# Patient Record
Sex: FEMALE | Race: WHITE | Employment: OTHER | URBAN - METROPOLITAN AREA
[De-identification: names, ages, dates, MRNs, and addresses within clinical notes are randomized per-mention and may not be internally consistent; named-entity substitution may affect disease eponyms.]

---

## 2017-07-17 PROBLEM — M16.11 PRIMARY OSTEOARTHRITIS OF RIGHT HIP: Status: ACTIVE | Noted: 2017-07-17

## 2018-04-12 ENCOUNTER — HOSPITAL ENCOUNTER (OUTPATIENT)
Age: 73
Discharge: HOME | End: 2018-04-14

## 2018-04-12 PROCEDURE — 88360 TUMOR IMMUNOHISTOCHEM/MANUAL: CPT

## 2018-04-12 PROCEDURE — 88342 IMHCHEM/IMCYTCHM 1ST ANTB: CPT

## 2018-04-12 PROCEDURE — 88305 TISSUE EXAM BY PATHOLOGIST: CPT

## 2018-04-12 PROCEDURE — 88341 IMHCHEM/IMCYTCHM EA ADD ANTB: CPT

## 2021-04-14 ENCOUNTER — HOSPITAL ENCOUNTER (EMERGENCY)
Age: 76
Discharge: HOME | End: 2021-04-14
Payer: MEDICARE

## 2021-04-14 VITALS
SYSTOLIC BLOOD PRESSURE: 179 MMHG | HEART RATE: 80 BPM | RESPIRATION RATE: 18 BRPM | BODY MASS INDEX: 30.86 KG/M2 | TEMPERATURE: 98 F | HEIGHT: 58 IN | OXYGEN SATURATION: 98 % | DIASTOLIC BLOOD PRESSURE: 71 MMHG | WEIGHT: 147 LBS

## 2021-04-14 DIAGNOSIS — H65.02 ACUTE SEROUS OTITIS MEDIA OF LEFT EAR, RECURRENCE NOT SPECIFIED: ICD-10-CM

## 2021-04-14 DIAGNOSIS — R42 LIGHTHEADEDNESS: Primary | ICD-10-CM

## 2021-04-14 PROCEDURE — 99211 OFF/OP EST MAY X REQ PHY/QHP: CPT

## 2021-04-14 PROCEDURE — 6370000000 HC RX 637 (ALT 250 FOR IP): Performed by: PHYSICIAN ASSISTANT

## 2021-04-14 RX ORDER — MECLIZINE HCL 12.5 MG/1
25 TABLET ORAL ONCE
Status: COMPLETED | OUTPATIENT
Start: 2021-04-14 | End: 2021-04-14

## 2021-04-14 RX ORDER — ASCORBIC ACID 500 MG
500 TABLET ORAL DAILY
COMMUNITY

## 2021-04-14 RX ORDER — ALPRAZOLAM 0.5 MG/1
0.5 TABLET ORAL NIGHTLY PRN
COMMUNITY
End: 2022-08-15 | Stop reason: ALTCHOICE

## 2021-04-14 RX ORDER — OLMESARTAN MEDOXOMIL 40 MG/1
40 TABLET ORAL DAILY
COMMUNITY

## 2021-04-14 RX ORDER — MECLIZINE HYDROCHLORIDE 25 MG/1
25 TABLET ORAL 3 TIMES DAILY PRN
Qty: 21 TABLET | Refills: 0 | Status: SHIPPED | OUTPATIENT
Start: 2021-04-14 | End: 2021-04-21

## 2021-04-14 RX ORDER — ANASTROZOLE 1 MG/1
1 TABLET ORAL DAILY
COMMUNITY

## 2021-04-14 RX ORDER — LORATADINE 10 MG/1
10 TABLET ORAL DAILY
Qty: 7 TABLET | Refills: 0 | Status: SHIPPED | OUTPATIENT
Start: 2021-04-14 | End: 2021-04-21

## 2021-04-14 RX ADMIN — MECLIZINE 25 MG: 12.5 TABLET ORAL at 19:29

## 2021-04-14 NOTE — ED PROVIDER NOTES
[address] [facility]  Department of Emergency Medicine   ED  Encounter Note  Admit Date/RoomTime: 2021  7:02 PM  ED Room:     NAME: Crystal Evans  : 1945  MRN: [id number]     Chief Complaint:  Dizziness (Pt c/o dizziness with nausea for 3 days, also c/o L ear feeling clogged off and on for 3 days) and Nausea    History of Present Illness        Crystal Evans is a 67 y.o. old female who presents to the emergency department with a complaint of dizziness. Patient states for 3 days now she has had some sinus congestion. Facial pressure. She feels pain and clogging to her left ear. In addition she has been light headed. She just feels a little off balance and lightheaded. The room is not spinning. She thought maybe it was a wax buildup in her ear so she has been using over-the-counter Debrox to her left ear. Does get slightly nauseated when she gets the dizzy feeling. Patient denies any recurrent headaches. Denies any visual changes or blurred vision. Denies sore throat. Denies cough or chest congestion. Denies any vomiting or diarrhea. Patient is not a diabetic. She is not on any blood thinners. Vertebrobasilar CVA Symptoms:    Vertigo: no (0)   Diplopia: no (0)   Decreased Vision: no (0)   Oscillopsia: no (0)   Ataxia: yes (1)   Precipitated by moving one upper extremity with  Decreased BP (subclavian steal syndrome):       no (0)   Total:    1     N/A  ROS   Pertinent positives and negatives are stated within HPI, all other systems reviewed and are negative. Past Medical History:  has a past medical history of Cancer ([address] Hyperlipidemia, Hypertension, and Pulmonary embolism ([address] Surgical History:  has a past surgical history that includes joint replacement; Foot surgery; Cholecystectomy;  section; Carpal tunnel release; Mastectomy (Right, 2018); and Mastectomy (Right, 2018). Social History:  reports that she has never smoked.  She has never used smokeless tobacco. She reports current alcohol use. She reports that she does not use drugs. Family History: family history is not on file. Allergies: Medrol [methylprednisolone], Penicillins, Biaxin [clarithromycin], Cortisone, Prednisone, Tetracyclines & related, and Procardia [nifedipine]    Physical Exam   Oxygen Saturation Interpretation: Normal.        ED Triage Vitals [04/14/21 1904]   BP Temp Temp Source Pulse Resp SpO2 Height Weight   (!) 179/71 98 °F (36.7 °C) Infrared 80 18 98 % 4' 10\" (1.473 m) 147 lb (66.7 kg)       General:  NAD. Alert and Oriented. Well-appearing. Skin:  Warm, dry. No rashes. Head:  Normocephalic. Atraumatic. Eyes:  EOMI. Conjunctiva normal.  Pupils equal round and reactive to light. ENT:  Oral mucosa moist.  Airway patent. Posterior pharynx slightly erythematous with some minimal vesicle formation to the posterior pharynx wall. Left TM is bulging with air bubbles behind the TM. Left ear canal itself has absolutely no wax, no swelling, no edema. Right TM also has a slight bulge, but no air-fluid levels. Neck:  Supple. Normal ROM. Respiratory:  No respiratory distress. No labored breathing. Lungs clear without rales, rhonchi or wheezing. Cardiovascular:  Regular rate. No peripheral edema. Extremities warm and good color. Extremities:  Normal ROM. Nontender to palpation. Atraumatic. Back:  Normal ROM. Nontender to palpation. Neuro:  Alert and Oriented to person, place, time and situation. Normal LOC. Moves all extremities. Speech fluent. Alert and Oriented to person, place, time, and situation. Normal LOC. Normal speech. No visual deficit. Negative facial droop. Tongue midline. Negative arm drift bilateral.  Equal  strength. Negative leg drift bilateral. Heel to shin normal bilateral.  Normal sensation to bilateral face, both arms and both legs. When I had patient stand she did sway for a second and then got her balance and was okay.   Psych: Calm and Cooperative. Normal thought process. Normal judgement. Lab / Imaging Results   (All laboratory and radiology results have been personally reviewed by myself)  Labs:  No results found for this visit on 04/14/21. Imaging: All Radiology results interpreted by Radiologist unless otherwise noted. No orders to display         ED Course / Medical Decision Making     Medications   meclizine (ANTIVERT) tablet 25 mg (25 mg Oral Given 4/14/21 1929)        Re-Evaluations:  4/14/21      Time: 1955    Patients condition remains unchanged. Consultations:             None    Procedures:   none    MDM: Patient medicated 1 time here with Antivert. Prescription of Antivert and Claritin called in. I did explain to her that if she does not get any relief of this dizziness by tonight that she is to go to the emergency room for further evaluation. Patient understands. Plan of Care/Counseling:  I reviewed today's visit with the patient in addition to providing specific details for the plan of care and counseling regarding the diagnosis and prognosis. Questions are answered at this time and are agreeable with the plan. Assessment      1. Lightheadedness New Problem   2. Acute serous otitis media of left ear, recurrence not specified New Problem     This patient's ED course included: a personal history and physicial examination and re-evaluation prior to disposition  This patient has remained hemodynamically stable and improved during their ED course. Plan   Discharge home. Patient condition is good. New Medications     New Prescriptions    LORATADINE (CLARITIN) 10 MG TABLET    Take 1 tablet by mouth daily for 7 days    MECLIZINE (ANTIVERT) 25 MG TABLET    Take 1 tablet by mouth 3 times daily as needed for Dizziness     Electronically signed by JELENA Brady   DD: 4/14/21  **This report was transcribed using voice recognition software.  Every effort was made to ensure accuracy; however, inadvertent

## 2022-08-15 ENCOUNTER — APPOINTMENT (OUTPATIENT)
Dept: GENERAL RADIOLOGY | Age: 77
End: 2022-08-15
Payer: MEDICARE

## 2022-08-15 ENCOUNTER — HOSPITAL ENCOUNTER (EMERGENCY)
Age: 77
Discharge: HOME | End: 2022-08-15
Attending: EMERGENCY MEDICINE
Payer: MEDICARE

## 2022-08-15 VITALS
TEMPERATURE: 97.8 F | RESPIRATION RATE: 16 BRPM | OXYGEN SATURATION: 97 % | DIASTOLIC BLOOD PRESSURE: 78 MMHG | HEIGHT: 58 IN | SYSTOLIC BLOOD PRESSURE: 167 MMHG | HEART RATE: 82 BPM | WEIGHT: 138 LBS | BODY MASS INDEX: 28.97 KG/M2

## 2022-08-15 DIAGNOSIS — M23.91 INTERNAL DERANGEMENT OF RIGHT KNEE: Primary | ICD-10-CM

## 2022-08-15 PROCEDURE — 99283 EMERGENCY DEPT VISIT LOW MDM: CPT

## 2022-08-15 PROCEDURE — 6370000000 HC RX 637 (ALT 250 FOR IP): Performed by: EMERGENCY MEDICINE

## 2022-08-15 PROCEDURE — 73564 X-RAY EXAM KNEE 4 OR MORE: CPT

## 2022-08-15 RX ORDER — PANTOPRAZOLE SODIUM 40 MG/1
40 GRANULE, DELAYED RELEASE ORAL
COMMUNITY

## 2022-08-15 RX ORDER — ROSUVASTATIN CALCIUM 5 MG/1
5 TABLET, COATED ORAL DAILY
COMMUNITY

## 2022-08-15 RX ORDER — ACETAMINOPHEN 500 MG
1000 TABLET ORAL ONCE
Status: COMPLETED | OUTPATIENT
Start: 2022-08-15 | End: 2022-08-15

## 2022-08-15 RX ORDER — TRAMADOL HYDROCHLORIDE 50 MG/1
50 TABLET ORAL EVERY 8 HOURS PRN
Qty: 9 TABLET | Refills: 0 | Status: SHIPPED | OUTPATIENT
Start: 2022-08-15 | End: 2022-08-18

## 2022-08-15 RX ADMIN — ACETAMINOPHEN 1000 MG: 500 TABLET ORAL at 12:33

## 2022-08-15 ASSESSMENT — ENCOUNTER SYMPTOMS
BACK PAIN: 0
PHOTOPHOBIA: 0
CHEST TIGHTNESS: 0
COLOR CHANGE: 0
SINUS PAIN: 0
SHORTNESS OF BREATH: 0

## 2022-08-15 ASSESSMENT — PAIN - FUNCTIONAL ASSESSMENT: PAIN_FUNCTIONAL_ASSESSMENT: 0-10

## 2022-08-15 ASSESSMENT — PAIN DESCRIPTION - LOCATION
LOCATION: KNEE
LOCATION: KNEE

## 2022-08-15 ASSESSMENT — PAIN SCALES - GENERAL
PAINLEVEL_OUTOF10: 4
PAINLEVEL_OUTOF10: 2

## 2022-08-15 ASSESSMENT — PAIN DESCRIPTION - PAIN TYPE: TYPE: ACUTE PAIN

## 2022-08-15 ASSESSMENT — PAIN DESCRIPTION - DESCRIPTORS
DESCRIPTORS: SHARP;TIGHTNESS;PRESSURE
DESCRIPTORS: SQUEEZING;STABBING;SHOOTING

## 2022-08-15 ASSESSMENT — PAIN DESCRIPTION - ORIENTATION
ORIENTATION: RIGHT
ORIENTATION: RIGHT

## 2022-08-15 NOTE — Clinical Note
Shannon Ariza was seen and treated in our emergency department on 8/15/2022. She may return to work on 08/17/2022. If you have any questions or concerns, please don't hesitate to call.       Ronnie Berrios, DO

## 2022-08-15 NOTE — ED PROVIDER NOTES
HPI:  8/15/22, Time: 10:45 AM EDT         Misa Bales is a 67 y.o. female presenting to the ED for sharp right knee pain wile moving a lawn chair, beginning yesterday ago. The complaint has been intermittent, moderate in severity, and worsened by nothing. 68 yo f who ntoes she takes aspirin was bending over movin ga lawn chair yesterday and felt a sharp pop in her right knee. She ntoes it happened twice has been having pain since. She notes she couldn't bend it yesterday now. Pt follows w dr Shira cross in [place]. Pain is now 2/10 was 10/10 better w rest worse with movement. Review of Systems   Constitutional:  Negative for chills and fever. HENT:  Negative for congestion and sinus pain. Eyes:  Negative for photophobia and visual disturbance. Respiratory:  Negative for chest tightness and shortness of breath. Cardiovascular:  Negative for chest pain and palpitations. Endocrine: Negative for polyphagia and polyuria. Genitourinary:  Negative for difficulty urinating and dysuria. Musculoskeletal:  Negative for arthralgias and back pain. Skin:  Negative for color change and rash. Allergic/Immunologic: Negative for immunocompromised state. Neurological:  Negative for dizziness and light-headedness. Hematological:  Negative for adenopathy. Does not bruise/bleed easily. Psychiatric/Behavioral:  Negative for agitation.              --------------------------------------------- PAST HISTORY ---------------------------------------------  Past Medical History:  has a past medical history of Cancer [address] [address] Hyperlipidemia, Hypertension, and Pulmonary embolism ([facility] Past Surgical History:  has a past surgical history that includes joint replacement; Foot surgery; Cholecystectomy;  section; Carpal tunnel release; Mastectomy (Right, 2018); and Mastectomy (Right, 2018). Social History:  reports that she has never smoked.  She has never used smokeless tobacco. She reports current alcohol use. She reports that she does not use drugs. Family History: family history is not on file. The patients home medications have been reviewed. Allergies: Medrol [methylprednisolone], Penicillins, Biaxin [clarithromycin], Cortisone, Prednisone, Tetracyclines & related, and Procardia [nifedipine]    -------------------------------------------------- RESULTS -------------------------------------------------  All laboratory and radiology results have been personally reviewed by myself   LABS:  No results found for this visit on 08/15/22. RADIOLOGY:  Interpreted by Radiologist.  XR KNEE RIGHT (MIN 4 VIEWS)   Final Result   Mild degenerative change about the right knee without acute bony abnormality.             ------------------------- NURSING NOTES AND VITALS REVIEWED ---------------------------   The nursing notes within the ED encounter and vital signs as below have been reviewed. BP (!) 167/78   Pulse 82   Temp 97.8 °F (36.6 °C) (Temporal)   Resp 16   Ht 4' 10\" (1.473 m)   Wt 138 lb (62.6 kg)   SpO2 97%   BMI 28.84 kg/m²   Oxygen Saturation Interpretation: Normal      ---------------------------------------------------PHYSICAL EXAM--------------------------------------      Physical Exam  Vitals reviewed. Constitutional:       General: She is not in acute distress. Appearance: Normal appearance. She is not toxic-appearing. HENT:      Head: Normocephalic and atraumatic. Right Ear: External ear normal.      Left Ear: External ear normal.      Nose: Nose normal. No congestion. Mouth/Throat:      Mouth: Mucous membranes are moist.      Pharynx: Oropharynx is clear. No posterior oropharyngeal erythema. Eyes:      Extraocular Movements: Extraocular movements intact. Pupils: Pupils are equal, round, and reactive to light. Cardiovascular:      Rate and Rhythm: Normal rate and regular rhythm. Pulses: Normal pulses. Heart sounds: No murmur heard.   Pulmonary: Effort: Pulmonary effort is normal.      Breath sounds: No wheezing or rhonchi. Chest:      Chest wall: No tenderness. Abdominal:      General: Bowel sounds are normal.      Tenderness: There is no abdominal tenderness. There is no right CVA tenderness, left CVA tenderness or guarding. Musculoskeletal:         General: No swelling or deformity. Cervical back: Normal range of motion and neck supple. No muscular tenderness. Skin:     General: Skin is warm and dry. Capillary Refill: Capillary refill takes less than 2 seconds. Findings: No bruising or erythema. Neurological:      General: No focal deficit present. Mental Status: She is alert and oriented to person, place, and time. Motor: No weakness. Coordination: Coordination normal.   Psychiatric:         Mood and Affect: Mood normal.           ------------------------------ ED COURSE/MEDICAL DECISION MAKING----------------------  Medications   acetaminophen (TYLENOL) tablet 1,000 mg (1,000 mg Oral Given 8/15/22 1233)         ED COURSE:       Medical Decision Making:    The patient with right knee pain. She likely may have a meniscus tear. Recommend rest ice elevation patient was placed in knee immobilizer was given crutches. He is to call orthopedics and schedule appointment. Recommend pain control. We discussed warning signs and precautions while taking pain medicine at home. She is also to follow-up with her primary care physician. Patient verbalized understanding of all instructions she will have her doctor follow-up all test results    Counseling: The emergency provider has spoken with the patient and discussed todays results, in addition to providing specific details for the plan of care and counseling regarding the diagnosis and prognosis.   Questions are answered at this time and they are agreeable with the plan.      --------------------------------- IMPRESSION AND DISPOSITION ---------------------------------    IMPRESSION  1. Internal derangement of right knee        DISPOSITION  Disposition: Discharge to  home  Patient condition is good      NOTE: This report was transcribed using voice recognition software.  Every effort was made to ensure accuracy; however, inadvertent computerized transcription errors may be present       Pio Neal DO  08/15/22 [id number]

## 2022-12-07 ENCOUNTER — HOSPITAL ENCOUNTER (EMERGENCY)
Age: 77
Discharge: HOME | End: 2022-12-07
Payer: MEDICARE

## 2022-12-07 VITALS
TEMPERATURE: 98.6 F | OXYGEN SATURATION: 100 % | HEART RATE: 73 BPM | DIASTOLIC BLOOD PRESSURE: 75 MMHG | SYSTOLIC BLOOD PRESSURE: 183 MMHG | WEIGHT: 145 LBS | RESPIRATION RATE: 16 BRPM | BODY MASS INDEX: 30.31 KG/M2

## 2022-12-07 DIAGNOSIS — S20.362A TICK BITE OF LEFT FRONT WALL OF THORAX, INITIAL ENCOUNTER: Primary | ICD-10-CM
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
## 2022-12-07 PROCEDURE — 99211 OFF/OP EST MAY X REQ PHY/QHP: CPT

## 2022-12-07 RX ORDER — CEFUROXIME AXETIL 500 MG/1
500 TABLET ORAL 2 TIMES DAILY
Qty: 28 TABLET | Refills: 0 | Status: SHIPPED | OUTPATIENT
Start: 2022-12-07 | End: 2022-12-21

## 2022-12-07 RX ORDER — LEVOTHYROXINE SODIUM 0.03 MG/1
25 TABLET ORAL DAILY
COMMUNITY

## 2022-12-07 NOTE — ED PROVIDER NOTES
HPI:  22, Time: 10:54 AM ISH Woodruff is a 67 y.o. female presenting to the ED for tick bite, beginning 2 weeks ago. The complaint has been persistent, mild in severity, and worsened by nothing. Patient initially reporting that she thought it was a blood blister. It had not went away in the last 2 weeks therefore prompting evaluation today. Reports it is not painful. Initially was bleeding a little bit however that has since resolved. No chest pain or shortness of breath. Does live with a [place]ed area behind her house. No rashes, headache, dizziness, neck pain. Afebrile without recent travel or sick contacts. Patient denies all other symptoms at this time. Review of Systems:   A complete review of systems was performed and pertinent positives and negatives are stated within HPI, all other systems reviewed and are negative.          --------------------------------------------- PAST HISTORY ---------------------------------------------  Past Medical History:  has a past medical history of Cancer ([facility] [address] Hyperlipidemia, Hypertension, and Pulmonary embolism ([facility] Past Surgical History:  has a past surgical history that includes joint replacement; Foot surgery; Cholecystectomy;  section; Carpal tunnel release; Mastectomy (Right, 2018); and Mastectomy (Right, 2018). Social History:  reports that she has never smoked. She has never used smokeless tobacco. She reports current alcohol use. She reports that she does not use drugs. Family History: family history is not on file. The patients home medications have been reviewed.     Allergies: Medrol [methylprednisolone], Penicillins, Biaxin [clarithromycin], Cortisone, Prednisone, Tetracyclines & related, and Procardia [nifedipine]    -------------------------------------------------- RESULTS -------------------------------------------------  All laboratory and radiology results have been personally reviewed by myself LABS:  No results found for this visit on 12/07/22. RADIOLOGY:  Interpreted by Radiologist.  No orders to display       ------------------------- NURSING NOTES AND VITALS REVIEWED ---------------------------   The nursing notes within the ED encounter and vital signs as below have been reviewed. BP (!) 183/75   Pulse 73   Temp 98.6 °F (37 °C)   Resp 16   Wt 145 lb (65.8 kg)   SpO2 100%   BMI 30.31 kg/m²   Oxygen Saturation Interpretation: Normal      ---------------------------------------------------PHYSICAL EXAM--------------------------------------      Constitutional/General: Alert and oriented x3, well appearing, non toxic in NAD  Head: Normocephalic and atraumatic  Eyes: PERRL, EOMI  Mouth: Oropharynx clear, handling secretions, no trismus  Neck: Supple, full ROM,   Pulmonary: Lungs clear to auscultation bilaterally, no wheezes, rales, or rhonchi. Not in respiratory distress  Cardiovascular:  Regular rate and rhythm, no murmurs, gallops, or rubs. 2+ distal pulses  Extremities: Moves all extremities x 4. Warm and well perfused  Skin: warm and dry without rash. Area of concern to left upper chest is actually a tick and not a blood blister as patient initially thought. Tick still appears to be alive and moving and embedded. No surrounding rash noted. No tenderness to palpation of this area. Neurologic: GCS 15,  Psych: Normal Affect      ------------------------------ ED COURSE/MEDICAL DECISION MAKING----------------------  Medications - No data to display      ED COURSE:       Medical Decision Making:    Patient is a 78-year-old female presenting to urgent care today with what she thought was a blood blister to the left upper chest however found to be a tick on physical exam.  Tick was easily removed in its entirety with forceps. Patient tolerated procedure well. Minimal bleeding following. Bacitracin and Band-Aid placed.   At this time discussed Lyme disease prophylaxis as she thinks that this has been attached for 2 weeks. She is allergic to tetracyclines therefore doxycycline nonamenable. We will go ahead and treat with Ceftin for 2 weeks. Recommended very close follow-up with PCP for recheck return emergency department any new or worsening symptoms. Patient voiced understanding and is agreeable to the above treatment plan. Counseling: The emergency provider has spoken with the patient and spouse/SO and discussed todays results, in addition to providing specific details for the plan of care and counseling regarding the diagnosis and prognosis. Questions are answered at this time and they are agreeable with the plan.      --------------------------------- IMPRESSION AND DISPOSITION ---------------------------------    IMPRESSION  1. Tick bite of left front wall of thorax, initial encounter        DISPOSITION  Disposition: Discharge to home  Patient condition is stable      NOTE: This report was transcribed using voice recognition software.  Every effort was made to ensure accuracy; however, inadvertent computerized transcription errors may be present        Jenna Samaniego  12/07/22 1116

## 2023-04-25 ENCOUNTER — APPOINTMENT (OUTPATIENT)
Dept: CT IMAGING | Age: 78
End: 2023-04-25
Payer: MEDICARE

## 2023-04-25 ENCOUNTER — HOSPITAL ENCOUNTER (EMERGENCY)
Age: 78
Discharge: HOME | End: 2023-04-26
Attending: EMERGENCY MEDICINE
Payer: MEDICARE

## 2023-04-25 ENCOUNTER — APPOINTMENT (OUTPATIENT)
Dept: GENERAL RADIOLOGY | Age: 78
End: 2023-04-25
Payer: MEDICARE

## 2023-04-25 VITALS
DIASTOLIC BLOOD PRESSURE: 96 MMHG | OXYGEN SATURATION: 98 % | TEMPERATURE: 98.6 F | HEART RATE: 85 BPM | BODY MASS INDEX: 30.1 KG/M2 | RESPIRATION RATE: 16 BRPM | WEIGHT: 144 LBS | SYSTOLIC BLOOD PRESSURE: 183 MMHG

## 2023-04-25 DIAGNOSIS — B02.9 HERPES ZOSTER WITHOUT COMPLICATION: Primary | ICD-10-CM

## 2023-04-25 DIAGNOSIS — R03.0 ELEVATED BLOOD PRESSURE READING: ICD-10-CM

## 2023-04-25 LAB
LACTATE BLDV-SCNC: 0.8 MMOL/L (ref 0.5–2.2)
REASON FOR REJECTION: NORMAL
REASON FOR REJECTION: NORMAL
REJECTED TEST: NORMAL
REJECTED TEST: NORMAL

## 2023-04-25 PROCEDURE — 93005 ELECTROCARDIOGRAM TRACING: CPT | Performed by: EMERGENCY MEDICINE

## 2023-04-25 PROCEDURE — 6370000000 HC RX 637 (ALT 250 FOR IP): Performed by: EMERGENCY MEDICINE

## 2023-04-25 PROCEDURE — 83605 ASSAY OF LACTIC ACID: CPT

## 2023-04-25 PROCEDURE — 99284 EMERGENCY DEPT VISIT MOD MDM: CPT

## 2023-04-25 RX ORDER — 0.9 % SODIUM CHLORIDE 0.9 %
1000 INTRAVENOUS SOLUTION INTRAVENOUS ONCE
Status: DISCONTINUED | OUTPATIENT
Start: 2023-04-25 | End: 2023-04-26 | Stop reason: HOSPADM

## 2023-04-25 RX ORDER — ONDANSETRON 4 MG/1
8 TABLET, ORALLY DISINTEGRATING ORAL ONCE
Status: COMPLETED | OUTPATIENT
Start: 2023-04-25 | End: 2023-04-25

## 2023-04-25 RX ORDER — HYDROCODONE BITARTRATE AND ACETAMINOPHEN 5; 325 MG/1; MG/1
1 TABLET ORAL ONCE
Status: COMPLETED | OUTPATIENT
Start: 2023-04-25 | End: 2023-04-25

## 2023-04-25 RX ADMIN — ONDANSETRON 8 MG: 4 TABLET, ORALLY DISINTEGRATING ORAL at 23:39

## 2023-04-25 RX ADMIN — HYDROCODONE BITARTRATE AND ACETAMINOPHEN 1 TABLET: 5; 325 TABLET ORAL at 23:38

## 2023-04-25 ASSESSMENT — PAIN - FUNCTIONAL ASSESSMENT: PAIN_FUNCTIONAL_ASSESSMENT: NONE - DENIES PAIN

## 2023-04-25 ASSESSMENT — PAIN DESCRIPTION - LOCATION: LOCATION: RIB CAGE

## 2023-04-25 ASSESSMENT — PAIN DESCRIPTION - DESCRIPTORS: DESCRIPTORS: BURNING

## 2023-04-25 ASSESSMENT — PAIN SCALES - GENERAL: PAINLEVEL_OUTOF10: 3

## 2023-04-26 LAB
EKG ATRIAL RATE: 75 BPM
EKG P AXIS: 23 DEGREES
EKG P-R INTERVAL: 174 MS
EKG Q-T INTERVAL: 386 MS
EKG QRS DURATION: 84 MS
EKG QTC CALCULATION (BAZETT): 431 MS
EKG R AXIS: 17 DEGREES
EKG T AXIS: 14 DEGREES
EKG VENTRICULAR RATE: 75 BPM

## 2023-04-26 PROCEDURE — 93010 ELECTROCARDIOGRAM REPORT: CPT | Performed by: INTERNAL MEDICINE

## 2023-04-26 RX ORDER — HYDROCODONE BITARTRATE AND ACETAMINOPHEN 5; 325 MG/1; MG/1
1 TABLET ORAL EVERY 8 HOURS PRN
Qty: 10 TABLET | Refills: 0 | Status: SHIPPED | OUTPATIENT
Start: 2023-04-26 | End: 2023-04-29

## 2023-04-26 RX ORDER — ACYCLOVIR 800 MG/1
800 TABLET ORAL
Qty: 35 TABLET | Refills: 0 | Status: SHIPPED | OUTPATIENT
Start: 2023-04-26 | End: 2023-05-03

## 2023-04-26 RX ORDER — ONDANSETRON 4 MG/1
8 TABLET, ORALLY DISINTEGRATING ORAL 3 TIMES DAILY PRN
Qty: 14 TABLET | Refills: 0 | Status: SHIPPED | OUTPATIENT
Start: 2023-04-26

## 2023-04-26 NOTE — ED NOTES
EKG completed and pt placed on monitor.  BP rechecked at this time     Giovanni Staton  04/25/23 0891

## 2023-04-26 NOTE — DISCHARGE INSTRUCTIONS
NOTE:  Please see your family doctor in 3 days for reevaluation and also for a blood pressure recheck.

## 2023-04-26 NOTE — ED PROVIDER NOTES
transcribed using voice recognition software.  Every effort was made to ensure accuracy; however, inadvertent computerized transcription errors may be present        Goyo Vásquez MD  04/26/23 0007

## 2023-08-05 ENCOUNTER — HOSPITAL ENCOUNTER (EMERGENCY)
Age: 78
Discharge: HOME | End: 2023-08-05
Payer: MEDICARE

## 2023-08-05 VITALS
HEART RATE: 77 BPM | WEIGHT: 143 LBS | TEMPERATURE: 97.5 F | DIASTOLIC BLOOD PRESSURE: 85 MMHG | BODY MASS INDEX: 29.89 KG/M2 | SYSTOLIC BLOOD PRESSURE: 182 MMHG | OXYGEN SATURATION: 100 % | RESPIRATION RATE: 18 BRPM

## 2023-08-05 DIAGNOSIS — N30.01 ACUTE CYSTITIS WITH HEMATURIA: Primary | ICD-10-CM

## 2023-08-05 LAB
BACTERIA URNS QL MICRO: ABNORMAL
BILIRUB UR QL STRIP: ABNORMAL
CLARITY UR: ABNORMAL
COLOR UR: ABNORMAL
GLUCOSE UR STRIP-MCNC: NEGATIVE MG/DL
HGB UR QL STRIP.AUTO: ABNORMAL
KETONES UR STRIP-MCNC: 15 MG/DL
LEUKOCYTE ESTERASE UR QL STRIP: ABNORMAL
NITRITE UR QL STRIP: NEGATIVE
PH UR STRIP: 5 [PH] (ref 5–9)
PROT UR STRIP-MCNC: >=300 MG/DL
RBC #/AREA URNS HPF: ABNORMAL /HPF
SP GR UR STRIP: 1.01 (ref 1–1.03)
UROBILINOGEN UR STRIP-ACNC: 4 EU/DL (ref 0–1)
WBC #/AREA URNS HPF: ABNORMAL /HPF

## 2023-08-05 PROCEDURE — 99211 OFF/OP EST MAY X REQ PHY/QHP: CPT

## 2023-08-05 PROCEDURE — 81001 URINALYSIS AUTO W/SCOPE: CPT

## 2023-08-05 PROCEDURE — 87086 URINE CULTURE/COLONY COUNT: CPT

## 2023-08-05 PROCEDURE — 87077 CULTURE AEROBIC IDENTIFY: CPT

## 2023-08-05 RX ORDER — CEFUROXIME AXETIL 250 MG/1
250 TABLET ORAL 2 TIMES DAILY
Qty: 14 TABLET | Refills: 0 | Status: SHIPPED | OUTPATIENT
Start: 2023-08-05 | End: 2023-08-12

## 2023-08-05 RX ORDER — GABAPENTIN 100 MG/1
100 CAPSULE ORAL 3 TIMES DAILY
COMMUNITY

## 2023-08-06 LAB
MICROORGANISM SPEC CULT: ABNORMAL
SPECIMEN DESCRIPTION: ABNORMAL

## 2023-08-07 LAB
MICROORGANISM SPEC CULT: ABNORMAL
SPECIMEN DESCRIPTION: ABNORMAL

## 2023-08-07 NOTE — ED NOTES
Reviewed patients after hours culture results. Urine culture growing ESCHERICHIA COLI (S) cefoxitin, patient discharged on cefuroxime. No need for further intervention at this time.     Delon Rousseau, PharmD, BCPS 8/7/2023 12:11 PM   [phone]

## 2023-09-11 ENCOUNTER — HOSPITAL ENCOUNTER (EMERGENCY)
Age: 78
Discharge: HOME | End: 2023-09-11
Payer: MEDICARE

## 2023-09-11 VITALS
HEIGHT: 58 IN | RESPIRATION RATE: 20 BRPM | HEART RATE: 75 BPM | DIASTOLIC BLOOD PRESSURE: 80 MMHG | TEMPERATURE: 98.7 F | WEIGHT: 143 LBS | BODY MASS INDEX: 30.02 KG/M2 | SYSTOLIC BLOOD PRESSURE: 140 MMHG | OXYGEN SATURATION: 100 %

## 2023-09-11 DIAGNOSIS — M43.6 TORTICOLLIS: Primary | ICD-10-CM

## 2023-09-11 PROCEDURE — 6360000002 HC RX W HCPCS: Performed by: NURSE PRACTITIONER

## 2023-09-11 PROCEDURE — 96372 THER/PROPH/DIAG INJ SC/IM: CPT

## 2023-09-11 PROCEDURE — 99211 OFF/OP EST MAY X REQ PHY/QHP: CPT

## 2023-09-11 RX ORDER — TRAMADOL HYDROCHLORIDE 50 MG/1
50 TABLET ORAL EVERY 6 HOURS PRN
Qty: 12 TABLET | Refills: 0 | Status: SHIPPED | OUTPATIENT
Start: 2023-09-11 | End: 2023-09-14

## 2023-09-11 RX ORDER — CYCLOBENZAPRINE HCL 5 MG
5 TABLET ORAL 2 TIMES DAILY PRN
Qty: 14 TABLET | Refills: 0 | Status: SHIPPED | OUTPATIENT
Start: 2023-09-11 | End: 2023-09-18

## 2023-09-11 RX ORDER — KETOROLAC TROMETHAMINE 30 MG/ML
30 INJECTION, SOLUTION INTRAMUSCULAR; INTRAVENOUS ONCE
Status: COMPLETED | OUTPATIENT
Start: 2023-09-11 | End: 2023-09-11

## 2023-09-11 RX ORDER — MENTHOL 40 MG/ML
GEL TOPICAL
Qty: 1 EACH | Refills: 0 | Status: SHIPPED | OUTPATIENT
Start: 2023-09-11

## 2023-09-11 RX ADMIN — KETOROLAC TROMETHAMINE 30 MG: 30 INJECTION, SOLUTION INTRAMUSCULAR; INTRAVENOUS at 09:02

## 2023-09-11 ASSESSMENT — PAIN SCALES - GENERAL: PAINLEVEL_OUTOF10: 10

## 2023-09-11 ASSESSMENT — PAIN DESCRIPTION - DESCRIPTORS: DESCRIPTORS: ACHING;DISCOMFORT

## 2023-09-11 ASSESSMENT — PAIN DESCRIPTION - LOCATION: LOCATION: NECK

## 2023-09-11 ASSESSMENT — PAIN - FUNCTIONAL ASSESSMENT: PAIN_FUNCTIONAL_ASSESSMENT: 0-10

## 2023-09-12 PROBLEM — K21.9 GERD (GASTROESOPHAGEAL REFLUX DISEASE): Status: ACTIVE | Noted: 2023-09-12

## 2023-09-12 PROBLEM — C50.919 INVASIVE CARCINOMA OF BREAST (MULTI): Status: ACTIVE | Noted: 2023-09-12

## 2023-09-12 PROBLEM — R59.0 AXILLARY LYMPHADENOPATHY: Status: ACTIVE | Noted: 2023-09-12

## 2023-09-12 PROBLEM — R51.9 CHRONIC HEADACHES: Status: ACTIVE | Noted: 2023-09-12

## 2023-09-12 PROBLEM — G89.29 CHRONIC HEADACHES: Status: ACTIVE | Noted: 2023-09-12

## 2023-09-12 PROBLEM — K64.9 HEMORRHOID: Status: ACTIVE | Noted: 2023-09-12

## 2023-09-12 PROBLEM — I10 HTN (HYPERTENSION): Status: ACTIVE | Noted: 2023-09-12

## 2023-09-12 PROBLEM — N64.1 FAT NECROSIS OF BREAST: Status: ACTIVE | Noted: 2023-09-12

## 2023-09-12 PROBLEM — I97.2 LYMPHEDEMA SYNDROME, POSTMASTECTOMY: Status: ACTIVE | Noted: 2023-09-12

## 2023-09-12 PROBLEM — M19.90 OSTEOARTHRITIS: Status: ACTIVE | Noted: 2023-09-12

## 2023-09-12 RX ORDER — OLMESARTAN MEDOXOMIL 40 MG/1
1 TABLET ORAL DAILY
COMMUNITY

## 2023-09-12 RX ORDER — METOPROLOL TARTRATE 50 MG/1
50 TABLET ORAL 2 TIMES DAILY
COMMUNITY

## 2023-09-12 RX ORDER — ARTIFICIAL TEARS 1; 2; 3 MG/ML; MG/ML; MG/ML
1 SOLUTION/ DROPS OPHTHALMIC 4 TIMES DAILY
COMMUNITY

## 2023-09-12 RX ORDER — LEVOTHYROXINE SODIUM 25 UG/1
1 TABLET ORAL DAILY
COMMUNITY

## 2023-09-12 RX ORDER — ASPIRIN 81 MG/1
TABLET ORAL
COMMUNITY
Start: 2018-04-30

## 2023-09-12 RX ORDER — ANASTROZOLE 1 MG/1
TABLET ORAL
COMMUNITY
Start: 2023-04-10

## 2023-09-12 RX ORDER — PSEUDOEPHEDRINE HCL 30 MG
TABLET ORAL
COMMUNITY
Start: 2021-09-20

## 2023-09-12 RX ORDER — PANTOPRAZOLE SODIUM 40 MG/1
1 TABLET, DELAYED RELEASE ORAL DAILY
COMMUNITY
End: 2024-03-27

## 2023-09-12 RX ORDER — VITAMIN E 268 MG
CAPSULE ORAL
COMMUNITY
Start: 2021-09-20

## 2023-09-12 RX ORDER — CYANOCOBALAMIN (VITAMIN B-12) 500 MCG
TABLET ORAL
COMMUNITY
Start: 2021-09-20 | End: 2024-03-27 | Stop reason: ALTCHOICE

## 2023-09-12 RX ORDER — ROSUVASTATIN CALCIUM 5 MG/1
1 TABLET, COATED ORAL DAILY
COMMUNITY
Start: 2022-07-12

## 2023-10-13 RX ORDER — GABAPENTIN 100 MG/1
200 CAPSULE ORAL EVERY 8 HOURS
COMMUNITY

## 2023-10-16 ENCOUNTER — OFFICE VISIT (OUTPATIENT)
Dept: HEMATOLOGY/ONCOLOGY | Facility: CLINIC | Age: 78
End: 2023-10-16
Payer: MEDICARE

## 2023-10-16 ENCOUNTER — LAB (OUTPATIENT)
Dept: LAB | Facility: HOSPITAL | Age: 78
End: 2023-10-16
Payer: MEDICARE

## 2023-10-16 VITALS
SYSTOLIC BLOOD PRESSURE: 135 MMHG | DIASTOLIC BLOOD PRESSURE: 78 MMHG | OXYGEN SATURATION: 96 % | TEMPERATURE: 97.2 F | WEIGHT: 135.47 LBS | BODY MASS INDEX: 28.44 KG/M2 | HEART RATE: 56 BPM | HEIGHT: 58 IN | RESPIRATION RATE: 16 BRPM

## 2023-10-16 DIAGNOSIS — C50.919 INVASIVE CARCINOMA OF BREAST (MULTI): ICD-10-CM

## 2023-10-16 LAB
ALBUMIN SERPL BCP-MCNC: 4.1 G/DL (ref 3.4–5)
ALP SERPL-CCNC: 47 U/L (ref 33–136)
ALT SERPL W P-5'-P-CCNC: 14 U/L (ref 7–45)
ANION GAP SERPL CALC-SCNC: 9 MMOL/L (ref 10–20)
AST SERPL W P-5'-P-CCNC: 17 U/L (ref 9–39)
BASOPHILS # BLD AUTO: 0.03 X10*3/UL (ref 0–0.1)
BASOPHILS NFR BLD AUTO: 0.5 %
BILIRUB SERPL-MCNC: 0.4 MG/DL (ref 0–1.2)
BUN SERPL-MCNC: 19 MG/DL (ref 6–23)
CALCIUM SERPL-MCNC: 9.7 MG/DL (ref 8.6–10.3)
CHLORIDE SERPL-SCNC: 107 MMOL/L (ref 98–107)
CO2 SERPL-SCNC: 32 MMOL/L (ref 21–32)
CREAT SERPL-MCNC: 0.99 MG/DL (ref 0.5–1.05)
EOSINOPHIL # BLD AUTO: 0.12 X10*3/UL (ref 0–0.4)
EOSINOPHIL NFR BLD AUTO: 2.2 %
ERYTHROCYTE [DISTWIDTH] IN BLOOD BY AUTOMATED COUNT: 12.8 % (ref 11.5–14.5)
GFR SERPL CREATININE-BSD FRML MDRD: 58 ML/MIN/1.73M*2
GLUCOSE SERPL-MCNC: 93 MG/DL (ref 74–99)
HCT VFR BLD AUTO: 38.1 % (ref 36–46)
HGB BLD-MCNC: 12.5 G/DL (ref 12–16)
IMM GRANULOCYTES # BLD AUTO: 0.02 X10*3/UL (ref 0–0.5)
IMM GRANULOCYTES NFR BLD AUTO: 0.4 % (ref 0–0.9)
LYMPHOCYTES # BLD AUTO: 1.44 X10*3/UL (ref 0.8–3)
LYMPHOCYTES NFR BLD AUTO: 26.3 %
MCH RBC QN AUTO: 32 PG (ref 26–34)
MCHC RBC AUTO-ENTMCNC: 32.8 G/DL (ref 32–36)
MCV RBC AUTO: 97 FL (ref 80–100)
MONOCYTES # BLD AUTO: 0.43 X10*3/UL (ref 0.05–0.8)
MONOCYTES NFR BLD AUTO: 7.9 %
NEUTROPHILS # BLD AUTO: 3.43 X10*3/UL (ref 1.6–5.5)
NEUTROPHILS NFR BLD AUTO: 62.7 %
PLATELET # BLD AUTO: 142 X10*3/UL (ref 150–450)
PMV BLD AUTO: 9 FL (ref 7.5–11.5)
POTASSIUM SERPL-SCNC: 4.4 MMOL/L (ref 3.5–5.3)
PROT SERPL-MCNC: 6.3 G/DL (ref 6.4–8.2)
RBC # BLD AUTO: 3.91 X10*6/UL (ref 4–5.2)
SODIUM SERPL-SCNC: 144 MMOL/L (ref 136–145)
WBC # BLD AUTO: 5.5 X10*3/UL (ref 4.4–11.3)

## 2023-10-16 PROCEDURE — 3078F DIAST BP <80 MM HG: CPT | Performed by: NURSE PRACTITIONER

## 2023-10-16 PROCEDURE — 1159F MED LIST DOCD IN RCRD: CPT | Performed by: NURSE PRACTITIONER

## 2023-10-16 PROCEDURE — 99214 OFFICE O/P EST MOD 30 MIN: CPT | Mod: PO | Performed by: NURSE PRACTITIONER

## 2023-10-16 PROCEDURE — 1036F TOBACCO NON-USER: CPT | Performed by: NURSE PRACTITIONER

## 2023-10-16 PROCEDURE — 80053 COMPREHEN METABOLIC PANEL: CPT | Performed by: NURSE PRACTITIONER

## 2023-10-16 PROCEDURE — 1126F AMNT PAIN NOTED NONE PRSNT: CPT | Performed by: NURSE PRACTITIONER

## 2023-10-16 PROCEDURE — 85025 COMPLETE CBC W/AUTO DIFF WBC: CPT | Performed by: NURSE PRACTITIONER

## 2023-10-16 PROCEDURE — 36415 COLL VENOUS BLD VENIPUNCTURE: CPT

## 2023-10-16 PROCEDURE — 3075F SYST BP GE 130 - 139MM HG: CPT | Performed by: NURSE PRACTITIONER

## 2023-10-16 PROCEDURE — 99214 OFFICE O/P EST MOD 30 MIN: CPT | Performed by: NURSE PRACTITIONER

## 2023-10-16 ASSESSMENT — ENCOUNTER SYMPTOMS: FATIGUE: 1

## 2023-10-16 ASSESSMENT — PAIN SCALES - GENERAL: PAINLEVEL: 0-NO PAIN

## 2023-10-16 ASSESSMENT — COLUMBIA-SUICIDE SEVERITY RATING SCALE - C-SSRS: 1. IN THE PAST MONTH, HAVE YOU WISHED YOU WERE DEAD OR WISHED YOU COULD GO TO SLEEP AND NOT WAKE UP?: NO

## 2023-10-16 ASSESSMENT — PATIENT HEALTH QUESTIONNAIRE - PHQ9
SUM OF ALL RESPONSES TO PHQ9 QUESTIONS 1 AND 2: 0
1. LITTLE INTEREST OR PLEASURE IN DOING THINGS: NOT AT ALL
2. FEELING DOWN, DEPRESSED OR HOPELESS: NOT AT ALL

## 2023-10-16 NOTE — PATIENT INSTRUCTIONS
Office appointment with Kimberly for right breast cancer 2018  Rosemary will continue Anastrozole until March of 2024  Follow up with DR. Stanton in 1year  Labs in 6mons then again before next appointment in 1yr

## 2023-10-16 NOTE — PROGRESS NOTES
Patient ID: Rosemary Motta is a 77 y.o. female.  Referring Physician: No referring provider defined for this encounter.  Primary Care Provider: Mello Urbina MD  Visit Type: Follow Up    Transfer of Care from Sonam Hernandes CNP      [facility]      Office visit for follow-up and treatment  of breast cancer.   Interval History:    Rosemary Motta presents today for interval follow-up and treatment of breast cancer. She overall feels well. She does endorse insomnia with moderate fatigue. Has tried  to take Melatonin in the past, however did wake up groggy in the mornings and did not like that feeling. She does not necessarily want to take anything else as she reports she feels like she takes enough medications and does not want to add anything further.  She is taking Anastrazole 1 mg daily and reports she is tolerating it well. Denies any hot flashes. She has chronic arthralgias, recently completed physical therapy for bilateral knee pain and is completing exercises at home. She denies any chest pain,  shortness of breath, bleeding indications, fevers, chills, night sweats. No neurological symptoms, abdominal pain, other significant symptoms.      Past Medical History:  Right breast cancer: Initially referred by Dr. Kylah Avendaño in consultation for evaluation and treatment of breast cancer.  She was in fairly good health until April 2018 when she felt a mass in her right  breast.  A diagnostic mammogram done on April 12, 2018 showed 5.5 cm mass in the right upper quadrant of right breast. She had ultrasound-guided core biopsy on April 12, 2018 of right breast mass which showed invasive poorly differentiated carcinoma with  associated lymphocytic infiltrate, Amaris score of 3, estrogen receptor positive > 90%, progesterone receptor positive > 90%, HER-2/dima equivocal 2+, fish not amplified negative. On April 30, 2018 she had right axillary lymph node biopsy which showed  benign lymph node with reactive  "changes. 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 She had Goldilocks mastectomy on December 18, 2018 after neoadjuvant chemotherapy with Adriamycin, Cytoxan  and Taxol.  She had residual lobular carcinoma in the breast. 3 sentinel lymph nodes are negative for metastatic disease. She had a lesion on the right chest. Biopsy of the chest wall mass was benign. Started hormonal therapy with Anastrazole 1 mg daily-  Started March 2019.        Review of Systems   Constitutional:  Positive for fatigue.        Objective   BSA: 1.59 meters squared  /78 (BP Location: Left arm, Patient Position: Sitting, BP Cuff Size: Adult)   Pulse 56   Temp 36.2 °C (97.2 °F) (Skin)   Resp 16   Ht 1.473 m (4' 10\")   Wt 61.4 kg (135 lb 7.6 oz)   SpO2 96%   BMI 28.31 kg/m²      has a past medical history of Complication of other artery following a procedure, not elsewhere classified, subsequent encounter, Personal history of other diseases of the circulatory system, Personal history of other diseases of the digestive system, and Personal history of other specified conditions.   has a past surgical history that includes Other surgical history (09/21/2021); Other surgical history (09/29/2021); Other surgical history (12/31/2018); Other surgical history (04/30/2018); Cholecystectomy (04/30/2018); Carpal tunnel release (04/30/2018); and Other surgical history (04/30/2018).  Family History   Problem Relation Name Age of Onset    Breast cancer Mother       Cancer History:          Breast         AJCC Edition: 8th (AJCC), Diagnosis Date: Apr 2018, IIB, cT3 cN0 cM0 G3     Treatment Synopsis:    Mass in the right breast 5.5 cm in the right upper quadrant on mammogram.  Right breast, core biopsy on April 12, 2018: Invasive poorly differentiated carcinoma with associated lymphocyte infiltrate.  Tumor measured 1.5 cm in the limited specimen.  Amaris score of 3, estrogen " receptor positive over 90%, progesterone receptor  positive > 90% HER-2 equivocal 2+  HER-2 by Fish negative not amplified.  Right axillary lymph node, ultrasound-guided core biopsy on April 30, 2018, benign lymph node with reactive changes.  Right breast stereotactic vacuum-assisted core biopsy on April 30, 2018: Ductal carcinoma in situ.  Neoadjuvant chemotherapy with Adriamycin plus Cytoxan ×4 followed by paclitaxel starting in May 2018.  Goldilocks mastectomy on December 18, 2018.  Right sentinel lymph node #1, #2, #3 no malignancy identified.  Right breast, mastectomy, invasive lobular carcinoma > 2 cm and< 5 cm stage IB.  Tumor board recommendation: Hormonal treatment, radiation therapy.  Anastrozole 1 mg daily starting March 2019.   March 2021- Osteopenia on bone density- Recommended starting Prolia every 6 months, however patient refused, taking Calcium and Vitamin D  Mammogram 9/19/22- negative    Rosemary Motta  reports that she has never smoked. She has never used smokeless tobacco.  She  reports no history of alcohol use.  She  reports no history of drug use.    Physical Exam  HENT:      Head: Normocephalic.      Nose: Nose normal.      Mouth/Throat:      Mouth: Mucous membranes are moist.   Eyes:      Pupils: Pupils are equal, round, and reactive to light.   Cardiovascular:      Rate and Rhythm: Normal rate and regular rhythm.      Pulses: Normal pulses.      Heart sounds: Normal heart sounds.   Pulmonary:      Effort: Pulmonary effort is normal.      Breath sounds: Normal breath sounds.   Abdominal:      General: Bowel sounds are normal.      Palpations: Abdomen is soft.   Musculoskeletal:         General: Normal range of motion.   Skin:     General: Skin is warm and dry.   Neurological:      General: No focal deficit present.      Mental Status: She is alert and oriented to person, place, and time.   Psychiatric:         Mood and Affect: Mood normal.         Behavior: Behavior normal.         WBC  "  Date/Time Value Ref Range Status   10/16/2023 09:52 AM 5.5 4.4 - 11.3 x10*3/uL Final   10/17/2022 09:53 AM 6.5 4.4 - 11.3 x10E9/L Final   12/02/2021 01:00 PM 5.6 4.4 - 11.3 x10E9/L Final   10/04/2021 10:39 AM 5.6 4.4 - 11.3 x10E9/L Final     No results found for: \"NRBC\"  RBC   Date Value Ref Range Status   10/16/2023 3.91 (L) 4.00 - 5.20 x10*6/uL Final   10/17/2022 4.14 4.00 - 5.20 x10E12/L Final   12/02/2021 4.28 4.00 - 5.20 x10E12/L Final   10/04/2021 3.99 (L) 4.00 - 5.20 x10E12/L Final     Hemoglobin   Date Value Ref Range Status   10/16/2023 12.5 12.0 - 16.0 g/dL Final   10/17/2022 13.0 12.0 - 16.0 g/dL Final   12/02/2021 13.4 12.0 - 16.0 g/dL Final   10/04/2021 12.7 12.0 - 16.0 g/dL Final     Hematocrit   Date Value Ref Range Status   10/16/2023 38.1 36.0 - 46.0 % Final   10/17/2022 39.6 36.0 - 46.0 % Final   12/02/2021 40.0 36.0 - 46.0 % Final   10/04/2021 38.0 36.0 - 46.0 % Final     MCV   Date/Time Value Ref Range Status   10/16/2023 09:52 AM 97 80 - 100 fL Final   10/17/2022 09:53 AM 96 80 - 100 fL Final   12/02/2021 01:00 PM 93 80 - 100 fL Final   10/04/2021 10:39 AM 95 80 - 100 fL Final     MCH   Date/Time Value Ref Range Status   10/16/2023 09:52 AM 32.0 26.0 - 34.0 pg Final     MCHC   Date/Time Value Ref Range Status   10/16/2023 09:52 AM 32.8 32.0 - 36.0 g/dL Final   10/17/2022 09:53 AM 32.8 32.0 - 36.0 g/dL Final   12/02/2021 01:00 PM 33.5 32.0 - 36.0 g/dL Final   10/04/2021 10:39 AM 33.4 32.0 - 36.0 g/dL Final     RDW   Date/Time Value Ref Range Status   10/16/2023 09:52 AM 12.8 11.5 - 14.5 % Final   10/17/2022 09:53 AM 12.3 11.5 - 14.5 % Final   12/02/2021 01:00 PM 12.3 11.5 - 14.5 % Final   10/04/2021 10:39 AM 12.4 11.5 - 14.5 % Final     Platelets   Date/Time Value Ref Range Status   10/16/2023 09:52  (L) 150 - 450 x10*3/uL Final   10/17/2022 09:53  150 - 450 x10E9/L Final   12/02/2021 01:00  150 - 450 x10E9/L Final   10/04/2021 10:39  150 - 450 x10E9/L Final     MPV "   Date/Time Value Ref Range Status   10/16/2023 09:52 AM 9.0 7.5 - 11.5 fL Final     Neutrophils %   Date/Time Value Ref Range Status   10/16/2023 09:52 AM 62.7 40.0 - 80.0 % Final   10/17/2022 09:53 AM 63.6 40.0 - 80.0 % Final   10/04/2021 10:39 AM 62.5 40.0 - 80.0 % Final   12/03/2018 11:26 AM 62.9 40.0 - 80.0 % Final     Immature Granulocytes %, Automated   Date/Time Value Ref Range Status   10/16/2023 09:52 AM 0.4 0.0 - 0.9 % Final     Comment:     Immature Granulocyte Count (IG) includes promyelocytes, myelocytes and metamyelocytes but does not include bands. Percent differential counts (%) should be interpreted in the context of the absolute cell counts (cells/UL).   10/17/2022 09:53 AM 0.3 0.0 - 0.9 % Final     Comment:      Immature Granulocyte Count (IG) includes promyelocytes,    myelocytes and metamyelocytes but does not include bands.   Percent differential counts (%) should be interpreted in the   context of the absolute cell counts (cells/L).     10/04/2021 10:39 AM 0.4 0.0 - 0.9 % Final     Comment:      Immature Granulocyte Count (IG) includes promyelocytes,    myelocytes and metamyelocytes but does not include bands.   Percent differential counts (%) should be interpreted in the   context of the absolute cell counts (cells/L).     12/03/2018 11:26 AM 0.5 0.0 - 0.9 % Final     Comment:      Percent differential counts (%) should be interpreted in the   context of the absolute cell counts (cells/L).       Lymphocytes %   Date/Time Value Ref Range Status   10/16/2023 09:52 AM 26.3 13.0 - 44.0 % Final   10/17/2022 09:53 AM 25.0 13.0 - 44.0 % Final   10/04/2021 10:39 AM 26.4 13.0 - 44.0 % Final   12/03/2018 11:26 AM 23.1 13.0 - 44.0 % Final     Monocytes %   Date/Time Value Ref Range Status   10/16/2023 09:52 AM 7.9 2.0 - 10.0 % Final   10/17/2022 09:53 AM 8.3 2.0 - 10.0 % Final   10/04/2021 10:39 AM 7.9 2.0 - 10.0 % Final   12/03/2018 11:26 AM 11.3 2.0 - 10.0 % Final     Eosinophils %   Date/Time Value  Ref Range Status   10/16/2023 09:52 AM 2.2 0.0 - 6.0 % Final   10/17/2022 09:53 AM 2.3 0.0 - 6.0 % Final   10/04/2021 10:39 AM 2.1 0.0 - 6.0 % Final   12/03/2018 11:26 AM 1.2 0.0 - 6.0 % Final     Basophils %   Date/Time Value Ref Range Status   10/16/2023 09:52 AM 0.5 0.0 - 2.0 % Final   10/17/2022 09:53 AM 0.5 0.0 - 2.0 % Final   10/04/2021 10:39 AM 0.7 0.0 - 2.0 % Final   12/03/2018 11:26 AM 1.0 0.0 - 2.0 % Final     Neutrophils Absolute   Date/Time Value Ref Range Status   10/16/2023 09:52 AM 3.43 1.60 - 5.50 x10*3/uL Final     Comment:     Percent differential counts (%) should be interpreted in the context of the absolute cell counts (cells/uL).   10/17/2022 09:53 AM 4.14 1.60 - 5.50 x10E9/L Final   10/04/2021 10:39 AM 3.50 1.60 - 5.50 x10E9/L Final   12/03/2018 11:26 AM 2.56 1.60 - 5.50 x10E9/L Final     Immature Granulocytes Absolute, Automated   Date/Time Value Ref Range Status   10/16/2023 09:52 AM 0.02 0.00 - 0.50 x10*3/uL Final     Lymphocytes Absolute   Date/Time Value Ref Range Status   10/16/2023 09:52 AM 1.44 0.80 - 3.00 x10*3/uL Final   10/17/2022 09:53 AM 1.63 0.80 - 3.00 x10E9/L Final   10/04/2021 10:39 AM 1.48 0.80 - 3.00 x10E9/L Final   12/03/2018 11:26 AM 0.94 0.80 - 3.00 x10E9/L Final     Monocytes Absolute   Date/Time Value Ref Range Status   10/16/2023 09:52 AM 0.43 0.05 - 0.80 x10*3/uL Final   10/17/2022 09:53 AM 0.54 0.05 - 0.80 x10E9/L Final   10/04/2021 10:39 AM 0.44 0.05 - 0.80 x10E9/L Final   12/03/2018 11:26 AM 0.46 0.05 - 0.80 x10E9/L Final     Eosinophils Absolute   Date/Time Value Ref Range Status   10/16/2023 09:52 AM 0.12 0.00 - 0.40 x10*3/uL Final   10/17/2022 09:53 AM 0.15 0.00 - 0.40 x10E9/L Final   10/04/2021 10:39 AM 0.12 0.00 - 0.40 x10E9/L Final   12/03/2018 11:26 AM 0.05 0.00 - 0.40 x10E9/L Final     Basophils Absolute   Date/Time Value Ref Range Status   10/16/2023 09:52 AM 0.03 0.00 - 0.10 x10*3/uL Final   10/17/2022 09:53 AM 0.03 0.00 - 0.10 x10E9/L Final   10/04/2021  "10:39 AM 0.04 0.00 - 0.10 x10E9/L Final   12/03/2018 11:26 AM 0.04 0.00 - 0.10 x10E9/L Final       No components found for: \"PT\"  aPTT   Date/Time Value Ref Range Status   12/03/2018 11:26 AM 29 28 - 38 sec Final     Comment:      Note new reference range as of 10/23/2018.    THE APTT IS NO LONGER USED FOR MONITORING     UNFRACTIONATED HEPARIN THERAPY.    FOR MONITORING HEPARIN THERAPY,     USE THE HEPARIN ASSAY.          Assessment and Plan:   Assessment:    1. Carcinoma right breast Diagnosed April 2018. T3, N0, M0 stage IIB, estrogen receptor positive, progesterone  receptor positive and HER-2 negative.  Neoadjuvant chemotherapy with A/C and paclitaxel. Goldilocks mastectomy, T2, N0, M0 stage IB. Started hormonal therapy with Anastrazole 1 mg daily March 2019. Mammogram 9/19/22- benign.      2. Osteopenia Bone density showed osteopenia, Prolia was recommended, however patient refused. Is taking Calcium and Vitamin D supplementation.      3. Multiple medical conditions including HTN, OA, GERD, History of PE, etc.     Plan:  Patient will continue Anastrazole 1 mg daily, plan for at least 5 years (through March 2024). Had another discussion at her last visit regarding Prolia for osteopenia, patient would like to remain on Calcium and Vitamin D supplement. She recently saw her breat surgeon and had a breast exam. She will continue yearly screening mammograms and self-breast exams. Will call office with any changes, concerns or questions. She will continue care with primary care provider, breast surgeon and all other healthcare providers.  She will return for follow-up in 12 months with Dr Stanton. If labs today are stable we will check CBC/diff in 6 months.     I had an extensive discussion with the patient regarding the diagnosis and discussed the plan of therapy, including general considerations regarding side effects and outcomes. Pt understood and gave appropriate teach back about the plan of care. All questions " were answered to the patient's satisfaction. The patient is instructed to contact us at any time if questions or problems arise. Thank you for the opportunity to participate in the care of this very pleasant patient.      Total time =30 minutes. 50% or more of this time was spent in counseling and/or coordination of care including reviewing medical history/radiology/labs, examining patient, formulating outlined plan  with team, and discussing plan with patient/family.  I reviewed patient's chart including but not limited to labs, imaging, surgical/procedure notes, pathology, hospital notes, doctor's notes.     Rosanne M Casal, DNP, APN-BC, AOCNP

## 2024-01-19 ENCOUNTER — OFFICE VISIT (OUTPATIENT)
Dept: PRIMARY CARE CLINIC | Age: 79
End: 2024-01-19
Payer: MEDICARE

## 2024-01-19 VITALS
HEIGHT: 58 IN | DIASTOLIC BLOOD PRESSURE: 80 MMHG | HEART RATE: 73 BPM | SYSTOLIC BLOOD PRESSURE: 155 MMHG | BODY MASS INDEX: 30.02 KG/M2 | WEIGHT: 143 LBS | TEMPERATURE: 97.4 F

## 2024-01-19 DIAGNOSIS — B02.29 POST HERPETIC NEURALGIA: Primary | ICD-10-CM

## 2024-01-19 DIAGNOSIS — I10 PRIMARY HYPERTENSION: ICD-10-CM

## 2024-01-19 DIAGNOSIS — Z76.89 ESTABLISHING CARE WITH NEW DOCTOR, ENCOUNTER FOR: ICD-10-CM

## 2024-01-19 PROCEDURE — G8400 PT W/DXA NO RESULTS DOC: HCPCS | Performed by: FAMILY MEDICINE

## 2024-01-19 PROCEDURE — 99204 OFFICE O/P NEW MOD 45 MIN: CPT | Performed by: FAMILY MEDICINE

## 2024-01-19 PROCEDURE — 1036F TOBACCO NON-USER: CPT | Performed by: FAMILY MEDICINE

## 2024-01-19 PROCEDURE — G8419 CALC BMI OUT NRM PARAM NOF/U: HCPCS | Performed by: FAMILY MEDICINE

## 2024-01-19 PROCEDURE — 3078F DIAST BP <80 MM HG: CPT | Performed by: FAMILY MEDICINE

## 2024-01-19 PROCEDURE — G8484 FLU IMMUNIZE NO ADMIN: HCPCS | Performed by: FAMILY MEDICINE

## 2024-01-19 PROCEDURE — 1123F ACP DISCUSS/DSCN MKR DOCD: CPT | Performed by: FAMILY MEDICINE

## 2024-01-19 PROCEDURE — G8427 DOCREV CUR MEDS BY ELIG CLIN: HCPCS | Performed by: FAMILY MEDICINE

## 2024-01-19 PROCEDURE — 1090F PRES/ABSN URINE INCON ASSESS: CPT | Performed by: FAMILY MEDICINE

## 2024-01-19 PROCEDURE — 3077F SYST BP >= 140 MM HG: CPT | Performed by: FAMILY MEDICINE

## 2024-01-19 RX ORDER — METOPROLOL TARTRATE 100 MG/1
100 TABLET ORAL 2 TIMES DAILY
Qty: 60 TABLET | Refills: 1 | Status: SHIPPED | OUTPATIENT
Start: 2024-01-19

## 2024-01-19 RX ORDER — HYDROXYZINE HYDROCHLORIDE 25 MG/1
25 TABLET, FILM COATED ORAL NIGHTLY
Qty: 30 TABLET | Refills: 2 | Status: SHIPPED | OUTPATIENT
Start: 2024-01-19 | End: 2024-04-18

## 2024-01-19 SDOH — ECONOMIC STABILITY: INCOME INSECURITY: HOW HARD IS IT FOR YOU TO PAY FOR THE VERY BASICS LIKE FOOD, HOUSING, MEDICAL CARE, AND HEATING?: NOT HARD AT ALL

## 2024-01-19 SDOH — ECONOMIC STABILITY: FOOD INSECURITY: WITHIN THE PAST 12 MONTHS, THE FOOD YOU BOUGHT JUST DIDN'T LAST AND YOU DIDN'T HAVE MONEY TO GET MORE.: NEVER TRUE

## 2024-01-19 SDOH — ECONOMIC STABILITY: FOOD INSECURITY: WITHIN THE PAST 12 MONTHS, YOU WORRIED THAT YOUR FOOD WOULD RUN OUT BEFORE YOU GOT MONEY TO BUY MORE.: NEVER TRUE

## 2024-01-19 SDOH — ECONOMIC STABILITY: HOUSING INSECURITY
IN THE LAST 12 MONTHS, WAS THERE A TIME WHEN YOU DID NOT HAVE A STEADY PLACE TO SLEEP OR SLEPT IN A SHELTER (INCLUDING NOW)?: NO

## 2024-01-19 ASSESSMENT — PATIENT HEALTH QUESTIONNAIRE - PHQ9
5. POOR APPETITE OR OVEREATING: 1
10. IF YOU CHECKED OFF ANY PROBLEMS, HOW DIFFICULT HAVE THESE PROBLEMS MADE IT FOR YOU TO DO YOUR WORK, TAKE CARE OF THINGS AT HOME, OR GET ALONG WITH OTHER PEOPLE: 1
SUM OF ALL RESPONSES TO PHQ QUESTIONS 1-9: 17
6. FEELING BAD ABOUT YOURSELF - OR THAT YOU ARE A FAILURE OR HAVE LET YOURSELF OR YOUR FAMILY DOWN: 1
7. TROUBLE CONCENTRATING ON THINGS, SUCH AS READING THE NEWSPAPER OR WATCHING TELEVISION: 3
SUM OF ALL RESPONSES TO PHQ QUESTIONS 1-9: 17
1. LITTLE INTEREST OR PLEASURE IN DOING THINGS: 3
SUM OF ALL RESPONSES TO PHQ QUESTIONS 1-9: 17
SUM OF ALL RESPONSES TO PHQ QUESTIONS 1-9: 17
SUM OF ALL RESPONSES TO PHQ9 QUESTIONS 1 & 2: 6
4. FEELING TIRED OR HAVING LITTLE ENERGY: 3
2. FEELING DOWN, DEPRESSED OR HOPELESS: 3
9. THOUGHTS THAT YOU WOULD BE BETTER OFF DEAD, OR OF HURTING YOURSELF: 0
3. TROUBLE FALLING OR STAYING ASLEEP: 3
8. MOVING OR SPEAKING SO SLOWLY THAT OTHER PEOPLE COULD HAVE NOTICED. OR THE OPPOSITE, BEING SO FIGETY OR RESTLESS THAT YOU HAVE BEEN MOVING AROUND A LOT MORE THAN USUAL: 0

## 2024-01-19 NOTE — PROGRESS NOTES
sounds: Normal breath sounds. No wheezing.   Abdominal:      General: Bowel sounds are normal.      Palpations: Abdomen is soft.   Musculoskeletal:      Cervical back: No rigidity.      Right lower leg: No edema.      Left lower leg: No edema.   Skin:     General: Skin is warm and dry.      Findings: No rash.   Neurological:      Mental Status: She is alert and oriented to person, place, and time. Mental status is at baseline.         Assessment and Plan     1. Post herpetic neuralgia  Uncontrolled  Trial atarax for itch  Trial gabapentin cream for pain  FU with Pain Management  FU 1 month  - Gabapentin 10 % CREA; Apply to affected area twice daily  Dispense: 30 g; Refill: 2  - hydrOXYzine HCl (ATARAX) 25 MG tablet; Take 1 tablet by mouth nightly  Dispense: 30 tablet; Refill: 2    2. Primary hypertension  Uncontrolled  Increase lopressor to 100mg BID  FU 1 month  - metoprolol (LOPRESSOR) 100 MG tablet; Take 1 tablet by mouth 2 times daily  Dispense: 60 tablet; Refill: 1    3. Establishing care with new doctor, encounter for  Updated medical, surgical, family, and social histories and medication list as appropriate.         Counseled regarding above diagnosis, including possible risks and complications, especially if left uncontrolled. Counseled regarding the possible side effects, risks, benefits and alternatives to treatment; patient and/or guardian verbalizes understanding, agrees, feels comfortable with, and wishes to proceed with above treatment plan.    Call or go to ED immediately if symptoms worsen or persist. Advised patient to call with any new medication issues and, as applicable, read all Rx info from pharmacy to assure aware of all possible risks and side effects of medication before taking.    Patient and/or guardian given opportunity to ask questions/raise concerns. The patient verbalized comfort and understanding of instructions.    I encourage further reading and education about your health

## 2024-01-22 ENCOUNTER — TELEPHONE (OUTPATIENT)
Dept: PRIMARY CARE CLINIC | Age: 79
End: 2024-01-22

## 2024-01-22 RX ORDER — CAPSAICIN 0.025 %
CREAM (GRAM) TOPICAL
Qty: 50 G | Refills: 1 | Status: SHIPPED | OUTPATIENT
Start: 2024-01-22 | End: 2024-02-21

## 2024-01-22 NOTE — TELEPHONE ENCOUNTER
Pt states the cream you sent in for the shingles is costing her $400 out of pocket so she can not afford it, is there anything else that can be sent into [facility] [place]

## 2024-01-22 NOTE — TELEPHONE ENCOUNTER
We can try a different type of cream, call capsicin cream, which can help with the pain and should be less expensive.  She should make sure to wash her hands after she applies this, as it contains the same chemical that make peppers hot and spicy.  Sent to pharmacy.

## 2024-01-22 NOTE — TELEPHONE ENCOUNTER
Pt called asking if she should take her metoprolol 100mg and her Olmesartan 40mg or should she only be taking the metoprolol?

## 2024-01-24 ENCOUNTER — OFFICE VISIT (OUTPATIENT)
Dept: PAIN MEDICINE | Facility: HOSPITAL | Age: 79
End: 2024-01-24
Payer: COMMERCIAL

## 2024-01-24 VITALS
BODY MASS INDEX: 28.99 KG/M2 | DIASTOLIC BLOOD PRESSURE: 92 MMHG | HEART RATE: 74 BPM | SYSTOLIC BLOOD PRESSURE: 174 MMHG | WEIGHT: 138.1 LBS | RESPIRATION RATE: 16 BRPM | HEIGHT: 58 IN

## 2024-01-24 DIAGNOSIS — B02.29 POST HERPETIC NEURALGIA: Primary | ICD-10-CM

## 2024-01-24 PROCEDURE — 3080F DIAST BP >= 90 MM HG: CPT | Performed by: PHYSICAL MEDICINE & REHABILITATION

## 2024-01-24 PROCEDURE — 1157F ADVNC CARE PLAN IN RCRD: CPT | Performed by: PHYSICAL MEDICINE & REHABILITATION

## 2024-01-24 PROCEDURE — 1126F AMNT PAIN NOTED NONE PRSNT: CPT | Performed by: PHYSICAL MEDICINE & REHABILITATION

## 2024-01-24 PROCEDURE — 99214 OFFICE O/P EST MOD 30 MIN: CPT | Performed by: PHYSICAL MEDICINE & REHABILITATION

## 2024-01-24 PROCEDURE — 99204 OFFICE O/P NEW MOD 45 MIN: CPT | Performed by: PHYSICAL MEDICINE & REHABILITATION

## 2024-01-24 PROCEDURE — 3077F SYST BP >= 140 MM HG: CPT | Performed by: PHYSICAL MEDICINE & REHABILITATION

## 2024-01-24 PROCEDURE — 1159F MED LIST DOCD IN RCRD: CPT | Performed by: PHYSICAL MEDICINE & REHABILITATION

## 2024-01-24 PROCEDURE — 1036F TOBACCO NON-USER: CPT | Performed by: PHYSICAL MEDICINE & REHABILITATION

## 2024-01-24 RX ORDER — HYDROXYZINE HYDROCHLORIDE 25 MG/1
25 TABLET, FILM COATED ORAL NIGHTLY
COMMUNITY

## 2024-01-24 ASSESSMENT — PATIENT HEALTH QUESTIONNAIRE - PHQ9
SUM OF ALL RESPONSES TO PHQ9 QUESTIONS 1 AND 2: 1
1. LITTLE INTEREST OR PLEASURE IN DOING THINGS: NOT AT ALL
2. FEELING DOWN, DEPRESSED OR HOPELESS: SEVERAL DAYS
10. IF YOU CHECKED OFF ANY PROBLEMS, HOW DIFFICULT HAVE THESE PROBLEMS MADE IT FOR YOU TO DO YOUR WORK, TAKE CARE OF THINGS AT HOME, OR GET ALONG WITH OTHER PEOPLE: NOT DIFFICULT AT ALL

## 2024-01-24 ASSESSMENT — ENCOUNTER SYMPTOMS
OCCASIONAL FEELINGS OF UNSTEADINESS: 0
DEPRESSION: 1
LOSS OF SENSATION IN FEET: 0

## 2024-01-24 ASSESSMENT — COLUMBIA-SUICIDE SEVERITY RATING SCALE - C-SSRS
6. HAVE YOU EVER DONE ANYTHING, STARTED TO DO ANYTHING, OR PREPARED TO DO ANYTHING TO END YOUR LIFE?: NO
1. IN THE PAST MONTH, HAVE YOU WISHED YOU WERE DEAD OR WISHED YOU COULD GO TO SLEEP AND NOT WAKE UP?: NO
2. HAVE YOU ACTUALLY HAD ANY THOUGHTS OF KILLING YOURSELF?: NO

## 2024-01-24 NOTE — PROGRESS NOTES
Subjective   Patient ID: Rosemary Motta is a 78 y.o. female who presents for Abdominal Pain.  HPI    Patient presents to office for initial eval of shingles pain to stomach and back. Patient reports that she has had pain since 25th of April. States that pain occasionally radiates towards neck posteriorly. Reports that she has seen another pain mgmt physician but wants a second opinion from office.    Pain Score: 2/10 but reports pain waxes and wanes with activity    Injections/Procedures: denies    Neuromodulators/Other Medications: gabapentin trial failed due to dizziness/foggy thoughts.    Other: denies    78-year-old female with history of shingles that started with a rash on 25 April 2023 several weeks later started developing intermittent neuropathic itching and pain that would wax and wane and has waxed and waned since then.  Nothing really incites that and nothing has really helped with the pain.  Pain is localized starting in her left side lower back radiating around her stomach to just above her umbilicus.  No other radiation no weakness.  Patient has had multiple medications that the only when she knows that she has had his gabapentin which was attempted to titrate up to 200 mg 3 times a day but she got side effects and it did not help her at 100 mg 3 times daily.  She was offered by another pain physician some type of injection but wants a second opinion for other options.                  Monitoring and compliance:    ORT:    PDUQ:    Office Agreement:      Review of Systems     Current Outpatient Medications   Medication Instructions    anastrozole (Arimidex) 1 mg tablet     artificial tears, dextran-hypomel-glycerin, 0.1-0.3-0.2 % ophthalmic solution 1 drop, ophthalmic (eye), 4 times daily,  1 drop(s) to each affected eye 4 times a day    ascorbic acid, vitamin C, 500 mg ER tablet Vitamin C  MG Oral Tablet Extended Release   Refills: 0        Start : 20-Sep-2021   Active    aspirin 81 mg EC  tablet Aspirin 81 MG Oral Tablet Delayed Release   Refills: 0        Start : 2018   Active    calcium citrate 250 mg calcium tablet Calcium Citrate 1040 MG TABS   Refills: 0        Start : 20-Sep-2021   Active    cholecalciferol (Vitamin D-3) 10 MCG (400 UNIT) tablet Vitamin D3 10 MCG (400 UNIT) Oral Tablet   Refills: 0        Start : 20-Sep-2021   Active    gabapentin (NEURONTIN) 200 mg, oral, Every 8 hours    hydrOXYzine HCL (ATARAX) 25 mg, oral, Nightly    levothyroxine (Synthroid, Levoxyl) 25 mcg tablet 1 tablet, oral, Daily    metoprolol tartrate (LOPRESSOR) 50 mg, oral, 2 times daily    olmesartan (BENIcar) 40 mg tablet 1 tablet, oral, Daily    pantoprazole (ProtoNix) 40 mg EC tablet 1 tablet, oral, Daily    phenyleph-min oil-petrolatum (Preparation H) 0.25-14-74.9 % rectal ointment 1 Application, rectal, 2 times daily PRN    rosuvastatin (Crestor) 5 mg tablet 1 tablet, oral, Daily        Past Medical History:   Diagnosis Date    Complication of other artery following a procedure, not elsewhere classified, subsequent encounter     Postoperative pulmonary embolism, subsequent encounter    Personal history of other diseases of the circulatory system     History of cardiac murmur    Personal history of other diseases of the digestive system     History of gastroesophageal reflux (GERD)    Personal history of other specified conditions     History of epistaxis        Past Surgical History:   Procedure Laterality Date    CARPAL TUNNEL RELEASE  2018    Neuroplasty Decompression Median Nerve At Carpal Tunnel    CHOLECYSTECTOMY  2018    Cholecystectomy Laparoscopic    OTHER SURGICAL HISTORY  2021     section    OTHER SURGICAL HISTORY  2021    Breast biopsy    OTHER SURGICAL HISTORY  2018    Right mastectomy    OTHER SURGICAL HISTORY  2018    Total Hip Replacement Right    OTHER SURGICAL HISTORY  2018    Hip Replacement Left        Family History   Problem  Relation Name Age of Onset    Breast cancer Mother          Allergies   Allergen Reactions    Penicillins Anaphylaxis and Other     Passed out    Clarithromycin Other and Nausea/vomiting     n/v    Tetracycline Other and Nausea/vomiting     n/v    Cortisone Rash, Other and Headache     Facial pain    Methylprednisolone Swelling, Other and Rash     facial pain    Nifedipine Rash, Other and Dizziness     Visual disturbance    Prednisone Rash, Other and Headache     Pain         Objective     Vitals:    01/24/24 1026   BP: (!) 174/92   Pulse: 74   Resp: 16        Physical Exam    GENERAL EXAM  Vital Signs: Vital signs to include heart rate, respiration rate, blood pressure, and temperature were reviewed.  General Appearance:  Awake, alert, healthy appearing, well developed, No acute distress.  Head: Normocephalic without evidence of head injury.  Neck: The appearance of the neck was normal without swelling with a midline trachea.  Eyes: The eyelids and eyebrows exhibited no abnormalities.  Pupils were not pin-point.  Sclera was without icterus.  Lungs: Respiration rhythm and depth was normal.  Respiratory movements were normal without labored breathing.  Cardiovascular: No peripheral edema was present.    Neurological: Patient was oriented to time, place, and person.  Speech was normal.  Balance, gait, and stance were unremarkable.    Psychiatric: Appearance was normal with appropriate dress.  Mood was euthymic and affect was normal.  Skin: Affected regions were without ecchymosis or skin lesions.        Physical exam as above except:  No allodynia no rash or hyperalgesia on her left abdomen.      Assessment/Plan   Problem List Items Addressed This Visit             ICD-10-CM    Post herpetic neuralgia - Primary B02.29     78-year-old female with history of mild kidney dysfunction with history of postherpetic neuralgia and a left  T8 and T9 distribution.  She has failed some medication treatments to include gabapentin  and possibly others that she is unsure.  We discussed multiple treatment options to include interventional treatment, Qutenza treatment or the addition of medications.  After lengthy discussion today we settled that patient would like to trial interventional treatments first.  Our plan for today:  - Will schedule patient for left eighth and ninth intercostal nerve block.  Discussed risk benefits and alternatives and patient would like to proceed.  Patient is on baby aspirin but no other blood thinners, she does not need to hold her baby aspirin for this procedure.  - We could trial Qutenza treatment in the future as well for postherpetic neuralgia  - We could try other medications in the future depending on what she has been on, patient will try to find out those medications.  Medications could include duloxetine, TCA such as amitriptyline or nortriptyline or topiramate or pregabalin.  - She will follow-up with my nurse practitioner few weeks after the injection for reevaluation.  We could repeat the injection in the future as well if it gives her substantial relief         Relevant Orders    Nerve Block            This note was generated with the aid of dictation software, there may be typos despite my attempts at proofreading.

## 2024-01-24 NOTE — ASSESSMENT & PLAN NOTE
78-year-old female with history of mild kidney dysfunction with history of postherpetic neuralgia and a left  T8 and T9 distribution.  She has failed some medication treatments to include gabapentin and possibly others that she is unsure.  We discussed multiple treatment options to include interventional treatment, Qutenza treatment or the addition of medications.  After lengthy discussion today we settled that patient would like to trial interventional treatments first.  Our plan for today:  - Will schedule patient for left eighth and ninth intercostal nerve block.  Discussed risk benefits and alternatives and patient would like to proceed.  Patient is on baby aspirin but no other blood thinners, she does not need to hold her baby aspirin for this procedure.  - We could trial Qutenza treatment in the future as well for postherpetic neuralgia  - We could try other medications in the future depending on what she has been on, patient will try to find out those medications.  Medications could include duloxetine, TCA such as amitriptyline or nortriptyline or topiramate or pregabalin.  - She will follow-up with my nurse practitioner few weeks after the injection for reevaluation.  We could repeat the injection in the future as well if it gives her substantial relief

## 2024-01-29 RX ORDER — OLMESARTAN MEDOXOMIL 40 MG/1
40 TABLET ORAL DAILY
Qty: 30 TABLET | Refills: 1 | Status: SHIPPED
Start: 2024-01-29 | End: 2024-02-01 | Stop reason: SDUPTHER

## 2024-02-01 RX ORDER — OLMESARTAN MEDOXOMIL 40 MG/1
40 TABLET ORAL DAILY
Qty: 90 TABLET | Refills: 1 | Status: SHIPPED | OUTPATIENT
Start: 2024-02-01

## 2024-02-01 RX ORDER — OLMESARTAN MEDOXOMIL 40 MG/1
40 TABLET ORAL DAILY
Qty: 90 TABLET | Refills: 1 | Status: CANCELLED | OUTPATIENT
Start: 2024-02-01

## 2024-02-01 NOTE — TELEPHONE ENCOUNTER
Last Appointment   1/19/2024  Next Appointment  2/16/2024      Pharmacy requesting a 90 day script

## 2024-02-14 ENCOUNTER — HOSPITAL ENCOUNTER (EMERGENCY)
Age: 79
Discharge: HOME | End: 2024-02-14
Payer: COMMERCIAL

## 2024-02-14 ENCOUNTER — APPOINTMENT (OUTPATIENT)
Dept: GENERAL RADIOLOGY | Age: 79
End: 2024-02-14
Payer: COMMERCIAL

## 2024-02-14 VITALS
DIASTOLIC BLOOD PRESSURE: 88 MMHG | RESPIRATION RATE: 18 BRPM | OXYGEN SATURATION: 98 % | TEMPERATURE: 98.4 F | SYSTOLIC BLOOD PRESSURE: 182 MMHG | HEART RATE: 59 BPM

## 2024-02-14 DIAGNOSIS — M79.672 LEFT FOOT PAIN: Primary | ICD-10-CM

## 2024-02-14 PROCEDURE — 73630 X-RAY EXAM OF FOOT: CPT

## 2024-02-14 PROCEDURE — 99211 OFF/OP EST MAY X REQ PHY/QHP: CPT

## 2024-02-14 NOTE — ED PROVIDER NOTES
[facility]  EMERGENCY DEPARTMENT ENCOUNTER        NAME: Brie Harris  :  1945  MRN:  [id number]  Date of evaluation: 2024  Provider: Juan J Gongora PA-C  PCP: Afsaneh Mello DO  Note Started : 5:53 PM EST 24    Chief Complaint: Foot Pain (Left foot hurting no injury noted   started Monday night)      This is a 78-year-old female that presents to urgent care complaining of mid to distal left foot pain for the past several days.  She is not exactly sure how she developed this foot pain but she states that there has been no fall recently.  She denies any injury to the foot in the past.  Patient denies any ankle or other extremity pain.  No numbness or tingling.  On first contact patient she appears to be in no acute distress.        Review of Systems  Pertinent positives and negatives are stated within HPI, all other systems reviewed and are negative.     Allergies: Medrol [methylprednisolone], Penicillins, Biaxin [clarithromycin], Cortisone, Prednisone, Tetracyclines & related, and Procardia [nifedipine]     --------------------------------------------- PAST HISTORY ---------------------------------------------  Past Medical History:  has a past medical history of Cancer (HCC), Hyperlipidemia, Hypertension, and Pulmonary embolism (HCC).    Past Surgical History:  has a past surgical history that includes joint replacement; Foot surgery; Cholecystectomy;  section; Carpal tunnel release; Mastectomy (Right, 2018); and Mastectomy (Right, 2018).    Social History:  reports that she has never smoked. She has never used smokeless tobacco. She reports current alcohol use. She reports that she does not use drugs.    Family History: family history is not on file.     The patient’s home medications have been reviewed.    The nursing notes within the ED encounter have been reviewed.

## 2024-02-16 ENCOUNTER — OFFICE VISIT (OUTPATIENT)
Dept: PRIMARY CARE CLINIC | Age: 79
End: 2024-02-16
Payer: COMMERCIAL

## 2024-02-16 VITALS
BODY MASS INDEX: 29.18 KG/M2 | WEIGHT: 139 LBS | OXYGEN SATURATION: 100 % | DIASTOLIC BLOOD PRESSURE: 79 MMHG | HEIGHT: 58 IN | TEMPERATURE: 97.3 F | HEART RATE: 67 BPM | SYSTOLIC BLOOD PRESSURE: 182 MMHG

## 2024-02-16 DIAGNOSIS — I10 PRIMARY HYPERTENSION: Primary | ICD-10-CM

## 2024-02-16 PROCEDURE — 3077F SYST BP >= 140 MM HG: CPT | Performed by: FAMILY MEDICINE

## 2024-02-16 PROCEDURE — 3078F DIAST BP <80 MM HG: CPT | Performed by: FAMILY MEDICINE

## 2024-02-16 PROCEDURE — 1123F ACP DISCUSS/DSCN MKR DOCD: CPT | Performed by: FAMILY MEDICINE

## 2024-02-16 PROCEDURE — 99213 OFFICE O/P EST LOW 20 MIN: CPT | Performed by: FAMILY MEDICINE

## 2024-02-16 RX ORDER — OLMESARTAN MEDOXOMIL AND HYDROCHLOROTHIAZIDE 40/12.5 40; 12.5 MG/1; MG/1
1 TABLET ORAL DAILY
Qty: 30 TABLET | Refills: 1 | Status: SHIPPED | OUTPATIENT
Start: 2024-02-16

## 2024-02-16 NOTE — PROGRESS NOTES
[facility]  Family Medicine      Patient:  Brie Harris 78 y.o. female  Date of Service: 24      Chief complaint:   Chief Complaint   Patient presents with    Hypertension     1 month f/u          History of Present Illness   Brie Harris is a 78 y.o. female who presents to the clinic with complaints as above.    Hypertension  BP Readings from Last 3 Encounters:   24 (!) 182/79   24 (!) 182/88   24 (!) 155/80   Blood pressures uncontrolled at home  Medications include olmesartan 40mg daily, lopressor 100mg BID, taking as prescribed  Denies symptoms of shakiness, dizziness, lightheadedness, high or low blood pressure  Blood work monitoring not due today    Current Health Maintenance:  Health Maintenance   Topic Date Due    Lipids  Never done    Hepatitis C screen  Never done    Shingles vaccine (1 of 2) Never done    DEXA (modify frequency per FRAX score)  Never done    Annual Wellness Visit (Medicare)  Never done    Depression Monitoring  2025    DTaP/Tdap/Td vaccine (2 - Td or Tdap) 2027    Flu vaccine  Completed    Pneumococcal 65+ years Vaccine  Completed    COVID-19 Vaccine  Completed    Respiratory Syncytial Virus (RSV) Pregnant or age 60 yrs+  Completed    Hepatitis A vaccine  Aged Out    Hepatitis B vaccine  Aged Out    Hib vaccine  Aged Out    Polio vaccine  Aged Out    Meningococcal (ACWY) vaccine  Aged Out    Depression Screen  Discontinued       Past Medical History:      Diagnosis Date    Cancer (HCC) 2018    breast    Hyperlipidemia     Hypertension     Pulmonary embolism (HCC)        Past Surgical History:        Procedure Laterality Date    CARPAL TUNNEL RELEASE      bilateral     SECTION      CHOLECYSTECTOMY      FOOT SURGERY      bunions and corns both feet    JOINT REPLACEMENT      left hip    MASTECTOMY Right 2018    MASTECTOMY Right 2018       Allergies:    Medrol [methylprednisolone], Penicillins, Biaxin [clarithromycin], Cortisone,

## 2024-02-19 ENCOUNTER — TELEPHONE (OUTPATIENT)
Dept: PRIMARY CARE CLINIC | Age: 79
End: 2024-02-19

## 2024-02-19 DIAGNOSIS — B02.29 POST HERPETIC NEURALGIA: ICD-10-CM

## 2024-02-20 RX ORDER — HYDROXYZINE HYDROCHLORIDE 25 MG/1
25 TABLET, FILM COATED ORAL 3 TIMES DAILY PRN
Qty: 90 TABLET | Refills: 2 | Status: SHIPPED | OUTPATIENT
Start: 2024-02-20 | End: 2024-05-20

## 2024-02-27 ENCOUNTER — TELEPHONE (OUTPATIENT)
Dept: PRIMARY CARE CLINIC | Age: 79
End: 2024-02-27

## 2024-02-27 NOTE — TELEPHONE ENCOUNTER
Pt was in stating that she wants to stop the olmesartan-hctz due to her getting bad diarrhea.    Wants to know if there is something else she can be put on.     Pt also wants you to know that Friday at 10:00 she is getting an injection for her current shingles outbreak

## 2024-02-28 RX ORDER — OLMESARTAN MEDOXOMIL 40 MG/1
40 TABLET ORAL DAILY
Qty: 90 TABLET | Refills: 1 | Status: SHIPPED | OUTPATIENT
Start: 2024-02-28

## 2024-02-28 NOTE — TELEPHONE ENCOUNTER
We could try amlodipine; I'll send it in if she would like to try it.  She can stop the combo pill; does she need a refill of her olmesartan tabs?

## 2024-02-28 NOTE — TELEPHONE ENCOUNTER
Pt is ok with just going on the Olmesartan by itself.      Please send in a Script to [facility]     Thank you

## 2024-03-01 ENCOUNTER — HOSPITAL ENCOUNTER (OUTPATIENT)
Dept: GASTROENTEROLOGY | Facility: HOSPITAL | Age: 79
Setting detail: OUTPATIENT SURGERY
Discharge: HOME | End: 2024-03-01
Payer: COMMERCIAL

## 2024-03-01 ENCOUNTER — HOSPITAL ENCOUNTER (OUTPATIENT)
Dept: RADIOLOGY | Facility: HOSPITAL | Age: 79
Setting detail: OUTPATIENT SURGERY
Discharge: HOME | End: 2024-03-01
Payer: COMMERCIAL

## 2024-03-01 VITALS
HEIGHT: 58 IN | BODY MASS INDEX: 28.97 KG/M2 | TEMPERATURE: 98.6 F | SYSTOLIC BLOOD PRESSURE: 150 MMHG | DIASTOLIC BLOOD PRESSURE: 66 MMHG | RESPIRATION RATE: 16 BRPM | HEART RATE: 64 BPM | WEIGHT: 138 LBS | OXYGEN SATURATION: 100 %

## 2024-03-01 DIAGNOSIS — R52 PAIN: ICD-10-CM

## 2024-03-01 DIAGNOSIS — B02.29 POST HERPETIC NEURALGIA: ICD-10-CM

## 2024-03-01 PROCEDURE — 77002 NEEDLE LOCALIZATION BY XRAY: CPT | Performed by: PHYSICAL MEDICINE & REHABILITATION

## 2024-03-01 PROCEDURE — 2500000004 HC RX 250 GENERAL PHARMACY W/ HCPCS (ALT 636 FOR OP/ED): Performed by: PHYSICAL MEDICINE & REHABILITATION

## 2024-03-01 PROCEDURE — 64421 NJX AA&/STRD NTRCOST NRV EA: CPT | Mod: LT | Performed by: PHYSICAL MEDICINE & REHABILITATION

## 2024-03-01 PROCEDURE — 2550000001 HC RX 255 CONTRASTS: Performed by: PHYSICAL MEDICINE & REHABILITATION

## 2024-03-01 PROCEDURE — 2500000005 HC RX 250 GENERAL PHARMACY W/O HCPCS: Performed by: PHYSICAL MEDICINE & REHABILITATION

## 2024-03-01 PROCEDURE — 64421 NJX AA&/STRD NTRCOST NRV EA: CPT | Performed by: PHYSICAL MEDICINE & REHABILITATION

## 2024-03-01 RX ORDER — DEXAMETHASONE SODIUM PHOSPHATE 4 MG/ML
INJECTION, SOLUTION INTRA-ARTICULAR; INTRALESIONAL; INTRAMUSCULAR; INTRAVENOUS; SOFT TISSUE AS NEEDED
Status: COMPLETED | OUTPATIENT
Start: 2024-03-01 | End: 2024-03-01

## 2024-03-01 RX ORDER — LIDOCAINE HYDROCHLORIDE 5 MG/ML
INJECTION, SOLUTION INFILTRATION; INTRAVENOUS AS NEEDED
Status: COMPLETED | OUTPATIENT
Start: 2024-03-01 | End: 2024-03-01

## 2024-03-01 RX ADMIN — LIDOCAINE HYDROCHLORIDE 15 ML: 5 INJECTION, SOLUTION INFILTRATION at 09:54

## 2024-03-01 RX ADMIN — DEXAMETHASONE SODIUM PHOSPHATE 10 MG: 4 INJECTION INTRA-ARTICULAR; INTRALESIONAL; INTRAMUSCULAR; INTRAVENOUS; SOFT TISSUE at 09:57

## 2024-03-01 RX ADMIN — IOHEXOL 5 ML: 350 INJECTION, SOLUTION INTRAVENOUS at 09:57

## 2024-03-01 ASSESSMENT — PAIN SCALES - GENERAL
PAINLEVEL_OUTOF10: 1
PAINLEVEL_OUTOF10: 1

## 2024-03-01 ASSESSMENT — COLUMBIA-SUICIDE SEVERITY RATING SCALE - C-SSRS
2. HAVE YOU ACTUALLY HAD ANY THOUGHTS OF KILLING YOURSELF?: NO
6. HAVE YOU EVER DONE ANYTHING, STARTED TO DO ANYTHING, OR PREPARED TO DO ANYTHING TO END YOUR LIFE?: NO
1. IN THE PAST MONTH, HAVE YOU WISHED YOU WERE DEAD OR WISHED YOU COULD GO TO SLEEP AND NOT WAKE UP?: NO

## 2024-03-01 ASSESSMENT — PAIN - FUNCTIONAL ASSESSMENT
PAIN_FUNCTIONAL_ASSESSMENT: 0-10
PAIN_FUNCTIONAL_ASSESSMENT: 0-10

## 2024-03-01 ASSESSMENT — PAIN DESCRIPTION - DESCRIPTORS: DESCRIPTORS: ACHING;DULL

## 2024-03-01 NOTE — H&P
History Of Present Illness  Rosemary Motta is a 78 y.o. female presents for procedure state below. Endorses no changes in past medical history or medical health since last seen in clinic.     Past Medical History  She has a past medical history of Complication of other artery following a procedure, not elsewhere classified, subsequent encounter, Personal history of other diseases of the circulatory system, Personal history of other diseases of the digestive system, and Personal history of other specified conditions.    Surgical History  She has a past surgical history that includes Other surgical history (09/21/2021); Other surgical history (09/29/2021); Other surgical history (12/31/2018); Other surgical history (04/30/2018); Cholecystectomy (04/30/2018); Carpal tunnel release (04/30/2018); and Other surgical history (04/30/2018).     Social History  She reports that she has never smoked. She has never used smokeless tobacco. She reports that she does not drink alcohol and does not use drugs.    Family History  Family History   Problem Relation Name Age of Onset    Breast cancer Mother          Allergies  Penicillins, Clarithromycin, Tetracycline, Cortisone, Methylprednisolone, Nifedipine, and Prednisone    Review of Symptoms:   Constitutional: Negative for chills, diaphoresis or fever  HENT: Negative for neck swelling  Eyes:.  Negative for eye pain  Respiratory:.  Negative for cough, shortness of breath or wheezing    Cardiovascular:.  Negative for chest pain or palpitations  Gastrointestinal:.  Negative for abdominal pain, nausea and vomiting  Genitourinary:.  Negative for urgency  Musculoskeletal:  Positive for back pain. Positive for joint pain. Denies falls within the past 3 months.  Skin: Negative for wounds or itching   Neurological: Negative for dizziness, seizures, loss of consciousness and weakness  Endo/Heme/Allergies: Does not bruise/bleed easily  Psychiatric/Behavioral: Negative for depression. The  patient does not appear anxious.       PHYSICAL EXAM  Vitals signs reviewed  Constitutional:       General: Not in acute distress     Appearance: Normal appearance. Not ill-appearing.  HENT:     Head: Normocephalic and atraumatic  Eyes:     Conjunctiva/sclera: Conjunctivae normal  Cardiovascular:     Rate and Rhythm: Normal rate and regular rhythm  Pulmonary:     Effort: No respiratory distress  Abdominal:     Palpations: Abdomen is soft  Musculoskeletal: LEAHY  Skin:     General: Skin is warm and dry  Neurological:     General: No focal deficit present  Psychiatric:         Mood and Affect: Mood normal         Behavior: Behavior normal     Last Recorded Vitals  There were no vitals taken for this visit.    Relevant Results  Current Outpatient Medications   Medication Instructions    anastrozole (Arimidex) 1 mg tablet     artificial tears, dextran-hypomel-glycerin, 0.1-0.3-0.2 % ophthalmic solution 1 drop, ophthalmic (eye), 4 times daily,  1 drop(s) to each affected eye 4 times a day    ascorbic acid, vitamin C, 500 mg ER tablet Vitamin C  MG Oral Tablet Extended Release   Refills: 0        Start : 20-Sep-2021   Active    aspirin 81 mg EC tablet Aspirin 81 MG Oral Tablet Delayed Release   Refills: 0        Start : 30-Apr-2018   Active    calcium citrate 250 mg calcium tablet Calcium Citrate 1040 MG TABS   Refills: 0        Start : 20-Sep-2021   Active    cholecalciferol (Vitamin D-3) 10 MCG (400 UNIT) tablet Vitamin D3 10 MCG (400 UNIT) Oral Tablet   Refills: 0        Start : 20-Sep-2021   Active    gabapentin (NEURONTIN) 200 mg, oral, Every 8 hours    hydrOXYzine HCL (ATARAX) 25 mg, oral, Nightly    levothyroxine (Synthroid, Levoxyl) 25 mcg tablet 1 tablet, oral, Daily    metoprolol tartrate (LOPRESSOR) 50 mg, oral, 2 times daily    olmesartan (BENIcar) 40 mg tablet 1 tablet, oral, Daily    pantoprazole (ProtoNix) 40 mg EC tablet 1 tablet, oral, Daily    phenyleph-min oil-petrolatum (Preparation H) 0.25-14-74.9  % rectal ointment 1 Application, rectal, 2 times daily PRN    rosuvastatin (Crestor) 5 mg tablet 1 tablet, oral, Daily       No results found for this or any previous visit from the past 1000 days.     No image results found.       No diagnosis found.     ASSESSMENT/PLAN  Rosemary Motta is a 78 y.o. female presents for left eighth and ninth intercostal nerve block.     Our plan is as follows:  - Follow In pain clinic  - Continue to participate in physical therapy as well as physician directed home exercises  - Continue pain medications as prescribed       Tre Terry MD

## 2024-03-01 NOTE — PROCEDURES
Nerve Block    Date/Time: 3/1/2024 9:58 AM    Performed by: Rei Redd MD  Authorized by: Rei Redd MD    Consent:     Consent obtained:  Written    Consent given by:  Patient    Risks, benefits, and alternatives were discussed: yes      Risks discussed:  Nerve damage, infection, allergic reaction, swelling, bleeding and pain    Alternatives discussed:  No treatment, delayed treatment and alternative treatment  Universal protocol:     Procedure explained and questions answered to patient or proxy's satisfaction: yes      Relevant documents present and verified: yes      Test results available: yes      Imaging studies available: yes      Required blood products, implants, devices, and special equipment available: yes      Site/side marked: yes      Immediately prior to procedure, a time out was called: yes      Patient identity confirmed:  Verbally with patient, hospital-assigned identification number and arm band  Comments:      Intercostal Nerve Block    The patient was brought into the procedure room and positioned into the prone position.  Skin was prepped with a Chloraprep solution, allowed to air dry, and then draped in sterile fashion. The rib(s) of the level below was identified under fluoroscopy.   Lidocaine 1% was used to anesthetize the skin with a 27ga needle.  22-gauge spinal needle was advanced to the rib(s) at the below level, it was then advanced after touching rib(s) into the intercostal space.   After negative needle aspiration, 2 mL of contrast was injected to confirm spread in the intercostal space around the intercostal space. The remaining local/steroid solution below was injected slowly. The needle was withdrawn and a bandaid was applied for a dressing.   The patient tolerated the procedure very well without any complications.     Level(s): 8, 9 intercostal nerve  Laterality:  [Left]  Medication(s): [5 mL 0.5% ropivacaine] 10 mg dexamethasone divided between sites

## 2024-03-02 ENCOUNTER — APPOINTMENT (OUTPATIENT)
Dept: RADIOLOGY | Facility: HOSPITAL | Age: 79
End: 2024-03-02
Payer: COMMERCIAL

## 2024-03-02 ENCOUNTER — HOSPITAL ENCOUNTER (EMERGENCY)
Facility: HOSPITAL | Age: 79
Discharge: HOME | End: 2024-03-02
Attending: STUDENT IN AN ORGANIZED HEALTH CARE EDUCATION/TRAINING PROGRAM
Payer: COMMERCIAL

## 2024-03-02 ENCOUNTER — APPOINTMENT (OUTPATIENT)
Dept: CARDIOLOGY | Facility: HOSPITAL | Age: 79
End: 2024-03-02
Payer: COMMERCIAL

## 2024-03-02 VITALS
WEIGHT: 145 LBS | OXYGEN SATURATION: 100 % | SYSTOLIC BLOOD PRESSURE: 168 MMHG | DIASTOLIC BLOOD PRESSURE: 85 MMHG | RESPIRATION RATE: 20 BRPM | BODY MASS INDEX: 30.44 KG/M2 | TEMPERATURE: 98.1 F | HEIGHT: 58 IN | HEART RATE: 89 BPM

## 2024-03-02 DIAGNOSIS — R42 DIZZINESS: Primary | ICD-10-CM

## 2024-03-02 DIAGNOSIS — T50.905A ADVERSE EFFECT OF DRUG, INITIAL ENCOUNTER: ICD-10-CM

## 2024-03-02 LAB
ALBUMIN SERPL BCP-MCNC: 4.3 G/DL (ref 3.4–5)
ALP SERPL-CCNC: 53 U/L (ref 33–136)
ALT SERPL W P-5'-P-CCNC: 19 U/L (ref 7–45)
ANION GAP SERPL CALC-SCNC: 12 MMOL/L (ref 10–20)
AST SERPL W P-5'-P-CCNC: 18 U/L (ref 9–39)
BASOPHILS # BLD AUTO: 0.02 X10*3/UL (ref 0–0.1)
BASOPHILS NFR BLD AUTO: 0.2 %
BILIRUB SERPL-MCNC: 0.5 MG/DL (ref 0–1.2)
BUN SERPL-MCNC: 30 MG/DL (ref 6–23)
CALCIUM SERPL-MCNC: 10.3 MG/DL (ref 8.6–10.3)
CARDIAC TROPONIN I PNL SERPL HS: 5 NG/L (ref 0–13)
CHLORIDE SERPL-SCNC: 105 MMOL/L (ref 98–107)
CO2 SERPL-SCNC: 27 MMOL/L (ref 21–32)
CREAT SERPL-MCNC: 1.05 MG/DL (ref 0.5–1.05)
CRP SERPL-MCNC: <0.1 MG/DL
EGFRCR SERPLBLD CKD-EPI 2021: 54 ML/MIN/1.73M*2
EOSINOPHIL # BLD AUTO: 0 X10*3/UL (ref 0–0.4)
EOSINOPHIL NFR BLD AUTO: 0 %
ERYTHROCYTE [DISTWIDTH] IN BLOOD BY AUTOMATED COUNT: 12.5 % (ref 11.5–14.5)
ERYTHROCYTE [SEDIMENTATION RATE] IN BLOOD BY WESTERGREN METHOD: 8 MM/H (ref 0–30)
GLUCOSE SERPL-MCNC: 161 MG/DL (ref 74–99)
HCT VFR BLD AUTO: 39.8 % (ref 36–46)
HGB BLD-MCNC: 13.9 G/DL (ref 12–16)
IMM GRANULOCYTES # BLD AUTO: 0.08 X10*3/UL (ref 0–0.5)
IMM GRANULOCYTES NFR BLD AUTO: 0.7 % (ref 0–0.9)
LYMPHOCYTES # BLD AUTO: 1.63 X10*3/UL (ref 0.8–3)
LYMPHOCYTES NFR BLD AUTO: 13.7 %
MCH RBC QN AUTO: 32.4 PG (ref 26–34)
MCHC RBC AUTO-ENTMCNC: 34.9 G/DL (ref 32–36)
MCV RBC AUTO: 93 FL (ref 80–100)
MONOCYTES # BLD AUTO: 0.67 X10*3/UL (ref 0.05–0.8)
MONOCYTES NFR BLD AUTO: 5.6 %
NEUTROPHILS # BLD AUTO: 9.46 X10*3/UL (ref 1.6–5.5)
NEUTROPHILS NFR BLD AUTO: 79.8 %
NRBC BLD-RTO: 0 /100 WBCS (ref 0–0)
PLATELET # BLD AUTO: 190 X10*3/UL (ref 150–450)
POTASSIUM SERPL-SCNC: 3.7 MMOL/L (ref 3.5–5.3)
PROT SERPL-MCNC: 6.8 G/DL (ref 6.4–8.2)
RBC # BLD AUTO: 4.29 X10*6/UL (ref 4–5.2)
SODIUM SERPL-SCNC: 140 MMOL/L (ref 136–145)
WBC # BLD AUTO: 11.9 X10*3/UL (ref 4.4–11.3)

## 2024-03-02 PROCEDURE — 70450 CT HEAD/BRAIN W/O DYE: CPT

## 2024-03-02 PROCEDURE — 96361 HYDRATE IV INFUSION ADD-ON: CPT

## 2024-03-02 PROCEDURE — 96374 THER/PROPH/DIAG INJ IV PUSH: CPT

## 2024-03-02 PROCEDURE — 86140 C-REACTIVE PROTEIN: CPT | Performed by: PHYSICIAN ASSISTANT

## 2024-03-02 PROCEDURE — 80053 COMPREHEN METABOLIC PANEL: CPT | Performed by: PHYSICIAN ASSISTANT

## 2024-03-02 PROCEDURE — 85025 COMPLETE CBC W/AUTO DIFF WBC: CPT | Performed by: PHYSICIAN ASSISTANT

## 2024-03-02 PROCEDURE — 36415 COLL VENOUS BLD VENIPUNCTURE: CPT | Performed by: PHYSICIAN ASSISTANT

## 2024-03-02 PROCEDURE — 84484 ASSAY OF TROPONIN QUANT: CPT | Performed by: PHYSICIAN ASSISTANT

## 2024-03-02 PROCEDURE — 93005 ELECTROCARDIOGRAM TRACING: CPT

## 2024-03-02 PROCEDURE — 99285 EMERGENCY DEPT VISIT HI MDM: CPT | Mod: 25

## 2024-03-02 PROCEDURE — 71046 X-RAY EXAM CHEST 2 VIEWS: CPT

## 2024-03-02 PROCEDURE — 2500000004 HC RX 250 GENERAL PHARMACY W/ HCPCS (ALT 636 FOR OP/ED): Performed by: PHYSICIAN ASSISTANT

## 2024-03-02 PROCEDURE — 85652 RBC SED RATE AUTOMATED: CPT | Performed by: PHYSICIAN ASSISTANT

## 2024-03-02 RX ORDER — DIPHENHYDRAMINE HCL 25 MG
25 CAPSULE ORAL EVERY 6 HOURS PRN
Qty: 20 CAPSULE | Refills: 0 | Status: SHIPPED | OUTPATIENT
Start: 2024-03-02 | End: 2024-03-27

## 2024-03-02 RX ORDER — DIPHENHYDRAMINE HYDROCHLORIDE 50 MG/ML
25 INJECTION INTRAMUSCULAR; INTRAVENOUS ONCE
Status: COMPLETED | OUTPATIENT
Start: 2024-03-02 | End: 2024-03-02

## 2024-03-02 RX ADMIN — DIPHENHYDRAMINE HYDROCHLORIDE 25 MG: 50 INJECTION, SOLUTION INTRAMUSCULAR; INTRAVENOUS at 09:50

## 2024-03-02 RX ADMIN — SODIUM CHLORIDE 1000 ML: 9 INJECTION, SOLUTION INTRAVENOUS at 09:51

## 2024-03-02 ASSESSMENT — LIFESTYLE VARIABLES
HAVE PEOPLE ANNOYED YOU BY CRITICIZING YOUR DRINKING: NO
HAVE YOU EVER FELT YOU SHOULD CUT DOWN ON YOUR DRINKING: NO
EVER HAD A DRINK FIRST THING IN THE MORNING TO STEADY YOUR NERVES TO GET RID OF A HANGOVER: NO
EVER FELT BAD OR GUILTY ABOUT YOUR DRINKING: NO

## 2024-03-02 ASSESSMENT — PAIN DESCRIPTION - LOCATION: LOCATION: FACE

## 2024-03-02 ASSESSMENT — COLUMBIA-SUICIDE SEVERITY RATING SCALE - C-SSRS
2. HAVE YOU ACTUALLY HAD ANY THOUGHTS OF KILLING YOURSELF?: NO
4. HAVE YOU HAD THESE THOUGHTS AND HAD SOME INTENTION OF ACTING ON THEM?: NO
6. HAVE YOU EVER DONE ANYTHING, STARTED TO DO ANYTHING, OR PREPARED TO DO ANYTHING TO END YOUR LIFE?: NO
5. HAVE YOU STARTED TO WORK OUT OR WORKED OUT THE DETAILS OF HOW TO KILL YOURSELF? DO YOU INTEND TO CARRY OUT THIS PLAN?: NO
1. IN THE PAST MONTH, HAVE YOU WISHED YOU WERE DEAD OR WISHED YOU COULD GO TO SLEEP AND NOT WAKE UP?: NO

## 2024-03-02 ASSESSMENT — PAIN - FUNCTIONAL ASSESSMENT: PAIN_FUNCTIONAL_ASSESSMENT: 0-10

## 2024-03-02 ASSESSMENT — PAIN DESCRIPTION - PAIN TYPE: TYPE: ACUTE PAIN

## 2024-03-02 ASSESSMENT — PAIN SCALES - GENERAL
PAINLEVEL_OUTOF10: 3
PAINLEVEL_OUTOF10: 6

## 2024-03-02 ASSESSMENT — PAIN DESCRIPTION - DESCRIPTORS: DESCRIPTORS: ACHING

## 2024-03-02 NOTE — ED PROVIDER NOTES
"HPI   Chief Complaint   Patient presents with    Headache     C/O headache and fever after having nerve block for shingles yesterday.       History of present illness: 78-year-old female has multiple complaints this morning.  Patient notes that she woke up feeling dizzy with mostly lightheadedness sensation that was mild.  Patient describes it as \"woozy.\"  She also notes that she felt that her face was warm and flushed.  She did not have a working thermometer so cannot check her temperature.  She also describes 5 out of 10 headache in her frontal region.  She does not normally have a headache.  Patient has a history of postherpetic neuralgia since April 2023.  She has been seeing pain management in regards to the pain.  Yesterday she had a nerve block at [facility] using a steroid and lidocaine.  She was not able to contact their office as they were closed today.  She was told to go to the emergency department if she developed a fever or other symptoms.  Patient denies any vertigo, nausea, vomiting, diplopia, blurred vision, cough, congestion, abdominal pain, diarrhea, constipation.  Patient states that she has an itchy painful area of her abdominal wall that has been going on for months that is no different than usual.  She does not feel there is any skin changes or swelling otherwise on her trunk.    Review of systems: Constitutional, eye, ENT, cardiovascular, respiratory, gastrointestinal, genitourinary, neurologic, musculoskeletal, dermatologic, hematologic, endocrine systems were evaluated and were negative unless otherwise specified in history of present illness.    Medications: Reviewed and per nursing note.    Family history: Denies relevant medical conditions.    Social history: Denies tobacco, alcohol, drug use.      Physical exam:    Appearance: Well-developed, well-nourished, nontoxic-appearing, alert and oriented x3. Talking in complete sentences.    HEENT: Facial flushing.  Head normocephalic atraumatic, " extraocular movements intact, pupils equal round reactive to light, mucous membranes are moist and pink.  Normal tympanic membranes.    NECK:  Nml Inspection, no meningismus, no thyromegaly, no lymphadenopathy, no JVD, trachea is midline.    Respiratory: Clear to auscultation bilaterally with normal bilateral excursion. No wheezes, rhonchi, crackles.    Cardiovascular: Regular rate and rhythm, no murmurs, rubs or gallops. Pulses 2+ symmetrically in the dorsalis pedis and radial pulses.    Abdomen/GI:  Soft, nontender, nondistended, normal bowel sounds x4. No masses or organomegaly.    :  No CVA tenderness    Neuro:  Oriented x 3, Speech Clear, cranial nerves grossly intact. Normal sensation to light touch in all 4 extremities.    Musculoskeletal: Patient spontaneously moves all 4 extremities.    Skin:  No cyanosis, clubbing, edema, or rashes.  Healing puncture wounds of the T3-T4 spine with no erythema induration or tenderness.                          No data recorded                   Patient History   Past Medical History:   Diagnosis Date    Complication of other artery following a procedure, not elsewhere classified, subsequent encounter     Postoperative pulmonary embolism, subsequent encounter    Personal history of other diseases of the circulatory system     History of cardiac murmur    Personal history of other diseases of the digestive system     History of gastroesophageal reflux (GERD)    Personal history of other specified conditions     History of epistaxis     Past Surgical History:   Procedure Laterality Date    CARPAL TUNNEL RELEASE  2018    Neuroplasty Decompression Median Nerve At Carpal Tunnel    CHOLECYSTECTOMY  2018    Cholecystectomy Laparoscopic    OTHER SURGICAL HISTORY  2021     section    OTHER SURGICAL HISTORY  2021    Breast biopsy    OTHER SURGICAL HISTORY  2018    Right mastectomy    OTHER SURGICAL HISTORY  2018    Total Hip Replacement  Right    OTHER SURGICAL HISTORY  04/30/2018    Hip Replacement Left     Family History   Problem Relation Name Age of Onset    Breast cancer Mother       Social History     Tobacco Use    Smoking status: Never    Smokeless tobacco: Never   Substance Use Topics    Alcohol use: Never    Drug use: Never       Physical Exam   ED Triage Vitals [03/02/24 0912]   Temperature Heart Rate Respirations BP   36.7 °C (98.1 °F) 89 20 168/85      Pulse Ox Temp Source Heart Rate Source Patient Position   100 % Tympanic -- Sitting      BP Location FiO2 (%)     Left arm --       Physical Exam    ED Course & MDM   Diagnoses as of 03/02/24 1213   Dizziness   Adverse effect of drug, initial encounter       Medical Decision Making  EKG: Normal sinus rhythm with a rate of 67 bpm with no ST segment elevation or ectopy.  NY interval 192 with a QTc 417.  No clear acute ischemia.  This is interpreted by myself.    Labs Reviewed  CBC WITH AUTO DIFFERENTIAL - Abnormal     WBC                           11.9 (*)               nRBC                          0.0                    RBC                           4.29                   Hemoglobin                    13.9                   Hematocrit                    39.8                   MCV                           93                     MCH                           32.4                   MCHC                          34.9                   RDW                           12.5                   Platelets                     190                    Neutrophils %                 79.8                   Immature Granulocytes %, Automated   0.7                    Lymphocytes %                 13.7                   Monocytes %                   5.6                    Eosinophils %                 0.0                    Basophils %                   0.2                    Neutrophils Absolute          9.46 (*)               Immature Granulocytes Absolute, Au*   0.08                   Lymphocytes Absolute           1.63                   Monocytes Absolute            0.67                   Eosinophils Absolute          0.00                   Basophils Absolute            0.02                COMPREHENSIVE METABOLIC PANEL - Abnormal     Glucose                       161 (*)                Sodium                        140                    Potassium                     3.7                    Chloride                      105                    Bicarbonate                   27                     Anion Gap                     12                     Urea Nitrogen                 30 (*)                 Creatinine                    1.05                   eGFR                          54 (*)                 Calcium                       10.3                   Albumin                       4.3                    Alkaline Phosphatase          53                     Total Protein                 6.8                    AST                           18                     Bilirubin, Total              0.5                    ALT                           19                  TROPONIN I, HIGH SENSITIVITY - Normal     Troponin I, High Sensitivity   5                          Narrative: Less than 99th percentile of normal range cutoff-                  Female and children under 18 years old <14 ng/L; Male <21 ng/L: Negative                  Repeat testing should be performed if clinically indicated.                                     Female and children under 18 years old 14-50 ng/L; Male 21-50 ng/L:                  Consistent with possible cardiac damage and possible increased clinical                   risk. Serial measurements may help to assess extent of myocardial damage.                                     >50 ng/L: Consistent with cardiac damage, increased clinical risk and                  myocardial infarction. Serial measurements may help assess extent of                   myocardial damage.                                       NOTE: Children less than 1 year old may have higher baseline troponin                   levels and results should be interpreted in conjunction with the overall                   clinical context.                                     NOTE: Troponin I testing is performed using a different                   testing methodology at [facility] than at other                   system hospitals. Direct result comparisons should only                   be made within the same method.  SEDIMENTATION RATE, AUTOMATED - Normal     Sedimentation Rate            8                   C-REACTIVE PROTEIN - Normal    XR chest 2 views   Final Result    1. No acute cardiopulmonary process.          Signed by: Shivani Perez 3/2/2024 10:41 AM    Dictation workstation:   [id number]     CT head wo IV contrast   Final Result    1. No acute intracranial abnormality.                MACRO:    None          Signed by: Shivani Perez 3/2/2024 10:39 AM    Dictation workstation:   [id number]         Patient has complaints of dizziness, facial flushing, headache.  Differential diagnosis of allergic reaction to medications, electrolyte disturbance, coronary syndrome, infection from injection.  Patient reports her symptoms began this morning.  She had thoracic rib injections yesterday.  Examination shows facial flushing.  There is no skin tenderness or induration.  No clear wound infection.  Normal cranial nerve and peripheral nerve exam.  No URI symptoms.    CBC CMP sed rate CRP chest x-ray CT head EKG troponin ordered.  Given normal saline and Benadryl IV.    EKG shows no acute ischemia, chemistries show glucose 161 with BUN 30 with normal creatinine, normal CRP, white blood cell count minimally elevated 11.9 chest x-ray shows no acute findings.    Patient's symptoms almost entirely resolved.  She is feeling much better.  White blood cell count elevation could be stress reaction.  No clear infectious process at this time.   Normal inflammatory markers.  Skin has no clear infectious findings.  BUN elevated indicating some degree of dehydration which could be a cause of her dizziness.  Most likely underlying etiology is medication reaction.  Recommend follow-up with pain management.    Patient will be discharged to home with prescription for Benadryl as needed.  Patient is educated in signs and symptoms of worsening symptoms and reasons to come back to the emergency department.  Will need to follow up with primary care provider.  Patient does not report social determinants of health impacting ability to obtain care that is needed.  Patient agrees with plan.    This is a transcription.  Text was reviewed for errors, but some transcription errors may remain.  Please call for any questions.              Procedure  Procedures     Heriberto Vallecillo PA-C  03/02/24 [id number]

## 2024-03-02 NOTE — ED TRIAGE NOTES
Pt to ER with c/o headache, facial pain, and fever. Pt states she received nerve block yesterday at 10am for shingles by pain management.

## 2024-03-12 LAB
ATRIAL RATE: 67 BPM
P AXIS: 44 DEGREES
PR INTERVAL: 192 MS
Q ONSET: 253 MS
QRS COUNT: 10 BEATS
QRS DURATION: 96 MS
QT INTERVAL: 395 MS
QTC CALCULATION(BAZETT): 417 MS
QTC FREDERICIA: 410 MS
R AXIS: -1 DEGREES
T AXIS: 8 DEGREES
T OFFSET: 451 MS
VENTRICULAR RATE: 67 BPM

## 2024-03-13 ENCOUNTER — TELEPHONE (OUTPATIENT)
Dept: PRIMARY CARE CLINIC | Age: 79
End: 2024-03-13

## 2024-03-13 NOTE — TELEPHONE ENCOUNTER
----- Message from Jennifer King sent at 3/13/2024 10:53 AM EDT -----  Subject: Message to Provider    QUESTIONS  Information for Provider? pt stated that she is having Diarrhea and that   she cant leave the house. it started in the middle of February , last   Friday for 3 days, and then went away, and then on Saturday for 2 and half   days, and it has started back up today   ---------------------------------------------------------------------------  --------------  CALL BACK INFO  [phone]; OK to leave message on voicemail  ---------------------------------------------------------------------------  --------------  SCRIPT ANSWERS  Relationship to Patient? Self

## 2024-03-18 ENCOUNTER — OFFICE VISIT (OUTPATIENT)
Dept: PRIMARY CARE CLINIC | Age: 79
End: 2024-03-18
Payer: COMMERCIAL

## 2024-03-18 VITALS
SYSTOLIC BLOOD PRESSURE: 167 MMHG | BODY MASS INDEX: 29.18 KG/M2 | HEART RATE: 72 BPM | WEIGHT: 139 LBS | DIASTOLIC BLOOD PRESSURE: 75 MMHG | TEMPERATURE: 97.1 F | HEIGHT: 58 IN

## 2024-03-18 DIAGNOSIS — E03.9 ACQUIRED HYPOTHYROIDISM: ICD-10-CM

## 2024-03-18 DIAGNOSIS — I10 PRIMARY HYPERTENSION: Primary | ICD-10-CM

## 2024-03-18 DIAGNOSIS — F32.A ANXIETY AND DEPRESSION: ICD-10-CM

## 2024-03-18 DIAGNOSIS — F41.9 ANXIETY AND DEPRESSION: ICD-10-CM

## 2024-03-18 DIAGNOSIS — E55.9 VITAMIN D DEFICIENCY: ICD-10-CM

## 2024-03-18 PROCEDURE — 99214 OFFICE O/P EST MOD 30 MIN: CPT | Performed by: FAMILY MEDICINE

## 2024-03-18 PROCEDURE — 3077F SYST BP >= 140 MM HG: CPT | Performed by: FAMILY MEDICINE

## 2024-03-18 PROCEDURE — 36415 COLL VENOUS BLD VENIPUNCTURE: CPT | Performed by: FAMILY MEDICINE

## 2024-03-18 PROCEDURE — 1123F ACP DISCUSS/DSCN MKR DOCD: CPT | Performed by: FAMILY MEDICINE

## 2024-03-18 PROCEDURE — 3078F DIAST BP <80 MM HG: CPT | Performed by: FAMILY MEDICINE

## 2024-03-18 RX ORDER — ESCITALOPRAM OXALATE 5 MG/1
5 TABLET ORAL DAILY
Qty: 30 TABLET | Refills: 1 | Status: SHIPPED | OUTPATIENT
Start: 2024-03-18

## 2024-03-18 RX ORDER — METOPROLOL TARTRATE 100 MG/1
100 TABLET ORAL 2 TIMES DAILY
Qty: 60 TABLET | Refills: 3 | Status: CANCELLED | OUTPATIENT
Start: 2024-03-18

## 2024-03-18 NOTE — PROGRESS NOTES
Completed    COVID-19 Vaccine  Completed    Respiratory Syncytial Virus (RSV) Pregnant or age 60 yrs+  Completed    Hepatitis A vaccine  Aged Out    Hepatitis B vaccine  Aged Out    Hib vaccine  Aged Out    Polio vaccine  Aged Out    Meningococcal (ACWY) vaccine  Aged Out    Depression Screen  Discontinued       Past Medical History:      Diagnosis Date    Cancer (HCC) 2018    breast    Hyperlipidemia     Hypertension     Pulmonary embolism (HCC)        Past Surgical History:        Procedure Laterality Date    CARPAL TUNNEL RELEASE      bilateral     SECTION      CHOLECYSTECTOMY      FOOT SURGERY      bunions and corns both feet    JOINT REPLACEMENT      left hip    MASTECTOMY Right 2018    MASTECTOMY Right 2018       Allergies:    Medrol [methylprednisolone], Penicillins, Biaxin [clarithromycin], Cortisone, Hydrochlorothiazide, Prednisone, Tetracyclines & related, and Procardia [nifedipine]    Social History:   Social History     Socioeconomic History    Marital status:      Spouse name: Not on file    Number of children: Not on file    Years of education: Not on file    Highest education level: Not on file   Occupational History    Not on file   Tobacco Use    Smoking status: Never    Smokeless tobacco: Never   Vaping Use    Vaping Use: Never used   Substance and Sexual Activity    Alcohol use: Yes     Comment: rare    Drug use: No    Sexual activity: Not on file   Other Topics Concern    Not on file   Social History Narrative    Not on file     Social Determinants of Health     Financial Resource Strain: Low Risk  (2024)    Overall Financial Resource Strain (CARDIA)     Difficulty of Paying Living Expenses: Not hard at all   Food Insecurity: No Food Insecurity (2024)    Hunger Vital Sign     Worried About Running Out of Food in the Last Year: Never true     Ran Out of Food in the Last Year: Never true   Transportation Needs: Unknown (2024)    PRAPARE - Transportation     Lack of

## 2024-03-19 LAB
T4 FREE: 1.3 NG/DL (ref 0.9–1.7)
TSH SERPL DL<=0.05 MIU/L-ACNC: 1.86 UIU/ML (ref 0.27–4.2)
VITAMIN D 25-HYDROXY: 98.1 NG/ML (ref 30–100)

## 2024-03-20 ENCOUNTER — TELEPHONE (OUTPATIENT)
Dept: PRIMARY CARE CLINIC | Age: 79
End: 2024-03-20

## 2024-03-21 NOTE — TELEPHONE ENCOUNTER
Patient notified and verbalizes understanding. Mailing her resource list  
She can try some therapy/counseling.  We can give her the list of providers in the area.  
Spoke to patient and she is afraid to start taking the lexapro due to all of the side effects.  She really doesn't want to take any more pills.  Wants to know if there is any other way to fix this issue.     Please advise   
no fever and no chills.

## 2024-03-25 ENCOUNTER — APPOINTMENT (OUTPATIENT)
Dept: PAIN MEDICINE | Facility: HOSPITAL | Age: 79
End: 2024-03-25
Payer: COMMERCIAL

## 2024-03-26 DIAGNOSIS — I10 PRIMARY HYPERTENSION: ICD-10-CM

## 2024-03-26 RX ORDER — METOPROLOL TARTRATE 100 MG/1
100 TABLET ORAL 2 TIMES DAILY
Qty: 60 TABLET | Refills: 1 | Status: SHIPPED | OUTPATIENT
Start: 2024-03-26

## 2024-03-27 ENCOUNTER — OFFICE VISIT (OUTPATIENT)
Dept: PAIN MEDICINE | Facility: HOSPITAL | Age: 79
End: 2024-03-27
Payer: COMMERCIAL

## 2024-03-27 VITALS
DIASTOLIC BLOOD PRESSURE: 89 MMHG | HEART RATE: 64 BPM | HEIGHT: 58 IN | RESPIRATION RATE: 18 BRPM | BODY MASS INDEX: 29.58 KG/M2 | WEIGHT: 140.9 LBS | OXYGEN SATURATION: 100 % | SYSTOLIC BLOOD PRESSURE: 165 MMHG

## 2024-03-27 DIAGNOSIS — B02.29 POST HERPETIC NEURALGIA: Primary | ICD-10-CM

## 2024-03-27 PROCEDURE — 1159F MED LIST DOCD IN RCRD: CPT | Performed by: CLINICAL NURSE SPECIALIST

## 2024-03-27 PROCEDURE — 1157F ADVNC CARE PLAN IN RCRD: CPT | Performed by: CLINICAL NURSE SPECIALIST

## 2024-03-27 PROCEDURE — 3079F DIAST BP 80-89 MM HG: CPT | Performed by: CLINICAL NURSE SPECIALIST

## 2024-03-27 PROCEDURE — 1126F AMNT PAIN NOTED NONE PRSNT: CPT | Performed by: CLINICAL NURSE SPECIALIST

## 2024-03-27 PROCEDURE — 1160F RVW MEDS BY RX/DR IN RCRD: CPT | Performed by: CLINICAL NURSE SPECIALIST

## 2024-03-27 PROCEDURE — 3077F SYST BP >= 140 MM HG: CPT | Performed by: CLINICAL NURSE SPECIALIST

## 2024-03-27 PROCEDURE — 99214 OFFICE O/P EST MOD 30 MIN: CPT | Performed by: CLINICAL NURSE SPECIALIST

## 2024-03-27 PROCEDURE — 1036F TOBACCO NON-USER: CPT | Performed by: CLINICAL NURSE SPECIALIST

## 2024-03-27 ASSESSMENT — PATIENT HEALTH QUESTIONNAIRE - PHQ9
2. FEELING DOWN, DEPRESSED OR HOPELESS: NOT AT ALL
1. LITTLE INTEREST OR PLEASURE IN DOING THINGS: NOT AT ALL
SUM OF ALL RESPONSES TO PHQ9 QUESTIONS 1 AND 2: 0

## 2024-03-27 ASSESSMENT — PAIN SCALES - GENERAL: PAINLEVEL: 0-NO PAIN

## 2024-03-27 ASSESSMENT — ENCOUNTER SYMPTOMS
OCCASIONAL FEELINGS OF UNSTEADINESS: 0
DEPRESSION: 0
LOSS OF SENSATION IN FEET: 1

## 2024-03-27 NOTE — PROGRESS NOTES
Follow Up Visit    Location of Pain: Intermittent itching stomach and back-had shingles 4/25/23. States itching is not getting better. States also has neuropathy bilat feet. Hydroxyzine is not helping with itching or sleeping-prescribed by PCP.         Pain Score: 0/10    Other pain medication/neuromodulator: none    Therapeutic Goals:    Therapeutic Assessment:    Injections and/or Procedures: Nerve block 3/1/24-seen in ER after for meds used for procedure    Other: none  OA: 1/24/24    Subjective   Patient ID: Rosemary Motta is a 78 y.o. female who presents for shingles pain    HPI    78-year-old female with history of shingles that started with a rash on 4/25/2023 and several weeks after this developed intermittent neuropathic itching and pain.  Neuropathic pain waxes and wanes.  Most bothersome in the morning.  Pain accompanied by itching/burning.  Pain localized to the left side of her lower back radiating around to her stomach above her umbilicus.  No lesions noted.  Patient has been evaluated by pain management in [place] and has tried multiple oral medications which she states were ineffective.  She does not recall the names of all the medications she has tried.  She knows that she failed gabapentin.  She has also tried multiple patches and creams without relief.  She was scheduled for a T8-9 intercostal nerve block at her last office visit.  Patient presents at today's office visit currently experiencing no pain.  She states that more than pain she has intermittent itching and burning following the dermatome consistent with previous shingles.  When she does have itching/burning it starts in the left side of her abdomen and radiates posteriorly to her mid back.  She does not have any noted lesions.  States that pain continues to wax and wane.  She cannot identify any events or activities that increase her pain.  Continues to experience more pain in the morning.  Unfortunately the nerve block did not  provide any relief.  Patient actually had what sounds like an allergic reaction after her nerve block with facial flushing, headache and was evaluated in the emergency department.  She is not interested in additional medication, ointments or treatments.  She states that if her pain does not worsen she is able to tolerate it at the current level.    OARRS:  Rosemary Caal, APRN-CNP, APRN-CNS on 3/27/2024 11:47 AM  I have personally reviewed the OARRS report for Rosemary Motta. I have considered the risks of abuse, dependence, addiction and diversion    Is the patient prescribed a combination of a benzodiazepine and opioid?  No    Last Urine Drug Screen / ordered today: No  No results found for this or any previous visit (from the past 8760 hour(s)).  N/A    Controlled Substance Agreement:  Date of the Last Agreement:       Monitoring and compliance:    ORT:    PDUQ:    Office Agreement: 1/24/24      Review of Systems    ROS:   General: No fevers, chills, weight loss  Skin: Negative for lesions  Eyes: No acute vision changes  Ears: No vertigo  Nose, mouth, throat: No difficulty swallowing or speaking  Respiratory: No cough, shortness of breath, cyanosis  Cardiovascular: Negative for chest pain syncope or palpitation  Gastrointestinal: No constipation, nausea, vomiting  Neurological: Negative for headache, positive for: Neuralgia, burning, itching  Psychological: Negative for severe or debilitating anxiety, depression. Negative memory loss  Musculoskeletal: Positive for myalgia and pain  Endocrine: Negative for weight gain, appetite changes, excessive sweating  Allergy/immune: Negative    All 13 systems were reviewed and are within normal levels except as noted or in the history of present illness.  Positive or pertinent negative responses are noted or were in the history of present illness. As noted, the patient denies significant or impairing weakness in the bilateral upper and lower extremities, medication induced  constipation, and bowel or bladder incontinence.     Current Outpatient Medications:     anastrozole (Arimidex) 1 mg tablet, , Disp: , Rfl:     ascorbic acid, vitamin C, 500 mg ER tablet, Vitamin C  MG Oral Tablet Extended Release  Refills: 0      Start : 20-Sep-2021  Active, Disp: , Rfl:     aspirin 81 mg EC tablet, Aspirin 81 MG Oral Tablet Delayed Release  Refills: 0      Start : 30-Apr-2018  Active, Disp: , Rfl:     calcium citrate 250 mg calcium tablet, Calcium Citrate 1040 MG TABS  Refills: 0      Start : 20-Sep-2021  Active, Disp: , Rfl:     hydrOXYzine HCL (Atarax) 25 mg tablet, Take 1 tablet (25 mg) by mouth once daily at bedtime., Disp: , Rfl:     levothyroxine (Synthroid, Levoxyl) 25 mcg tablet, Take 1 tablet (25 mcg) by mouth once daily., Disp: , Rfl:     metoprolol tartrate (Lopressor) 50 mg tablet, Take 1 tablet by mouth twice a day., Disp: , Rfl:     olmesartan (BENIcar) 40 mg tablet, Take 1 tablet (40 mg) by mouth once daily., Disp: , Rfl:     rosuvastatin (Crestor) 5 mg tablet, Take 1 tablet (5 mg) by mouth once daily., Disp: , Rfl:     artificial tears, dextran-hypomel-glycerin, 0.1-0.3-0.2 % ophthalmic solution, Administer 1 drop into affected eye(s) 4 times a day.  1 drop(s) to each affected eye 4 times a day, Disp: , Rfl:     gabapentin (Neurontin) 100 mg capsule, Take 2 capsules (200 mg) by mouth every 8 hours., Disp: , Rfl:     phenyleph-min oil-petrolatum (Preparation H) 0.25-14-74.9 % rectal ointment, Insert 1 Application into the rectum 2 times a day as needed., Disp: , Rfl:      Past Medical History:   Diagnosis Date    Complication of other artery following a procedure, not elsewhere classified, subsequent encounter     Postoperative pulmonary embolism, subsequent encounter    Personal history of other diseases of the circulatory system     History of cardiac murmur    Personal history of other diseases of the digestive system     History of gastroesophageal reflux (GERD)     "Personal history of other specified conditions     History of epistaxis        Past Surgical History:   Procedure Laterality Date    CARPAL TUNNEL RELEASE  2018    Neuroplasty Decompression Median Nerve At Carpal Tunnel    CHOLECYSTECTOMY  2018    Cholecystectomy Laparoscopic    OTHER SURGICAL HISTORY  2021     section    OTHER SURGICAL HISTORY  2021    Breast biopsy    OTHER SURGICAL HISTORY  2018    Right mastectomy    OTHER SURGICAL HISTORY  2018    Total Hip Replacement Right    OTHER SURGICAL HISTORY  2018    Hip Replacement Left        Family History   Problem Relation Name Age of Onset    Breast cancer Mother          Allergies   Allergen Reactions    Penicillins Anaphylaxis and Other     Passed out    Clarithromycin Other and Nausea/vomiting     n/v    Tetracycline Other and Nausea/vomiting     n/v    Cortisone Rash, Other and Headache     Facial pain    Methylprednisolone Swelling, Other and Rash     facial pain    Nifedipine Rash, Other and Dizziness     Visual disturbance    Prednisone Rash, Other and Headache     Pain         Objective     Visit Vitals  /89   Pulse 64   Resp 18   Ht 1.473 m (4' 10\")   Wt 63.9 kg (140 lb 14.4 oz)   SpO2 100%   BMI 29.45 kg/m²   Smoking Status Never   BSA 1.62 m²        Physical Exam    PE:  General: Well-developed, well-nourished, no acute distress. The patient demonstrates no pain behavior, symptom magnification or overt drug-seeking behavior.  Eye: Pupils appropriate for room lighting  Neck/thyroid: No obvious goiter or enlargement of neck noted  Respiratory exam: Normal respiratory effort, unlabored respiration. No accessory muscle use noted  Cardiac exam: Bilateral radial pulses intact  Abdominal: Nondistended  Spine, lumbar: The patient is able to rise from a seated to standing position without hesitancy, push off, or delay. Gait is grossly nonantalgic.  No noted lesions on her abdomen or lower back.  No pain " with palpation, no allodynia or hyperalgesia.  Neurologic exam: Muscle strength is antigravity in all 4 extremities.  Psychiatric exam: Judgment and insight normal, affect normal, speech is fluent, affect appropriate, demonstrating no signs of hypersomnolence, sedation, or confusion        Assessment/Plan   Problem List Items Addressed This Visit             ICD-10-CM    Post herpetic neuralgia - Primary B02.29       78-year-old female with history of mild kidney dysfunction with history of postherpetic neuralgia in a left T8- T9 distribution.  She has failed multiple medication treatments including gabapentin and others which she cannot recall.  She is not interested in additional medication at this time.  She also has failed multiple patches and ointments.  She is not interested in trying additional topical treatments.  Discussed Qutenza treatments at her last office visit and she states she is not interested at this time, however, if her symptoms worsen she may reconsider Qutenza treatment.  Unfortunately patient did not receive relief with recent T8-9 intercostal nerve block.  She does not want to repeat the nerve block. Currently she states that her pain is tolerable and that it comes and goes.  She states that if it does not worsen it is something she can live with.  We discussed returning to our office if her symptoms should worsen.    -Patient will return to our office if symptoms of postherpetic neuralgia should worsen.  She currently does not want to add additional medication, creams or patches.  She may consider Qutenza in the future if symptoms increase.

## 2024-03-27 NOTE — ASSESSMENT & PLAN NOTE
78-year-old female with history of mild kidney dysfunction with history of postherpetic neuralgia in a left T8- T9 distribution.  She has failed multiple medication treatments including gabapentin and others which she cannot recall.  She is not interested in additional medication at this time.  She also has failed multiple patches and ointments.  She is not interested in trying additional topical treatments.  Discussed Qutenza treatments at her last office visit and she states she is not interested at this time, however, if her symptoms worsen she may reconsider Qutenza treatment.  Unfortunately patient did not receive relief with recent T8-9 intercostal nerve block.  She does not want to repeat the nerve block. Currently she states that her pain is tolerable and that it comes and goes.  She states that if it does not worsen it is something she can live with.  We discussed returning to our office if her symptoms should worsen.    -Patient will return to our office if symptoms of postherpetic neuralgia should worsen.  She currently does not want to add additional medication, creams or patches.  She may consider Qutenza in the future if symptoms increase.

## 2024-04-02 ENCOUNTER — TELEPHONE (OUTPATIENT)
Dept: PRIMARY CARE CLINIC | Age: 79
End: 2024-04-02

## 2024-04-02 RX ORDER — ANASTROZOLE 1 MG/1
1 TABLET ORAL DAILY
Qty: 90 TABLET | Refills: 1 | Status: SHIPPED | OUTPATIENT
Start: 2024-04-02

## 2024-04-02 NOTE — TELEPHONE ENCOUNTER
Last Appointment   3/18/2024  Next Appointment  4/17/2024    Needs anastrozole 1 mg refilled to [facility]   Also pt stated hydroxyzine is not helping her and she does not want to take it anymore, does she need weaned off of it ?

## 2024-04-02 NOTE — TELEPHONE ENCOUNTER
She should be safe to get the shingles vaccine.  If that doesn't help with her symptoms, we can do a follow up appointment to discuss options.

## 2024-04-06 ENCOUNTER — HOSPITAL ENCOUNTER (EMERGENCY)
Facility: HOSPITAL | Age: 79
Discharge: HOME | End: 2024-04-06
Attending: STUDENT IN AN ORGANIZED HEALTH CARE EDUCATION/TRAINING PROGRAM
Payer: COMMERCIAL

## 2024-04-06 VITALS
DIASTOLIC BLOOD PRESSURE: 78 MMHG | OXYGEN SATURATION: 96 % | SYSTOLIC BLOOD PRESSURE: 155 MMHG | WEIGHT: 140 LBS | BODY MASS INDEX: 29.39 KG/M2 | HEART RATE: 69 BPM | HEIGHT: 58 IN | TEMPERATURE: 97.5 F | RESPIRATION RATE: 18 BRPM

## 2024-04-06 DIAGNOSIS — K56.41 FECAL IMPACTION (MULTI): Primary | ICD-10-CM

## 2024-04-06 PROCEDURE — 99283 EMERGENCY DEPT VISIT LOW MDM: CPT

## 2024-04-06 PROCEDURE — 2500000005 HC RX 250 GENERAL PHARMACY W/O HCPCS

## 2024-04-06 RX ORDER — LIDOCAINE HYDROCHLORIDE 20 MG/ML
JELLY TOPICAL
Status: DISCONTINUED
Start: 2024-04-06 | End: 2024-04-06 | Stop reason: HOSPADM

## 2024-04-06 RX ORDER — AMOXICILLIN 250 MG
1 CAPSULE ORAL DAILY
Qty: 30 TABLET | Refills: 0 | Status: SHIPPED | OUTPATIENT
Start: 2024-04-06 | End: 2024-05-06

## 2024-04-06 RX ORDER — ONDANSETRON 4 MG/1
TABLET, ORALLY DISINTEGRATING ORAL
Status: COMPLETED
Start: 2024-04-06 | End: 2024-04-06

## 2024-04-06 RX ORDER — ONDANSETRON 4 MG/1
4 TABLET, ORALLY DISINTEGRATING ORAL ONCE
Status: COMPLETED | OUTPATIENT
Start: 2024-04-06 | End: 2024-04-06

## 2024-04-06 RX ORDER — LIDOCAINE HYDROCHLORIDE 20 MG/ML
1 JELLY TOPICAL ONCE
Status: DISCONTINUED | OUTPATIENT
Start: 2024-04-06 | End: 2024-04-06 | Stop reason: HOSPADM

## 2024-04-06 RX ADMIN — ONDANSETRON 4 MG: 4 TABLET, ORALLY DISINTEGRATING ORAL at 16:37

## 2024-04-06 ASSESSMENT — LIFESTYLE VARIABLES
HAVE YOU EVER FELT YOU SHOULD CUT DOWN ON YOUR DRINKING: NO
EVER FELT BAD OR GUILTY ABOUT YOUR DRINKING: NO
HAVE PEOPLE ANNOYED YOU BY CRITICIZING YOUR DRINKING: NO
EVER HAD A DRINK FIRST THING IN THE MORNING TO STEADY YOUR NERVES TO GET RID OF A HANGOVER: NO
TOTAL SCORE: 0

## 2024-04-06 ASSESSMENT — PAIN SCALES - GENERAL: PAINLEVEL_OUTOF10: 4

## 2024-04-06 ASSESSMENT — COLUMBIA-SUICIDE SEVERITY RATING SCALE - C-SSRS
2. HAVE YOU ACTUALLY HAD ANY THOUGHTS OF KILLING YOURSELF?: NO
1. IN THE PAST MONTH, HAVE YOU WISHED YOU WERE DEAD OR WISHED YOU COULD GO TO SLEEP AND NOT WAKE UP?: NO
6. HAVE YOU EVER DONE ANYTHING, STARTED TO DO ANYTHING, OR PREPARED TO DO ANYTHING TO END YOUR LIFE?: NO

## 2024-04-06 ASSESSMENT — PAIN - FUNCTIONAL ASSESSMENT: PAIN_FUNCTIONAL_ASSESSMENT: 0-10

## 2024-04-06 ASSESSMENT — PAIN DESCRIPTION - LOCATION: LOCATION: RECTUM

## 2024-04-06 ASSESSMENT — PAIN DESCRIPTION - DESCRIPTORS: DESCRIPTORS: PRESSURE

## 2024-04-06 NOTE — ED PROVIDER NOTES
HPI   Chief Complaint   Patient presents with    Rectal Bleeding     Started 0700 on 4/6/24.     Rectal Pain     Started 0700 on 4/6/24.    Constipation     Started 0700 on 4/6/24.       HPI: The patient is a 78-year-old female, she is presenting to the emergency department today for concern for constipation.  She reports that she has battled with constipation for quite some time.  She says that she is also had hemorrhoids for quite some time as well.  She reports that today though after not having a bowel movement for a few days she tried to use the restroom and felt a lot of pressure in her rectum, she had bleeding from her hemorrhoids which was not uncommon, however she was unable to use the restroom.  She was having increasing rectal pain and so presented to the ER for further evaluation.  She is unable to sit down due to the discomfort in her bottom.  She denies any vomiting, no fevers or chills, no trauma.  She is not on narcotic or antihistamine medications.  Denies any prior abdominal surgeries.      ROS: Complete 12 point review of systems performed, otherwise negative except as noted in the history of present illness    PMH: Reviewed, documented below in note. Pertinents in HPI  PSH: Reviewed and documented below in note. Pertinents in HPI  SH: No tobacco alcohol or illicits   Fam: Reviewed, noncontributory to patients current complaint  MEDS: Reviewed and documented below in note. Pertinents in HPI  ALLERGIES: Reviewed and documented below in note.        History provided by:  Patient                        Poly Coma Scale Score: 15                  Patient History   Past Medical History:   Diagnosis Date    Complication of other artery following a procedure, not elsewhere classified, subsequent encounter     Postoperative pulmonary embolism, subsequent encounter    Personal history of other diseases of the circulatory system     History of cardiac murmur    Personal history of other diseases of  "the digestive system     History of gastroesophageal reflux (GERD)    Personal history of other specified conditions     History of epistaxis     Past Surgical History:   Procedure Laterality Date    CARPAL TUNNEL RELEASE  2018    Neuroplasty Decompression Median Nerve At Carpal Tunnel    CHOLECYSTECTOMY  2018    Cholecystectomy Laparoscopic    OTHER SURGICAL HISTORY  2021     section    OTHER SURGICAL HISTORY  2021    Breast biopsy    OTHER SURGICAL HISTORY  2018    Right mastectomy    OTHER SURGICAL HISTORY  2018    Total Hip Replacement Right    OTHER SURGICAL HISTORY  2018    Hip Replacement Left     Family History   Problem Relation Name Age of Onset    Breast cancer Mother       Social History     Tobacco Use    Smoking status: Never    Smokeless tobacco: Never   Vaping Use    Vaping Use: Never used   Substance Use Topics    Alcohol use: Not Currently    Drug use: Never       Physical Exam   Visit Vitals  BP (!) 198/83 (BP Location: Left arm, Patient Position: Sitting)   Pulse 67   Temp 36.4 °C (97.5 °F) (Temporal)   Resp 16   Ht 1.473 m (4' 10\")   Wt 63.5 kg (140 lb)   SpO2 95%   BMI 29.26 kg/m²   Smoking Status Never   BSA 1.61 m²      Physical Exam  Vitals and nursing note reviewed.   Constitutional:       Appearance: Normal appearance.      Comments: Uncomfortable appearing, pacing the room   HENT:      Head: Normocephalic and atraumatic.      Mouth/Throat:      Mouth: Mucous membranes are moist.   Neck:      Vascular: No carotid bruit.   Cardiovascular:      Rate and Rhythm: Normal rate and regular rhythm.      Pulses: Normal pulses.      Heart sounds: Normal heart sounds.   Pulmonary:      Effort: Pulmonary effort is normal.      Breath sounds: Normal breath sounds.   Abdominal:      General: There is no distension.      Palpations: Abdomen is soft.      Tenderness: There is no abdominal tenderness. There is no guarding or rebound.   Genitourinary:     " Comments: Rectal exam reveals large hemorrhoids, not thrombosed. On internal exam she has a fecal impaction noted  Musculoskeletal:         General: No tenderness, deformity or signs of injury.      Cervical back: Normal range of motion. No rigidity.   Skin:     General: Skin is warm and dry.      Capillary Refill: Capillary refill takes less than 2 seconds.   Neurological:      General: No focal deficit present.      Mental Status: She is alert and oriented to person, place, and time.      Sensory: No sensory deficit.      Motor: No weakness.   Psychiatric:         Mood and Affect: Mood normal.         Behavior: Behavior normal.         No orders to display       Labs Reviewed - No data to display      ED Course & MDM   Diagnoses as of 04/06/24 1831   Fecal impaction (CMS/[facility])           Medical Decision Making  All mentioned lab results, ECGs, and imaging were independently reviewed by myself  - Patient evaluated.  Patient is presenting to the emergency department today for rectal pain and constipation.  On exam she has a fecal impaction.  Uro-Jet was ordered and placed in the patient's rectum and a disimpaction was performed by myself.  Soapsuds enema was then given and the patient had a bowel movement with good success.  During the bowel movement she was feeling little nauseous and so ODT Zofran was given.  On reevaluation she feels much better and is requesting discharge.  I feel this is appropriate.  We will start the patient on Senokot and she was instructed on a high-fiber diet.  All questions were answered.  She was discharged otherwise stable condition.  We will give the patient surgery follow-up for her hemorrhoids as needed and she was instructed on strict return precautions    - Monitored for any changes in stability or symptomatology. Patient remained stable.   - Counseled regarding labs, imaging, diagnosis, and plan. Patient was agreeable. All questions were answered. The patient was receptive and  agreeable to the plan of care.   -The patient was instructed to return to the emergency department if any symptoms recurred, worsened, or if there were any additional concerns.    *Disclaimer: This note was dictated by speech recognition. Minor errors in transcription may be present. Please call with questions.    Jaime Parkinson MD             Your medication list        START taking these medications        Instructions Last Dose Given Next Dose Due   sennosides-docusate sodium 8.6-50 mg tablet  Commonly known as: Annie-Colace      Take 1 tablet by mouth once daily.              ASK your doctor about these medications        Instructions Last Dose Given Next Dose Due   anastrozole 1 mg tablet  Commonly known as: Arimidex           artificial tears (dextran-hypomel-glycerin) 0.1-0.3-0.2 % ophthalmic solution           ascorbic acid (vitamin C) 500 mg ER tablet           aspirin 81 mg EC tablet           calcium citrate 250 mg calcium tablet           gabapentin 100 mg capsule  Commonly known as: Neurontin           hydrOXYzine HCL 25 mg tablet  Commonly known as: Atarax           levothyroxine 25 mcg tablet  Commonly known as: Synthroid, Levoxyl           metoprolol tartrate 50 mg tablet  Commonly known as: Lopressor           olmesartan 40 mg tablet  Commonly known as: BENIcar           phenyleph-min oil-petrolatum 0.25-14-74.9 % rectal ointment  Commonly known as: Preparation H           rosuvastatin 5 mg tablet  Commonly known as: Crestor                     Where to Get Your Medications        These medications were sent to [id number] - [address] [address]      Phone: [phone]   sennosides-docusate sodium 8.6-50 mg tablet         Procedure  Procedures     *This report was transcribed using voice recognition software.  Every effort was made to ensure accuracy; however, inadvertent computerized transcription errors may be  present.*  Matt Parkinson MD  04/06/24         Matt Parkinson MD  04/06/24 4558

## 2024-04-17 ENCOUNTER — OFFICE VISIT (OUTPATIENT)
Dept: PRIMARY CARE CLINIC | Age: 79
End: 2024-04-17
Payer: COMMERCIAL

## 2024-04-17 VITALS
SYSTOLIC BLOOD PRESSURE: 145 MMHG | TEMPERATURE: 97.6 F | HEIGHT: 58 IN | BODY MASS INDEX: 28.84 KG/M2 | OXYGEN SATURATION: 98 % | HEART RATE: 73 BPM | WEIGHT: 137.4 LBS | DIASTOLIC BLOOD PRESSURE: 80 MMHG

## 2024-04-17 DIAGNOSIS — I10 PRIMARY HYPERTENSION: Primary | ICD-10-CM

## 2024-04-17 DIAGNOSIS — Z76.0 MEDICATION REFILL: ICD-10-CM

## 2024-04-17 PROCEDURE — 1123F ACP DISCUSS/DSCN MKR DOCD: CPT | Performed by: FAMILY MEDICINE

## 2024-04-17 PROCEDURE — 3078F DIAST BP <80 MM HG: CPT | Performed by: FAMILY MEDICINE

## 2024-04-17 PROCEDURE — 3077F SYST BP >= 140 MM HG: CPT | Performed by: FAMILY MEDICINE

## 2024-04-17 PROCEDURE — 99213 OFFICE O/P EST LOW 20 MIN: CPT | Performed by: FAMILY MEDICINE

## 2024-04-17 RX ORDER — AMOXICILLIN 250 MG
1 CAPSULE ORAL DAILY
COMMUNITY
End: 2024-04-17 | Stop reason: SDUPTHER

## 2024-04-17 RX ORDER — AMOXICILLIN 250 MG
1 CAPSULE ORAL 2 TIMES DAILY
Qty: 60 TABLET | Refills: 2 | Status: SHIPPED | OUTPATIENT
Start: 2024-04-17

## 2024-04-17 NOTE — PROGRESS NOTES
[facility]  Family Medicine      Patient:  Brie Harris 78 y.o. female  Date of Service: 24      Chief complaint:   Chief Complaint   Patient presents with    Hypertension         History of Present Illness   Brie Harris is a 78 y.o. female who presents to the clinic with complaints as above.    Hypertension  BP Readings from Last 3 Encounters:   24 (!) 145/80   24 (!) 167/75   24 (!) 182/79   Blood pressures controlled at home  Medications include Lopressor 100mg BID, olmesartan 40mg daily, taking as prescribed  Denies symptoms of shakiness, dizziness, lightheadedness, high or low blood pressure  Blood work monitoring not due today    Anxiety  Controlled  Patient states mood is doing much better recently, now that her diarrhea is improved.  She has been connecting more with family and friends.  Current medications include none  Denies SI, HI     Current Health Maintenance:  Health Maintenance   Topic Date Due    Lipids  Never done    Hepatitis C screen  Never done    Shingles vaccine (1 of 2) Never done    DEXA (modify frequency per FRAX score)  Never done    Annual Wellness Visit (Medicare)  Never done    Depression Monitoring  2025    DTaP/Tdap/Td vaccine (2 - Td or Tdap) 2027    Flu vaccine  Completed    Pneumococcal 65+ years Vaccine  Completed    COVID-19 Vaccine  Completed    Respiratory Syncytial Virus (RSV) Pregnant or age 60 yrs+  Completed    Hepatitis A vaccine  Aged Out    Hepatitis B vaccine  Aged Out    Hib vaccine  Aged Out    Polio vaccine  Aged Out    Meningococcal (ACWY) vaccine  Aged Out    Depression Screen  Discontinued       Past Medical History:      Diagnosis Date    Cancer (HCC) 2018    breast    Hyperlipidemia     Hypertension     Pulmonary embolism (HCC)        Past Surgical History:        Procedure Laterality Date    CARPAL TUNNEL RELEASE      bilateral     SECTION      CHOLECYSTECTOMY      FOOT SURGERY      bunions and

## 2024-05-09 ENCOUNTER — TELEPHONE (OUTPATIENT)
Dept: PRIMARY CARE CLINIC | Age: 79
End: 2024-05-09

## 2024-05-09 NOTE — TELEPHONE ENCOUNTER
----- Message from Elham Love sent at 5/9/2024 11:36 AM EDT -----  Subject: Message to Provider    QUESTIONS  Information for Provider? PT called stating she went to ED on 4/6, with   Impacted Intestine. Has appt with specialist 5/10. Please f/u.   ---------------------------------------------------------------------------  --------------  CALL BACK INFO  [phone]; OK to leave message on voicemail  ---------------------------------------------------------------------------  --------------  SCRIPT ANSWERS  Relationship to Patient? Self

## 2024-05-10 ENCOUNTER — TELEPHONE (OUTPATIENT)
Dept: PRIMARY CARE CLINIC | Age: 79
End: 2024-05-10

## 2024-05-10 DIAGNOSIS — Z12.11 COLON CANCER SCREENING: Primary | ICD-10-CM

## 2024-05-10 NOTE — TELEPHONE ENCOUNTER
Patient states her provider who was going to do her colonoscopy is not contracted with her insurance. Patient states she needs a referral for her colonoscopy within [facility].

## 2024-05-10 NOTE — TELEPHONE ENCOUNTER
I can see if we have a general surgeon who can do a colonoscopy within [facility].  We do not have any GI doctors who can do routine colonoscopies within [facility] at this time.  Is this okay with her?

## 2024-05-13 NOTE — TELEPHONE ENCOUNTER
Pt called back insurance will cover [facility], [address] [place] [address], phone number [phone]

## 2024-05-28 DIAGNOSIS — I10 PRIMARY HYPERTENSION: ICD-10-CM

## 2024-05-28 RX ORDER — METOPROLOL TARTRATE 100 MG/1
100 TABLET ORAL 2 TIMES DAILY
Qty: 180 TABLET | Refills: 1 | Status: SHIPPED | OUTPATIENT
Start: 2024-05-28

## 2024-05-28 NOTE — TELEPHONE ENCOUNTER
Last Appointment:  4/17/2024  Future Appointments   Date Time Provider Department Center   10/18/2024 10:00 AM Afsaneh Mello DO [facility]

## 2024-06-12 ENCOUNTER — HOSPITAL ENCOUNTER (EMERGENCY)
Age: 79
Discharge: HOME | End: 2024-06-12
Attending: EMERGENCY MEDICINE
Payer: COMMERCIAL

## 2024-06-12 ENCOUNTER — APPOINTMENT (OUTPATIENT)
Dept: GENERAL RADIOLOGY | Age: 79
End: 2024-06-12
Payer: COMMERCIAL

## 2024-06-12 ENCOUNTER — TELEPHONE (OUTPATIENT)
Dept: PRIMARY CARE CLINIC | Age: 79
End: 2024-06-12

## 2024-06-12 ENCOUNTER — APPOINTMENT (OUTPATIENT)
Dept: CT IMAGING | Age: 79
End: 2024-06-12
Payer: COMMERCIAL

## 2024-06-12 VITALS
SYSTOLIC BLOOD PRESSURE: 203 MMHG | HEART RATE: 64 BPM | TEMPERATURE: 98.1 F | OXYGEN SATURATION: 99 % | WEIGHT: 145 LBS | BODY MASS INDEX: 30.31 KG/M2 | DIASTOLIC BLOOD PRESSURE: 87 MMHG | RESPIRATION RATE: 18 BRPM

## 2024-06-12 DIAGNOSIS — R53.1 GENERALIZED WEAKNESS: Primary | ICD-10-CM
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
## 2024-06-12 LAB
ALBUMIN SERPL-MCNC: 4.6 G/DL (ref 3.5–5.2)
ALP SERPL-CCNC: 87 U/L (ref 35–104)
ALT SERPL-CCNC: 13 U/L (ref 0–32)
ANION GAP SERPL CALCULATED.3IONS-SCNC: 10 MMOL/L (ref 7–16)
AST SERPL-CCNC: 21 U/L (ref 0–31)
BASOPHILS # BLD: 0.04 K/UL (ref 0–0.2)
BASOPHILS NFR BLD: 1 % (ref 0–2)
BILIRUB SERPL-MCNC: 0.5 MG/DL (ref 0–1.2)
BUN SERPL-MCNC: 18 MG/DL (ref 6–23)
CALCIUM SERPL-MCNC: 11.2 MG/DL (ref 8.6–10.2)
CHLORIDE SERPL-SCNC: 106 MMOL/L (ref 98–107)
CO2 SERPL-SCNC: 28 MMOL/L (ref 22–29)
CREAT SERPL-MCNC: 1.1 MG/DL (ref 0.5–1)
EKG ATRIAL RATE: 56 BPM
EKG P AXIS: 41 DEGREES
EKG P-R INTERVAL: 172 MS
EKG Q-T INTERVAL: 402 MS
EKG QRS DURATION: 82 MS
EKG QTC CALCULATION (BAZETT): 387 MS
EKG R AXIS: -2 DEGREES
EKG T AXIS: -3 DEGREES
EKG VENTRICULAR RATE: 56 BPM
EOSINOPHIL # BLD: 0.1 K/UL (ref 0.05–0.5)
EOSINOPHILS RELATIVE PERCENT: 2 % (ref 0–6)
ERYTHROCYTE [DISTWIDTH] IN BLOOD BY AUTOMATED COUNT: 12.1 % (ref 11.5–15)
GFR, ESTIMATED: 54 ML/MIN/1.73M2
GLUCOSE SERPL-MCNC: 99 MG/DL (ref 74–99)
HCT VFR BLD AUTO: 40.8 % (ref 34–48)
HGB BLD-MCNC: 13.9 G/DL (ref 11.5–15.5)
IMM GRANULOCYTES # BLD AUTO: 0.04 K/UL (ref 0–0.58)
IMM GRANULOCYTES NFR BLD: 1 % (ref 0–5)
LYMPHOCYTES NFR BLD: 1.84 K/UL (ref 1.5–4)
LYMPHOCYTES RELATIVE PERCENT: 30 % (ref 20–42)
MCH RBC QN AUTO: 31.9 PG (ref 26–35)
MCHC RBC AUTO-ENTMCNC: 34.1 G/DL (ref 32–34.5)
MCV RBC AUTO: 93.6 FL (ref 80–99.9)
MONOCYTES NFR BLD: 0.51 K/UL (ref 0.1–0.95)
MONOCYTES NFR BLD: 8 % (ref 2–12)
NEUTROPHILS NFR BLD: 59 % (ref 43–80)
NEUTS SEG NFR BLD: 3.67 K/UL (ref 1.8–7.3)
PLATELET # BLD AUTO: 196 K/UL (ref 130–450)
PMV BLD AUTO: 9.9 FL (ref 7–12)
POTASSIUM SERPL-SCNC: 4.2 MMOL/L (ref 3.5–5)
PROT SERPL-MCNC: 7.5 G/DL (ref 6.4–8.3)
RBC # BLD AUTO: 4.36 M/UL (ref 3.5–5.5)
SODIUM SERPL-SCNC: 144 MMOL/L (ref 132–146)
TROPONIN I SERPL HS-MCNC: 10 NG/L (ref 0–9)
TROPONIN I SERPL HS-MCNC: 11 NG/L (ref 0–9)
WBC OTHER # BLD: 6.2 K/UL (ref 4.5–11.5)

## 2024-06-12 PROCEDURE — 71046 X-RAY EXAM CHEST 2 VIEWS: CPT

## 2024-06-12 PROCEDURE — 93010 ELECTROCARDIOGRAM REPORT: CPT | Performed by: INTERNAL MEDICINE

## 2024-06-12 PROCEDURE — 72125 CT NECK SPINE W/O DYE: CPT

## 2024-06-12 PROCEDURE — 71275 CT ANGIOGRAPHY CHEST: CPT

## 2024-06-12 PROCEDURE — 93005 ELECTROCARDIOGRAM TRACING: CPT

## 2024-06-12 PROCEDURE — 99285 EMERGENCY DEPT VISIT HI MDM: CPT

## 2024-06-12 PROCEDURE — 6360000004 HC RX CONTRAST MEDICATION: Performed by: RADIOLOGY

## 2024-06-12 PROCEDURE — 85025 COMPLETE CBC W/AUTO DIFF WBC: CPT

## 2024-06-12 PROCEDURE — 80053 COMPREHEN METABOLIC PANEL: CPT

## 2024-06-12 PROCEDURE — 70450 CT HEAD/BRAIN W/O DYE: CPT

## 2024-06-12 PROCEDURE — 84484 ASSAY OF TROPONIN QUANT: CPT

## 2024-06-12 RX ADMIN — IOPAMIDOL 70 ML: 755 INJECTION, SOLUTION INTRAVENOUS at 15:59

## 2024-06-12 ASSESSMENT — LIFESTYLE VARIABLES: HOW OFTEN DO YOU HAVE A DRINK CONTAINING ALCOHOL: NEVER

## 2024-06-12 NOTE — TELEPHONE ENCOUNTER
Pt called stating she fell over the weekend with no clue as to why and she is all bruised up, said today it almost happened again and she is very dizzy and seems out of it, told pt to go to urgent care or ED for further evaluation due to no openings here in office and no way for us to do imaging pt agreed to this and will go.

## 2024-06-12 NOTE — ED PROVIDER NOTES
I have also reviewed and agree with the past medical, family and social history unless otherwise noted.    I have discussed this patient in detail with the resident, and provided the instruction and education regarding patient here complaining of feeling fatigued and weak and has slumped to the ground a few times or fallen with no actual injuries.  She has not been lightheaded or syncopal or near syncopal.  She is not dizzy.  She denies extremity weakness or numbness or paresthesia or facial droop or speech difficulty.  No chest pain or palpitations or shortness of breath.  No abdominal pain.  She states this been an ongoing chronic issue for her although it has been worse lately and She fell a couple days ago but really did not hurt herself..  My findings/plan: Patient sitting up in a chair resting comfortably no distress.  Arms legs are palpated through their entirety and really show no sign of acute bony or joint injury although she states she hit her elbows and knees she really does not have any tenderness or findings of injury on my exam.  No sign of acute head or face injury and no cervical, thoracic or lumbar spine tenderness.  She has no focal neurologic deficit.  No resting nystagmus.  Heart rate regular, lungs are clear and equal.  Abdomen nontender.       [NC]   1412 EKG sinus bradycardia rate 56 with sinus arrhythmia.  No acute ischemic ST-T wave changes, normal axis, normal conduction otherwise.  Otherwise agree with resident. [NC]   1619 Chloride: 106 [SD]      ED Course User Index  [NC] Shaheed Gallegos, DO  [SD] Cb Lancaster MD       Discussion With Other Professionals:  Consultation with None .      1. Generalized weakness    2. Fall, initial encounter        Patient has been closely monitored with multiple reevaluations and has remained hemodynamically stable. The patient has clinically improved and through shared decision making, is to be discharged to home.  Patient is understanding and

## 2024-06-26 RX ORDER — ROSUVASTATIN CALCIUM 5 MG/1
5 TABLET, COATED ORAL DAILY
Qty: 30 TABLET | Refills: 1 | Status: SHIPPED | OUTPATIENT
Start: 2024-06-26

## 2024-06-26 NOTE — TELEPHONE ENCOUNTER
Last Appointment   4/17/2024  Next Appointment  6/27/2024    Rosuvastatin 5 mg to [facility]

## 2024-06-27 ENCOUNTER — OFFICE VISIT (OUTPATIENT)
Dept: PRIMARY CARE CLINIC | Age: 79
End: 2024-06-27
Payer: COMMERCIAL

## 2024-06-27 VITALS
TEMPERATURE: 97.4 F | OXYGEN SATURATION: 96 % | DIASTOLIC BLOOD PRESSURE: 72 MMHG | HEIGHT: 58 IN | WEIGHT: 137.4 LBS | BODY MASS INDEX: 28.84 KG/M2 | HEART RATE: 71 BPM | SYSTOLIC BLOOD PRESSURE: 139 MMHG

## 2024-06-27 DIAGNOSIS — I83.93 VARICOSE VEINS OF BOTH LOWER EXTREMITIES, UNSPECIFIED WHETHER COMPLICATED: ICD-10-CM

## 2024-06-27 DIAGNOSIS — H61.23 BILATERAL IMPACTED CERUMEN: ICD-10-CM

## 2024-06-27 DIAGNOSIS — M79.602 ARM PAIN, LEFT: ICD-10-CM

## 2024-06-27 DIAGNOSIS — Z09 HOSPITAL DISCHARGE FOLLOW-UP: Primary | ICD-10-CM

## 2024-06-27 PROCEDURE — 1111F DSCHRG MED/CURRENT MED MERGE: CPT

## 2024-06-27 PROCEDURE — 99214 OFFICE O/P EST MOD 30 MIN: CPT

## 2024-06-27 PROCEDURE — 1123F ACP DISCUSS/DSCN MKR DOCD: CPT

## 2024-06-27 PROCEDURE — 3078F DIAST BP <80 MM HG: CPT

## 2024-06-27 PROCEDURE — 3075F SYST BP GE 130 - 139MM HG: CPT

## 2024-06-27 ASSESSMENT — ENCOUNTER SYMPTOMS
NAUSEA: 0
DIARRHEA: 0
ABDOMINAL PAIN: 0
VOMITING: 0
SHORTNESS OF BREATH: 0
WHEEZING: 0
CHEST TIGHTNESS: 0
CONSTIPATION: 0

## 2024-06-27 NOTE — PROGRESS NOTES
in office and patient did not tolerate.  She does have an appointment with ENT in July.  - Ear wax removal    3. Varicose veins of both lower extremities, unspecified whether complicated  Patient wants to see vascular surgery.  - External Referral To Vascular Surgery    4. Arm pain, left  Rule out fracture from fall.  In the meantime Apply a compressive ACE bandage. Rest and elevate the affected painful area.  Apply cold compresses intermittently as needed.  As pain recedes, begin normal activities slowly as tolerated.  Call if symptoms persist.  - XR RADIUS ULNA LEFT (2 VIEWS)      Counseled regarding above diagnosis, including possible risks and complications, especially if left uncontrolled. Counseled regarding the possible side effects, risks, benefits and alternatives to treatment; patient and/or guardian verbalizes understanding, agrees, feels comfortable with, and wishes to proceed with above treatment plan.    Call or go to ED immediately if symptoms worsen or persist. Advised patient to call with any new medication issues and, as applicable, read all Rx info from pharmacy to assure aware of all possible risks and side effects of medication before taking.    Patient and/or guardian given opportunity to ask questions/raise concerns. The patient verbalized comfort and understanding of instructions.    I encourage further reading and education about your health conditions.  Information on many health conditions is provided by the American Academy of Family [employer]icians: https://familydoctor.org/  Please bring any questions to me at your next visit.    Return to Office: No follow-ups on file.    Tanesha Zamora, BISMARK - CNP

## 2024-07-01 ENCOUNTER — APPOINTMENT (OUTPATIENT)
Dept: GENERAL RADIOLOGY | Age: 79
End: 2024-07-01
Payer: COMMERCIAL

## 2024-07-01 ENCOUNTER — HOSPITAL ENCOUNTER (EMERGENCY)
Age: 79
Discharge: HOME | End: 2024-07-01
Payer: COMMERCIAL

## 2024-07-01 VITALS
TEMPERATURE: 97.5 F | BODY MASS INDEX: 28.76 KG/M2 | DIASTOLIC BLOOD PRESSURE: 80 MMHG | WEIGHT: 137 LBS | HEART RATE: 61 BPM | OXYGEN SATURATION: 100 % | SYSTOLIC BLOOD PRESSURE: 193 MMHG | RESPIRATION RATE: 16 BRPM | HEIGHT: 58 IN

## 2024-07-01 DIAGNOSIS — S50.11XA CONTUSION OF RIGHT FOREARM, INITIAL ENCOUNTER: Primary | ICD-10-CM

## 2024-07-01 DIAGNOSIS — M17.11 ARTHRITIS OF RIGHT KNEE: ICD-10-CM

## 2024-07-01 PROCEDURE — 99211 OFF/OP EST MAY X REQ PHY/QHP: CPT

## 2024-07-01 PROCEDURE — 73090 X-RAY EXAM OF FOREARM: CPT

## 2024-07-01 PROCEDURE — 73560 X-RAY EXAM OF KNEE 1 OR 2: CPT

## 2024-07-01 ASSESSMENT — PAIN DESCRIPTION - ORIENTATION: ORIENTATION: RIGHT

## 2024-07-01 ASSESSMENT — PAIN DESCRIPTION - DESCRIPTORS: DESCRIPTORS: ACHING

## 2024-07-01 ASSESSMENT — PAIN SCALES - GENERAL: PAINLEVEL_OUTOF10: 0

## 2024-07-01 ASSESSMENT — PAIN DESCRIPTION - LOCATION: LOCATION: KNEE

## 2024-07-01 ASSESSMENT — PAIN - FUNCTIONAL ASSESSMENT: PAIN_FUNCTIONAL_ASSESSMENT: 0-10

## 2024-07-01 NOTE — DISCHARGE INSTRUCTIONS
XR RADIUS ULNA LEFT (2 VIEWS)   Final Result   No acute osseous abnormality.         XR KNEE RIGHT (1-2 VIEWS)   Final Result   No acute abnormality of the knee.      Mild tricompartmental osteoarthritis.              There are no fractures on your knee or forearm x-rays, you do have severe arthritis of the right knee which is likely causing the pain.  You may apply Voltaren gel 4 times daily as needed for right knee pain.  You may also apply the Voltaren gel to your forearm.  Ace wrap on your forearm while awake, may remove at rest.  If symptoms do not improve please follow-up with orthopedics as listed below.

## 2024-07-01 NOTE — ED PROVIDER NOTES
interpreted by Radiologist unless otherwise noted.  XR RADIUS ULNA LEFT (2 VIEWS)   Final Result   No acute osseous abnormality.         XR KNEE RIGHT (1-2 VIEWS)   Final Result   No acute abnormality of the knee.      Mild tricompartmental osteoarthritis.           ED Course / Medical Decision Making   Medications - No data to display       Consult(s):   None    Procedure(s):  PROCEDURE NOTE  7/1/24       Time: 1115    SPLINT  APPLICATION  Risks, benefits and alternatives (for applicable procedures below) described.   Performed By: BISMARK Carter CNP.    Indication:  strain of left forearm .  Procedure:   An Ace wrap was applied by myself, patient remained neurovascular intact after Ace wrap application.          MDM:   Please refer to HPI as above, on exam patient is overall well-appearing, well-hydrated, there is no tenderness or joint laxity with any provocative movements.,  Patient has no bony tenderness to the knee.  No obvious deformity to the knee.  She does have a small contusion noted to her left lateral forearm, range of motion is intact, cap refill is less than 2 seconds, able to abduct and adduct fingers, able to make the okay sign, able to give the thumbs up, able to supinate and pronate hand.  Plain film x-rays of the right knee and left radius ulna were obtained and independently reviewed by myself, no evidence of an acute fracture or traumatic malalignment.  Patient was placed in Ace wrap by myself, patient remained neurovascular intact after Ace wrap application.  Patient was advised follow-up with Ortho regarding the arthritis, she may benefit from joint injections or other treatment modalities for her arthritis of the knee.  Prescription for Voltaren gel was sent to patient's pharmacy.  Patient was advised of conditions requiring emergent reevaluation in the ED.    Plan of Care/Counseling:  BISMARK Carter CNP reviewed today's visit with the patient in addition to providing

## 2024-08-01 NOTE — TELEPHONE ENCOUNTER
Last Appointment   6/27/2024  Next Appointment  9/26/2024    [facility] stating they don't have this script. Can you please resend

## 2024-08-02 RX ORDER — ROSUVASTATIN CALCIUM 5 MG/1
5 TABLET, COATED ORAL DAILY
Qty: 30 TABLET | Refills: 1 | Status: SHIPPED
Start: 2024-08-02 | End: 2024-08-02 | Stop reason: SDUPTHER

## 2024-08-04 RX ORDER — ROSUVASTATIN CALCIUM 5 MG/1
5 TABLET, COATED ORAL DAILY
Qty: 30 TABLET | Refills: 1 | Status: SHIPPED | OUTPATIENT
Start: 2024-08-04

## 2024-08-23 DIAGNOSIS — I10 PRIMARY HYPERTENSION: ICD-10-CM

## 2024-08-26 DIAGNOSIS — I10 PRIMARY HYPERTENSION: ICD-10-CM

## 2024-08-26 RX ORDER — OLMESARTAN MEDOXOMIL 40 MG/1
40 TABLET ORAL DAILY
Qty: 90 TABLET | Refills: 1 | Status: SHIPPED | OUTPATIENT
Start: 2024-08-26

## 2024-08-26 RX ORDER — OLMESARTAN MEDOXOMIL 40 MG/1
40 TABLET ORAL DAILY
Qty: 90 TABLET | Refills: 1 | Status: SHIPPED
Start: 2024-08-26 | End: 2024-08-26 | Stop reason: SDUPTHER

## 2024-08-26 RX ORDER — ROSUVASTATIN CALCIUM 5 MG/1
5 TABLET, COATED ORAL DAILY
Qty: 90 TABLET | Refills: 0 | Status: SHIPPED | OUTPATIENT
Start: 2024-08-26

## 2024-08-26 RX ORDER — LEVOTHYROXINE SODIUM 25 UG/1
25 TABLET ORAL DAILY
Qty: 90 TABLET | Refills: 0 | Status: SHIPPED | OUTPATIENT
Start: 2024-08-26

## 2024-08-26 RX ORDER — METOPROLOL TARTRATE 100 MG
100 TABLET ORAL 2 TIMES DAILY
Qty: 180 TABLET | Refills: 1 | Status: SHIPPED | OUTPATIENT
Start: 2024-08-26

## 2024-08-26 RX ORDER — METOPROLOL TARTRATE 100 MG
100 TABLET ORAL 2 TIMES DAILY
Qty: 180 TABLET | Refills: 1 | Status: SHIPPED
Start: 2024-08-26 | End: 2024-08-26 | Stop reason: SDUPTHER

## 2024-08-26 RX ORDER — ANASTROZOLE 1 MG/1
1 TABLET ORAL DAILY
Qty: 90 TABLET | Refills: 1 | Status: SHIPPED | OUTPATIENT
Start: 2024-08-26

## 2024-09-23 ENCOUNTER — HOSPITAL ENCOUNTER (OUTPATIENT)
Dept: RADIOLOGY | Facility: HOSPITAL | Age: 79
Discharge: HOME | End: 2024-09-23
Payer: MEDICARE

## 2024-09-23 VITALS — WEIGHT: 137 LBS | BODY MASS INDEX: 28.76 KG/M2 | HEIGHT: 58 IN

## 2024-09-23 DIAGNOSIS — Z12.31 ENCOUNTER FOR SCREENING MAMMOGRAM FOR MALIGNANT NEOPLASM OF BREAST: ICD-10-CM

## 2024-09-23 PROCEDURE — 77067 SCR MAMMO BI INCL CAD: CPT | Mod: 52,LT

## 2024-09-23 PROCEDURE — 77067 SCR MAMMO BI INCL CAD: CPT | Mod: LEFT SIDE | Performed by: RADIOLOGY

## 2024-09-23 PROCEDURE — 77063 BREAST TOMOSYNTHESIS BI: CPT | Mod: LEFT SIDE | Performed by: RADIOLOGY

## 2024-09-26 ENCOUNTER — TELEPHONE (OUTPATIENT)
Dept: PRIMARY CARE CLINIC | Age: 79
End: 2024-09-26

## 2024-09-26 ENCOUNTER — OFFICE VISIT (OUTPATIENT)
Dept: PRIMARY CARE CLINIC | Age: 79
End: 2024-09-26
Payer: MEDICARE

## 2024-09-26 VITALS
DIASTOLIC BLOOD PRESSURE: 83 MMHG | WEIGHT: 137 LBS | OXYGEN SATURATION: 97 % | TEMPERATURE: 97.4 F | BODY MASS INDEX: 28.76 KG/M2 | SYSTOLIC BLOOD PRESSURE: 159 MMHG | HEART RATE: 68 BPM | HEIGHT: 58 IN

## 2024-09-26 DIAGNOSIS — I10 PRIMARY HYPERTENSION: ICD-10-CM

## 2024-09-26 DIAGNOSIS — F41.9 ANXIETY AND DEPRESSION: ICD-10-CM

## 2024-09-26 DIAGNOSIS — F32.A ANXIETY AND DEPRESSION: ICD-10-CM

## 2024-09-26 DIAGNOSIS — F51.01 PRIMARY INSOMNIA: Primary | ICD-10-CM

## 2024-09-26 PROCEDURE — 3077F SYST BP >= 140 MM HG: CPT | Performed by: FAMILY MEDICINE

## 2024-09-26 PROCEDURE — G8400 PT W/DXA NO RESULTS DOC: HCPCS | Performed by: FAMILY MEDICINE

## 2024-09-26 PROCEDURE — 1036F TOBACCO NON-USER: CPT | Performed by: FAMILY MEDICINE

## 2024-09-26 PROCEDURE — 1123F ACP DISCUSS/DSCN MKR DOCD: CPT | Performed by: FAMILY MEDICINE

## 2024-09-26 PROCEDURE — 1090F PRES/ABSN URINE INCON ASSESS: CPT | Performed by: FAMILY MEDICINE

## 2024-09-26 PROCEDURE — 99214 OFFICE O/P EST MOD 30 MIN: CPT | Performed by: FAMILY MEDICINE

## 2024-09-26 PROCEDURE — G8419 CALC BMI OUT NRM PARAM NOF/U: HCPCS | Performed by: FAMILY MEDICINE

## 2024-09-26 PROCEDURE — G8427 DOCREV CUR MEDS BY ELIG CLIN: HCPCS | Performed by: FAMILY MEDICINE

## 2024-09-26 PROCEDURE — 3079F DIAST BP 80-89 MM HG: CPT | Performed by: FAMILY MEDICINE

## 2024-09-26 RX ORDER — DULOXETIN HYDROCHLORIDE 30 MG/1
30 CAPSULE, DELAYED RELEASE ORAL DAILY
Qty: 30 CAPSULE | Refills: 2 | Status: SHIPPED | OUTPATIENT
Start: 2024-09-26

## 2024-09-26 NOTE — TELEPHONE ENCOUNTER
Patient states she verified her medications and does not remember being on Cymbalta in the past and is for sure currently not taking it \"but I will be once I  the new prescription from \". Patient just wanted to make provider aware.     Last Appointment:  9/26/2024  Future Appointments   Date Time Provider Department Center   10/18/2024 10:00 AM [facility]   10/25/2024  9:30 AM Afsaneh Mello, DO [facility] DEP

## 2024-09-27 ENCOUNTER — APPOINTMENT (OUTPATIENT)
Dept: SURGERY | Facility: CLINIC | Age: 79
End: 2024-09-27
Payer: COMMERCIAL

## 2024-09-27 VITALS
WEIGHT: 138.2 LBS | HEIGHT: 58 IN | BODY MASS INDEX: 29.01 KG/M2 | SYSTOLIC BLOOD PRESSURE: 175 MMHG | OXYGEN SATURATION: 97 % | DIASTOLIC BLOOD PRESSURE: 91 MMHG | HEART RATE: 67 BPM

## 2024-09-27 DIAGNOSIS — I97.2 LYMPHEDEMA SYNDROME, POSTMASTECTOMY: ICD-10-CM

## 2024-09-27 DIAGNOSIS — C50.919 INVASIVE CARCINOMA OF BREAST (MULTI): Primary | ICD-10-CM

## 2024-09-27 DIAGNOSIS — N64.1 FAT NECROSIS OF BREAST: ICD-10-CM

## 2024-09-27 DIAGNOSIS — Z12.31 ENCOUNTER FOR SCREENING MAMMOGRAM FOR BREAST CANCER: ICD-10-CM

## 2024-09-27 PROCEDURE — 1036F TOBACCO NON-USER: CPT | Performed by: SURGERY

## 2024-09-27 PROCEDURE — 3080F DIAST BP >= 90 MM HG: CPT | Performed by: SURGERY

## 2024-09-27 PROCEDURE — 1157F ADVNC CARE PLAN IN RCRD: CPT | Performed by: SURGERY

## 2024-09-27 PROCEDURE — 3077F SYST BP >= 140 MM HG: CPT | Performed by: SURGERY

## 2024-09-27 PROCEDURE — 1159F MED LIST DOCD IN RCRD: CPT | Performed by: SURGERY

## 2024-09-27 PROCEDURE — 99213 OFFICE O/P EST LOW 20 MIN: CPT | Performed by: SURGERY

## 2024-09-27 PROCEDURE — 1160F RVW MEDS BY RX/DR IN RCRD: CPT | Performed by: SURGERY

## 2024-09-27 NOTE — PROGRESS NOTES
GENERAL SURGERY OFFICE NOTE    Patient: Rosemary Motta    Age: 78 y.o.   Gender: female    MRN: [id number]    PCP: Mello Urbina MD        SUBJECTIVE     Chief Complaint  Follow-up (Patient is here for her 1 year right breast follow up. Patient states that she has not been having any problems with either breast.)       HPI  Rosemary returns to the office for a 6.5-year follow-up after undergoing a right Goldilocks mastectomy with sentinel lymph node biopsy for a T3N0 poorly differentiated right breast cancer.  She has completed her 5 years of anastrozole (as of March 2024), but continues to take this medication.  She does have some associated tiredness and arthritic pain, but is unsure whether this medication is contributing to these symptoms, or not.  She does have a follow-up appointment with medical oncology (Dr. Stanton) on 10/16/2024.  She noted that recently she has been having panic attacks, and her primary care physician started her on some medication for this.  She has not noticed any new masses, skin changes or nipple discharge of the left breast, nor any other abnormalities of the right chest wall.  She did undergo her screening mammogram of her left breast which showed no concerns for malignancy.    ROS  Review of Systems   Constitutional: no fever, no chills, no recent weight gain and no recent weight loss.   Eyes: no loss of vision, no discharge from the eyes, no itching of the eyes and no eye pain.   ENT: no hearing loss, no neck pain and no hoarseness.   Cardiovascular: no chest pain, no palpitations and no lower extremity edema.   Respiratory: no dyspnea, no dyspnea during exertion and no cough.   Breast: no nipple discharge, no breast mass, no pain in breast, no erythema, no change in breast skin and no axillary adenopathy.   Gastrointestinal: no abdominal pain, no vomiting, no nausea.   Genitourinary: no dysuria and no hematuria.   Musculoskeletal: arthralgias, muscle weakness and joint  stiffness/swelling, but no myalgias.   Integumentary: no rashes and no skin lesions.   Neurological: no headache, no dizziness and no numbness.   Psychiatric: anxiety, but no depression and no emotional problems.   Endocrine: no heat or cold intolerance and no increased thirst.   Hematologic/Lymphatic: no tendency for easy bleeding and no tendency for easy bruising.   All other systems have been reviewed and are negative for complaint.     HISTORY     Past Medical History:   Diagnosis Date    Complication of other artery following a procedure, not elsewhere classified, subsequent encounter     Postoperative pulmonary embolism, subsequent encounter    Personal history of other diseases of the circulatory system     History of cardiac murmur    Personal history of other diseases of the digestive system     History of gastroesophageal reflux (GERD)    Personal history of other specified conditions     History of epistaxis        Past Surgical History:   Procedure Laterality Date    CARPAL TUNNEL RELEASE  2018    Neuroplasty Decompression Median Nerve At Carpal Tunnel    CHOLECYSTECTOMY  2018    Cholecystectomy Laparoscopic    OTHER SURGICAL HISTORY  2021     section    OTHER SURGICAL HISTORY  2021    Breast biopsy    OTHER SURGICAL HISTORY  2018    Right mastectomy    OTHER SURGICAL HISTORY  2018    Total Hip Replacement Right    OTHER SURGICAL HISTORY  2018    Hip Replacement Left        Family History   Problem Relation Name Age of Onset    Breast cancer Mother  65        Allergies   Allergen Reactions    Penicillins Anaphylaxis and Other     Passed out    Clarithromycin Other and Nausea/vomiting     n/v    Tetracycline Other and Nausea/vomiting     n/v    Cortisone Rash, Other and Headache     Facial pain    Methylprednisolone Swelling, Other and Rash     facial pain    Nifedipine Rash, Other and Dizziness     Visual disturbance    Prednisone Rash, Other and Headache  "    Pain         Social History     Tobacco Use   Smoking Status Never   Smokeless Tobacco Never        Social History     Substance and Sexual Activity   Alcohol Use Not Currently        HOME MEDICATIONS  Current Outpatient Medications   Medication Instructions    anastrozole (Arimidex) 1 mg tablet     artificial tears, dextran-hypomel-glycerin, 0.1-0.3-0.2 % ophthalmic solution 1 drop, ophthalmic (eye), 4 times daily,  1 drop(s) to each affected eye 4 times a day    aspirin 81 mg EC tablet Aspirin 81 MG Oral Tablet Delayed Release   Refills: 0        Start : 30-Apr-2018   Active    calcium citrate 250 mg calcium tablet Calcium Citrate 1040 MG TABS   Refills: 0        Start : 20-Sep-2021   Active    levothyroxine (Synthroid, Levoxyl) 25 mcg tablet 1 tablet, oral, Daily    metoprolol tartrate (LOPRESSOR) 50 mg, oral, 2 times daily    olmesartan (BENIcar) 40 mg tablet 1 tablet, oral, Daily    rosuvastatin (Crestor) 5 mg tablet 1 tablet, oral, Daily          OBJECTIVE   Last Recorded Vitals.  Blood pressure (!) 175/91, pulse 67, height 1.473 m (4' 10\"), weight 62.7 kg (138 lb 3.2 oz), SpO2 97%.     PHYSICAL EXAM  Physical Exam   General: Well-developed, well-nourished and in no acute distress.  Head: Normocephalic. Atraumatic.  Neck/thyroid: Neck is supple. Normal thyroid without mass. No jugular venous distention.  Eyes: Pupils equal round and reactive to light. Conjunctiva normal.  ENMT: No masses or deformity of external nose. External ears without masses.  Respiratory/Chest: Normal respiratory effort.  Breast: No abnormalities of the left breast. On the right, status post mastectomy with inverted T shaped scar. At the central portion of the mastectomy wound, j there is a mound of tissue, but the previous \"nodules\" are no longer palpable. No induration. No obvious mass.  Lymphatics: No palpable lymphadenopathy of the cervical, supraclavicular or axillary regions.  Cardiovascular: Regular rate and rhythm.   Abdomen: " Soft, nontender, nondistended. No hernias.   Musculoskeletal: Joints and limbs are grossly normal. Normal gait. Normal range of motion of major joints.  Neuro: Oriented to person, place and time. No obvious neurological deficit. Motor strength grossly normal.  Psych: Normal mood and affect.     RESULTS   Labs  No results found for this or any previous visit (from the past 24 hour(s)).    Radiology Resutls  mammo left screening tomosynthesis  Status: Final result     PACS Images     Show images for BI mammo left screening tomosynthesis  Signed by    Signed Time Phone Pager   Chris Escobar MD 9/23/2024 14:24 [phone]     Exam Information    Status Exam Begun Exam Ended   Final 9/23/2024 09:55 9/23/2024 10:10     Study Result    Narrative & Impression   Interpreted By:  Chris Escobar,   STUDY:  BI MAMMO LEFT SCREENING TOMOSYNTHESIS;  9/23/2024 10:10 am      ACCESSION NUMBER(S):  [id number]      ORDERING CLINICIAN:  AISHA MEADOWS      INDICATION:  Screening.      ,Z12.31 Encounter for screening mammogram for malignant neoplasm of  breast      COMPARISON:  Multiple prior examinations dating back to 06/10/2019      FINDINGS:  2D and tomosynthesis images were reviewed at 1 mm slice thickness.  Left unilateral screening mammogram      Density:  There are scattered areas of fibroglandular density.      There are punctate calcifications left breast, stable. No suspicious  masses or calcifications are identified.      IMPRESSION:  No mammographic evidence of malignancy.      BI-RADS CATEGORY:  BI-RADS Category:  2 Benign.  Recommendation:  Annual Screening.  Recommended Date:  1 Year.         ASSESSMENT / PLAN   Diagnoses and all orders for this visit:  Invasive carcinoma of breast (Multi)  -     Mastectomy bra  Lymphedema syndrome, postmastectomy  Fat necrosis of breast  Encounter for screening mammogram for breast cancer  -     BI mammo left screening tomosynthesis; Future      Plan  2018: RIGHT: T3N0  "(3 neg SLN) poorly differentialted cancer; neoadj chemo; Goldilock mastectomy with SLNB; post op XRT; Anastrazole     1. The \"mound\" of tissue in the central portion of her mastectomy wound is her subcutaneous tissue from the Goldilocks mastectomy. The previous nodules are no longer palpable and previous biopsy was benign. Those nodules most likely were consistent with fatty necrosis. No clinical evidence for recurrent cancer.   2. Recent yearly screening left mammogram was without radiographic evidence of malignancy.  Continue with yearly left screening mammograms. She will follow-up in the office after this mammogram.   3.  She has completed her 5 years of anastrozole as of March 2024.  She will follow-up with medical oncology (Dr. Stanton) on 10/16/2024 to discuss whether, or not, to continue this medication.  She is having some tiredness/exhaustion and some arthritic pain, but it is unclear whether is associated with this medication or not.  She does still take a calcium and vitamin D supplement while on this medication.  4.  Prescription provided for postmastectomy bra and prosthesis.      Cynthia Haywood MD, FACS  [facility] General Surgery  [address] [place] [address];   [address]; [address]  [place] [id number] [phone]  "

## 2024-09-27 NOTE — PATIENT INSTRUCTIONS
1. You will be due for a left breast mammogram in September 2025. Follow-up in Dr. Haywood's office after this mammogram.  2.  Keep your appointment with Dr. Stanton (medical oncology) on 10/16/2024 to discuss whether, or not, you can come off of the anastrozole medication.  3. Continue checking your chest wall on the right and a self breast exam on the left monthly. If you identify any abnormalities, please call Dr. Haywood's office immediately. [phone]. Otherwise she will follow-up with Dr. Haywood in September 2024 after your mammogram.   Adequate: hears normal conversation without difficulty

## 2024-10-02 ENCOUNTER — TELEPHONE (OUTPATIENT)
Dept: PRIMARY CARE CLINIC | Age: 79
End: 2024-10-02

## 2024-10-02 NOTE — TELEPHONE ENCOUNTER
She can stop the Cymbalta.  If her mood is still bothering her, we can always try something like Celexa.

## 2024-10-04 NOTE — TELEPHONE ENCOUNTER
Spoke with pt she stopped the Cymbalta and will call if she wants something else sent to [facility]

## 2024-10-09 RX ORDER — ANASTROZOLE 1 MG/1
1 TABLET ORAL DAILY
Qty: 90 TABLET | Refills: 1 | Status: SHIPPED | OUTPATIENT
Start: 2024-10-09

## 2024-10-14 ENCOUNTER — APPOINTMENT (OUTPATIENT)
Dept: HEMATOLOGY/ONCOLOGY | Facility: CLINIC | Age: 79
End: 2024-10-14
Payer: MEDICARE

## 2024-10-15 DIAGNOSIS — C50.919 INVASIVE CARCINOMA OF BREAST (MULTI): ICD-10-CM

## 2024-10-16 ENCOUNTER — SOCIAL WORK (OUTPATIENT)
Dept: HEMATOLOGY/ONCOLOGY | Facility: CLINIC | Age: 79
End: 2024-10-16

## 2024-10-16 ENCOUNTER — APPOINTMENT (OUTPATIENT)
Dept: HEMATOLOGY/ONCOLOGY | Facility: CLINIC | Age: 79
End: 2024-10-16
Payer: MEDICARE

## 2024-10-16 ENCOUNTER — OFFICE VISIT (OUTPATIENT)
Dept: HEMATOLOGY/ONCOLOGY | Facility: CLINIC | Age: 79
End: 2024-10-16
Payer: MEDICARE

## 2024-10-16 VITALS
WEIGHT: 137.24 LBS | HEART RATE: 75 BPM | OXYGEN SATURATION: 99 % | BODY MASS INDEX: 28.81 KG/M2 | TEMPERATURE: 98.6 F | HEIGHT: 58 IN | DIASTOLIC BLOOD PRESSURE: 77 MMHG | RESPIRATION RATE: 16 BRPM | SYSTOLIC BLOOD PRESSURE: 153 MMHG

## 2024-10-16 DIAGNOSIS — C50.919 INVASIVE CARCINOMA OF BREAST (MULTI): ICD-10-CM

## 2024-10-16 LAB
ALBUMIN SERPL BCP-MCNC: 4.1 G/DL (ref 3.4–5)
ALP SERPL-CCNC: 62 U/L (ref 33–136)
ALT SERPL W P-5'-P-CCNC: 12 U/L (ref 7–45)
ANION GAP SERPL CALC-SCNC: 12 MMOL/L (ref 10–20)
AST SERPL W P-5'-P-CCNC: 17 U/L (ref 9–39)
BASOPHILS # BLD AUTO: 0.03 X10*3/UL (ref 0–0.1)
BASOPHILS NFR BLD AUTO: 0.6 %
BILIRUB SERPL-MCNC: 0.4 MG/DL (ref 0–1.2)
BUN SERPL-MCNC: 20 MG/DL (ref 6–23)
CALCIUM SERPL-MCNC: 9.4 MG/DL (ref 8.6–10.3)
CHLORIDE SERPL-SCNC: 104 MMOL/L (ref 98–107)
CO2 SERPL-SCNC: 29 MMOL/L (ref 21–32)
CREAT SERPL-MCNC: 0.89 MG/DL (ref 0.5–1.05)
EGFRCR SERPLBLD CKD-EPI 2021: 66 ML/MIN/1.73M*2
EOSINOPHIL # BLD AUTO: 0.1 X10*3/UL (ref 0–0.4)
EOSINOPHIL NFR BLD AUTO: 2 %
ERYTHROCYTE [DISTWIDTH] IN BLOOD BY AUTOMATED COUNT: 12.6 % (ref 11.5–14.5)
GLUCOSE SERPL-MCNC: 120 MG/DL (ref 74–99)
HCT VFR BLD AUTO: 38.5 % (ref 36–46)
HGB BLD-MCNC: 12.7 G/DL (ref 12–16)
IMM GRANULOCYTES # BLD AUTO: 0.02 X10*3/UL (ref 0–0.5)
IMM GRANULOCYTES NFR BLD AUTO: 0.4 % (ref 0–0.9)
LYMPHOCYTES # BLD AUTO: 1.4 X10*3/UL (ref 0.8–3)
LYMPHOCYTES NFR BLD AUTO: 28.3 %
MCH RBC QN AUTO: 31.8 PG (ref 26–34)
MCHC RBC AUTO-ENTMCNC: 33 G/DL (ref 32–36)
MCV RBC AUTO: 96 FL (ref 80–100)
MONOCYTES # BLD AUTO: 0.38 X10*3/UL (ref 0.05–0.8)
MONOCYTES NFR BLD AUTO: 7.7 %
NEUTROPHILS # BLD AUTO: 3.02 X10*3/UL (ref 1.6–5.5)
NEUTROPHILS NFR BLD AUTO: 61 %
PLATELET # BLD AUTO: 154 X10*3/UL (ref 150–450)
POTASSIUM SERPL-SCNC: 3.8 MMOL/L (ref 3.5–5.3)
PROT SERPL-MCNC: 6.6 G/DL (ref 6.4–8.2)
RBC # BLD AUTO: 4 X10*6/UL (ref 4–5.2)
SODIUM SERPL-SCNC: 141 MMOL/L (ref 136–145)
WBC # BLD AUTO: 5 X10*3/UL (ref 4.4–11.3)

## 2024-10-16 PROCEDURE — 99214 OFFICE O/P EST MOD 30 MIN: CPT | Performed by: INTERNAL MEDICINE

## 2024-10-16 PROCEDURE — 36415 COLL VENOUS BLD VENIPUNCTURE: CPT | Performed by: INTERNAL MEDICINE

## 2024-10-16 PROCEDURE — 3077F SYST BP >= 140 MM HG: CPT | Performed by: INTERNAL MEDICINE

## 2024-10-16 PROCEDURE — 1157F ADVNC CARE PLAN IN RCRD: CPT | Performed by: INTERNAL MEDICINE

## 2024-10-16 PROCEDURE — 84075 ASSAY ALKALINE PHOSPHATASE: CPT | Performed by: INTERNAL MEDICINE

## 2024-10-16 PROCEDURE — 3078F DIAST BP <80 MM HG: CPT | Performed by: INTERNAL MEDICINE

## 2024-10-16 PROCEDURE — 1159F MED LIST DOCD IN RCRD: CPT | Performed by: INTERNAL MEDICINE

## 2024-10-16 PROCEDURE — 99204 OFFICE O/P NEW MOD 45 MIN: CPT | Performed by: INTERNAL MEDICINE

## 2024-10-16 PROCEDURE — 1126F AMNT PAIN NOTED NONE PRSNT: CPT | Performed by: INTERNAL MEDICINE

## 2024-10-16 PROCEDURE — 1160F RVW MEDS BY RX/DR IN RCRD: CPT | Performed by: INTERNAL MEDICINE

## 2024-10-16 PROCEDURE — 85025 COMPLETE CBC W/AUTO DIFF WBC: CPT | Performed by: INTERNAL MEDICINE

## 2024-10-16 ASSESSMENT — NCCN CANCER DISTRESS MANAGEMENT
NCCN PHYSICAL CONCERNS: 2
NCCN OTHER CONCERNS: ANGER
NCCN EMOTIONAL CONCERNS: 1
NCCN PRACTICAL CONCERNS: 2
NCCN EMOTIONAL CONCERNS: 7
NCCN PHYSICAL CONCERNS: 3
NCCN EMOTIONAL CONCERNS: 2
NCCN PHYSICAL CONCERNS: 6

## 2024-10-16 ASSESSMENT — PAIN SCALES - GENERAL: PAINLEVEL_OUTOF10: 0-NO PAIN

## 2024-10-16 NOTE — PROGRESS NOTES
Patient had her visit with Dr. Stanton today.   (SW) reviewed chart and met with patient today for introduction, to assess needs, and offer support.  Patient was A&Ox3 with appropriate and congruent mood and affect.  Patient did get emotional during SW discussion.  Patient stated that she has not been sleeping very well, feels a little weak, hard to concentrate, feels restless, and cries a lot.  Patient stated that she was embarrassed because she cried in front of the doctor.  Patient stated that she is not sure why she feels this way.  Patient stated that she has discussed this with her PCP but she does not want to be on any medication.  SW reviewed an article on anxiety and depression.  Patient stated that she had a lot of these symptoms.   SW discussed getting set up with a counselor.  Patient thought this was a good idea.  Patient stated that she is  and her  has been through a lot and has had several strokes.  Patient stated that she is very close to her son and daughter but she does not tell them what she is going through.  Patient stated that if counseling does not help she would maybe be willing to get on medication.  SW provided information on [facility] and provided their phone number.  SW encouraged her to reach out to SW if her insurance does not work for their agency.  SW provided business card.  Patient was appreciative.      Chance Almanza MSW, LSW

## 2024-10-16 NOTE — PROGRESS NOTES
Patient ID: Rosemary Motta is a 77 y.o. female.  Referring Physician: No referring provider defined for this encounter.  Primary Care Provider: Mello Urbina MD  Visit Type: Follow Up             Subjective  Office visit for follow-up and treatment  of breast cancer.   Interval History:    10/16/24  Rosemary Motta presents today for interval follow-up and treatment of breast cancer. She overall feels well. She does endorse insomnia with moderate fatigue.  She is not taking any medication for sleep problem.  She is very anxious     She is taking Anastrazole 1 mg daily and reports she is tolerating it well. Denies any hot flashes. She has chronic arthralgias, recently completed physical therapy for bilateral knee pain and is completing exercises at home. She denies any chest pain,  shortness of breath, bleeding indications, fevers, chills, night sweats. No neurological symptoms, abdominal pain, other significant symptoms.      Past Medical History:  Right breast cancer: Initially referred by Dr. Kylah Avendaño in consultation for evaluation and treatment of breast cancer.  She was in fairly good health until April 2018 when she felt a mass in her right  breast.  A diagnostic mammogram done on April 12, 2018 showed 5.5 cm mass in the right upper quadrant of right breast. She had ultrasound-guided core biopsy on April 12, 2018 of right breast mass which showed invasive poorly differentiated carcinoma with  associated lymphocytic infiltrate, [place] score of 3, estrogen receptor positive > 90%, progesterone receptor positive > 90%, HER-2/dima equivocal 2+, fish not amplified negative. On April 30, 2018 she had right axillary lymph node biopsy which showed  benign lymph node with reactive changes. 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 She had Goldilocks mastectomy on December 18, 2018 after neoadjuvant chemotherapy with Adriamycin, Cytoxan  and  "Taxol.  She had residual lobular carcinoma in the breast. 3 sentinel lymph nodes are negative for metastatic disease. She had a lesion on the right chest. Biopsy of the chest wall mass was benign. Started hormonal therapy with Anastrazole 1 mg daily-  Started March 2019.         Review of Systems   Constitutional:  Positive for fatigue.            Objective  BSA: 1.59 meters squared  /78 (BP Location: Left arm, Patient Position: Sitting, BP Cuff Size: Adult)   Pulse 56   Temp 36.2 °C (97.2 °F) (Skin)   Resp 16   Ht 1.473 m (4' 10\")   Wt 61.4 kg (135 lb 7.6 oz)   SpO2 96%   BMI 28.31 kg/m²       has a past medical history of Complication of other artery following a procedure, not elsewhere classified, subsequent encounter, Personal history of other diseases of the circulatory system, Personal history of other diseases of the digestive system, and Personal history of other specified conditions.   has a past surgical history that includes Other surgical history (09/21/2021); Other surgical history (09/29/2021); Other surgical history (12/31/2018); Other surgical history (04/30/2018); Cholecystectomy (04/30/2018); Carpal tunnel release (04/30/2018); and Other surgical history (04/30/2018).  Family History          Family History   Problem Relation Name Age of Onset    Breast cancer Mother             Cancer History:          Breast         AJCC Edition: 8th (AJCC), Diagnosis Date: Apr 2018, IIB, cT3 cN0 cM0 G3     Treatment Synopsis:    Mass in the right breast 5.5 cm in the right upper quadrant on mammogram.  Right breast, core biopsy on April 12, 2018: Invasive poorly differentiated carcinoma with associated lymphocyte infiltrate.  Tumor measured 1.5 cm in the limited specimen.  [place] score of 3, estrogen receptor positive over 90%, progesterone receptor  positive > 90% HER-2 equivocal 2+  HER-2 by Fish negative not amplified.  Right axillary lymph node, ultrasound-guided core biopsy on April 30, " 2018, benign lymph node with reactive changes.  Right breast stereotactic vacuum-assisted core biopsy on April 30, 2018: Ductal carcinoma in situ.  Neoadjuvant chemotherapy with Adriamycin plus Cytoxan ×4 followed by paclitaxel starting in May 2018.  Goldilocks mastectomy on December 18, 2018.  Right sentinel lymph node #1, #2, #3 no malignancy identified.  Right breast, mastectomy, invasive lobular carcinoma > 2 cm and< 5 cm stage IB.  Tumor board recommendation: Hormonal treatment, radiation therapy.  Anastrozole 1 mg daily starting March 2019.   March 2021- Osteopenia on bone density- Recommended starting Prolia every 6 months, however patient refused, taking Calcium and Vitamin D  Mammogram 9/19/22- negative  Mammogram  9/23/24 negative    Rosemary Motta  reports that she has never smoked. She has never used smokeless tobacco.  She  reports no history of alcohol use.  She  reports no history of drug use.     Physical Exam  HENT:      Head: Normocephalic.      Nose: Nose normal.      Mouth/Throat:      Mouth: Mucous membranes are moist.   Eyes:      Pupils: Pupils are equal, round, and reactive to light.   Cardiovascular:      Rate and Rhythm: Normal rate and regular rhythm.      Pulses: Normal pulses.      Heart sounds: Normal heart sounds.   Pulmonary:      Effort: Pulmonary effort is normal.      Breath sounds: Normal breath sounds.   Abdominal:      General: Bowel sounds are normal.      Palpations: Abdomen is soft.   Musculoskeletal:         General: Normal range of motion.   Skin:     General: Skin is warm and dry.   Neurological:      General: No focal deficit present.      Mental Status: She is alert and oriented to person, place, and time.   Psychiatric:      Very anxious   Right breast examination did show surgical scar no mass, left breast examination within normal limit    Lymphatic no peripheral lymphadenopathy    Labs      16/24) 7 mo ago  (3/2/24) 1 yr ago  (10/16/23) 2 yr ago  (10/17/22) 2 yr  ago  (12/2/21) 3 yr ago  (10/4/21) 3 yr ago  (6/22/21)    WBC  4.4 - 11.3 x10*3/uL 5.0 11.9 High  5.5 6.5 R 5.6 R 5.6 R 5.3 R   RBC  4.00 - 5.20 x10*6/uL 4.00 4.29 3.91 Low  4.14 R 4.28 R 3.99 Low  R 4.46 R   Hemoglobin  12.0 - 16.0 g/dL 12.7 13.9 12.5 13.0 13.4 12.7 13.7   Hematocrit  36.0 - 46.0 % 38.5 39.8 38.1 39.6 40.0 38.0 41.9   MCV  80 - 100 fL 96 93 97 96 93 95 94   MCH  26.0 - 34.0 pg 31.8 32.4 32.0       MCHC  32.0 - 36.0 g/dL 33.0 34.9 32.8 32.8 33.5 33.4 32.7   RDW  11.5 - 14.5 % 12.6 12.5 12.8 12.3 12.3 12.4 12.4   Platelets  150 - 450 x10*3/uL 154                 Assessment and Plan:   Assessment:    1. Carcinoma right breast Diagnosed April 2018. T3, N0, M0 stage IIB, estrogen receptor positive, progesterone  receptor positive and HER-2 negative.  Neoadjuvant chemotherapy with A/C and paclitaxel. Goldilocks mastectomy, T2, N0, M0 stage IB. Started hormonal therapy with Anastrazole 1 mg daily March 2019. Mammogram 9/23/24- benign.      2. Osteopenia Bone density showed osteopenia, Prolia was recommended, however patient refused. Is taking Calcium and Vitamin D supplementation.  Calcium level was slightly elevated     3. Multiple medical conditions including HTN, OA, GERD, History of PE, etc.     Plan:  Patient will continue Anastrazole 1 mg daily, plan for at least 7 years (through March 2026).   I will offer further 7-year course of aromatase inhibitor Patient did not respond very well to neoadjuvant chemotherapy.  Had another discussion at her last visit regarding Prolia for osteopenia, patient would like to remain on Calcium and Vitamin D supplement. She recently saw her breat surgeon and had a breast exam. She will continue yearly screening mammograms and self-breast exams.    Follow-up after 1 year.

## 2024-10-20 DIAGNOSIS — F41.9 ANXIETY AND DEPRESSION: ICD-10-CM

## 2024-10-20 DIAGNOSIS — F32.A ANXIETY AND DEPRESSION: ICD-10-CM

## 2024-10-21 RX ORDER — DULOXETIN HYDROCHLORIDE 30 MG/1
CAPSULE, DELAYED RELEASE ORAL DAILY
Qty: 90 CAPSULE | Refills: 1 | OUTPATIENT
Start: 2024-10-21

## 2024-11-20 NOTE — TELEPHONE ENCOUNTER
Name of Medication(s) Requested:  Requested Prescriptions     Pending Prescriptions Disp Refills    rosuvastatin (CRESTOR) 5 MG tablet 90 tablet 1     Sig: Take 1 tablet by mouth daily       Medication is on current medication list Yes    Dosage and directions were verified? Yes    Quantity verified: 90 day supply     Pharmacy Verified?  Yes    Last Appointment:  9/26/2024    Future appts:  No future appointments.     (If no appt send self scheduling link. .REFILLAPPT)  Scheduling request sent?     [] Yes  [x] No    Does patient need updated?  [] Yes  [x] No

## 2024-11-21 RX ORDER — ROSUVASTATIN CALCIUM 5 MG/1
5 TABLET, COATED ORAL DAILY
Qty: 90 TABLET | Refills: 3 | Status: SHIPPED | OUTPATIENT
Start: 2024-11-21

## 2024-11-22 ENCOUNTER — OFFICE VISIT (OUTPATIENT)
Dept: PRIMARY CARE CLINIC | Age: 79
End: 2024-11-22
Payer: MEDICARE

## 2024-11-22 VITALS
HEART RATE: 77 BPM | DIASTOLIC BLOOD PRESSURE: 85 MMHG | SYSTOLIC BLOOD PRESSURE: 159 MMHG | HEIGHT: 58 IN | BODY MASS INDEX: 29.05 KG/M2 | OXYGEN SATURATION: 100 % | WEIGHT: 138.4 LBS | TEMPERATURE: 98.7 F

## 2024-11-22 DIAGNOSIS — N39.42 URINARY INCONTINENCE WITHOUT SENSORY AWARENESS: ICD-10-CM

## 2024-11-22 DIAGNOSIS — R30.0 DYSURIA: ICD-10-CM

## 2024-11-22 DIAGNOSIS — R53.83 OTHER FATIGUE: Primary | ICD-10-CM

## 2024-11-22 DIAGNOSIS — R15.9 FULL INCONTINENCE OF FECES: ICD-10-CM

## 2024-11-22 LAB
BILIRUBIN, POC: NORMAL
BLOOD URINE, POC: NORMAL
CLARITY, POC: NORMAL
COLOR, POC: YELLOW
GLUCOSE URINE, POC: NORMAL MG/DL
KETONES, POC: NORMAL MG/DL
LEUKOCYTE EST, POC: NORMAL
NITRITE, POC: NORMAL
PH, POC: 7
PROTEIN, POC: NORMAL MG/DL
SPECIFIC GRAVITY, POC: 1.02
UROBILINOGEN, POC: 0.2 MG/DL

## 2024-11-22 PROCEDURE — G8427 DOCREV CUR MEDS BY ELIG CLIN: HCPCS | Performed by: FAMILY MEDICINE

## 2024-11-22 PROCEDURE — 1160F RVW MEDS BY RX/DR IN RCRD: CPT | Performed by: FAMILY MEDICINE

## 2024-11-22 PROCEDURE — 1123F ACP DISCUSS/DSCN MKR DOCD: CPT | Performed by: FAMILY MEDICINE

## 2024-11-22 PROCEDURE — 1159F MED LIST DOCD IN RCRD: CPT | Performed by: FAMILY MEDICINE

## 2024-11-22 PROCEDURE — G8484 FLU IMMUNIZE NO ADMIN: HCPCS | Performed by: FAMILY MEDICINE

## 2024-11-22 PROCEDURE — 3077F SYST BP >= 140 MM HG: CPT | Performed by: FAMILY MEDICINE

## 2024-11-22 PROCEDURE — 0509F URINE INCON PLAN DOCD: CPT | Performed by: FAMILY MEDICINE

## 2024-11-22 PROCEDURE — 1090F PRES/ABSN URINE INCON ASSESS: CPT | Performed by: FAMILY MEDICINE

## 2024-11-22 PROCEDURE — G8419 CALC BMI OUT NRM PARAM NOF/U: HCPCS | Performed by: FAMILY MEDICINE

## 2024-11-22 PROCEDURE — G8400 PT W/DXA NO RESULTS DOC: HCPCS | Performed by: FAMILY MEDICINE

## 2024-11-22 PROCEDURE — 99214 OFFICE O/P EST MOD 30 MIN: CPT | Performed by: FAMILY MEDICINE

## 2024-11-22 PROCEDURE — 1036F TOBACCO NON-USER: CPT | Performed by: FAMILY MEDICINE

## 2024-11-22 PROCEDURE — 3079F DIAST BP 80-89 MM HG: CPT | Performed by: FAMILY MEDICINE

## 2024-11-22 PROCEDURE — 81002 URINALYSIS NONAUTO W/O SCOPE: CPT | Performed by: FAMILY MEDICINE

## 2024-11-22 RX ORDER — POLYETHYLENE GLYCOL 3350 17 G/17G
17 POWDER, FOR SOLUTION ORAL DAILY
COMMUNITY

## 2024-11-22 RX ORDER — LEVOTHYROXINE SODIUM 25 UG/1
25 TABLET ORAL DAILY
Qty: 90 TABLET | Refills: 0 | OUTPATIENT
Start: 2024-11-22

## 2024-11-22 NOTE — PROGRESS NOTES
[facility]  Family Medicine      Patient:  Brie Harris 78 y.o. female  Date of Service: 11/22/24      Chief complaint:   Chief Complaint   Patient presents with    Cough    Other     Pt c/o of being extremely cold.    Fatigue         History of Present Illness   Brie Harris is a 78 y.o. female who presents to the clinic with complaints as above.    Fatigue  Cough occasionally for a few weeks now  Fatigued for much longer, feels like she is cold all the time  Feels cold in hands and feet all the time, now with whole body  Never before  Wears a sweatsuit at home even on warm days  Hair falling out, thinning    Stool and Urine Incontinence  Maybe about 6 weeks  April 6th was blockage  She did have a bowel blockage, seen in ED in [place], Dr. Way for colonoscopy  She was unaware of these incidents, now wears a pad  Stool is liquid when she is incontinent  Occasional diarrhea  No saddle anesthesia  Back pain worsening in the past month    Current Health Maintenance:  Health Maintenance   Topic Date Due    Lipids  Never done    Hepatitis C screen  Never done    Shingles vaccine (1 of 2) Never done    DEXA (modify frequency per FRAX score)  Never done    Annual Wellness Visit (Medicare)  Never done    Depression Monitoring  01/19/2025    DTaP/Tdap/Td vaccine (2 - Td or Tdap) 11/24/2027    Flu vaccine  Completed    Pneumococcal 65+ years Vaccine  Completed    COVID-19 Vaccine  Completed    Respiratory Syncytial Virus (RSV) Pregnant or age 60 yrs+  Completed    Hepatitis A vaccine  Aged Out    Hepatitis B vaccine  Aged Out    Hib vaccine  Aged Out    Polio vaccine  Aged Out    Meningococcal (ACWY) vaccine  Aged Out    Depression Screen  Discontinued       Past Medical History:      Diagnosis Date    Cancer (HCC) 2018    breast    Hyperlipidemia     Hypertension     Pulmonary embolism (HCC)        Past Surgical History:        Procedure Laterality Date    CARPAL TUNNEL RELEASE      bilateral

## 2024-11-23 LAB
CULTURE: NORMAL
CULTURE: NORMAL
SPECIMEN DESCRIPTION: NORMAL

## 2024-12-01 PROBLEM — N39.42 URINARY INCONTINENCE WITHOUT SENSORY AWARENESS: Status: ACTIVE | Noted: 2024-12-01

## 2024-12-01 PROBLEM — R53.83 OTHER FATIGUE: Status: ACTIVE | Noted: 2024-12-01

## 2024-12-01 PROBLEM — R15.9 FULL INCONTINENCE OF FECES: Status: ACTIVE | Noted: 2024-12-01

## 2024-12-04 ENCOUNTER — OFFICE VISIT (OUTPATIENT)
Dept: PRIMARY CARE CLINIC | Age: 79
End: 2024-12-04
Payer: MEDICARE

## 2024-12-04 VITALS
HEART RATE: 66 BPM | WEIGHT: 140.2 LBS | OXYGEN SATURATION: 97 % | HEIGHT: 58 IN | BODY MASS INDEX: 29.43 KG/M2 | SYSTOLIC BLOOD PRESSURE: 155 MMHG | TEMPERATURE: 98 F | DIASTOLIC BLOOD PRESSURE: 67 MMHG

## 2024-12-04 DIAGNOSIS — E03.9 ACQUIRED HYPOTHYROIDISM: ICD-10-CM

## 2024-12-04 DIAGNOSIS — N39.42 URINARY INCONTINENCE WITHOUT SENSORY AWARENESS: Primary | ICD-10-CM

## 2024-12-04 PROCEDURE — G8400 PT W/DXA NO RESULTS DOC: HCPCS | Performed by: FAMILY MEDICINE

## 2024-12-04 PROCEDURE — G8419 CALC BMI OUT NRM PARAM NOF/U: HCPCS | Performed by: FAMILY MEDICINE

## 2024-12-04 PROCEDURE — G8484 FLU IMMUNIZE NO ADMIN: HCPCS | Performed by: FAMILY MEDICINE

## 2024-12-04 PROCEDURE — 1036F TOBACCO NON-USER: CPT | Performed by: FAMILY MEDICINE

## 2024-12-04 PROCEDURE — 1090F PRES/ABSN URINE INCON ASSESS: CPT | Performed by: FAMILY MEDICINE

## 2024-12-04 PROCEDURE — 99213 OFFICE O/P EST LOW 20 MIN: CPT | Performed by: FAMILY MEDICINE

## 2024-12-04 PROCEDURE — 0509F URINE INCON PLAN DOCD: CPT | Performed by: FAMILY MEDICINE

## 2024-12-04 PROCEDURE — 1159F MED LIST DOCD IN RCRD: CPT | Performed by: FAMILY MEDICINE

## 2024-12-04 PROCEDURE — 1123F ACP DISCUSS/DSCN MKR DOCD: CPT | Performed by: FAMILY MEDICINE

## 2024-12-04 PROCEDURE — 3078F DIAST BP <80 MM HG: CPT | Performed by: FAMILY MEDICINE

## 2024-12-04 PROCEDURE — 1160F RVW MEDS BY RX/DR IN RCRD: CPT | Performed by: FAMILY MEDICINE

## 2024-12-04 PROCEDURE — G8427 DOCREV CUR MEDS BY ELIG CLIN: HCPCS | Performed by: FAMILY MEDICINE

## 2024-12-04 PROCEDURE — 3077F SYST BP >= 140 MM HG: CPT | Performed by: FAMILY MEDICINE

## 2024-12-04 RX ORDER — LEVOTHYROXINE SODIUM 25 UG/1
25 TABLET ORAL DAILY
Qty: 90 TABLET | Refills: 1 | Status: SHIPPED | OUTPATIENT
Start: 2024-12-04

## 2024-12-04 NOTE — PROGRESS NOTES
19 [clarithromycin], Cortisone, Hydrochlorothiazide, Prednisone, Tetracyclines & related, and Procardia [nifedipine]    Social History:   Social History     Socioeconomic History    Marital status:      Spouse name: Not on file    Number of children: Not on file    Years of education: Not on file    Highest education level: Not on file   Occupational History    Not on file   Tobacco Use    Smoking status: Never    Smokeless tobacco: Never   Vaping Use    Vaping status: Never Used   Substance and Sexual Activity    Alcohol use: Yes     Comment: rare    Drug use: No    Sexual activity: Not on file   Other Topics Concern    Not on file   Social History Narrative    Not on file     Social Determinants of Health     Financial Resource Strain: Low Risk  (1/19/2024)    Overall Financial Resource Strain (CARDIA)     Difficulty of Paying Living Expenses: Not hard at all   Food Insecurity: No Food Insecurity (1/19/2024)    Hunger Vital Sign     Worried About Running Out of Food in the Last Year: Never true     Ran Out of Food in the Last Year: Never true   Transportation Needs: Unknown (1/19/2024)    PRAPARE - Transportation     Lack of Transportation (Medical): Not on file     Lack of Transportation (Non-Medical): No   Physical Activity: Not on file   Stress: Not on file   Social Connections: Not on file   Intimate Partner Violence: Not on file   Housing Stability: Unknown (1/19/2024)    Housing Stability Vital Sign     Unable to Pay for Housing in the Last Year: Not on file     Number of Places Lived in the Last Year: Not on file     Unstable Housing in the Last Year: No        Family History:   History reviewed. No pertinent family history.    Medication List:    Current Outpatient Medications   Medication Sig Dispense Refill    levothyroxine (SYNTHROID) 25 MCG tablet Take 1 tablet by mouth Daily 90 tablet 1    polyethylene glycol (GLYCOLAX) 17 GM/SCOOP powder Take 17 g by mouth daily      rosuvastatin (CRESTOR) 5 MG

## 2024-12-23 RX ORDER — ROSUVASTATIN CALCIUM 5 MG/1
5 TABLET, COATED ORAL DAILY
Qty: 90 TABLET | Refills: 3 | Status: SHIPPED
Start: 2024-12-23 | End: 2025-03-04

## 2024-12-23 RX ORDER — ROSUVASTATIN CALCIUM 5 MG/1
5 TABLET, COATED ORAL DAILY
Qty: 90 TABLET | Refills: 3 | Status: CANCELLED | OUTPATIENT
Start: 2024-12-23

## 2024-12-23 NOTE — TELEPHONE ENCOUNTER
Name of Medication(s) Requested:  Requested Prescriptions     Pending Prescriptions Disp Refills    rosuvastatin (CRESTOR) 5 MG tablet 90 tablet 3     Sig: Take 1 tablet by mouth daily       Medication is on current medication list Yes    Dosage and directions were verified? Yes    Quantity verified: 90 day supply     Pharmacy Verified?  Yes    Last Appointment:  12/4/2024    Future appts:  Future Appointments   Date Time Provider Department Center   3/4/2025 10:30 AM Afsaneh Mello DO [facility] DEP        (If no appt send self scheduling link. .REFILLAPPT)  Scheduling request sent?     [] Yes  [x] No    Does patient need updated?  [] Yes  [x] No

## 2025-01-05 ENCOUNTER — HOSPITAL ENCOUNTER (EMERGENCY)
Age: 80
Discharge: ANOTHER HOSPITAL | DRG: 378 | End: 2025-01-05
Payer: MEDICARE

## 2025-01-05 ENCOUNTER — APPOINTMENT (OUTPATIENT)
Dept: CT IMAGING | Age: 80
DRG: 378 | End: 2025-01-05
Payer: MEDICARE

## 2025-01-05 ENCOUNTER — HOSPITAL ENCOUNTER (INPATIENT)
Age: 80
LOS: 3 days | Discharge: HOME | DRG: 378 | End: 2025-01-08
Attending: STUDENT IN AN ORGANIZED HEALTH CARE EDUCATION/TRAINING PROGRAM | Admitting: INTERNAL MEDICINE
Payer: MEDICARE

## 2025-01-05 VITALS
WEIGHT: 140 LBS | HEART RATE: 78 BPM | RESPIRATION RATE: 18 BRPM | SYSTOLIC BLOOD PRESSURE: 145 MMHG | BODY MASS INDEX: 29.26 KG/M2 | OXYGEN SATURATION: 98 % | DIASTOLIC BLOOD PRESSURE: 75 MMHG | TEMPERATURE: 98.2 F

## 2025-01-05 DIAGNOSIS — R31.9 URINARY TRACT INFECTION WITH HEMATURIA, SITE UNSPECIFIED: ICD-10-CM

## 2025-01-05 DIAGNOSIS — K57.20 DIVERTICULITIS OF LARGE INTESTINE WITH ABSCESS, UNSPECIFIED BLEEDING STATUS: Primary | ICD-10-CM

## 2025-01-05 DIAGNOSIS — R10.32 LEFT LOWER QUADRANT ABDOMINAL PAIN: ICD-10-CM

## 2025-01-05 DIAGNOSIS — R10.32 LEFT LOWER QUADRANT ABDOMINAL PAIN: Primary | ICD-10-CM

## 2025-01-05 DIAGNOSIS — N39.0 URINARY TRACT INFECTION WITH HEMATURIA, SITE UNSPECIFIED: ICD-10-CM

## 2025-01-05 PROBLEM — K57.92 ACUTE DIVERTICULITIS: Status: ACTIVE | Noted: 2025-01-05

## 2025-01-05 PROBLEM — E78.5 HYPERLIPIDEMIA: Status: ACTIVE | Noted: 2025-01-05

## 2025-01-05 PROBLEM — E03.9 HYPOTHYROIDISM: Status: ACTIVE | Noted: 2025-01-05

## 2025-01-05 LAB
ALBUMIN SERPL-MCNC: 4.1 G/DL (ref 3.5–5.2)
ALP SERPL-CCNC: 97 U/L (ref 35–104)
ALT SERPL-CCNC: 13 U/L (ref 0–32)
ANION GAP SERPL CALCULATED.3IONS-SCNC: 14 MMOL/L (ref 7–16)
AST SERPL-CCNC: 24 U/L (ref 0–31)
BACTERIA URNS QL MICRO: ABNORMAL
BASOPHILS # BLD: 0.04 K/UL (ref 0–0.2)
BASOPHILS NFR BLD: 0 % (ref 0–2)
BILIRUB SERPL-MCNC: 1.1 MG/DL (ref 0–1.2)
BILIRUB UR QL STRIP: ABNORMAL
BUN SERPL-MCNC: 16 MG/DL (ref 6–23)
CALCIUM SERPL-MCNC: 9.9 MG/DL (ref 8.6–10.2)
CHLORIDE SERPL-SCNC: 99 MMOL/L (ref 98–107)
CLARITY UR: CLEAR
CO2 SERPL-SCNC: 25 MMOL/L (ref 22–29)
COLOR UR: ABNORMAL
CREAT SERPL-MCNC: 1.1 MG/DL (ref 0.5–1)
EOSINOPHIL # BLD: 0.03 K/UL (ref 0.05–0.5)
EOSINOPHILS RELATIVE PERCENT: 0 % (ref 0–6)
ERYTHROCYTE [DISTWIDTH] IN BLOOD BY AUTOMATED COUNT: 12.5 % (ref 11.5–15)
GFR, ESTIMATED: 53 ML/MIN/1.73M2
GLUCOSE SERPL-MCNC: 102 MG/DL (ref 74–99)
GLUCOSE UR STRIP-MCNC: NEGATIVE MG/DL
HCT VFR BLD AUTO: 42.9 % (ref 34–48)
HGB BLD-MCNC: 14.5 G/DL (ref 11.5–15.5)
HGB UR QL STRIP.AUTO: ABNORMAL
IMM GRANULOCYTES # BLD AUTO: 0.1 K/UL (ref 0–0.58)
IMM GRANULOCYTES NFR BLD: 1 % (ref 0–5)
KETONES UR STRIP-MCNC: 15 MG/DL
LACTATE BLDV-SCNC: 1 MMOL/L (ref 0.5–2.2)
LEUKOCYTE ESTERASE UR QL STRIP: ABNORMAL
LYMPHOCYTES NFR BLD: 1.52 K/UL (ref 1.5–4)
LYMPHOCYTES RELATIVE PERCENT: 13 % (ref 20–42)
MCH RBC QN AUTO: 32.4 PG (ref 26–35)
MCHC RBC AUTO-ENTMCNC: 33.8 G/DL (ref 32–34.5)
MCV RBC AUTO: 96 FL (ref 80–99.9)
MONOCYTES NFR BLD: 0.76 K/UL (ref 0.1–0.95)
MONOCYTES NFR BLD: 7 % (ref 2–12)
NEUTROPHILS NFR BLD: 79 % (ref 43–80)
NEUTS SEG NFR BLD: 9.24 K/UL (ref 1.8–7.3)
NITRITE UR QL STRIP: POSITIVE
PH UR STRIP: 5.5 [PH] (ref 5–9)
PLATELET # BLD AUTO: 183 K/UL (ref 130–450)
PMV BLD AUTO: 9.7 FL (ref 7–12)
POTASSIUM SERPL-SCNC: 4.3 MMOL/L (ref 3.5–5)
PROT SERPL-MCNC: 7.4 G/DL (ref 6.4–8.3)
PROT UR STRIP-MCNC: 30 MG/DL
RBC # BLD AUTO: 4.47 M/UL (ref 3.5–5.5)
RBC #/AREA URNS HPF: ABNORMAL /HPF
SODIUM SERPL-SCNC: 138 MMOL/L (ref 132–146)
SP GR UR STRIP: >1.03 (ref 1–1.03)
UROBILINOGEN UR STRIP-ACNC: 0.2 EU/DL (ref 0–1)
WBC #/AREA URNS HPF: ABNORMAL /HPF
WBC OTHER # BLD: 11.7 K/UL (ref 4.5–11.5)

## 2025-01-05 PROCEDURE — 6360000002 HC RX W HCPCS: Performed by: INTERNAL MEDICINE

## 2025-01-05 PROCEDURE — 99223 1ST HOSP IP/OBS HIGH 75: CPT | Performed by: INTERNAL MEDICINE

## 2025-01-05 PROCEDURE — 83605 ASSAY OF LACTIC ACID: CPT

## 2025-01-05 PROCEDURE — 2500000003 HC RX 250 WO HCPCS: Performed by: INTERNAL MEDICINE

## 2025-01-05 PROCEDURE — 6360000004 HC RX CONTRAST MEDICATION: Performed by: RADIOLOGY

## 2025-01-05 PROCEDURE — 96375 TX/PRO/DX INJ NEW DRUG ADDON: CPT

## 2025-01-05 PROCEDURE — 93005 ELECTROCARDIOGRAM TRACING: CPT | Performed by: STUDENT IN AN ORGANIZED HEALTH CARE EDUCATION/TRAINING PROGRAM

## 2025-01-05 PROCEDURE — 99222 1ST HOSP IP/OBS MODERATE 55: CPT | Performed by: SURGERY

## 2025-01-05 PROCEDURE — 96374 THER/PROPH/DIAG INJ IV PUSH: CPT

## 2025-01-05 PROCEDURE — 74177 CT ABD & PELVIS W/CONTRAST: CPT

## 2025-01-05 PROCEDURE — 2060000000 HC ICU INTERMEDIATE R&B

## 2025-01-05 PROCEDURE — 2500000003 HC RX 250 WO HCPCS: Performed by: STUDENT IN AN ORGANIZED HEALTH CARE EDUCATION/TRAINING PROGRAM

## 2025-01-05 PROCEDURE — 6360000002 HC RX W HCPCS: Performed by: STUDENT IN AN ORGANIZED HEALTH CARE EDUCATION/TRAINING PROGRAM

## 2025-01-05 PROCEDURE — 99285 EMERGENCY DEPT VISIT HI MDM: CPT

## 2025-01-05 PROCEDURE — 99211 OFF/OP EST MAY X REQ PHY/QHP: CPT

## 2025-01-05 PROCEDURE — 2580000003 HC RX 258: Performed by: INTERNAL MEDICINE

## 2025-01-05 PROCEDURE — 81001 URINALYSIS AUTO W/SCOPE: CPT

## 2025-01-05 PROCEDURE — 85025 COMPLETE CBC W/AUTO DIFF WBC: CPT

## 2025-01-05 PROCEDURE — 87086 URINE CULTURE/COLONY COUNT: CPT

## 2025-01-05 PROCEDURE — 6370000000 HC RX 637 (ALT 250 FOR IP): Performed by: INTERNAL MEDICINE

## 2025-01-05 PROCEDURE — 80053 COMPREHEN METABOLIC PANEL: CPT

## 2025-01-05 RX ORDER — SODIUM CHLORIDE 9 MG/ML
INJECTION, SOLUTION INTRAVENOUS CONTINUOUS
Status: DISCONTINUED | OUTPATIENT
Start: 2025-01-05 | End: 2025-01-06

## 2025-01-05 RX ORDER — LEVOTHYROXINE SODIUM 25 UG/1
25 TABLET ORAL DAILY
Status: DISCONTINUED | OUTPATIENT
Start: 2025-01-06 | End: 2025-01-08 | Stop reason: HOSPADM

## 2025-01-05 RX ORDER — SODIUM CHLORIDE 0.9 % (FLUSH) 0.9 %
5-40 SYRINGE (ML) INJECTION PRN
Status: DISCONTINUED | OUTPATIENT
Start: 2025-01-05 | End: 2025-01-08 | Stop reason: HOSPADM

## 2025-01-05 RX ORDER — ONDANSETRON 4 MG/1
4 TABLET, ORALLY DISINTEGRATING ORAL EVERY 8 HOURS PRN
Status: DISCONTINUED | OUTPATIENT
Start: 2025-01-05 | End: 2025-01-08 | Stop reason: HOSPADM

## 2025-01-05 RX ORDER — SODIUM CHLORIDE 9 MG/ML
INJECTION, SOLUTION INTRAVENOUS PRN
Status: DISCONTINUED | OUTPATIENT
Start: 2025-01-05 | End: 2025-01-08 | Stop reason: HOSPADM

## 2025-01-05 RX ORDER — ASPIRIN 81 MG/1
81 TABLET, CHEWABLE ORAL DAILY
Status: DISCONTINUED | OUTPATIENT
Start: 2025-01-05 | End: 2025-01-08 | Stop reason: HOSPADM

## 2025-01-05 RX ORDER — POLYETHYLENE GLYCOL 3350 17 G/17G
17 POWDER, FOR SOLUTION ORAL DAILY PRN
Status: DISCONTINUED | OUTPATIENT
Start: 2025-01-05 | End: 2025-01-08 | Stop reason: HOSPADM

## 2025-01-05 RX ORDER — METOPROLOL TARTRATE 50 MG
100 TABLET ORAL 2 TIMES DAILY
Status: DISCONTINUED | OUTPATIENT
Start: 2025-01-05 | End: 2025-01-08 | Stop reason: HOSPADM

## 2025-01-05 RX ORDER — METRONIDAZOLE 500 MG/100ML
500 INJECTION, SOLUTION INTRAVENOUS ONCE
Status: COMPLETED | OUTPATIENT
Start: 2025-01-05 | End: 2025-01-05

## 2025-01-05 RX ORDER — ANASTROZOLE 1 MG/1
1 TABLET ORAL DAILY
Status: DISCONTINUED | OUTPATIENT
Start: 2025-01-05 | End: 2025-01-08 | Stop reason: HOSPADM

## 2025-01-05 RX ORDER — ROSUVASTATIN CALCIUM 10 MG/1
5 TABLET, COATED ORAL DAILY
Status: DISCONTINUED | OUTPATIENT
Start: 2025-01-06 | End: 2025-01-08 | Stop reason: HOSPADM

## 2025-01-05 RX ORDER — METRONIDAZOLE 500 MG/100ML
500 INJECTION, SOLUTION INTRAVENOUS EVERY 8 HOURS
Status: DISCONTINUED | OUTPATIENT
Start: 2025-01-05 | End: 2025-01-08 | Stop reason: HOSPADM

## 2025-01-05 RX ORDER — SODIUM CHLORIDE 0.9 % (FLUSH) 0.9 %
5-40 SYRINGE (ML) INJECTION EVERY 12 HOURS SCHEDULED
Status: DISCONTINUED | OUTPATIENT
Start: 2025-01-05 | End: 2025-01-08 | Stop reason: HOSPADM

## 2025-01-05 RX ORDER — ENOXAPARIN SODIUM 100 MG/ML
40 INJECTION SUBCUTANEOUS DAILY
Status: DISCONTINUED | OUTPATIENT
Start: 2025-01-05 | End: 2025-01-08 | Stop reason: HOSPADM

## 2025-01-05 RX ORDER — ONDANSETRON 2 MG/ML
4 INJECTION INTRAMUSCULAR; INTRAVENOUS EVERY 6 HOURS PRN
Status: DISCONTINUED | OUTPATIENT
Start: 2025-01-05 | End: 2025-01-08 | Stop reason: HOSPADM

## 2025-01-05 RX ORDER — ACETAMINOPHEN 650 MG/1
650 SUPPOSITORY RECTAL EVERY 6 HOURS PRN
Status: DISCONTINUED | OUTPATIENT
Start: 2025-01-05 | End: 2025-01-08 | Stop reason: HOSPADM

## 2025-01-05 RX ORDER — ACETAMINOPHEN 325 MG/1
650 TABLET ORAL EVERY 6 HOURS PRN
Status: DISCONTINUED | OUTPATIENT
Start: 2025-01-05 | End: 2025-01-08 | Stop reason: HOSPADM

## 2025-01-05 RX ORDER — IOPAMIDOL 755 MG/ML
70 INJECTION, SOLUTION INTRAVASCULAR
Status: COMPLETED | OUTPATIENT
Start: 2025-01-05 | End: 2025-01-05

## 2025-01-05 RX ADMIN — IOPAMIDOL 70 ML: 755 INJECTION, SOLUTION INTRAVENOUS at 12:29

## 2025-01-05 RX ADMIN — METRONIDAZOLE 500 MG: 500 INJECTION, SOLUTION INTRAVENOUS at 22:08

## 2025-01-05 RX ADMIN — ENOXAPARIN SODIUM 40 MG: 100 INJECTION SUBCUTANEOUS at 16:18

## 2025-01-05 RX ADMIN — METRONIDAZOLE 500 MG: 500 INJECTION, SOLUTION INTRAVENOUS at 13:43

## 2025-01-05 RX ADMIN — SODIUM CHLORIDE, PRESERVATIVE FREE 10 ML: 5 INJECTION INTRAVENOUS at 22:08

## 2025-01-05 RX ADMIN — METOPROLOL TARTRATE 100 MG: 50 TABLET, FILM COATED ORAL at 22:04

## 2025-01-05 RX ADMIN — WATER 2000 MG: 1 INJECTION INTRAMUSCULAR; INTRAVENOUS; SUBCUTANEOUS at 13:36

## 2025-01-05 RX ADMIN — SODIUM CHLORIDE: 9 INJECTION, SOLUTION INTRAVENOUS at 16:21

## 2025-01-05 ASSESSMENT — PAIN SCALES - GENERAL: PAINLEVEL_OUTOF10: 8

## 2025-01-05 ASSESSMENT — LIFESTYLE VARIABLES
HOW MANY STANDARD DRINKS CONTAINING ALCOHOL DO YOU HAVE ON A TYPICAL DAY: PATIENT DOES NOT DRINK
HOW OFTEN DO YOU HAVE A DRINK CONTAINING ALCOHOL: NEVER

## 2025-01-05 ASSESSMENT — PAIN DESCRIPTION - ORIENTATION: ORIENTATION: LEFT;LOWER

## 2025-01-05 ASSESSMENT — PAIN DESCRIPTION - LOCATION: LOCATION: ABDOMEN

## 2025-01-05 ASSESSMENT — PAIN DESCRIPTION - DESCRIPTORS: DESCRIPTORS: ACHING;SHARP;SHOOTING

## 2025-01-05 ASSESSMENT — PAIN - FUNCTIONAL ASSESSMENT
PAIN_FUNCTIONAL_ASSESSMENT: 0-10
PAIN_FUNCTIONAL_ASSESSMENT: NONE - DENIES PAIN

## 2025-01-05 NOTE — CONSULTS
GENERAL SURGERY  CONSULT NOTE  2025        HPI  Brie Harris is a 79 y.o. female who presents for evaluation of abdominal pain.  Patient states that she started develop left lower quadrant pain roughly 1 day ago.  States she has never had any similar pains like this in the past.  States her last bowel movement was yesterday did not notice any blood.  She does state this pain is associate with some nausea but without any vomiting.  Also endorses having a recent colonoscopy, that only showed diverticular disease.  Labs were reviewed, they were consistent with leukocytosis and evidence of UTI.  CT was reviewed, there is an area of the sigmoid colon that is diffusely thickened with a small fluid collection/possible intramural abscess.  No drainable collection was identified.  Patient denies any fever/chills      Past Medical History:   Diagnosis Date    Cancer (HCC) 2018    breast    Hyperlipidemia     Hypertension     Pulmonary embolism (HCC)        Past Surgical History:   Procedure Laterality Date    CARPAL TUNNEL RELEASE      bilateral     SECTION      CHOLECYSTECTOMY      FOOT SURGERY      bunions and corns both feet    JOINT REPLACEMENT      left hip    MASTECTOMY Right 2018    MASTECTOMY Right 2018       Medications Prior to Admission:    Prior to Admission medications    Medication Sig Start Date End Date Taking? Authorizing Provider   rosuvastatin (CRESTOR) 5 MG tablet Take 1 tablet by mouth daily 24   Afsaneh Mello DO   levothyroxine (SYNTHROID) 25 MCG tablet Take 1 tablet by mouth Daily 24   Afsaneh Mello DO   polyethylene glycol (GLYCOLAX) 17 GM/SCOOP powder Take 17 g by mouth daily    Provider, MD Gosia   anastrozole (ARIMIDEX) 1 MG tablet Take 1 tablet by mouth daily 10/9/24   Afsaneh Mello DO   olmesartan (BENICAR) 40 MG tablet Take 1 tablet by mouth daily 24   Tanesha Zamora, APRN - CNP   metoprolol (LOPRESSOR) 100 MG tablet Take 1 tablet by mouth 2

## 2025-01-05 NOTE — ED PROVIDER NOTES
18 98 %      Height Weight - Scale         -- 01/05/25 0854          63.5 kg (140 lb)              Physical Exam  General Appearance/Constitutional:  Alert, development consistent with age.  HEENT:  NC/NT. PERRLA.  Airway patent.  Neck:  Supple.  No lymphadenopathy.  Respiratory:  No retractions.  Lungs Clear to auscultation and breath sounds equal.  CV:  Regular rate and rhythm.  GI:  normal appearing, non-distended with no visible hernias.      Bowel sounds: normal bowel sounds.          Tenderness: There is mild tenderness present - located in the LLQ.       Liver: non-tender.      Spleen:  non-tender.                            Back: CVA Tenderness: mild tenderness present on left.  : /Pelvic examination deferred / declined.  Integument:  Normal turgor.  Warm, dry, without visible rash, unless noted elsewhere.  Lymphatics: No edema, cap.refill <3sec.  Neurological:  Orientation age-appropriate.  Motor functions intact.    Lab / Imaging Results   (All laboratory and radiology results have been personally reviewed by myself)  Labs:  No results found for this visit on 01/05/25.  Imaging:  All Radiology results interpreted by Radiologist unless otherwise noted.  No orders to display     ED Course / Medical Decision Making   Medications - No data to display       MDM: Patient presents to urgent care for lower abdominal and left flank pain.  Did discuss with patient she needs a comprehensive evaluation and possible imaging which is not available at this facility therefore she was advised to present directly to the emergency room, offered EMS, patient declined states her  will drive her directly there.    Plan of Care/Counseling:  BISMARK Plascencia CNP reviewed today's visit with the patient in addition to providing specific details for the plan of care and counseling regarding the diagnosis and prognosis.  Questions are answered at this time and are agreeable with the plan.    Assessment      1. Left

## 2025-01-05 NOTE — H&P
the last 72 hours.    CBC with Differential:    Lab Results   Component Value Date/Time    WBC 11.7 01/05/2025 11:40 AM    RBC 4.47 01/05/2025 11:40 AM    HGB 14.5 01/05/2025 11:40 AM    HCT 42.9 01/05/2025 11:40 AM     01/05/2025 11:40 AM    MCV 96.0 01/05/2025 11:40 AM    MCH 32.4 01/05/2025 11:40 AM    MCHC 33.8 01/05/2025 11:40 AM    RDW 12.5 01/05/2025 11:40 AM    LYMPHOPCT 13 01/05/2025 11:40 AM    MONOPCT 7 01/05/2025 11:40 AM    EOSPCT 0 01/05/2025 11:40 AM    BASOPCT 0 01/05/2025 11:40 AM    MONOSABS 0.76 01/05/2025 11:40 AM    LYMPHSABS 1.52 01/05/2025 11:40 AM    EOSABS 0.03 01/05/2025 11:40 AM    BASOSABS 0.04 01/05/2025 11:40 AM     CMP:    Lab Results   Component Value Date/Time     01/05/2025 11:40 AM    K 4.3 01/05/2025 11:40 AM    CL 99 01/05/2025 11:40 AM    CO2 25 01/05/2025 11:40 AM    BUN 16 01/05/2025 11:40 AM    CREATININE 1.1 01/05/2025 11:40 AM    GFRAA 60 04/26/2014 01:45 PM    LABGLOM 53 01/05/2025 11:40 AM    GLUCOSE 102 01/05/2025 11:40 AM    CALCIUM 9.9 01/05/2025 11:40 AM    BILITOT 1.1 01/05/2025 11:40 AM    ALKPHOS 97 01/05/2025 11:40 AM    AST 24 01/05/2025 11:40 AM    ALT 13 01/05/2025 11:40 AM     U/A:    Lab Results   Component Value Date/Time    NITRITE neg 11/22/2024 02:26 PM    COLORU VICTOR MANUEL 01/05/2025 12:18 PM    PROTEINU 30 01/05/2025 12:18 PM    PHUR 5.5 01/05/2025 12:18 PM    PHUR 7.0 11/22/2024 02:26 PM    PHUR 5.0 08/05/2023 08:40 AM    WBCUA 21 TO 50 01/05/2025 12:18 PM    RBCUA 0 TO 2 01/05/2025 12:18 PM    BACTERIA TRACE 01/05/2025 12:18 PM    CLARITYU cloudy 11/22/2024 02:26 PM    SPECGRAV 1.025 11/22/2024 02:26 PM    LEUKOCYTESUR SMALL 01/05/2025 12:18 PM    UROBILINOGEN 0.2 01/05/2025 12:18 PM    BILIRUBINUR SMALL 01/05/2025 12:18 PM    BLOODU trace-intact 11/22/2024 02:26 PM    GLUCOSEU NEGATIVE 01/05/2025 12:18 PM       Radiology: CT ABDOMEN PELVIS W IV CONTRAST Additional Contrast? None    Result Date: 1/5/2025  EXAMINATION: CT OF THE ABDOMEN AND

## 2025-01-05 NOTE — ED PROVIDER NOTES
99.9 fL    MCH 32.4 26.0 - 35.0 pg    MCHC 33.8 32.0 - 34.5 g/dL    RDW 12.5 11.5 - 15.0 %    Platelets 183 130 - 450 k/uL    MPV 9.7 7.0 - 12.0 fL    Neutrophils % 79 43.0 - 80.0 %    Lymphocytes % 13 (L) 20.0 - 42.0 %    Monocytes % 7 2.0 - 12.0 %    Eosinophils % 0 0 - 6 %    Basophils % 0 0.0 - 2.0 %    Immature Granulocytes % 1 0.0 - 5.0 %    Neutrophils Absolute 9.24 (H) 1.80 - 7.30 k/uL    Lymphocytes Absolute 1.52 1.50 - 4.00 k/uL    Monocytes Absolute 0.76 0.10 - 0.95 k/uL    Eosinophils Absolute 0.03 (L) 0.05 - 0.50 k/uL    Basophils Absolute 0.04 0.00 - 0.20 k/uL    Immature Granulocytes Absolute 0.10 0.00 - 0.58 k/uL   Comprehensive Metabolic Panel w/ Reflex to MG   Result Value Ref Range    Sodium 138 132 - 146 mmol/L    Potassium 4.3 3.5 - 5.0 mmol/L    Chloride 99 98 - 107 mmol/L    CO2 25 22 - 29 mmol/L    Anion Gap 14 7 - 16 mmol/L    Glucose 102 (H) 74 - 99 mg/dL    BUN 16 6 - 23 mg/dL    Creatinine 1.1 (H) 0.50 - 1.00 mg/dL    Est, Glom Filt Rate 53 (L) >60 mL/min/1.73m2    Calcium 9.9 8.6 - 10.2 mg/dL    Total Protein 7.4 6.4 - 8.3 g/dL    Albumin 4.1 3.5 - 5.2 g/dL    Total Bilirubin 1.1 0.0 - 1.2 mg/dL    Alkaline Phosphatase 97 35 - 104 U/L    ALT 13 0 - 32 U/L    AST 24 0 - 31 U/L   Lactic Acid   Result Value Ref Range    Lactic Acid 1.0 0.5 - 2.2 mmol/L   Lactic Acid   Result Value Ref Range    Lactic Acid 2.4 (H) 0.5 - 2.2 mmol/L   CBC with Auto Differential   Result Value Ref Range    WBC 6.3 4.5 - 11.5 k/uL    RBC 4.00 3.50 - 5.50 m/uL    Hemoglobin 13.0 11.5 - 15.5 g/dL    Hematocrit 38.5 34.0 - 48.0 %    MCV 96.3 80.0 - 99.9 fL    MCH 32.5 26.0 - 35.0 pg    MCHC 33.8 32.0 - 34.5 g/dL    RDW 12.6 11.5 - 15.0 %    Platelets 188 130 - 450 k/uL    MPV 10.1 7.0 - 12.0 fL    Neutrophils % 74 43.0 - 80.0 %    Lymphocytes % 20 20.0 - 42.0 %    Monocytes % 5 2.0 - 12.0 %    Eosinophils % 1 0 - 6 %    Basophils % 1 0.0 - 2.0 %    Immature Granulocytes % 1 0.0 - 5.0 %    Neutrophils Absolute 4.60

## 2025-01-06 LAB
ALBUMIN SERPL-MCNC: 4 G/DL (ref 3.5–5.2)
ALP SERPL-CCNC: 97 U/L (ref 35–104)
ALT SERPL-CCNC: 12 U/L (ref 0–32)
ANION GAP SERPL CALCULATED.3IONS-SCNC: 16 MMOL/L (ref 7–16)
AST SERPL-CCNC: 20 U/L (ref 0–31)
BASOPHILS # BLD: 0.03 K/UL (ref 0–0.2)
BASOPHILS NFR BLD: 1 % (ref 0–2)
BILIRUB SERPL-MCNC: 0.6 MG/DL (ref 0–1.2)
BUN SERPL-MCNC: 24 MG/DL (ref 6–23)
CALCIUM SERPL-MCNC: 9.2 MG/DL (ref 8.6–10.2)
CHLORIDE SERPL-SCNC: 103 MMOL/L (ref 98–107)
CO2 SERPL-SCNC: 21 MMOL/L (ref 22–29)
CREAT SERPL-MCNC: 1 MG/DL (ref 0.5–1)
EKG ATRIAL RATE: 74 BPM
EKG P AXIS: 39 DEGREES
EKG P-R INTERVAL: 166 MS
EKG Q-T INTERVAL: 370 MS
EKG QRS DURATION: 78 MS
EKG QTC CALCULATION (BAZETT): 410 MS
EKG R AXIS: 0 DEGREES
EKG T AXIS: -4 DEGREES
EKG VENTRICULAR RATE: 74 BPM
EOSINOPHIL # BLD: 0.05 K/UL (ref 0.05–0.5)
EOSINOPHILS RELATIVE PERCENT: 1 % (ref 0–6)
ERYTHROCYTE [DISTWIDTH] IN BLOOD BY AUTOMATED COUNT: 12.6 % (ref 11.5–15)
GFR, ESTIMATED: 58 ML/MIN/1.73M2
GLUCOSE SERPL-MCNC: 198 MG/DL (ref 74–99)
HCT VFR BLD AUTO: 38.5 % (ref 34–48)
HGB BLD-MCNC: 13 G/DL (ref 11.5–15.5)
IMM GRANULOCYTES # BLD AUTO: 0.04 K/UL (ref 0–0.58)
IMM GRANULOCYTES NFR BLD: 1 % (ref 0–5)
LACTATE BLDV-SCNC: 2.4 MMOL/L (ref 0.5–2.2)
LYMPHOCYTES NFR BLD: 1.23 K/UL (ref 1.5–4)
LYMPHOCYTES RELATIVE PERCENT: 20 % (ref 20–42)
MAGNESIUM SERPL-MCNC: 2 MG/DL (ref 1.6–2.6)
MCH RBC QN AUTO: 32.5 PG (ref 26–35)
MCHC RBC AUTO-ENTMCNC: 33.8 G/DL (ref 32–34.5)
MCV RBC AUTO: 96.3 FL (ref 80–99.9)
MONOCYTES NFR BLD: 0.31 K/UL (ref 0.1–0.95)
MONOCYTES NFR BLD: 5 % (ref 2–12)
NEUTROPHILS NFR BLD: 74 % (ref 43–80)
NEUTS SEG NFR BLD: 4.6 K/UL (ref 1.8–7.3)
PHOSPHATE SERPL-MCNC: 2.6 MG/DL (ref 2.5–4.5)
PLATELET # BLD AUTO: 188 K/UL (ref 130–450)
PMV BLD AUTO: 10.1 FL (ref 7–12)
POTASSIUM SERPL-SCNC: 3.7 MMOL/L (ref 3.5–5)
PROT SERPL-MCNC: 7 G/DL (ref 6.4–8.3)
RBC # BLD AUTO: 4 M/UL (ref 3.5–5.5)
SODIUM SERPL-SCNC: 140 MMOL/L (ref 132–146)
WBC OTHER # BLD: 6.3 K/UL (ref 4.5–11.5)

## 2025-01-06 PROCEDURE — 36415 COLL VENOUS BLD VENIPUNCTURE: CPT

## 2025-01-06 PROCEDURE — 80053 COMPREHEN METABOLIC PANEL: CPT

## 2025-01-06 PROCEDURE — 6370000000 HC RX 637 (ALT 250 FOR IP): Performed by: INTERNAL MEDICINE

## 2025-01-06 PROCEDURE — 99232 SBSQ HOSP IP/OBS MODERATE 35: CPT | Performed by: INTERNAL MEDICINE

## 2025-01-06 PROCEDURE — 99232 SBSQ HOSP IP/OBS MODERATE 35: CPT | Performed by: SURGERY

## 2025-01-06 PROCEDURE — 83605 ASSAY OF LACTIC ACID: CPT

## 2025-01-06 PROCEDURE — 85025 COMPLETE CBC W/AUTO DIFF WBC: CPT

## 2025-01-06 PROCEDURE — 93010 ELECTROCARDIOGRAM REPORT: CPT | Performed by: INTERNAL MEDICINE

## 2025-01-06 PROCEDURE — 2500000003 HC RX 250 WO HCPCS: Performed by: INTERNAL MEDICINE

## 2025-01-06 PROCEDURE — 2060000000 HC ICU INTERMEDIATE R&B

## 2025-01-06 PROCEDURE — 83735 ASSAY OF MAGNESIUM: CPT

## 2025-01-06 PROCEDURE — 6360000002 HC RX W HCPCS: Performed by: INTERNAL MEDICINE

## 2025-01-06 PROCEDURE — 84100 ASSAY OF PHOSPHORUS: CPT

## 2025-01-06 RX ORDER — SODIUM CHLORIDE 9 MG/ML
INJECTION, SOLUTION INTRAVENOUS CONTINUOUS
Status: DISCONTINUED | OUTPATIENT
Start: 2025-01-06 | End: 2025-01-08

## 2025-01-06 RX ADMIN — ASPIRIN 81 MG: 81 TABLET, CHEWABLE ORAL at 08:18

## 2025-01-06 RX ADMIN — METRONIDAZOLE 500 MG: 500 INJECTION, SOLUTION INTRAVENOUS at 21:04

## 2025-01-06 RX ADMIN — METRONIDAZOLE 500 MG: 500 INJECTION, SOLUTION INTRAVENOUS at 05:43

## 2025-01-06 RX ADMIN — LEVOTHYROXINE SODIUM 25 MCG: 25 TABLET ORAL at 05:43

## 2025-01-06 RX ADMIN — WATER 1000 MG: 1 INJECTION INTRAMUSCULAR; INTRAVENOUS; SUBCUTANEOUS at 13:45

## 2025-01-06 RX ADMIN — ENOXAPARIN SODIUM 40 MG: 100 INJECTION SUBCUTANEOUS at 16:03

## 2025-01-06 RX ADMIN — METRONIDAZOLE 500 MG: 500 INJECTION, SOLUTION INTRAVENOUS at 13:52

## 2025-01-06 RX ADMIN — ANASTROZOLE 1 MG: 1 TABLET ORAL at 08:19

## 2025-01-06 RX ADMIN — METOPROLOL TARTRATE 100 MG: 50 TABLET, FILM COATED ORAL at 21:08

## 2025-01-06 RX ADMIN — METOPROLOL TARTRATE 100 MG: 50 TABLET, FILM COATED ORAL at 08:18

## 2025-01-06 RX ADMIN — ROSUVASTATIN CALCIUM 5 MG: 10 TABLET, FILM COATED ORAL at 08:19

## 2025-01-06 ASSESSMENT — ENCOUNTER SYMPTOMS
PHOTOPHOBIA: 0
DIARRHEA: 1
COUGH: 0
ABDOMINAL PAIN: 1
NAUSEA: 1
ABDOMINAL DISTENTION: 0
SHORTNESS OF BREATH: 0
VOMITING: 0
CHEST TIGHTNESS: 0

## 2025-01-06 ASSESSMENT — PAIN SCALES - GENERAL: PAINLEVEL_OUTOF10: 0

## 2025-01-06 NOTE — PLAN OF CARE
Problem: Discharge Planning  Goal: Discharge to home or other facility with appropriate resources  Outcome: Progressing  Flowsheets  Taken 1/5/2025 2047 by Magalis Armstrong RN  Discharge to home or other facility with appropriate resources:   Identify barriers to discharge with patient and caregiver   Arrange for needed discharge resources and transportation as appropriate  Taken 1/5/2025 1600 by Carol Elliott RN  Discharge to home or other facility with appropriate resources:   Identify barriers to discharge with patient and caregiver   Arrange for needed discharge resources and transportation as appropriate   Identify discharge learning needs (meds, wound care, etc)   Arrange for interpreters to assist at discharge as needed   Refer to discharge planning if patient needs post-hospital services based on physician order or complex needs related to functional status, cognitive ability or social support system     Problem: Pain  Goal: Verbalizes/displays adequate comfort level or baseline comfort level  Outcome: Progressing     Problem: Safety - Adult  Goal: Free from fall injury  Outcome: Progressing     Problem: ABCDS Injury Assessment  Goal: Absence of physical injury  Outcome: Progressing

## 2025-01-07 LAB
MICROORGANISM SPEC CULT: ABNORMAL
MICROORGANISM SPEC CULT: ABNORMAL
SERVICE CMNT-IMP: ABNORMAL
SPECIMEN DESCRIPTION: ABNORMAL

## 2025-01-07 PROCEDURE — 99232 SBSQ HOSP IP/OBS MODERATE 35: CPT | Performed by: INTERNAL MEDICINE

## 2025-01-07 PROCEDURE — 2500000003 HC RX 250 WO HCPCS: Performed by: INTERNAL MEDICINE

## 2025-01-07 PROCEDURE — 6370000000 HC RX 637 (ALT 250 FOR IP): Performed by: INTERNAL MEDICINE

## 2025-01-07 PROCEDURE — 99232 SBSQ HOSP IP/OBS MODERATE 35: CPT | Performed by: SURGERY

## 2025-01-07 PROCEDURE — 2060000000 HC ICU INTERMEDIATE R&B

## 2025-01-07 PROCEDURE — 6360000002 HC RX W HCPCS: Performed by: INTERNAL MEDICINE

## 2025-01-07 RX ADMIN — METOPROLOL TARTRATE 100 MG: 50 TABLET, FILM COATED ORAL at 21:37

## 2025-01-07 RX ADMIN — ENOXAPARIN SODIUM 40 MG: 100 INJECTION SUBCUTANEOUS at 16:13

## 2025-01-07 RX ADMIN — METRONIDAZOLE 500 MG: 500 INJECTION, SOLUTION INTRAVENOUS at 13:40

## 2025-01-07 RX ADMIN — METOPROLOL TARTRATE 100 MG: 50 TABLET, FILM COATED ORAL at 08:19

## 2025-01-07 RX ADMIN — SODIUM CHLORIDE, PRESERVATIVE FREE 10 ML: 5 INJECTION INTRAVENOUS at 21:46

## 2025-01-07 RX ADMIN — METRONIDAZOLE 500 MG: 500 INJECTION, SOLUTION INTRAVENOUS at 05:34

## 2025-01-07 RX ADMIN — ASPIRIN 81 MG: 81 TABLET, CHEWABLE ORAL at 08:20

## 2025-01-07 RX ADMIN — WATER 1000 MG: 1 INJECTION INTRAMUSCULAR; INTRAVENOUS; SUBCUTANEOUS at 13:34

## 2025-01-07 RX ADMIN — ROSUVASTATIN CALCIUM 5 MG: 10 TABLET, FILM COATED ORAL at 08:19

## 2025-01-07 RX ADMIN — LEVOTHYROXINE SODIUM 25 MCG: 25 TABLET ORAL at 05:36

## 2025-01-07 RX ADMIN — ANASTROZOLE 1 MG: 1 TABLET ORAL at 08:20

## 2025-01-07 RX ADMIN — METRONIDAZOLE 500 MG: 500 INJECTION, SOLUTION INTRAVENOUS at 21:46

## 2025-01-07 NOTE — ACP (ADVANCE CARE PLANNING)
Advance Care Planning   Healthcare Decision Maker:    Primary Decision Maker: Ramon Harrisneth - Spouse - [phone]    Secondary Decision Maker: Ahsan Mcdermott - Child - [phone]    Click here to complete Healthcare Decision Makers including selection of the Healthcare Decision Maker Relationship (ie \"Primary\").

## 2025-01-07 NOTE — PLAN OF CARE
Problem: Discharge Planning  Goal: Discharge to home or other facility with appropriate resources  Outcome: Progressing  Flowsheets (Taken 1/7/2025 0800)  Discharge to home or other facility with appropriate resources: Identify barriers to discharge with patient and caregiver     Problem: Pain  Goal: Verbalizes/displays adequate comfort level or baseline comfort level  Outcome: Progressing     Problem: Safety - Adult  Goal: Free from fall injury  Outcome: Progressing

## 2025-01-07 NOTE — CARE COORDINATION
Case Management Assessment  Initial Evaluation    Date/Time of Evaluation: 1/7/2025 1:23 PM  Assessment Completed by: BENEDICT Reis    If patient is discharged prior to next notation, then this note serves as note for discharge by case management.    Patient Name: Brie Harris                   YOB: 1945  Diagnosis: Diverticulitis of large intestine with abscess, unspecified bleeding status [K57.20]                   Date / Time: 1/5/2025 11:18 AM    Patient Admission Status: Inpatient   Readmission Risk (Low < 19, Mod (19-27), High > 27): Readmission Risk Score: 6.5    Current PCP: Afsaneh Mello, DO  PCP verified by CM? Yes    Chart Reviewed: Yes      History Provided by: Patient  Patient Orientation: Alert and Oriented, Person, Place, Situation    Patient Cognition: Alert    Hospitalization in the last 30 days (Readmission):  No    If yes, Readmission Assessment in CM Navigator will be completed.    Advance Directives:      Code Status: Full Code   Patient's Primary Decision Maker is: Patient Declined (Legal Next of Kin Remains as Decision Maker)    Primary Decision Maker: Fco Harris - Spouse - [phone]    Secondary Decision Maker: Ahsan Mcdermott - Child - [phone]    Discharge Planning:    Patient lives with: Spouse/Significant Other Type of Home: House  Primary Care Giver: Self  Patient Support Systems include: Spouse/Significant Other, Children   Current Financial resources:    Current community resources:    Current services prior to admission: None            Current DME:              Type of Home Care services:  None    ADLS  Prior functional level: Independent in ADLs/IADLs  Current functional level: Independent in ADLs/IADLs    PT AM-PAC:   /24  OT AM-PAC:   /24    Family can provide assistance at DC: Yes (if needed)  Would you like Case Management to discuss the discharge plan with any other family members/significant others, and if so, who? No (declined need, reported

## 2025-01-08 VITALS
BODY MASS INDEX: 29.39 KG/M2 | HEART RATE: 71 BPM | RESPIRATION RATE: 20 BRPM | DIASTOLIC BLOOD PRESSURE: 78 MMHG | HEIGHT: 58 IN | SYSTOLIC BLOOD PRESSURE: 106 MMHG | TEMPERATURE: 98.1 F | WEIGHT: 140 LBS | OXYGEN SATURATION: 98 %

## 2025-01-08 LAB
ANION GAP SERPL CALCULATED.3IONS-SCNC: 10 MMOL/L (ref 7–16)
BASOPHILS # BLD: 0.03 K/UL (ref 0–0.2)
BASOPHILS NFR BLD: 1 % (ref 0–2)
BUN SERPL-MCNC: 10 MG/DL (ref 6–23)
CALCIUM SERPL-MCNC: 9 MG/DL (ref 8.6–10.2)
CHLORIDE SERPL-SCNC: 107 MMOL/L (ref 98–107)
CO2 SERPL-SCNC: 23 MMOL/L (ref 22–29)
CREAT SERPL-MCNC: 0.8 MG/DL (ref 0.5–1)
EOSINOPHIL # BLD: 0.1 K/UL (ref 0.05–0.5)
EOSINOPHILS RELATIVE PERCENT: 2 % (ref 0–6)
ERYTHROCYTE [DISTWIDTH] IN BLOOD BY AUTOMATED COUNT: 12.5 % (ref 11.5–15)
GFR, ESTIMATED: 73 ML/MIN/1.73M2
GLUCOSE SERPL-MCNC: 118 MG/DL (ref 74–99)
HCT VFR BLD AUTO: 37 % (ref 34–48)
HGB BLD-MCNC: 12.6 G/DL (ref 11.5–15.5)
IMM GRANULOCYTES # BLD AUTO: 0.03 K/UL (ref 0–0.58)
IMM GRANULOCYTES NFR BLD: 1 % (ref 0–5)
LYMPHOCYTES NFR BLD: 1.2 K/UL (ref 1.5–4)
LYMPHOCYTES RELATIVE PERCENT: 28 % (ref 20–42)
MAGNESIUM SERPL-MCNC: 1.8 MG/DL (ref 1.6–2.6)
MCH RBC QN AUTO: 32.8 PG (ref 26–35)
MCHC RBC AUTO-ENTMCNC: 34.1 G/DL (ref 32–34.5)
MCV RBC AUTO: 96.4 FL (ref 80–99.9)
MONOCYTES NFR BLD: 0.34 K/UL (ref 0.1–0.95)
MONOCYTES NFR BLD: 8 % (ref 2–12)
NEUTROPHILS NFR BLD: 61 % (ref 43–80)
NEUTS SEG NFR BLD: 2.65 K/UL (ref 1.8–7.3)
PHOSPHATE SERPL-MCNC: 2.1 MG/DL (ref 2.5–4.5)
PLATELET, FLUORESCENCE: 188 K/UL (ref 130–450)
PMV BLD AUTO: 10.1 FL (ref 7–12)
POTASSIUM SERPL-SCNC: 3.7 MMOL/L (ref 3.5–5)
RBC # BLD AUTO: 3.84 M/UL (ref 3.5–5.5)
SODIUM SERPL-SCNC: 140 MMOL/L (ref 132–146)
WBC OTHER # BLD: 4.4 K/UL (ref 4.5–11.5)

## 2025-01-08 PROCEDURE — 84100 ASSAY OF PHOSPHORUS: CPT

## 2025-01-08 PROCEDURE — 2580000003 HC RX 258: Performed by: STUDENT IN AN ORGANIZED HEALTH CARE EDUCATION/TRAINING PROGRAM

## 2025-01-08 PROCEDURE — 2500000003 HC RX 250 WO HCPCS: Performed by: INTERNAL MEDICINE

## 2025-01-08 PROCEDURE — 80048 BASIC METABOLIC PNL TOTAL CA: CPT

## 2025-01-08 PROCEDURE — 99232 SBSQ HOSP IP/OBS MODERATE 35: CPT | Performed by: SURGERY

## 2025-01-08 PROCEDURE — 99239 HOSP IP/OBS DSCHRG MGMT >30: CPT | Performed by: INTERNAL MEDICINE

## 2025-01-08 PROCEDURE — 6360000002 HC RX W HCPCS: Performed by: INTERNAL MEDICINE

## 2025-01-08 PROCEDURE — 85025 COMPLETE CBC W/AUTO DIFF WBC: CPT

## 2025-01-08 PROCEDURE — 6370000000 HC RX 637 (ALT 250 FOR IP): Performed by: INTERNAL MEDICINE

## 2025-01-08 PROCEDURE — 83735 ASSAY OF MAGNESIUM: CPT

## 2025-01-08 PROCEDURE — 2580000003 HC RX 258: Performed by: INTERNAL MEDICINE

## 2025-01-08 RX ORDER — CLINDAMYCIN HYDROCHLORIDE 300 MG/1
300 CAPSULE ORAL 3 TIMES DAILY
Qty: 21 CAPSULE | Refills: 0 | Status: SHIPPED | OUTPATIENT
Start: 2025-01-08 | End: 2025-01-15

## 2025-01-08 RX ORDER — METRONIDAZOLE 500 MG/1
500 TABLET ORAL 2 TIMES DAILY
Qty: 14 TABLET | Refills: 0 | Status: SHIPPED | OUTPATIENT
Start: 2025-01-08 | End: 2025-01-15

## 2025-01-08 RX ADMIN — SODIUM CHLORIDE: 9 INJECTION, SOLUTION INTRAVENOUS at 03:28

## 2025-01-08 RX ADMIN — METOPROLOL TARTRATE 100 MG: 50 TABLET, FILM COATED ORAL at 08:26

## 2025-01-08 RX ADMIN — ROSUVASTATIN CALCIUM 5 MG: 10 TABLET, FILM COATED ORAL at 08:26

## 2025-01-08 RX ADMIN — SODIUM PHOSPHATE, MONOBASIC, MONOHYDRATE AND SODIUM PHOSPHATE, DIBASIC, ANHYDROUS 10 MMOL: 142; 276 INJECTION, SOLUTION INTRAVENOUS at 15:00

## 2025-01-08 RX ADMIN — WATER 1000 MG: 1 INJECTION INTRAMUSCULAR; INTRAVENOUS; SUBCUTANEOUS at 12:59

## 2025-01-08 RX ADMIN — ENOXAPARIN SODIUM 40 MG: 100 INJECTION SUBCUTANEOUS at 15:01

## 2025-01-08 RX ADMIN — LEVOTHYROXINE SODIUM 25 MCG: 25 TABLET ORAL at 05:32

## 2025-01-08 RX ADMIN — METRONIDAZOLE 500 MG: 500 INJECTION, SOLUTION INTRAVENOUS at 05:30

## 2025-01-08 RX ADMIN — METRONIDAZOLE 500 MG: 500 INJECTION, SOLUTION INTRAVENOUS at 13:05

## 2025-01-08 RX ADMIN — ANASTROZOLE 1 MG: 1 TABLET ORAL at 08:26

## 2025-01-08 RX ADMIN — SODIUM CHLORIDE, PRESERVATIVE FREE 10 ML: 5 INJECTION INTRAVENOUS at 08:28

## 2025-01-08 RX ADMIN — ASPIRIN 81 MG: 81 TABLET, CHEWABLE ORAL at 08:26

## 2025-01-08 NOTE — PLAN OF CARE
Problem: Discharge Planning  Goal: Discharge to home or other facility with appropriate resources  1/8/2025 1717 by Jas Fish RN  Outcome: Progressing  Flowsheets (Taken 1/8/2025 0800)  Discharge to home or other facility with appropriate resources: Identify barriers to discharge with patient and caregiver  1/8/2025 0633 by Robert Vaughn RN  Outcome: Progressing  Flowsheets (Taken 1/7/2025 2137)  Discharge to home or other facility with appropriate resources: Identify barriers to discharge with patient and caregiver     Problem: Pain  Goal: Verbalizes/displays adequate comfort level or baseline comfort level  1/8/2025 1717 by Jas Fish RN  Outcome: Progressing  1/8/2025 0633 by Robert Vaughn RN  Outcome: Progressing     Problem: Safety - Adult  Goal: Free from fall injury  1/8/2025 1717 by Jas Fish RN  Outcome: Progressing  1/8/2025 0633 by Robert Vaughn RN  Outcome: Progressing  Flowsheets (Taken 1/8/2025 0135)  Free From Fall Injury: Instruct family/caregiver on patient safety     Problem: ABCDS Injury Assessment  Goal: Absence of physical injury  1/8/2025 0633 by Robert Vaughn RN  Outcome: Progressing  Flowsheets (Taken 1/8/2025 0135)  Absence of Physical Injury: Implement safety measures based on patient assessment

## 2025-01-08 NOTE — PROGRESS NOTES
[facility] Hospitalist   Progress Note    Admitting Date and Time: 1/5/2025 11:18 AM  Admit Dx: Diverticulitis of large intestine with abscess, unspecified bleeding status [K57.20]    Subjective:    Patient was admitted with Diverticulitis of large intestine with abscess, unspecified bleeding status [K57.20]. Patient denies fever, chills, cp, sob, n/v.     metoprolol  100 mg Oral BID    aspirin  81 mg Oral Daily    levothyroxine  25 mcg Oral Daily    sodium chloride flush  5-40 mL IntraVENous 2 times per day    enoxaparin  40 mg SubCUTAneous Daily    cefTRIAXone (ROCEPHIN) IV  1,000 mg IntraVENous Q24H    metroNIDAZOLE  500 mg IntraVENous Q8H    anastrozole  1 mg Oral Daily    rosuvastatin  5 mg Oral Daily     sodium chloride flush, 5-40 mL, PRN  sodium chloride, , PRN  ondansetron, 4 mg, Q8H PRN   Or  ondansetron, 4 mg, Q6H PRN  polyethylene glycol, 17 g, Daily PRN  acetaminophen, 650 mg, Q6H PRN   Or  acetaminophen, 650 mg, Q6H PRN         Objective:    /60   Pulse 70   Temp 98 °F (36.7 °C) (Axillary)   Resp 18   Ht 1.473 m (4' 10\")   Wt 63.5 kg (140 lb)   SpO2 98%   BMI 29.26 kg/m²   Skin: warm and dry, no rash or erythema  Pulmonary/Chest: clear to auscultation bilaterally- no wheezes, rales or rhonchi, normal air movement, no respiratory distress  Cardiovascular: rhythm reg at rate of 72  Abdomen: soft, non-tender, non-distended, normal bowel sounds, no masses or organomegaly  Extremities: no cyanosis, no clubbing, and no edema      Recent Labs     01/05/25  1140 01/06/25  1035    140   K 4.3 3.7   CL 99 103   CO2 25 21*   BUN 16 24*   CREATININE 1.1* 1.0   GLUCOSE 102* 198*   CALCIUM 9.9 9.2       Recent Labs     01/05/25  1140 01/06/25  1035   WBC 11.7* 6.3   RBC 4.47 4.00   HGB 14.5 13.0   HCT 42.9 38.5   MCV 96.0 96.3   MCH 32.4 32.5   MCHC 33.8 33.8   RDW 12.5 12.6    188   MPV 9.7 10.1       CBC with Differential:    Lab Results   Component Value Date/Time    
4 Eyes Skin Assessment     NAME:  Brie Harris  YOB: 1945  MEDICAL RECORD NUMBER:  [id number]    The patient is being assessed for  Admission    I agree that at least one RN has performed a thorough Head to Toe Skin Assessment on the patient. ALL assessment sites listed below have been assessed.      Areas assessed by both nurses:    Head, Face, Ears, Shoulders, Back, Chest, Arms, Elbows, Hands, Sacrum. Buttock, Coccyx, Ischium, Legs. Feet and Heels, and Under Medical Devices         Does the Patient have a Wound? No noted wound(s)       Niles Prevention initiated by RN: No  Wound Care Orders initiated by RN: No    Pressure Injury (Stage 3,4, Unstageable, DTI, NWPT, and Complex wounds) if present, place Wound referral order by RN under : No    New Ostomies, if present place, Ostomy referral order under : No     Nurse 1 eSignature: Electronically signed by Carol Elliott RN on 1/5/25 at 7:35 PM EST    **SHARE this note so that the co-signing nurse can place an eSignature**    Nurse 2 eSignature: Electronically signed by Esla Hassan RN on 1/5/25 at 7:40 PM EST   
CLINICAL PHARMACY NOTE: MEDS TO BEDS    Total # of Prescriptions Filled: 2   The following medications were delivered to the patient:  Clindamycin 300 mg  Metronidazole 500 mg    Additional Documentation:   
GENERAL SURGERY  DAILY PROGRESS NOTE  1/6/2025    Chief Complaint   Patient presents with    Abdominal Pain     Sharp lower left abdominal pain, started in the middle of the night.       Subjective:  No acute issues overnight.  Patient's abdominal pain is mildly improved.  Patient remains passing gas, no bowel movements.  Denies any fever/    Objective:  BP (!) 146/58   Pulse 78   Temp 98.1 °F (36.7 °C) (Oral)   Resp 16   Ht 1.473 m (4' 10\")   Wt 63.5 kg (140 lb)   SpO2 96%   BMI 29.26 kg/m²     GENERAL:  Laying in bed, awake, alert, cooperative, no apparent distress  HEAD: Normocephalic, atraumatic  EYES: No sclera icterus, pupils equal  LUNGS:  No increased work of breathing  CARDIOVASCULAR:  RR  ABDOMEN:  Soft, minimal tenderness throughout the abdomen, nondistended  EXTREMITIES: No edema or swelling  SKIN: Warm and dry    Assessment/Plan:  79 y.o. female acute diverticulitis    Will decrease IV fluids  Will start him on clear liquid diet  Continue multimodal pain control  Continue IV antibiotics  Okay to ambulate as able  Home medication has been restarted    Electronically signed by Tahira Santacruz MD on 1/6/2025 at 6:47 AM         General Surgery Progress Note  [facility]    Patient's Name/Date of Birth: Brie Harris / 1945    Date: January 6, 2025     Surgeon: MD Noam    Chief Complaint: Abdominal pain    Patient Active Problem List   Diagnosis    Primary osteoarthritis of right hip    Post herpetic neuralgia    Primary hypertension    Acquired hypothyroidism    Anxiety and depression    Vitamin D deficiency    Primary insomnia    Other fatigue    Full incontinence of feces    Urinary incontinence without sensory awareness    Diverticulitis of large intestine with abscess, unspecified bleeding status    Acute diverticulitis    Hyperlipidemia    Hypothyroidism       Subjective: Feels better today.  Denies any significant abdominal pain.    Objective:  BP (!) 146/58   Pulse 78 
GENERAL SURGERY  DAILY PROGRESS NOTE  1/8/2025    Chief Complaint   Patient presents with    Abdominal Pain     Sharp lower left abdominal pain, started in the middle of the night.       Subjective:  No acute issues overnight.  Patient doing well.  Tolerating regular diet.  Having bowel function    Objective:  BP (!) 140/88   Pulse 73   Temp 98.1 °F (36.7 °C) (Oral)   Resp 18   Ht 1.473 m (4' 10\")   Wt 63.5 kg (140 lb)   SpO2 97%   BMI 29.26 kg/m²     GENERAL:  Laying in bed, awake, alert, cooperative, no apparent distress  LUNGS:  No increased work of breathing  CARDIOVASCULAR:  RR  ABDOMEN:  Soft, nontender, nondistended  EXTREMITIES: No edema or swelling      Assessment/Plan:  79 y.o. female acute diverticulitis    Stop IV fluids  Continue regular diet  Will switch antibiotics to p.o. on discharge  Okay to discharge when okay with others    Electronically signed by Tahira Santacruz MD on 1/8/2025 at 9:25 AM     General Surgery Progress Note  [facility]    Patient's Name/Date of Birth: Brie Harris / 1945    Date: January 8, 2025     Surgeon: MD Noam    Chief Complaint: Abdominal pain    Patient Active Problem List   Diagnosis    Primary osteoarthritis of right hip    Post herpetic neuralgia    Primary hypertension    Acquired hypothyroidism    Anxiety and depression    Vitamin D deficiency    Primary insomnia    Other fatigue    Full incontinence of feces    Urinary incontinence without sensory awareness    Diverticulitis of large intestine with abscess, unspecified bleeding status    Acute diverticulitis    Hyperlipidemia    Hypothyroidism       Subjective: no new complaints. Tolerating reg diet    Objective:  BP (!) 140/88   Pulse 73   Temp 98.1 °F (36.7 °C) (Oral)   Resp 18   Ht 1.473 m (4' 10\")   Wt 63.5 kg (140 lb)   SpO2 97%   BMI 29.26 kg/m²   Labs:  Recent Labs     01/05/25  1140 01/06/25  1035   WBC 11.7* 6.3   HGB 14.5 13.0   HCT 42.9 38.5     Lab Results 
Patient unable state home medications.  will bring in home medication list.   
Spiritual Health History and Assessment/Progress Note  [facility] René Chan    Initial Encounter, Spiritual/Emotional Needs,  ,  ,      Name: Brie Harris MRN: [id number]    Age: 79 y.o.     Sex: female   Language: English   Restorationist: None   Diverticulitis of large intestine with abscess, unspecified bleeding status     Date: 1/7/2025                           Spiritual Assessment began in [facility] PIC/ICU        Referral/Consult From: Rounding   Encounter Overview/Reason: Initial Encounter, Spiritual/Emotional Needs  Service Provided For: Patient    Diane, Belief, Meaning:   Patient is connected with a diane tradition or spiritual practice  Family/Friends No family/friends present      Importance and Influence:  Patient has spiritual/personal beliefs that influence decisions regarding their health  Family/Friends No family/friends present    Community:  Patient is connected with a spiritual community  Family/Friends No family/friends present    Assessment and Plan of Care:     Patient Interventions include: Engaged in theological reflection  Family/Friends Interventions include: No family/friends present    Patient Plan of Care: Spiritual Care available upon further referral  Family/Friends Plan of Care: No family/friends present    Electronically signed by Chaplain Desmond on 1/7/2025 at 12:26 PM   
WBC 6.3 01/06/2025 10:35 AM    RBC 4.00 01/06/2025 10:35 AM    HGB 13.0 01/06/2025 10:35 AM    HCT 38.5 01/06/2025 10:35 AM     01/06/2025 10:35 AM    MCV 96.3 01/06/2025 10:35 AM    MCH 32.5 01/06/2025 10:35 AM    MCHC 33.8 01/06/2025 10:35 AM    RDW 12.6 01/06/2025 10:35 AM    LYMPHOPCT 20 01/06/2025 10:35 AM    MONOPCT 5 01/06/2025 10:35 AM    EOSPCT 1 01/06/2025 10:35 AM    BASOPCT 1 01/06/2025 10:35 AM    MONOSABS 0.31 01/06/2025 10:35 AM    LYMPHSABS 1.23 01/06/2025 10:35 AM    EOSABS 0.05 01/06/2025 10:35 AM    BASOSABS 0.03 01/06/2025 10:35 AM     CMP:    Lab Results   Component Value Date/Time     01/06/2025 10:35 AM    K 3.7 01/06/2025 10:35 AM     01/06/2025 10:35 AM    CO2 21 01/06/2025 10:35 AM    BUN 24 01/06/2025 10:35 AM    CREATININE 1.0 01/06/2025 10:35 AM    GFRAA 60 04/26/2014 01:45 PM    LABGLOM 58 01/06/2025 10:35 AM    GLUCOSE 198 01/06/2025 10:35 AM    CALCIUM 9.2 01/06/2025 10:35 AM    BILITOT 0.6 01/06/2025 10:35 AM    ALKPHOS 97 01/06/2025 10:35 AM    AST 20 01/06/2025 10:35 AM    ALT 12 01/06/2025 10:35 AM     Magnesium:    Lab Results   Component Value Date/Time    MG 2.0 01/06/2025 10:35 AM     Phosphorus:    Lab Results   Component Value Date/Time    PHOS 2.6 01/06/2025 10:35 AM        Radiology:   CT ABDOMEN PELVIS W IV CONTRAST Additional Contrast? None   Final Result   1. Findings consistent with acute diverticulitis of the proximal sigmoid   colon with a small fluid collection containing air adjacent to the sigmoid   colon to suggest a developing small abscess measuring 1.2 x 0.8 cm. Some   fluid identified in the surrounding soft tissues with pericolonic stranding.   2. Small hiatal hernia.   3. Status post cholecystectomy.   4. Small umbilical hernia containing fat.             Assessment:    Principal Problem:    Diverticulitis of large intestine with abscess, unspecified bleeding status  Active Problems:    Acute diverticulitis    Hyperlipidemia    
diet    Objective:  /60   Pulse 70   Temp 98 °F (36.7 °C) (Axillary)   Resp 18   Ht 1.473 m (4' 10\")   Wt 63.5 kg (140 lb)   SpO2 98%   BMI 29.26 kg/m²   Labs:  Recent Labs     01/05/25  1140 01/06/25  1035   WBC 11.7* 6.3   HGB 14.5 13.0   HCT 42.9 38.5     Lab Results   Component Value Date    CREATININE 1.0 01/06/2025    BUN 24 (H) 01/06/2025     01/06/2025    K 3.7 01/06/2025     01/06/2025    CO2 21 (L) 01/06/2025     No results for input(s): \"LIPASE\", \"AMYLASE\" in the last 72 hours.  CBC with Differential:    Lab Results   Component Value Date/Time    WBC 6.3 01/06/2025 10:35 AM    RBC 4.00 01/06/2025 10:35 AM    HGB 13.0 01/06/2025 10:35 AM    HCT 38.5 01/06/2025 10:35 AM     01/06/2025 10:35 AM    MCV 96.3 01/06/2025 10:35 AM    MCH 32.5 01/06/2025 10:35 AM    MCHC 33.8 01/06/2025 10:35 AM    RDW 12.6 01/06/2025 10:35 AM    LYMPHOPCT 20 01/06/2025 10:35 AM    MONOPCT 5 01/06/2025 10:35 AM    EOSPCT 1 01/06/2025 10:35 AM    BASOPCT 1 01/06/2025 10:35 AM    MONOSABS 0.31 01/06/2025 10:35 AM    LYMPHSABS 1.23 01/06/2025 10:35 AM    EOSABS 0.05 01/06/2025 10:35 AM    BASOSABS 0.03 01/06/2025 10:35 AM     CMP:    Lab Results   Component Value Date/Time     01/06/2025 10:35 AM    K 3.7 01/06/2025 10:35 AM     01/06/2025 10:35 AM    CO2 21 01/06/2025 10:35 AM    BUN 24 01/06/2025 10:35 AM    CREATININE 1.0 01/06/2025 10:35 AM    GFRAA 60 04/26/2014 01:45 PM    LABGLOM 58 01/06/2025 10:35 AM    GLUCOSE 198 01/06/2025 10:35 AM    CALCIUM 9.2 01/06/2025 10:35 AM    BILITOT 0.6 01/06/2025 10:35 AM    ALKPHOS 97 01/06/2025 10:35 AM    AST 20 01/06/2025 10:35 AM    ALT 12 01/06/2025 10:35 AM       General appearance:  NAD  Head: NCAT, PERRLA, EOMI, red conjunctiva  Neck: supple, no masses  Lungs: Nonlabored, equal chest rise bilateral  Heart: Reg rate  Abdomen: soft, nondistended, tender mild left lower quadrant without guarding.  No rebound  Skin; no lesions  Gu: no cva

## 2025-01-08 NOTE — PLAN OF CARE
Problem: Discharge Planning  Goal: Discharge to home or other facility with appropriate resources  Outcome: Progressing  Flowsheets (Taken 1/7/2025 2137)  Discharge to home or other facility with appropriate resources: Identify barriers to discharge with patient and caregiver     Problem: Safety - Adult  Goal: Free from fall injury  Outcome: Progressing  Flowsheets (Taken 1/8/2025 0135)  Free From Fall Injury: Instruct family/caregiver on patient safety     Problem: ABCDS Injury Assessment  Goal: Absence of physical injury  Outcome: Progressing  Flowsheets (Taken 1/8/2025 0135)  Absence of Physical Injury: Implement safety measures based on patient assessment     Problem: ABCDS Injury Assessment  Goal: Absence of physical injury  Outcome: Progressing  Flowsheets (Taken 1/8/2025 0135)  Absence of Physical Injury: Implement safety measures based on patient assessment

## 2025-01-08 NOTE — CARE COORDINATION
1/8/25  Note: Pt discharging home today. Followed up with pt @ bedside. IV meds discontinued. Pt declined any needs & spouse/daughter to provide transportation home. Dr. Mello is pcp and [facility] [address] is pharmacy. Electronically signed by BENEDICT Reis on 1/8/2025 at 1:37 PM

## 2025-01-08 NOTE — DISCHARGE SUMMARY
[facility] Hospitalist       Hospitalist Physician Discharge Summary       No follow-up provider specified.    Activity level: as arianna    Diet: ADULT DIET; Regular    Dispo:home    Condition at discharge: fair        Patient ID:  Brie Harris  [id number]  79 y.o.  1945    Admit date: 1/5/2025    Discharge date and time:  1/8/2025  1:02 PM    Admission Diagnoses: Principal Problem:    Diverticulitis of large intestine with abscess, unspecified bleeding status  Active Problems:    Acute diverticulitis    Hyperlipidemia    Hypothyroidism  Resolved Problems:    * No resolved hospital problems. *      Discharge Diagnoses: Principal Problem:    Diverticulitis of large intestine with abscess, unspecified bleeding status  Active Problems:    Acute diverticulitis    Hyperlipidemia    Hypothyroidism  Resolved Problems:    * No resolved hospital problems. *    Acute diverticulitis  Htn  Hypothyroidism  Hyperlipidemia  Hx of breast ca  hypophosphatemia  Elevated lactic acid level/acidosis      Consults:  IP CONSULT TO GENERAL SURGERY    Procedures: none    Hospital Course: Patient was admitted with Diverticulitis of large intestine with abscess, unspecified bleeding status [K57.20]. Patient is an 79 y.o. female who presents with abdominal pain. Pain is in llq. Pain is sharp and intermittent. Nothing makes pain better or worse. With pain persisting and worsening, pt came to ER. Pt has been having intermittent diarrhea over recent time but last bm was formed. Pt noted having cold sweats when asked if having fever or chills. Pt denies n/v, constipation, melena, hematochezia, change in urination, dysuria, or hematuria.     Pt seen and examined by surgery. Pt improved on abx. Pt did note some diarrhea during stay. On day of discharge, pt denied fevers, chills,n/v. Discharge planning d/w pt. Time given for questions and all questions answered. Pt to monitor for further diarrhea. Pt to monitor for fever or new

## 2025-01-08 NOTE — PLAN OF CARE
Problem: Discharge Planning  Goal: Discharge to home or other facility with appropriate resources  1/8/2025 1717 by Jas Fish RN  Outcome: Progressing  1/8/2025 1717 by Jas Fish RN  Outcome: Progressing  Flowsheets (Taken 1/8/2025 0800)  Discharge to home or other facility with appropriate resources: Identify barriers to discharge with patient and caregiver  1/8/2025 0633 by Robert Vaughn RN  Outcome: Progressing  Flowsheets (Taken 1/7/2025 2137)  Discharge to home or other facility with appropriate resources: Identify barriers to discharge with patient and caregiver     Problem: Pain  Goal: Verbalizes/displays adequate comfort level or baseline comfort level  1/8/2025 1717 by Jas Fish RN  Outcome: Progressing  1/8/2025 1717 by Jas Fish RN  Outcome: Progressing  1/8/2025 0633 by Robert Vaughn RN  Outcome: Progressing     Problem: Safety - Adult  Goal: Free from fall injury  1/8/2025 1717 by Jas Fish RN  Outcome: Progressing  1/8/2025 1717 by Jas Fish RN  Outcome: Progressing  1/8/2025 0633 by Robert Vaughn RN  Outcome: Progressing  Flowsheets (Taken 1/8/2025 0135)  Free From Fall Injury: Instruct family/caregiver on patient safety     Problem: ABCDS Injury Assessment  Goal: Absence of physical injury  1/8/2025 0633 by Robert Vaughn RN  Outcome: Progressing  Flowsheets (Taken 1/8/2025 0135)  Absence of Physical Injury: Implement safety measures based on patient assessment

## 2025-01-28 ENCOUNTER — OFFICE VISIT (OUTPATIENT)
Dept: PRIMARY CARE CLINIC | Age: 80
End: 2025-01-28

## 2025-01-28 VITALS
TEMPERATURE: 98.4 F | DIASTOLIC BLOOD PRESSURE: 79 MMHG | BODY MASS INDEX: 28.55 KG/M2 | HEART RATE: 69 BPM | SYSTOLIC BLOOD PRESSURE: 172 MMHG | HEIGHT: 58 IN | WEIGHT: 136 LBS | OXYGEN SATURATION: 98 %

## 2025-01-28 DIAGNOSIS — Z91.81 AT HIGH RISK FOR FALLS: ICD-10-CM

## 2025-01-28 DIAGNOSIS — F68.8 CHANGE IN PERSONALITY: ICD-10-CM

## 2025-01-28 DIAGNOSIS — R41.3 MEMORY PROBLEM: ICD-10-CM

## 2025-01-28 DIAGNOSIS — Z09 HOSPITAL DISCHARGE FOLLOW-UP: Primary | ICD-10-CM

## 2025-01-28 ASSESSMENT — PATIENT HEALTH QUESTIONNAIRE - PHQ9
6. FEELING BAD ABOUT YOURSELF - OR THAT YOU ARE A FAILURE OR HAVE LET YOURSELF OR YOUR FAMILY DOWN: NOT AT ALL
9. THOUGHTS THAT YOU WOULD BE BETTER OFF DEAD, OR OF HURTING YOURSELF: NOT AT ALL
1. LITTLE INTEREST OR PLEASURE IN DOING THINGS: NEARLY EVERY DAY
2. FEELING DOWN, DEPRESSED OR HOPELESS: NEARLY EVERY DAY
SUM OF ALL RESPONSES TO PHQ QUESTIONS 1-9: 16
10. IF YOU CHECKED OFF ANY PROBLEMS, HOW DIFFICULT HAVE THESE PROBLEMS MADE IT FOR YOU TO DO YOUR WORK, TAKE CARE OF THINGS AT HOME, OR GET ALONG WITH OTHER PEOPLE: SOMEWHAT DIFFICULT
7. TROUBLE CONCENTRATING ON THINGS, SUCH AS READING THE NEWSPAPER OR WATCHING TELEVISION: NEARLY EVERY DAY
SUM OF ALL RESPONSES TO PHQ QUESTIONS 1-9: 16
SUM OF ALL RESPONSES TO PHQ QUESTIONS 1-9: 16
8. MOVING OR SPEAKING SO SLOWLY THAT OTHER PEOPLE COULD HAVE NOTICED. OR THE OPPOSITE, BEING SO FIGETY OR RESTLESS THAT YOU HAVE BEEN MOVING AROUND A LOT MORE THAN USUAL: NOT AT ALL
4. FEELING TIRED OR HAVING LITTLE ENERGY: NEARLY EVERY DAY
SUM OF ALL RESPONSES TO PHQ QUESTIONS 1-9: 16
5. POOR APPETITE OR OVEREATING: SEVERAL DAYS
SUM OF ALL RESPONSES TO PHQ9 QUESTIONS 1 & 2: 6
3. TROUBLE FALLING OR STAYING ASLEEP: NEARLY EVERY DAY

## 2025-01-28 NOTE — PROGRESS NOTES
baseline.         An electronic signature was used to authenticate this note.  --Afsaneh Mello DO    On the basis of positive falls risk screening, assessment and plan is as follows: home safety tips provided.

## 2025-02-24 ENCOUNTER — TELEPHONE (OUTPATIENT)
Dept: PRIMARY CARE CLINIC | Age: 80
End: 2025-02-24

## 2025-02-24 NOTE — TELEPHONE ENCOUNTER
Pt called requesting something for pain. She went Saturday night and sat on hard bleechers for few hours and ride home was bumpy and woke up yesterday morning sore and she put hot or cold cream  on and used heating pad and they did not help. [facility] [address]. Pt does not want anything strong. May need a muscle relaxer. Please advise.

## 2025-02-25 NOTE — TELEPHONE ENCOUNTER
Has she tried Tylenol up to 1000 mg 3 times daily?  I would like to avoid NSAIDs to protect her kidneys if possible.  I do not think a muscle relaxer will help with this problem.

## 2025-04-04 ENCOUNTER — OFFICE VISIT (OUTPATIENT)
Dept: PRIMARY CARE CLINIC | Age: 80
End: 2025-04-04
Payer: MEDICARE

## 2025-04-04 VITALS
WEIGHT: 140 LBS | HEIGHT: 58 IN | SYSTOLIC BLOOD PRESSURE: 163 MMHG | OXYGEN SATURATION: 99 % | HEART RATE: 68 BPM | BODY MASS INDEX: 29.39 KG/M2 | DIASTOLIC BLOOD PRESSURE: 78 MMHG | RESPIRATION RATE: 18 BRPM | TEMPERATURE: 98.1 F

## 2025-04-04 DIAGNOSIS — I10 PRIMARY HYPERTENSION: Primary | ICD-10-CM

## 2025-04-04 PROCEDURE — 3077F SYST BP >= 140 MM HG: CPT | Performed by: FAMILY MEDICINE

## 2025-04-04 PROCEDURE — 99213 OFFICE O/P EST LOW 20 MIN: CPT | Performed by: FAMILY MEDICINE

## 2025-04-04 PROCEDURE — 3078F DIAST BP <80 MM HG: CPT | Performed by: FAMILY MEDICINE

## 2025-04-04 PROCEDURE — 1160F RVW MEDS BY RX/DR IN RCRD: CPT | Performed by: FAMILY MEDICINE

## 2025-04-04 PROCEDURE — 1159F MED LIST DOCD IN RCRD: CPT | Performed by: FAMILY MEDICINE

## 2025-04-04 PROCEDURE — 1124F ACP DISCUSS-NO DSCNMKR DOCD: CPT | Performed by: FAMILY MEDICINE

## 2025-04-04 RX ORDER — METOPROLOL TARTRATE 50 MG
150 TABLET ORAL 2 TIMES DAILY
Qty: 180 TABLET | Refills: 1 | Status: SHIPPED | OUTPATIENT
Start: 2025-04-04

## 2025-04-04 RX ORDER — ROSUVASTATIN CALCIUM 5 MG/1
5 TABLET, COATED ORAL DAILY
COMMUNITY

## 2025-04-04 NOTE — PROGRESS NOTES
[facility]  Family Medicine      Patient:  Brie Harris 79 y.o. female  Date of Service: 25      Chief complaint:   Chief Complaint   Patient presents with    Follow-Up from Hospital         History of Present Illness   Brie Harris is a 79 y.o. female who presents to the clinic with complaints as above.    Hypertension  BP Readings from Last 3 Encounters:   25 (!) 163/78   25 (!) 172/79   25 106/78   Blood pressures uncontrolled at home  Medications include none Lopressor 100 mg twice per day, olmesartan 40 mg once per day, taking as prescribed  Denies symptoms of shakiness, dizziness, lightheadedness, high or low blood pressure  Blood work monitoring not due today    Current Health Maintenance:  Health Maintenance   Topic Date Due    Lipids  Never done    Shingles vaccine (1 of 2) Never done    DEXA (modify frequency per FRAX score)  Never done    Annual Wellness Visit (Medicare Advantage)  Never done    COVID-19 Vaccine (8 - 2024-25 season) 2025    Depression Monitoring  2026    DTaP/Tdap/Td vaccine (2 - Td or Tdap) 2027    Flu vaccine  Completed    Pneumococcal 50+ years Vaccine  Completed    Respiratory Syncytial Virus (RSV) Pregnant or age 60 yrs+  Completed    Hepatitis A vaccine  Aged Out    Hepatitis B vaccine  Aged Out    Hib vaccine  Aged Out    Polio vaccine  Aged Out    Meningococcal (ACWY) vaccine  Aged Out    Meningococcal B vaccine  Aged Out    Depression Screen  Discontinued       Past Medical History:      Diagnosis Date    Cancer (HCC) 2018    breast    Hyperlipidemia     Hypertension     Pulmonary embolism        Past Surgical History:        Procedure Laterality Date    CARPAL TUNNEL RELEASE      bilateral     SECTION      CHOLECYSTECTOMY      FOOT SURGERY      bunions and corns both feet    JOINT REPLACEMENT      left hip    MASTECTOMY Right 2018    MASTECTOMY Right 2018       Allergies:    Medrol [methylprednisolone],

## 2025-04-27 DIAGNOSIS — I10 PRIMARY HYPERTENSION: ICD-10-CM

## 2025-04-28 ENCOUNTER — OFFICE VISIT (OUTPATIENT)
Dept: PRIMARY CARE CLINIC | Age: 80
End: 2025-04-28
Payer: MEDICARE

## 2025-04-28 VITALS
DIASTOLIC BLOOD PRESSURE: 80 MMHG | SYSTOLIC BLOOD PRESSURE: 176 MMHG | WEIGHT: 139.2 LBS | HEIGHT: 58 IN | OXYGEN SATURATION: 99 % | BODY MASS INDEX: 29.22 KG/M2 | TEMPERATURE: 97.8 F | HEART RATE: 69 BPM

## 2025-04-28 DIAGNOSIS — I10 PRIMARY HYPERTENSION: Primary | ICD-10-CM

## 2025-04-28 DIAGNOSIS — F32.A ANXIETY AND DEPRESSION: ICD-10-CM

## 2025-04-28 DIAGNOSIS — F41.9 ANXIETY AND DEPRESSION: ICD-10-CM

## 2025-04-28 PROCEDURE — 1160F RVW MEDS BY RX/DR IN RCRD: CPT | Performed by: FAMILY MEDICINE

## 2025-04-28 PROCEDURE — 99214 OFFICE O/P EST MOD 30 MIN: CPT | Performed by: FAMILY MEDICINE

## 2025-04-28 PROCEDURE — 3079F DIAST BP 80-89 MM HG: CPT | Performed by: FAMILY MEDICINE

## 2025-04-28 PROCEDURE — 1124F ACP DISCUSS-NO DSCNMKR DOCD: CPT | Performed by: FAMILY MEDICINE

## 2025-04-28 PROCEDURE — 3077F SYST BP >= 140 MM HG: CPT | Performed by: FAMILY MEDICINE

## 2025-04-28 PROCEDURE — 1159F MED LIST DOCD IN RCRD: CPT | Performed by: FAMILY MEDICINE

## 2025-04-28 RX ORDER — METOPROLOL TARTRATE 50 MG
TABLET ORAL
Qty: 540 TABLET | Refills: 1 | Status: SHIPPED | OUTPATIENT
Start: 2025-04-28

## 2025-04-28 RX ORDER — HYDRALAZINE HYDROCHLORIDE 10 MG/1
10 TABLET, FILM COATED ORAL 3 TIMES DAILY
Qty: 90 TABLET | Refills: 2 | Status: CANCELLED | OUTPATIENT
Start: 2025-04-28 | End: 2026-04-28

## 2025-04-28 RX ORDER — CITALOPRAM HYDROBROMIDE 10 MG/1
10 TABLET ORAL DAILY
Qty: 30 TABLET | Refills: 2 | Status: SHIPPED | OUTPATIENT
Start: 2025-04-28

## 2025-04-28 NOTE — TELEPHONE ENCOUNTER
Name of Medication(s) Requested:  Requested Prescriptions     Pending Prescriptions Disp Refills    metoprolol tartrate (LOPRESSOR) 50 MG tablet [Pharmacy Med Name: METOPROLOL TARTRATE 50 MG TAB] 540 tablet 1     Sig: TAKE 3 TABLETS BY MOUTH TWICE A DAY       Medication is on current medication list Yes    Dosage and directions were verified? Yes    Quantity verified: 90 day supply     Pharmacy Verified?  Yes    Last Appointment:  4/4/2025    Future appts:  Future Appointments   Date Time Provider Department Center   4/28/2025 10:15 AM [facility]   6/4/2025 11:15 AM [facility]   7/29/2025  1:00 PM Bruna Cintron MD [facility] Neurology -        (If no appt send self scheduling link. .REFILLAPPT)  Scheduling request sent?     [] Yes  [x] No    Does patient need updated?  [] Yes  [x] No

## 2025-04-28 NOTE — PROGRESS NOTES
[facility]  Family Medicine      Patient:  Brie Harris 79 y.o. female  Date of Service: 4/28/25      Chief complaint:   Chief Complaint   Patient presents with    3 Month Follow-Up    Hypertension         History of Present Illness   Brie Harris is a 79 y.o. female who presents to the clinic with complaints as above.    Hypertension  BP Readings from Last 3 Encounters:   04/28/25 (!) 176/80   04/04/25 (!) 163/78   01/28/25 (!) 172/79   Blood pressures uncontrolled at home  Medications include olmesartan 40 mg daily, Lopressor 150 mg twice daily, taking as prescribed  Patient has had reactions with several other hypertensives in the past  Denies symptoms of shakiness, dizziness, lightheadedness, high or low blood pressure  Blood work monitoring not due today    Anxiety, stress, anger  Uncontrolled  Patient states mood is still quite anxious, stressed.  She reports problem with anger, causing problems with her .  This has been slowly worsening over time, and is likely raising her blood pressure  Current medications include none  Denies SI, HI      Current Health Maintenance:  Health Maintenance   Topic Date Due    Shingles vaccine (1 of 2) Never done    DEXA (modify frequency per FRAX score)  Never done    Annual Wellness Visit (Medicare Advantage)  Never done    COVID-19 Vaccine (8 - 2024-25 season) 04/24/2025    Depression Monitoring  01/28/2026    DTaP/Tdap/Td vaccine (2 - Td or Tdap) 11/24/2027    Flu vaccine  Completed    Pneumococcal 50+ years Vaccine  Completed    Respiratory Syncytial Virus (RSV) Pregnant or age 60 yrs+  Completed    Hepatitis A vaccine  Aged Out    Hepatitis B vaccine  Aged Out    Hib vaccine  Aged Out    Polio vaccine  Aged Out    Meningococcal (ACWY) vaccine  Aged Out    Meningococcal B vaccine  Aged Out    Depression Screen  Discontinued       Past Medical History:      Diagnosis Date    Cancer (HCC) 2018    breast    Hyperlipidemia     Hypertension

## 2025-05-01 ENCOUNTER — APPOINTMENT (OUTPATIENT)
Dept: RADIOLOGY | Facility: HOSPITAL | Age: 80
End: 2025-05-01
Payer: MEDICARE

## 2025-05-01 ENCOUNTER — APPOINTMENT (OUTPATIENT)
Dept: CARDIOLOGY | Facility: HOSPITAL | Age: 80
End: 2025-05-01
Payer: MEDICARE

## 2025-05-01 ENCOUNTER — HOSPITAL ENCOUNTER (EMERGENCY)
Facility: HOSPITAL | Age: 80
Discharge: HOME | End: 2025-05-01
Attending: STUDENT IN AN ORGANIZED HEALTH CARE EDUCATION/TRAINING PROGRAM
Payer: MEDICARE

## 2025-05-01 VITALS
HEIGHT: 58 IN | RESPIRATION RATE: 18 BRPM | OXYGEN SATURATION: 99 % | SYSTOLIC BLOOD PRESSURE: 177 MMHG | HEART RATE: 63 BPM | DIASTOLIC BLOOD PRESSURE: 80 MMHG | WEIGHT: 142 LBS | BODY MASS INDEX: 29.81 KG/M2 | TEMPERATURE: 98.4 F

## 2025-05-01 DIAGNOSIS — R42 DIZZINESS: Primary | ICD-10-CM

## 2025-05-01 LAB
ALBUMIN SERPL BCP-MCNC: 4.5 G/DL (ref 3.4–5)
ALP SERPL-CCNC: 57 U/L (ref 33–136)
ALT SERPL W P-5'-P-CCNC: 14 U/L (ref 7–45)
ANION GAP SERPL CALC-SCNC: 12 MMOL/L (ref 10–20)
APPEARANCE UR: CLEAR
APTT PPP: 26 SECONDS (ref 26–36)
AST SERPL W P-5'-P-CCNC: 21 U/L (ref 9–39)
BASOPHILS # BLD AUTO: 0.04 X10*3/UL (ref 0–0.1)
BASOPHILS NFR BLD AUTO: 0.6 %
BILIRUB SERPL-MCNC: 0.8 MG/DL (ref 0–1.2)
BILIRUB UR STRIP.AUTO-MCNC: NEGATIVE MG/DL
BUN SERPL-MCNC: 16 MG/DL (ref 6–23)
CALCIUM SERPL-MCNC: 9.8 MG/DL (ref 8.6–10.3)
CARDIAC TROPONIN I PNL SERPL HS: 6 NG/L (ref 0–13)
CHLORIDE SERPL-SCNC: 104 MMOL/L (ref 98–107)
CO2 SERPL-SCNC: 28 MMOL/L (ref 21–32)
COLOR UR: COLORLESS
CREAT SERPL-MCNC: 1.02 MG/DL (ref 0.5–1.05)
EGFRCR SERPLBLD CKD-EPI 2021: 56 ML/MIN/1.73M*2
EOSINOPHIL # BLD AUTO: 0.08 X10*3/UL (ref 0–0.4)
EOSINOPHIL NFR BLD AUTO: 1.1 %
ERYTHROCYTE [DISTWIDTH] IN BLOOD BY AUTOMATED COUNT: 12.7 % (ref 11.5–14.5)
GLUCOSE SERPL-MCNC: 108 MG/DL (ref 74–99)
GLUCOSE UR STRIP.AUTO-MCNC: NORMAL MG/DL
HCT VFR BLD AUTO: 42.3 % (ref 36–46)
HGB BLD-MCNC: 14.4 G/DL (ref 12–16)
HOLD SPECIMEN: 293
IMM GRANULOCYTES # BLD AUTO: 0.04 X10*3/UL (ref 0–0.5)
IMM GRANULOCYTES NFR BLD AUTO: 0.6 % (ref 0–0.9)
INR PPP: 1 (ref 0.9–1.1)
KETONES UR STRIP.AUTO-MCNC: NEGATIVE MG/DL
LEUKOCYTE ESTERASE UR QL STRIP.AUTO: ABNORMAL
LIPASE SERPL-CCNC: 26 U/L (ref 9–82)
LYMPHOCYTES # BLD AUTO: 1.65 X10*3/UL (ref 0.8–3)
LYMPHOCYTES NFR BLD AUTO: 23.4 %
MAGNESIUM SERPL-MCNC: 1.73 MG/DL (ref 1.6–2.4)
MCH RBC QN AUTO: 31.5 PG (ref 26–34)
MCHC RBC AUTO-ENTMCNC: 34 G/DL (ref 32–36)
MCV RBC AUTO: 93 FL (ref 80–100)
MONOCYTES # BLD AUTO: 0.43 X10*3/UL (ref 0.05–0.8)
MONOCYTES NFR BLD AUTO: 6.1 %
NEUTROPHILS # BLD AUTO: 4.81 X10*3/UL (ref 1.6–5.5)
NEUTROPHILS NFR BLD AUTO: 68.2 %
NITRITE UR QL STRIP.AUTO: NEGATIVE
NRBC BLD-RTO: 0 /100 WBCS (ref 0–0)
PH UR STRIP.AUTO: 5.5 [PH]
PLATELET # BLD AUTO: 196 X10*3/UL (ref 150–450)
POTASSIUM SERPL-SCNC: 4 MMOL/L (ref 3.5–5.3)
PROT SERPL-MCNC: 6.8 G/DL (ref 6.4–8.2)
PROT UR STRIP.AUTO-MCNC: NEGATIVE MG/DL
PROTHROMBIN TIME: 11.1 SECONDS (ref 9.8–12.4)
RBC # BLD AUTO: 4.57 X10*6/UL (ref 4–5.2)
RBC # UR STRIP.AUTO: NEGATIVE MG/DL
RBC #/AREA URNS AUTO: ABNORMAL /HPF
SODIUM SERPL-SCNC: 140 MMOL/L (ref 136–145)
SP GR UR STRIP.AUTO: 1.01
UROBILINOGEN UR STRIP.AUTO-MCNC: NORMAL MG/DL
WBC # BLD AUTO: 7.1 X10*3/UL (ref 4.4–11.3)
WBC #/AREA URNS AUTO: ABNORMAL /HPF

## 2025-05-01 PROCEDURE — 81001 URINALYSIS AUTO W/SCOPE: CPT | Performed by: PHYSICIAN ASSISTANT

## 2025-05-01 PROCEDURE — 70450 CT HEAD/BRAIN W/O DYE: CPT | Performed by: STUDENT IN AN ORGANIZED HEALTH CARE EDUCATION/TRAINING PROGRAM

## 2025-05-01 PROCEDURE — 83690 ASSAY OF LIPASE: CPT | Performed by: PHYSICIAN ASSISTANT

## 2025-05-01 PROCEDURE — 71045 X-RAY EXAM CHEST 1 VIEW: CPT

## 2025-05-01 PROCEDURE — 87086 URINE CULTURE/COLONY COUNT: CPT | Mod: PORLAB | Performed by: PHYSICIAN ASSISTANT

## 2025-05-01 PROCEDURE — 70450 CT HEAD/BRAIN W/O DYE: CPT

## 2025-05-01 PROCEDURE — 83735 ASSAY OF MAGNESIUM: CPT | Performed by: PHYSICIAN ASSISTANT

## 2025-05-01 PROCEDURE — 84484 ASSAY OF TROPONIN QUANT: CPT | Performed by: PHYSICIAN ASSISTANT

## 2025-05-01 PROCEDURE — 85610 PROTHROMBIN TIME: CPT | Performed by: PHYSICIAN ASSISTANT

## 2025-05-01 PROCEDURE — 36415 COLL VENOUS BLD VENIPUNCTURE: CPT | Performed by: PHYSICIAN ASSISTANT

## 2025-05-01 PROCEDURE — 99285 EMERGENCY DEPT VISIT HI MDM: CPT | Mod: 25 | Performed by: STUDENT IN AN ORGANIZED HEALTH CARE EDUCATION/TRAINING PROGRAM

## 2025-05-01 PROCEDURE — 85025 COMPLETE CBC W/AUTO DIFF WBC: CPT | Performed by: PHYSICIAN ASSISTANT

## 2025-05-01 PROCEDURE — 85730 THROMBOPLASTIN TIME PARTIAL: CPT | Performed by: PHYSICIAN ASSISTANT

## 2025-05-01 PROCEDURE — 93005 ELECTROCARDIOGRAM TRACING: CPT

## 2025-05-01 PROCEDURE — 71045 X-RAY EXAM CHEST 1 VIEW: CPT | Performed by: RADIOLOGY

## 2025-05-01 PROCEDURE — 80053 COMPREHEN METABOLIC PANEL: CPT | Performed by: PHYSICIAN ASSISTANT

## 2025-05-01 ASSESSMENT — COLUMBIA-SUICIDE SEVERITY RATING SCALE - C-SSRS
1. IN THE PAST MONTH, HAVE YOU WISHED YOU WERE DEAD OR WISHED YOU COULD GO TO SLEEP AND NOT WAKE UP?: NO
6. HAVE YOU EVER DONE ANYTHING, STARTED TO DO ANYTHING, OR PREPARED TO DO ANYTHING TO END YOUR LIFE?: NO
2. HAVE YOU ACTUALLY HAD ANY THOUGHTS OF KILLING YOURSELF?: NO

## 2025-05-01 ASSESSMENT — PAIN SCALES - GENERAL: PAINLEVEL_OUTOF10: 0 - NO PAIN

## 2025-05-01 ASSESSMENT — PAIN - FUNCTIONAL ASSESSMENT: PAIN_FUNCTIONAL_ASSESSMENT: 0-10

## 2025-05-01 NOTE — ED PROVIDER NOTES
EMERGENCY MEDICINE EVALUATION NOTE    History of Present Illness     Chief Complaint:   Chief Complaint   Patient presents with    Dizziness    Nausea    Headache    Weakness, Gen       HPI: Rosemary Motta is a 79 y.o. female presents with a chief complaint of multiple medical complaints.  Patient reports that she has been having diarrhea on and off for months now.  She states previous she was hospitalized for diverticulitis but does not feel similar to that there is no pain associated with this time she just had episodes throughout the last couple days.  She reports she came in today primarily secondary to the dizziness and headaches has been experiencing.  She has anytime she goes from a sitting to a standing position she feels very dizzy.  She denies any associated chest pain or shortness of breath.  She reports she has some nausea but no vomiting.  Patient denies any fevers or chills.  She denies any urinary symptoms of dysuria or frequency.  Patient denies any weakness or numbness of any of her extremities.  She denies any history of any issues with dizziness and denies any history of any vertigo.    Previous History   Medical History[1]  Surgical History[2]  Social History[3]  Family History[4]  Allergies[5]  Current Outpatient Medications   Medication Instructions    anastrozole (Arimidex) 1 mg tablet     artificial tears, dextran-hypomel-glycerin, 0.1-0.3-0.2 % ophthalmic solution 1 drop, 4 times daily    aspirin 81 mg EC tablet Aspirin 81 MG Oral Tablet Delayed Release   Refills: 0        Start : 30-Apr-2018   Active    calcium citrate 250 mg calcium tablet Calcium Citrate 1040 MG TABS   Refills: 0        Start : 20-Sep-2021   Active    levothyroxine (Synthroid, Levoxyl) 25 mcg tablet 1 tablet, Daily    metoprolol tartrate (LOPRESSOR) 50 mg, 2 times daily    olmesartan (BENIcar) 40 mg tablet 1 tablet, Daily    rosuvastatin (Crestor) 5 mg tablet 1 tablet, Daily       Physical Exam     Appearance: Alert,  oriented , cooperative     Skin: Intact,  dry skin, no lesions, rash, petechiae or purpura.      Eyes: PERRLA, EOMs intact,  Conjunctiva pink      ENT: Hearing grossly intact. Pharynx clear, uvula midline.      Neck: Supple. Trachea at midline.      Pulmonary: Clear bilaterally. No rales, rhonchi or wheezing. No accessory muscle use or stridor.     Cardiac: Normal rate and rhythm without murmur     Abdomen: Soft, nontender, active bowel sounds.     Musculoskeletal: Full range of motion.      Neurological:Cranial nerves II through XII are grossly intact, normal sensation, no weakness, no focal findings identified.  NIH of 0.  No facial droop noted.  Normal finger-nose.     Results     Labs Reviewed   COMPREHENSIVE METABOLIC PANEL - Abnormal       Result Value    Glucose 108 (*)     Sodium 140      Potassium 4.0      Chloride 104      Bicarbonate 28      Anion Gap 12      Urea Nitrogen 16      Creatinine 1.02      eGFR 56 (*)     Calcium 9.8      Albumin 4.5      Alkaline Phosphatase 57      Total Protein 6.8      AST 21      Bilirubin, Total 0.8      ALT 14     URINALYSIS WITH REFLEX CULTURE AND MICROSCOPIC - Abnormal    Color, Urine Colorless (*)     Appearance, Urine Clear      Specific Gravity, Urine 1.007      pH, Urine 5.5      Protein, Urine NEGATIVE      Glucose, Urine Normal      Blood, Urine NEGATIVE      Ketones, Urine NEGATIVE      Bilirubin, Urine NEGATIVE      Urobilinogen, Urine Normal      Nitrite, Urine NEGATIVE      Leukocyte Esterase, Urine 25 Ian/uL (*)    MICROSCOPIC ONLY, URINE - Abnormal    WBC, Urine 6-10 (*)     RBC, Urine 1-2     PROTIME-INR - Normal    Protime 11.1      INR 1.0     APTT - Normal    aPTT 26      Narrative:     The APTT is no longer used for monitoring Unfractionated Heparin Therapy. For monitoring Heparin Therapy, use the Heparin Assay.   LIPASE - Normal    Lipase 26      Narrative:     Venipuncture immediately after or during the administration of Metamizole may lead to  falsely low results. Testing should be performed immediately prior to Metamizole dosing.   MAGNESIUM - Normal    Magnesium 1.73     SERIAL TROPONIN-INITIAL - Normal    Troponin I, High Sensitivity 6      Narrative:     Less than 99th percentile of normal range cutoff-  Female and children under 18 years old <14 ng/L; Male <21 ng/L: Negative  Repeat testing should be performed if clinically indicated.     Female and children under 18 years old 14-50 ng/L; Male 21-50 ng/L:  Consistent with possible cardiac damage and possible increased clinical   risk. Serial measurements may help to assess extent of myocardial damage.     >50 ng/L: Consistent with cardiac damage, increased clinical risk and  myocardial infarction. Serial measurements may help assess extent of   myocardial damage.      NOTE: Children less than 1 year old may have higher baseline troponin   levels and results should be interpreted in conjunction with the overall   clinical context.     NOTE: Troponin I testing is performed using a different   testing methodology at [facility] [place] than at other   [facility]. Direct result comparisons should only   be made within the same method.   URINE CULTURE   CBC WITH AUTO DIFFERENTIAL    WBC 7.1      nRBC 0.0      RBC 4.57      Hemoglobin 14.4      Hematocrit 42.3      MCV 93      MCH 31.5      MCHC 34.0      RDW 12.7      Platelets 196      Neutrophils % 68.2      Immature Granulocytes %, Automated 0.6      Lymphocytes % 23.4      Monocytes % 6.1      Eosinophils % 1.1      Basophils % 0.6      Neutrophils Absolute 4.81      Immature Granulocytes Absolute, Automated 0.04      Lymphocytes Absolute 1.65      Monocytes Absolute 0.43      Eosinophils Absolute 0.08      Basophils Absolute 0.04     TROPONIN SERIES- (INITIAL, 1 HR)    Narrative:     The following orders were created for panel order Troponin I Series, High Sensitivity (0, 1 HR).  Procedure                               Abnormality          "Status                     ---------                               -----------         ------                     Troponin I, High Sensiti...[[id number]]  Normal              Final result               Troponin, High Sensitivi...[[id number]]                                                   Please view results for these tests on the individual orders.   URINALYSIS WITH REFLEX CULTURE AND MICROSCOPIC    Narrative:     The following orders were created for panel order Urinalysis with Reflex Culture and Microscopic.  Procedure                               Abnormality         Status                     ---------                               -----------         ------                     Urinalysis with Reflex C...[[id number]]  Abnormal            Final result               Extra Urine Gray Tube[[id number]]                            In process                   Please view results for these tests on the individual orders.   EXTRA URINE GRAY TUBE   SERIAL TROPONIN, 1 HOUR     CT head wo IV contrast   Final Result   No CT evidence of acute intracranial abnormality.        MACRO   None        Signed by: Delvin Woods 5/1/2025 1:58 PM   Dictation workstation:   [id number]      XR chest 1 view   Final Result   1.  No evidence of acute cardiopulmonary process.             Signed by: Onesimo Sands 5/1/2025 11:58 AM   Dictation workstation:   [id number]            ED Course & Medical Decision Making   Medications - No data to display  Heart Rate:  [58-63]   Temperature:  [36.9 °C (98.4 °F)]   Respirations:  [18-20]   BP: (177-205)/(70-95)   Height:  [147.3 cm (4' 10\")]   Weight:  [64.4 kg (142 lb)]   Pulse Ox:  [99 %]    ED Course as of 05/01/25 1531   Thu May 01, 2025   1419 CT head wo IV contrast [RS]   1508 Patient was able to ambulate around the emergency department well but states she still felt slightly dizzy.  Currently awaiting urinalysis for disposition. [CJ]   1526 Reevaluated the patient at this time.  Patient's " workup did not show any significant abnormalities.  Patient CBC was within normal limits CMP was within normal limits.  Patient's urinalysis/urine Are maladies.  Troponin was negative lipase was negative magnesium was normal coags were normal.  Patient CT scan of the head did not show any acute abnormalities and chest x-ray was within normal limits.  We discussed the plan of care going forward.  Patient was able to ambulate around the ER without difficulty.  We discussed the plan of care going forward and she is agreeable to plan of care of discharge at this time. Delta Troponin pending but she is okay to go home without it at this time.  We did offer her admission for PT OT eval as well as MRI which she declined stating she would be comfortable going home to follow-up with her primary care provider. [CJ]      ED Course User Index  [CJ] Dimas Plata PA-C  [RS] Ronnie Alfredo DO         Diagnoses as of 05/01/25 1531   Dizziness       Procedures   Procedures    Diagnosis     1. Dizziness        Disposition   Discharged surgery decision-making    ED Prescriptions    None         Disclaimer: This note was dictated by speech recognition. Minor errors in transcription may be present. Please call if questions.         [1]   Past Medical History:  Diagnosis Date    Complication of other artery following a procedure, not elsewhere classified, subsequent encounter     Postoperative pulmonary embolism, subsequent encounter    Personal history of other diseases of the circulatory system     History of cardiac murmur    Personal history of other diseases of the digestive system     History of gastroesophageal reflux (GERD)    Personal history of other specified conditions     History of epistaxis   [2]   Past Surgical History:  Procedure Laterality Date    CARPAL TUNNEL RELEASE  04/30/2018    Neuroplasty Decompression Median Nerve At Carpal Tunnel    CHOLECYSTECTOMY  04/30/2018    Cholecystectomy Laparoscopic    OTHER SURGICAL  HISTORY  2021     section    OTHER SURGICAL HISTORY  2021    Breast biopsy    OTHER SURGICAL HISTORY  2018    Right mastectomy    OTHER SURGICAL HISTORY  2018    Total Hip Replacement Right    OTHER SURGICAL HISTORY  2018    Hip Replacement Left   [3]   Social History  Tobacco Use    Smoking status: Never    Smokeless tobacco: Never   Vaping Use    Vaping status: Never Used   Substance Use Topics    Alcohol use: Not Currently    Drug use: Never   [4]   Family History  Problem Relation Name Age of Onset    Breast cancer Mother  65   [5]   Allergies  Allergen Reactions    Penicillins Anaphylaxis and Other     Passed out    Clarithromycin Other and Nausea/vomiting     n/v    Tetracycline Other and Nausea/vomiting     n/v    Cortisone Rash, Other and Headache     Facial pain    Methylprednisolone Swelling, Other and Rash     facial pain    Nifedipine Rash, Other and Dizziness     Visual disturbance    Prednisone Rash, Other and Headache     Pain         Dimas Plata PA-C  25 1532       Dimas Plata PA-C  25 1532

## 2025-05-02 ENCOUNTER — TELEPHONE (OUTPATIENT)
Dept: PRIMARY CARE CLINIC | Age: 80
End: 2025-05-02

## 2025-05-02 NOTE — TELEPHONE ENCOUNTER
FYI        Pt stopped in and states now that she had this episode (AVS in [place]) she is afraid to take these.  So she is stopping them.

## 2025-05-03 LAB — BACTERIA UR CULT: NORMAL

## 2025-05-07 ENCOUNTER — HOSPITAL ENCOUNTER (EMERGENCY)
Age: 80
Discharge: HOME | End: 2025-05-07
Attending: STUDENT IN AN ORGANIZED HEALTH CARE EDUCATION/TRAINING PROGRAM
Payer: MEDICARE

## 2025-05-07 ENCOUNTER — APPOINTMENT (OUTPATIENT)
Dept: CT IMAGING | Age: 80
End: 2025-05-07
Payer: MEDICARE

## 2025-05-07 VITALS
SYSTOLIC BLOOD PRESSURE: 191 MMHG | RESPIRATION RATE: 16 BRPM | WEIGHT: 142 LBS | OXYGEN SATURATION: 97 % | TEMPERATURE: 98.3 F | BODY MASS INDEX: 29.68 KG/M2 | DIASTOLIC BLOOD PRESSURE: 74 MMHG | HEART RATE: 64 BPM

## 2025-05-07 DIAGNOSIS — R10.32 ABDOMINAL PAIN, LEFT LOWER QUADRANT: ICD-10-CM

## 2025-05-07 DIAGNOSIS — K44.9 HIATAL HERNIA: Primary | ICD-10-CM

## 2025-05-07 LAB
ALBUMIN SERPL-MCNC: 4.4 G/DL (ref 3.5–5.2)
ALP SERPL-CCNC: 77 U/L (ref 35–104)
ALT SERPL-CCNC: 14 U/L (ref 0–32)
ANION GAP SERPL CALCULATED.3IONS-SCNC: 12 MMOL/L (ref 7–16)
AST SERPL-CCNC: 26 U/L (ref 0–31)
BASOPHILS # BLD: 0.04 K/UL (ref 0–0.2)
BASOPHILS NFR BLD: 1 % (ref 0–2)
BILIRUB SERPL-MCNC: 0.5 MG/DL (ref 0–1.2)
BILIRUB UR QL STRIP: NEGATIVE
BUN SERPL-MCNC: 12 MG/DL (ref 6–23)
CALCIUM SERPL-MCNC: 10.4 MG/DL (ref 8.6–10.2)
CHLORIDE SERPL-SCNC: 103 MMOL/L (ref 98–107)
CLARITY UR: CLEAR
CO2 SERPL-SCNC: 28 MMOL/L (ref 22–29)
COLOR UR: YELLOW
CREAT SERPL-MCNC: 1 MG/DL (ref 0.5–1)
EOSINOPHIL # BLD: 0.11 K/UL (ref 0.05–0.5)
EOSINOPHILS RELATIVE PERCENT: 2 % (ref 0–6)
ERYTHROCYTE [DISTWIDTH] IN BLOOD BY AUTOMATED COUNT: 12.6 % (ref 11.5–15)
GFR, ESTIMATED: 61 ML/MIN/1.73M2
GLUCOSE SERPL-MCNC: 101 MG/DL (ref 74–99)
GLUCOSE UR STRIP-MCNC: NEGATIVE MG/DL
HCT VFR BLD AUTO: 41.1 % (ref 34–48)
HGB BLD-MCNC: 14.4 G/DL (ref 11.5–15.5)
HGB UR QL STRIP.AUTO: NEGATIVE
IMM GRANULOCYTES # BLD AUTO: <0.03 K/UL (ref 0–0.58)
IMM GRANULOCYTES NFR BLD: 0 % (ref 0–5)
KETONES UR STRIP-MCNC: NEGATIVE MG/DL
LACTATE BLDV-SCNC: 0.7 MMOL/L (ref 0.5–2.2)
LEUKOCYTE ESTERASE UR QL STRIP: ABNORMAL
LIPASE SERPL-CCNC: 49 U/L (ref 13–60)
LYMPHOCYTES NFR BLD: 1.46 K/UL (ref 1.5–4)
LYMPHOCYTES RELATIVE PERCENT: 26 % (ref 20–42)
MCH RBC QN AUTO: 32.1 PG (ref 26–35)
MCHC RBC AUTO-ENTMCNC: 35 G/DL (ref 32–34.5)
MCV RBC AUTO: 91.5 FL (ref 80–99.9)
MONOCYTES NFR BLD: 0.32 K/UL (ref 0.1–0.95)
MONOCYTES NFR BLD: 6 % (ref 2–12)
NEUTROPHILS NFR BLD: 66 % (ref 43–80)
NEUTS SEG NFR BLD: 3.71 K/UL (ref 1.8–7.3)
NITRITE UR QL STRIP: NEGATIVE
PH UR STRIP: 6 [PH] (ref 5–8)
PLATELET # BLD AUTO: 202 K/UL (ref 130–450)
PMV BLD AUTO: 9.9 FL (ref 7–12)
POTASSIUM SERPL-SCNC: 3.7 MMOL/L (ref 3.5–5)
PROT SERPL-MCNC: 7 G/DL (ref 6.4–8.3)
PROT UR STRIP-MCNC: NEGATIVE MG/DL
RBC # BLD AUTO: 4.49 M/UL (ref 3.5–5.5)
RBC #/AREA URNS HPF: ABNORMAL /HPF
SODIUM SERPL-SCNC: 143 MMOL/L (ref 132–146)
SP GR UR STRIP: 1.01 (ref 1–1.03)
TROPONIN I SERPL HS-MCNC: 10 NG/L (ref 0–14)
TROPONIN I SERPL HS-MCNC: 9 NG/L (ref 0–14)
UROBILINOGEN UR STRIP-ACNC: 0.2 EU/DL (ref 0–1)
WBC #/AREA URNS HPF: ABNORMAL /HPF
WBC OTHER # BLD: 5.7 K/UL (ref 4.5–11.5)

## 2025-05-07 PROCEDURE — 93005 ELECTROCARDIOGRAM TRACING: CPT | Performed by: STUDENT IN AN ORGANIZED HEALTH CARE EDUCATION/TRAINING PROGRAM

## 2025-05-07 PROCEDURE — 6360000004 HC RX CONTRAST MEDICATION: Performed by: RADIOLOGY

## 2025-05-07 PROCEDURE — 83605 ASSAY OF LACTIC ACID: CPT

## 2025-05-07 PROCEDURE — 85025 COMPLETE CBC W/AUTO DIFF WBC: CPT

## 2025-05-07 PROCEDURE — 81001 URINALYSIS AUTO W/SCOPE: CPT

## 2025-05-07 PROCEDURE — 84484 ASSAY OF TROPONIN QUANT: CPT

## 2025-05-07 PROCEDURE — 99285 EMERGENCY DEPT VISIT HI MDM: CPT

## 2025-05-07 PROCEDURE — 96375 TX/PRO/DX INJ NEW DRUG ADDON: CPT

## 2025-05-07 PROCEDURE — 6370000000 HC RX 637 (ALT 250 FOR IP): Performed by: STUDENT IN AN ORGANIZED HEALTH CARE EDUCATION/TRAINING PROGRAM

## 2025-05-07 PROCEDURE — 2580000003 HC RX 258: Performed by: STUDENT IN AN ORGANIZED HEALTH CARE EDUCATION/TRAINING PROGRAM

## 2025-05-07 PROCEDURE — 74177 CT ABD & PELVIS W/CONTRAST: CPT

## 2025-05-07 PROCEDURE — 6360000002 HC RX W HCPCS: Performed by: STUDENT IN AN ORGANIZED HEALTH CARE EDUCATION/TRAINING PROGRAM

## 2025-05-07 PROCEDURE — 80053 COMPREHEN METABOLIC PANEL: CPT

## 2025-05-07 PROCEDURE — 83690 ASSAY OF LIPASE: CPT

## 2025-05-07 PROCEDURE — 96374 THER/PROPH/DIAG INJ IV PUSH: CPT

## 2025-05-07 RX ORDER — CEFDINIR 300 MG/1
300 CAPSULE ORAL 2 TIMES DAILY
Qty: 14 CAPSULE | Refills: 0 | Status: SHIPPED | OUTPATIENT
Start: 2025-05-07 | End: 2025-05-14

## 2025-05-07 RX ORDER — METRONIDAZOLE 500 MG/1
500 TABLET ORAL 2 TIMES DAILY
Qty: 14 TABLET | Refills: 0 | Status: SHIPPED | OUTPATIENT
Start: 2025-05-07 | End: 2025-05-14

## 2025-05-07 RX ORDER — CEFDINIR 300 MG/1
300 CAPSULE ORAL EVERY 12 HOURS SCHEDULED
Status: DISCONTINUED | OUTPATIENT
Start: 2025-05-07 | End: 2025-05-07 | Stop reason: HOSPADM

## 2025-05-07 RX ORDER — IOPAMIDOL 755 MG/ML
75 INJECTION, SOLUTION INTRAVASCULAR
Status: COMPLETED | OUTPATIENT
Start: 2025-05-07 | End: 2025-05-07

## 2025-05-07 RX ORDER — 0.9 % SODIUM CHLORIDE 0.9 %
500 INTRAVENOUS SOLUTION INTRAVENOUS ONCE
Status: COMPLETED | OUTPATIENT
Start: 2025-05-07 | End: 2025-05-07

## 2025-05-07 RX ORDER — PROCHLORPERAZINE EDISYLATE 5 MG/ML
10 INJECTION INTRAMUSCULAR; INTRAVENOUS ONCE
Status: COMPLETED | OUTPATIENT
Start: 2025-05-07 | End: 2025-05-07

## 2025-05-07 RX ORDER — HYDRALAZINE HYDROCHLORIDE 20 MG/ML
10 INJECTION INTRAMUSCULAR; INTRAVENOUS ONCE
Status: COMPLETED | OUTPATIENT
Start: 2025-05-07 | End: 2025-05-07

## 2025-05-07 RX ORDER — MORPHINE SULFATE 2 MG/ML
2 INJECTION, SOLUTION INTRAMUSCULAR; INTRAVENOUS ONCE
Refills: 0 | Status: COMPLETED | OUTPATIENT
Start: 2025-05-07 | End: 2025-05-07

## 2025-05-07 RX ORDER — METRONIDAZOLE 500 MG/1
500 TABLET ORAL ONCE
Status: COMPLETED | OUTPATIENT
Start: 2025-05-07 | End: 2025-05-07

## 2025-05-07 RX ADMIN — HYDRALAZINE HYDROCHLORIDE 10 MG: 20 INJECTION INTRAMUSCULAR; INTRAVENOUS at 15:35

## 2025-05-07 RX ADMIN — IOPAMIDOL 75 ML: 755 INJECTION, SOLUTION INTRAVENOUS at 15:19

## 2025-05-07 RX ADMIN — PROCHLORPERAZINE EDISYLATE 10 MG: 5 INJECTION INTRAMUSCULAR; INTRAVENOUS at 13:12

## 2025-05-07 RX ADMIN — CEFDINIR 300 MG: 300 CAPSULE ORAL at 16:40

## 2025-05-07 RX ADMIN — MORPHINE SULFATE 2 MG: 2 INJECTION, SOLUTION INTRAMUSCULAR; INTRAVENOUS at 13:15

## 2025-05-07 RX ADMIN — SODIUM CHLORIDE 500 ML: 0.9 INJECTION, SOLUTION INTRAVENOUS at 13:09

## 2025-05-07 RX ADMIN — METRONIDAZOLE 500 MG: 500 TABLET ORAL at 16:40

## 2025-05-07 ASSESSMENT — PAIN DESCRIPTION - LOCATION
LOCATION: ABDOMEN

## 2025-05-07 ASSESSMENT — PAIN DESCRIPTION - DESCRIPTORS
DESCRIPTORS: DULL;DISCOMFORT;ACHING
DESCRIPTORS: ACHING;DISCOMFORT;DULL
DESCRIPTORS: ACHING;DISCOMFORT;DULL

## 2025-05-07 ASSESSMENT — PAIN - FUNCTIONAL ASSESSMENT: PAIN_FUNCTIONAL_ASSESSMENT: 0-10

## 2025-05-07 ASSESSMENT — PAIN SCALES - GENERAL
PAINLEVEL_OUTOF10: 3
PAINLEVEL_OUTOF10: 5
PAINLEVEL_OUTOF10: 3

## 2025-05-07 ASSESSMENT — PAIN DESCRIPTION - ORIENTATION
ORIENTATION: LEFT;LOWER

## 2025-05-07 NOTE — ED PROVIDER NOTES
Department of Emergency Medicine    ED  Provider Note - Sign out / Oncoming Provider   Admit Date/RoomTime: 5/7/2025 12:11 PM  4:27 PM EDT      I received this patient at sign out from Dr. Javier.  I have discussed the patient's initial exam, treatment and plan of care with the out going physician.  I have introduced my self to the patient / family and have answered their questions to this point.  I have examined the patient myself and reviewed ordered tests / medications and  reviewed any available results to this point.   If a resident is involved in the Emergency Department care, I have discussed my findings and plan with them as well.    MDM:     I, Dr. Holliday am the primary provider of record    Patient signed out pending CT scan and repeat evaluation.  Time: 1627  Re-evaluation.  Patient’s symptoms are improving  Repeat physical examination is significantly improved    Patient notes that last night she had a constant sharp pain to her left lower quadrant yesterday evening when she went to the bathroom. The pain felt similar to when she had diverticulitis January of this year and was admitted.   Patient notes that she had some nausea at the time, which has since gone away.   Imaging negative for findings at this time, but she is symptomatic and concern for diverticulitis clinically.  Was given first dose of antibiotics in the department.  Discharged home on PO omnicef and flagyl.    CT ABDOMEN PELVIS W IV CONTRAST Additional Contrast? None   Final Result   1. No acute intra-abdominal or pelvic process.   2. Mild sigmoid diverticulosis without diverticulitis.   3. Small sliding-type hiatal hernia.   4. Status post cholecystectomy. Minimal central intrahepatic biliary ductal   prominence and some dilatation of the extrahepatic common bile duct measuring   up to 1.4 cm in diameter compared to 1.3 cm on the prior examination. No CT   evidence of radiopaque choledocholithiasis.  Consider correlation with liver

## 2025-05-07 NOTE — DISCHARGE INSTRUCTIONS
CT ABDOMEN PELVIS W IV CONTRAST Additional Contrast? None   Final Result   1. No acute intra-abdominal or pelvic process.   2. Mild sigmoid diverticulosis without diverticulitis.   3. Small sliding-type hiatal hernia.   4. Status post cholecystectomy. Minimal central intrahepatic biliary ductal   prominence and some dilatation of the extrahepatic common bile duct measuring   up to 1.4 cm in diameter compared to 1.3 cm on the prior examination. No CT   evidence of radiopaque choledocholithiasis.  Consider correlation with liver   function tests/serum bilirubin   5. Calcific ASVD of the infrarenal abdominal aorta without aneurysm.           [place] diet  Take antibiotics as prescribed.  Follow-up with general surgery.  If symptoms worsen or concern arises please return for reevaluation.

## 2025-05-07 NOTE — ED PROVIDER NOTES
Weight:  64.4 kg (142 lb)       Patient was given the following medications:  Medications   sodium chloride 0.9 % bolus 500 mL (500 mLs IntraVENous New Bag 5/7/25 1309)   morphine (PF) injection 2 mg (2 mg IntraVENous Given 5/7/25 1315)   prochlorperazine (COMPAZINE) injection 10 mg (10 mg IntraVENous Given 5/7/25 1312)         Is this patient to be included in the SEP-1 core measure due to severe sepsis or septic shock? {Sep-1 Core Yes/No:629910}        Medical Decision Making/Differential Diagnosis:    CC/HPI Summary, Social Determinants of health, Records Reviewed, DDx, testing done/not done, ED Course, Reassessment, disposition considerations/shared decision making with patient, consults, disposition:            Chronic Conditions:   Past Medical History:   Diagnosis Date    Cancer (HCC) 2018    breast    Hyperlipidemia     Hypertension     Pulmonary embolism (HCC)        CONSULTS: (Who and What was discussed)  None    {Discussion with Other Profesionals (Optional):18954}    {Social Determinants (Optional):11729}    {Records Reviewed (Optional):33379}    CC/HPI Summary, DDx, ED Course, and Reassessment: {[facility] orders:42677}      Disposition Considerations (Tests not ordered but considered, Shared Decision Making, Pt Expectation of Test or Tx.): ***  {Escalation of care, including admission/OBS considered:01321}    FINAL IMPRESSION    No diagnosis found.      DISPOSITION/PLAN     DISPOSITION      PATIENT REFERRED TO:  No follow-up provider specified.    DISCHARGE MEDICATIONS:  New Prescriptions    No medications on file       DISCONTINUED MEDICATIONS:  Discontinued Medications    No medications on file            (Please note that portions of this note were completed with a voice recognition program.  Efforts were made to edit the dictations but occasionally words are mis-transcribed.)    Alex Akbar DO (electronically signed)     urinalysis nonaddictive infection lactic acid 0.7, lipase 49, troponin was obtained was 10 on repeat 9.  CT scan of the abdomen pelvis was ordered but is pending at this time.  Patient was given 10 mg of IV Compazine 2 mg of IV morphine as well as 500 cc of IV fluids.  Patient signed out to oncoming provider plan for disposition pending CT imaging results.  Please see ED course for final disposition and plan of care.    FINAL IMPRESSION      1. Hiatal hernia    2. Abdominal pain, left lower quadrant          DISPOSITION/PLAN     DISPOSITION Decision To Discharge 05/07/2025 05:29:03 PM    PATIENT REFERRED TO:  Afsaneh Mello DO  [address]  [facility] [id number] [phone]    Call   Follow-up appointment    [facility] Emergency Department  [address] [place] [address]  [phone]  Go to   If symptoms worsen    Tahira Santacruz MD  [address]  [address]  [facility] [address] [phone]    Call   Follow-up appointment      DISCHARGE MEDICATIONS:  Discharge Medication List as of 5/7/2025  5:33 PM        START taking these medications    Details   cefdinir (OMNICEF) 300 MG capsule Take 1 capsule by mouth 2 times daily for 7 days, Disp-14 capsule, R-0Normal      metroNIDAZOLE (FLAGYL) 500 MG tablet Take 1 tablet by mouth 2 times daily for 7 days, Disp-14 tablet, R-0Normal             DISCONTINUED MEDICATIONS:  Discharge Medication List as of 5/7/2025  5:33 PM               (Please note that portions of this note were completed with a voice recognition program.  Efforts were made to edit the dictations but occasionally words are mis-transcribed.)    Alex Akbar DO (electronically signed)       Alex Akbar DO  05/12/25 [id number]

## 2025-05-09 ENCOUNTER — HOSPITAL ENCOUNTER (EMERGENCY)
Facility: HOSPITAL | Age: 80
Discharge: HOME | End: 2025-05-09
Payer: MEDICARE

## 2025-05-09 ENCOUNTER — TELEPHONE (OUTPATIENT)
Dept: PRIMARY CARE CLINIC | Age: 80
End: 2025-05-09

## 2025-05-09 VITALS
WEIGHT: 142 LBS | DIASTOLIC BLOOD PRESSURE: 84 MMHG | HEART RATE: 66 BPM | OXYGEN SATURATION: 99 % | HEIGHT: 58 IN | SYSTOLIC BLOOD PRESSURE: 171 MMHG | BODY MASS INDEX: 29.81 KG/M2 | RESPIRATION RATE: 16 BRPM | TEMPERATURE: 98.1 F

## 2025-05-09 DIAGNOSIS — R35.0 URINARY FREQUENCY: Primary | ICD-10-CM

## 2025-05-09 LAB
APPEARANCE UR: CLEAR
ATRIAL RATE: 58 BPM
BILIRUB UR STRIP.AUTO-MCNC: NEGATIVE MG/DL
COLOR UR: ABNORMAL
EKG ATRIAL RATE: 63 BPM
EKG P AXIS: 11 DEGREES
EKG P-R INTERVAL: 172 MS
EKG Q-T INTERVAL: 392 MS
EKG QRS DURATION: 82 MS
EKG QTC CALCULATION (BAZETT): 401 MS
EKG R AXIS: -5 DEGREES
EKG T AXIS: -1 DEGREES
EKG VENTRICULAR RATE: 63 BPM
GLUCOSE UR STRIP.AUTO-MCNC: NORMAL MG/DL
HOLD SPECIMEN: 293
KETONES UR STRIP.AUTO-MCNC: ABNORMAL MG/DL
LEUKOCYTE ESTERASE UR QL STRIP.AUTO: NEGATIVE
NITRITE UR QL STRIP.AUTO: NEGATIVE
P AXIS: -18 DEGREES
PH UR STRIP.AUTO: 6 [PH]
PR INTERVAL: 183 MS
PROT UR STRIP.AUTO-MCNC: NEGATIVE MG/DL
Q ONSET: 249 MS
QRS COUNT: 10 BEATS
QRS DURATION: 107 MS
QT INTERVAL: 410 MS
QTC CALCULATION(BAZETT): 400 MS
QTC FREDERICIA: 403 MS
R AXIS: -1 DEGREES
RBC # UR STRIP.AUTO: NEGATIVE MG/DL
SP GR UR STRIP.AUTO: 1.01
T AXIS: 8 DEGREES
T OFFSET: 454 MS
UROBILINOGEN UR STRIP.AUTO-MCNC: NORMAL MG/DL
VENTRICULAR RATE: 57 BPM

## 2025-05-09 PROCEDURE — 99283 EMERGENCY DEPT VISIT LOW MDM: CPT

## 2025-05-09 PROCEDURE — 81003 URINALYSIS AUTO W/O SCOPE: CPT

## 2025-05-09 PROCEDURE — 93010 ELECTROCARDIOGRAM REPORT: CPT | Performed by: INTERNAL MEDICINE

## 2025-05-09 ASSESSMENT — LIFESTYLE VARIABLES
HAVE YOU EVER FELT YOU SHOULD CUT DOWN ON YOUR DRINKING: NO
EVER FELT BAD OR GUILTY ABOUT YOUR DRINKING: NO
TOTAL SCORE: 0
HAVE PEOPLE ANNOYED YOU BY CRITICIZING YOUR DRINKING: NO
EVER HAD A DRINK FIRST THING IN THE MORNING TO STEADY YOUR NERVES TO GET RID OF A HANGOVER: NO

## 2025-05-09 ASSESSMENT — COLUMBIA-SUICIDE SEVERITY RATING SCALE - C-SSRS
1. IN THE PAST MONTH, HAVE YOU WISHED YOU WERE DEAD OR WISHED YOU COULD GO TO SLEEP AND NOT WAKE UP?: NO
2. HAVE YOU ACTUALLY HAD ANY THOUGHTS OF KILLING YOURSELF?: NO
6. HAVE YOU EVER DONE ANYTHING, STARTED TO DO ANYTHING, OR PREPARED TO DO ANYTHING TO END YOUR LIFE?: NO

## 2025-05-09 ASSESSMENT — PAIN SCALES - GENERAL: PAINLEVEL_OUTOF10: 0 - NO PAIN

## 2025-05-09 ASSESSMENT — PAIN - FUNCTIONAL ASSESSMENT: PAIN_FUNCTIONAL_ASSESSMENT: 0-10

## 2025-05-09 NOTE — DISCHARGE INSTRUCTIONS
As discussed, please discontinue all antibiotics at this time, follow up with your primary care provider both your established and your newly referred as well as your colorectal surgeon that you were referred to.

## 2025-05-09 NOTE — TELEPHONE ENCOUNTER
Pt called and states was at ER yesterday for Diverticulitis and was given antibiotics. She was given cefdinir 300 mg 1 tab twice a day x 7 days and metronidazole 500 mg 1 tab twice a day x 7 days. She states she was laying in bed and is seeing things which happened twice. She saw a hand come out of the closet and someone playing basketball with a hoop. She is having diarrhea, off balance, just not feeling well and urinating a lot the last few days. Advised pt to not take antibiotics as she may be having an allergic reaction and to go back to the ER to be evaluated. She will definitely go to ER and her  will take her.   
positive S2/murmur/positive S1

## 2025-05-09 NOTE — ED TRIAGE NOTES
Pt concerned for allergic reaction to new medications prescribed Wednesday (increased urination and frequency). Pt started on cefdinir and flagyl on Thursday. Pt A&Ox4, GCS 15, no airway compromise.

## 2025-05-18 DIAGNOSIS — E03.9 ACQUIRED HYPOTHYROIDISM: ICD-10-CM

## 2025-05-19 NOTE — TELEPHONE ENCOUNTER
Last Appointment:  4/28/2025  Future Appointments   Date Time Provider Department Center   6/4/2025 11:15 AM Afsaneh Mello DO [facility] DEP   6/27/2025  1:00 PM Afsaneh Mello DO [facility] DEP   7/29/2025  1:00 PM Bruna Cintron MD [facility] NEURO Neurology -

## 2025-05-21 DIAGNOSIS — F41.9 ANXIETY AND DEPRESSION: ICD-10-CM

## 2025-05-21 DIAGNOSIS — F32.A ANXIETY AND DEPRESSION: ICD-10-CM

## 2025-05-21 RX ORDER — CITALOPRAM HYDROBROMIDE 10 MG/1
10 TABLET ORAL DAILY
Qty: 90 TABLET | Refills: 1 | OUTPATIENT
Start: 2025-05-21

## 2025-05-21 RX ORDER — LEVOTHYROXINE SODIUM 25 UG/1
25 TABLET ORAL DAILY
Qty: 90 TABLET | Refills: 1 | Status: SHIPPED | OUTPATIENT
Start: 2025-05-21

## 2025-05-27 PROBLEM — K44.9 HIATAL HERNIA: Status: ACTIVE | Noted: 2025-05-27

## 2025-06-07 ENCOUNTER — APPOINTMENT (OUTPATIENT)
Dept: RADIOLOGY | Facility: HOSPITAL | Age: 80
DRG: 853 | End: 2025-06-07
Payer: MEDICARE

## 2025-06-07 ENCOUNTER — APPOINTMENT (OUTPATIENT)
Dept: CARDIOLOGY | Facility: HOSPITAL | Age: 80
DRG: 853 | End: 2025-06-07
Payer: MEDICARE

## 2025-06-07 ENCOUNTER — ANESTHESIA (OUTPATIENT)
Dept: RADIOLOGY | Facility: HOSPITAL | Age: 80
DRG: 853 | End: 2025-06-07
Payer: MEDICARE

## 2025-06-07 ENCOUNTER — ANESTHESIA (OUTPATIENT)
Dept: OPERATING ROOM | Facility: HOSPITAL | Age: 80
End: 2025-06-07
Payer: MEDICARE

## 2025-06-07 ENCOUNTER — ANESTHESIA EVENT (OUTPATIENT)
Dept: OPERATING ROOM | Facility: HOSPITAL | Age: 80
End: 2025-06-07
Payer: MEDICARE

## 2025-06-07 ENCOUNTER — ANESTHESIA EVENT (OUTPATIENT)
Dept: RADIOLOGY | Facility: HOSPITAL | Age: 80
DRG: 853 | End: 2025-06-07
Payer: MEDICARE

## 2025-06-07 ENCOUNTER — HOSPITAL ENCOUNTER (INPATIENT)
Facility: HOSPITAL | Age: 80
End: 2025-06-07
Attending: STUDENT IN AN ORGANIZED HEALTH CARE EDUCATION/TRAINING PROGRAM | Admitting: STUDENT IN AN ORGANIZED HEALTH CARE EDUCATION/TRAINING PROGRAM
Payer: MEDICARE

## 2025-06-07 DIAGNOSIS — K66.8 FREE INTRAPERITONEAL AIR: ICD-10-CM

## 2025-06-07 DIAGNOSIS — K57.92 DIVERTICULITIS: Primary | ICD-10-CM

## 2025-06-07 DIAGNOSIS — B02.29 POST HERPETIC NEURALGIA: ICD-10-CM

## 2025-06-07 DIAGNOSIS — R06.09 DOE (DYSPNEA ON EXERTION): ICD-10-CM

## 2025-06-07 DIAGNOSIS — R06.00 DYSPNEA, UNSPECIFIED: ICD-10-CM

## 2025-06-07 DIAGNOSIS — K57.20 DIVERTICULITIS OF LARGE INTESTINE WITH PERFORATION WITHOUT BLEEDING: ICD-10-CM

## 2025-06-07 DIAGNOSIS — I48.0 PAROXYSMAL ATRIAL FIBRILLATION (MULTI): ICD-10-CM

## 2025-06-07 DIAGNOSIS — I10 PRIMARY HYPERTENSION: ICD-10-CM

## 2025-06-07 DIAGNOSIS — I48.91 ATRIAL FIBRILLATION, UNSPECIFIED TYPE (MULTI): ICD-10-CM

## 2025-06-07 LAB
ALBUMIN SERPL BCP-MCNC: 3.7 G/DL (ref 3.4–5)
ALBUMIN SERPL BCP-MCNC: 4.3 G/DL (ref 3.4–5)
ALP SERPL-CCNC: 43 U/L (ref 33–136)
ALP SERPL-CCNC: 47 U/L (ref 33–136)
ALT SERPL W P-5'-P-CCNC: 13 U/L (ref 7–45)
ALT SERPL W P-5'-P-CCNC: 13 U/L (ref 7–45)
AMMONIA PLAS-SCNC: 20 UMOL/L (ref 16–53)
ANION GAP SERPL CALC-SCNC: 11 MMOL/L (ref 10–20)
ANION GAP SERPL CALC-SCNC: 12 MMOL/L (ref 10–20)
APPEARANCE UR: CLEAR
AST SERPL W P-5'-P-CCNC: 16 U/L (ref 9–39)
AST SERPL W P-5'-P-CCNC: 16 U/L (ref 9–39)
BASOPHILS # BLD AUTO: 0.02 X10*3/UL (ref 0–0.1)
BASOPHILS NFR BLD AUTO: 0.2 %
BILIRUB SERPL-MCNC: 0.6 MG/DL (ref 0–1.2)
BILIRUB SERPL-MCNC: 0.6 MG/DL (ref 0–1.2)
BILIRUB UR STRIP.AUTO-MCNC: NEGATIVE MG/DL
BUN SERPL-MCNC: 25 MG/DL (ref 6–23)
BUN SERPL-MCNC: 26 MG/DL (ref 6–23)
CALCIUM SERPL-MCNC: 8.7 MG/DL (ref 8.6–10.3)
CALCIUM SERPL-MCNC: 9.8 MG/DL (ref 8.6–10.3)
CARDIAC TROPONIN I PNL SERPL HS: 6 NG/L (ref 0–13)
CARDIAC TROPONIN I PNL SERPL HS: 7 NG/L (ref 0–13)
CHLORIDE SERPL-SCNC: 107 MMOL/L (ref 98–107)
CHLORIDE SERPL-SCNC: 108 MMOL/L (ref 98–107)
CO2 SERPL-SCNC: 24 MMOL/L (ref 21–32)
CO2 SERPL-SCNC: 28 MMOL/L (ref 21–32)
COLOR UR: ABNORMAL
CREAT SERPL-MCNC: 1.17 MG/DL (ref 0.5–1.05)
CREAT SERPL-MCNC: 1.29 MG/DL (ref 0.5–1.05)
EGFRCR SERPLBLD CKD-EPI 2021: 42 ML/MIN/1.73M*2
EGFRCR SERPLBLD CKD-EPI 2021: 47 ML/MIN/1.73M*2
EOSINOPHIL # BLD AUTO: 0.06 X10*3/UL (ref 0–0.4)
EOSINOPHIL NFR BLD AUTO: 0.7 %
ERYTHROCYTE [DISTWIDTH] IN BLOOD BY AUTOMATED COUNT: 12.7 % (ref 11.5–14.5)
ERYTHROCYTE [DISTWIDTH] IN BLOOD BY AUTOMATED COUNT: 12.8 % (ref 11.5–14.5)
GLUCOSE SERPL-MCNC: 122 MG/DL (ref 74–99)
GLUCOSE SERPL-MCNC: 158 MG/DL (ref 74–99)
GLUCOSE UR STRIP.AUTO-MCNC: NORMAL MG/DL
HCT VFR BLD AUTO: 36.7 % (ref 36–46)
HCT VFR BLD AUTO: 39.8 % (ref 36–46)
HGB BLD-MCNC: 12.4 G/DL (ref 12–16)
HGB BLD-MCNC: 13.2 G/DL (ref 12–16)
HOLD SPECIMEN: NORMAL
IMM GRANULOCYTES # BLD AUTO: 0.06 X10*3/UL (ref 0–0.5)
IMM GRANULOCYTES NFR BLD AUTO: 0.7 % (ref 0–0.9)
KETONES UR STRIP.AUTO-MCNC: NEGATIVE MG/DL
LACTATE SERPL-SCNC: 1 MMOL/L (ref 0.4–2)
LEUKOCYTE ESTERASE UR QL STRIP.AUTO: ABNORMAL
LIPASE SERPL-CCNC: 33 U/L (ref 9–82)
LYMPHOCYTES # BLD AUTO: 1.17 X10*3/UL (ref 0.8–3)
LYMPHOCYTES NFR BLD AUTO: 14.4 %
MCH RBC QN AUTO: 31.3 PG (ref 26–34)
MCH RBC QN AUTO: 31.3 PG (ref 26–34)
MCHC RBC AUTO-ENTMCNC: 33.2 G/DL (ref 32–36)
MCHC RBC AUTO-ENTMCNC: 33.8 G/DL (ref 32–36)
MCV RBC AUTO: 93 FL (ref 80–100)
MCV RBC AUTO: 94 FL (ref 80–100)
MONOCYTES # BLD AUTO: 0.3 X10*3/UL (ref 0.05–0.8)
MONOCYTES NFR BLD AUTO: 3.7 %
NEUTROPHILS # BLD AUTO: 6.52 X10*3/UL (ref 1.6–5.5)
NEUTROPHILS NFR BLD AUTO: 80.3 %
NITRITE UR QL STRIP.AUTO: NEGATIVE
NRBC BLD-RTO: 0 /100 WBCS (ref 0–0)
NRBC BLD-RTO: 0 /100 WBCS (ref 0–0)
PH UR STRIP.AUTO: 6 [PH]
PLATELET # BLD AUTO: 144 X10*3/UL (ref 150–450)
PLATELET # BLD AUTO: 169 X10*3/UL (ref 150–450)
POTASSIUM SERPL-SCNC: 3.5 MMOL/L (ref 3.5–5.3)
POTASSIUM SERPL-SCNC: 4 MMOL/L (ref 3.5–5.3)
PROT SERPL-MCNC: 5.7 G/DL (ref 6.4–8.2)
PROT SERPL-MCNC: 6.4 G/DL (ref 6.4–8.2)
PROT UR STRIP.AUTO-MCNC: NEGATIVE MG/DL
RBC # BLD AUTO: 3.96 X10*6/UL (ref 4–5.2)
RBC # BLD AUTO: 4.22 X10*6/UL (ref 4–5.2)
RBC # UR STRIP.AUTO: NEGATIVE MG/DL
RBC #/AREA URNS AUTO: ABNORMAL /HPF
SODIUM SERPL-SCNC: 139 MMOL/L (ref 136–145)
SODIUM SERPL-SCNC: 143 MMOL/L (ref 136–145)
SP GR UR STRIP.AUTO: 1.03
TSH SERPL-ACNC: 2.89 MIU/L (ref 0.44–3.98)
UROBILINOGEN UR STRIP.AUTO-MCNC: NORMAL MG/DL
WBC # BLD AUTO: 8.1 X10*3/UL (ref 4.4–11.3)
WBC # BLD AUTO: 8.6 X10*3/UL (ref 4.4–11.3)
WBC #/AREA URNS AUTO: ABNORMAL /HPF

## 2025-06-07 PROCEDURE — 85027 COMPLETE CBC AUTOMATED: CPT | Performed by: STUDENT IN AN ORGANIZED HEALTH CARE EDUCATION/TRAINING PROGRAM

## 2025-06-07 PROCEDURE — 2500000001 HC RX 250 WO HCPCS SELF ADMINISTERED DRUGS (ALT 637 FOR MEDICARE OP): Performed by: STUDENT IN AN ORGANIZED HEALTH CARE EDUCATION/TRAINING PROGRAM

## 2025-06-07 PROCEDURE — 96375 TX/PRO/DX INJ NEW DRUG ADDON: CPT

## 2025-06-07 PROCEDURE — 3700000001 HC GENERAL ANESTHESIA TIME - INITIAL BASE CHARGE

## 2025-06-07 PROCEDURE — 74177 CT ABD & PELVIS W/CONTRAST: CPT | Performed by: RADIOLOGY

## 2025-06-07 PROCEDURE — 2720000007 HC OR 272 NO HCPCS: Performed by: SURGERY

## 2025-06-07 PROCEDURE — 3600000008 HC OR TIME - EACH INCREMENTAL 1 MINUTE - PROCEDURE LEVEL THREE: Performed by: SURGERY

## 2025-06-07 PROCEDURE — 84075 ASSAY ALKALINE PHOSPHATASE: CPT | Performed by: STUDENT IN AN ORGANIZED HEALTH CARE EDUCATION/TRAINING PROGRAM

## 2025-06-07 PROCEDURE — 96366 THER/PROPH/DIAG IV INF ADDON: CPT

## 2025-06-07 PROCEDURE — 36415 COLL VENOUS BLD VENIPUNCTURE: CPT | Performed by: STUDENT IN AN ORGANIZED HEALTH CARE EDUCATION/TRAINING PROGRAM

## 2025-06-07 PROCEDURE — 74177 CT ABD & PELVIS W/CONTRAST: CPT

## 2025-06-07 PROCEDURE — 1100000001 HC PRIVATE ROOM DAILY

## 2025-06-07 PROCEDURE — 85025 COMPLETE CBC W/AUTO DIFF WBC: CPT | Performed by: STUDENT IN AN ORGANIZED HEALTH CARE EDUCATION/TRAINING PROGRAM

## 2025-06-07 PROCEDURE — 80053 COMPREHEN METABOLIC PANEL: CPT | Performed by: STUDENT IN AN ORGANIZED HEALTH CARE EDUCATION/TRAINING PROGRAM

## 2025-06-07 PROCEDURE — 7100000002 HC RECOVERY ROOM TIME - EACH INCREMENTAL 1 MINUTE: Performed by: SURGERY

## 2025-06-07 PROCEDURE — 83690 ASSAY OF LIPASE: CPT | Performed by: STUDENT IN AN ORGANIZED HEALTH CARE EDUCATION/TRAINING PROGRAM

## 2025-06-07 PROCEDURE — 96367 TX/PROPH/DG ADDL SEQ IV INF: CPT

## 2025-06-07 PROCEDURE — 2500000004 HC RX 250 GENERAL PHARMACY W/ HCPCS (ALT 636 FOR OP/ED): Performed by: STUDENT IN AN ORGANIZED HEALTH CARE EDUCATION/TRAINING PROGRAM

## 2025-06-07 PROCEDURE — 96361 HYDRATE IV INFUSION ADD-ON: CPT

## 2025-06-07 PROCEDURE — 84443 ASSAY THYROID STIM HORMONE: CPT | Performed by: STUDENT IN AN ORGANIZED HEALTH CARE EDUCATION/TRAINING PROGRAM

## 2025-06-07 PROCEDURE — 96365 THER/PROPH/DIAG IV INF INIT: CPT

## 2025-06-07 PROCEDURE — 99232 SBSQ HOSP IP/OBS MODERATE 35: CPT | Performed by: NURSE PRACTITIONER

## 2025-06-07 PROCEDURE — 3600000003 HC OR TIME - INITIAL BASE CHARGE - PROCEDURE LEVEL THREE: Performed by: SURGERY

## 2025-06-07 PROCEDURE — 84484 ASSAY OF TROPONIN QUANT: CPT | Performed by: STUDENT IN AN ORGANIZED HEALTH CARE EDUCATION/TRAINING PROGRAM

## 2025-06-07 PROCEDURE — 0D1N4Z4 BYPASS SIGMOID COLON TO CUTANEOUS, PERCUTANEOUS ENDOSCOPIC APPROACH: ICD-10-PCS | Performed by: SURGERY

## 2025-06-07 PROCEDURE — 81001 URINALYSIS AUTO W/SCOPE: CPT | Performed by: STUDENT IN AN ORGANIZED HEALTH CARE EDUCATION/TRAINING PROGRAM

## 2025-06-07 PROCEDURE — 82140 ASSAY OF AMMONIA: CPT | Performed by: STUDENT IN AN ORGANIZED HEALTH CARE EDUCATION/TRAINING PROGRAM

## 2025-06-07 PROCEDURE — 99223 1ST HOSP IP/OBS HIGH 75: CPT | Performed by: STUDENT IN AN ORGANIZED HEALTH CARE EDUCATION/TRAINING PROGRAM

## 2025-06-07 PROCEDURE — 93005 ELECTROCARDIOGRAM TRACING: CPT

## 2025-06-07 PROCEDURE — 3700000002 HC GENERAL ANESTHESIA TIME - EACH INCREMENTAL 1 MINUTE: Performed by: SURGERY

## 2025-06-07 PROCEDURE — 0D9670Z DRAINAGE OF STOMACH WITH DRAINAGE DEVICE, VIA NATURAL OR ARTIFICIAL OPENING: ICD-10-PCS | Performed by: SURGERY

## 2025-06-07 PROCEDURE — 44143 PARTIAL REMOVAL OF COLON: CPT | Performed by: SURGERY

## 2025-06-07 PROCEDURE — 3700000001 HC GENERAL ANESTHESIA TIME - INITIAL BASE CHARGE: Performed by: SURGERY

## 2025-06-07 PROCEDURE — 7100000001 HC RECOVERY ROOM TIME - INITIAL BASE CHARGE: Performed by: SURGERY

## 2025-06-07 PROCEDURE — 99222 1ST HOSP IP/OBS MODERATE 55: CPT | Performed by: SURGERY

## 2025-06-07 PROCEDURE — 2550000001 HC RX 255 CONTRASTS: Performed by: STUDENT IN AN ORGANIZED HEALTH CARE EDUCATION/TRAINING PROGRAM

## 2025-06-07 PROCEDURE — 83605 ASSAY OF LACTIC ACID: CPT | Performed by: STUDENT IN AN ORGANIZED HEALTH CARE EDUCATION/TRAINING PROGRAM

## 2025-06-07 PROCEDURE — 87086 URINE CULTURE/COLONY COUNT: CPT | Mod: PORLAB | Performed by: STUDENT IN AN ORGANIZED HEALTH CARE EDUCATION/TRAINING PROGRAM

## 2025-06-07 PROCEDURE — 96376 TX/PRO/DX INJ SAME DRUG ADON: CPT

## 2025-06-07 PROCEDURE — 3700000002 HC GENERAL ANESTHESIA TIME - EACH INCREMENTAL 1 MINUTE

## 2025-06-07 PROCEDURE — 99285 EMERGENCY DEPT VISIT HI MDM: CPT | Mod: 25 | Performed by: STUDENT IN AN ORGANIZED HEALTH CARE EDUCATION/TRAINING PROGRAM

## 2025-06-07 PROCEDURE — 2500000004 HC RX 250 GENERAL PHARMACY W/ HCPCS (ALT 636 FOR OP/ED): Performed by: NURSE ANESTHETIST, CERTIFIED REGISTERED

## 2025-06-07 PROCEDURE — 2500000004 HC RX 250 GENERAL PHARMACY W/ HCPCS (ALT 636 FOR OP/ED): Performed by: LICENSED PRACTICAL NURSE

## 2025-06-07 PROCEDURE — 0DTN4ZZ RESECTION OF SIGMOID COLON, PERCUTANEOUS ENDOSCOPIC APPROACH: ICD-10-PCS | Performed by: SURGERY

## 2025-06-07 RX ORDER — ACETAMINOPHEN 160 MG/5ML
650 SUSPENSION ORAL EVERY 4 HOURS PRN
Status: DISCONTINUED | OUTPATIENT
Start: 2025-06-07 | End: 2025-06-07

## 2025-06-07 RX ORDER — MORPHINE SULFATE 2 MG/ML
2 INJECTION, SOLUTION INTRAMUSCULAR; INTRAVENOUS EVERY 4 HOURS PRN
Status: DISCONTINUED | OUTPATIENT
Start: 2025-06-07 | End: 2025-06-08

## 2025-06-07 RX ORDER — OLMESARTAN MEDOXOMIL 40 MG/1
40 TABLET ORAL DAILY
Status: DISCONTINUED | OUTPATIENT
Start: 2025-06-07 | End: 2025-06-07

## 2025-06-07 RX ORDER — SODIUM CHLORIDE, SODIUM LACTATE, POTASSIUM CHLORIDE, CALCIUM CHLORIDE 600; 310; 30; 20 MG/100ML; MG/100ML; MG/100ML; MG/100ML
75 INJECTION, SOLUTION INTRAVENOUS CONTINUOUS
Status: ACTIVE | OUTPATIENT
Start: 2025-06-07 | End: 2025-06-13

## 2025-06-07 RX ORDER — LEVOTHYROXINE SODIUM 25 UG/1
25 TABLET ORAL DAILY
Status: DISPENSED | OUTPATIENT
Start: 2025-06-07

## 2025-06-07 RX ORDER — MORPHINE SULFATE 2 MG/ML
1 INJECTION, SOLUTION INTRAMUSCULAR; INTRAVENOUS EVERY 4 HOURS PRN
Status: DISCONTINUED | OUTPATIENT
Start: 2025-06-07 | End: 2025-06-08

## 2025-06-07 RX ORDER — POLYETHYLENE GLYCOL 3350 17 G/17G
8.5 POWDER, FOR SOLUTION ORAL DAILY
Status: ON HOLD | COMMUNITY

## 2025-06-07 RX ORDER — ROSUVASTATIN CALCIUM 5 MG/1
5 TABLET, COATED ORAL DAILY
Status: DISPENSED | OUTPATIENT
Start: 2025-06-07

## 2025-06-07 RX ORDER — ACETAMINOPHEN 325 MG/1
650 TABLET ORAL EVERY 4 HOURS PRN
Status: DISCONTINUED | OUTPATIENT
Start: 2025-06-07 | End: 2025-06-08 | Stop reason: SDUPTHER

## 2025-06-07 RX ORDER — ONDANSETRON HYDROCHLORIDE 2 MG/ML
4 INJECTION, SOLUTION INTRAVENOUS EVERY 8 HOURS PRN
Status: DISPENSED | OUTPATIENT
Start: 2025-06-07

## 2025-06-07 RX ORDER — ROSUVASTATIN CALCIUM 10 MG/1
5 TABLET, COATED ORAL NIGHTLY
Status: DISCONTINUED | OUTPATIENT
Start: 2025-06-07 | End: 2025-06-07

## 2025-06-07 RX ORDER — METRONIDAZOLE 500 MG/100ML
500 INJECTION, SOLUTION INTRAVENOUS ONCE
Status: COMPLETED | OUTPATIENT
Start: 2025-06-07 | End: 2025-06-07

## 2025-06-07 RX ORDER — HYDRALAZINE HYDROCHLORIDE 20 MG/ML
10 INJECTION INTRAMUSCULAR; INTRAVENOUS EVERY 6 HOURS PRN
Status: DISPENSED | OUTPATIENT
Start: 2025-06-07

## 2025-06-07 RX ORDER — OXYCODONE AND ACETAMINOPHEN 5; 325 MG/1; MG/1
0.5 TABLET ORAL EVERY 6 HOURS PRN
Refills: 0 | Status: DISCONTINUED | OUTPATIENT
Start: 2025-06-07 | End: 2025-06-07

## 2025-06-07 RX ORDER — ACETAMINOPHEN 650 MG/1
650 SUPPOSITORY RECTAL EVERY 4 HOURS PRN
Status: DISCONTINUED | OUTPATIENT
Start: 2025-06-07 | End: 2025-06-07

## 2025-06-07 RX ORDER — LEVOTHYROXINE SODIUM 50 UG/1
25 TABLET ORAL DAILY
Status: DISCONTINUED | OUTPATIENT
Start: 2025-06-07 | End: 2025-06-07

## 2025-06-07 RX ORDER — FENTANYL CITRATE 50 UG/ML
INJECTION, SOLUTION INTRAMUSCULAR; INTRAVENOUS AS NEEDED
Status: DISCONTINUED | OUTPATIENT
Start: 2025-06-07 | End: 2025-06-08

## 2025-06-07 RX ORDER — ANASTROZOLE 1 MG/1
1 TABLET ORAL DAILY
Status: DISPENSED | OUTPATIENT
Start: 2025-06-07

## 2025-06-07 RX ORDER — METOPROLOL TARTRATE 50 MG/1
150 TABLET ORAL 2 TIMES DAILY
Status: DISCONTINUED | OUTPATIENT
Start: 2025-06-07 | End: 2025-06-07

## 2025-06-07 RX ORDER — CIPROFLOXACIN 2 MG/ML
400 INJECTION, SOLUTION INTRAVENOUS EVERY 12 HOURS
Status: DISCONTINUED | OUTPATIENT
Start: 2025-06-07 | End: 2025-06-08

## 2025-06-07 RX ORDER — CITALOPRAM 10 MG/1
10 TABLET ORAL DAILY
Status: ACTIVE | OUTPATIENT
Start: 2025-06-07

## 2025-06-07 RX ORDER — METRONIDAZOLE 500 MG/100ML
500 INJECTION, SOLUTION INTRAVENOUS EVERY 8 HOURS
Status: DISCONTINUED | OUTPATIENT
Start: 2025-06-07 | End: 2025-06-08

## 2025-06-07 RX ORDER — TALC
3 POWDER (GRAM) TOPICAL NIGHTLY PRN
Status: ACTIVE | OUTPATIENT
Start: 2025-06-07

## 2025-06-07 RX ORDER — SODIUM CHLORIDE, SODIUM LACTATE, POTASSIUM CHLORIDE, CALCIUM CHLORIDE 600; 310; 30; 20 MG/100ML; MG/100ML; MG/100ML; MG/100ML
100 INJECTION, SOLUTION INTRAVENOUS CONTINUOUS
Status: ACTIVE | OUTPATIENT
Start: 2025-06-07 | End: 2025-06-08

## 2025-06-07 RX ORDER — METOPROLOL TARTRATE 50 MG/1
150 TABLET ORAL 2 TIMES DAILY
Status: DISCONTINUED | OUTPATIENT
Start: 2025-06-07 | End: 2025-06-08

## 2025-06-07 RX ORDER — ROCURONIUM BROMIDE 10 MG/ML
INJECTION, SOLUTION INTRAVENOUS AS NEEDED
Status: DISCONTINUED | OUTPATIENT
Start: 2025-06-07 | End: 2025-06-08

## 2025-06-07 RX ORDER — ONDANSETRON 4 MG/1
4 TABLET, FILM COATED ORAL EVERY 8 HOURS PRN
Status: DISCONTINUED | OUTPATIENT
Start: 2025-06-07 | End: 2025-06-07

## 2025-06-07 RX ORDER — ACETAMINOPHEN 325 MG/1
650 TABLET ORAL ONCE
Status: COMPLETED | OUTPATIENT
Start: 2025-06-07 | End: 2025-06-07

## 2025-06-07 RX ORDER — PROPOFOL 10 MG/ML
INJECTION, EMULSION INTRAVENOUS AS NEEDED
Status: DISCONTINUED | OUTPATIENT
Start: 2025-06-07 | End: 2025-06-08

## 2025-06-07 RX ORDER — CIPROFLOXACIN 2 MG/ML
400 INJECTION, SOLUTION INTRAVENOUS ONCE
Status: COMPLETED | OUTPATIENT
Start: 2025-06-07 | End: 2025-06-07

## 2025-06-07 RX ORDER — ASPIRIN 81 MG/1
81 TABLET ORAL DAILY
Status: DISPENSED | OUTPATIENT
Start: 2025-06-07

## 2025-06-07 RX ORDER — SODIUM CHLORIDE, SODIUM LACTATE, POTASSIUM CHLORIDE, CALCIUM CHLORIDE 600; 310; 30; 20 MG/100ML; MG/100ML; MG/100ML; MG/100ML
125 INJECTION, SOLUTION INTRAVENOUS CONTINUOUS
Status: ACTIVE | OUTPATIENT
Start: 2025-06-07 | End: 2025-06-08

## 2025-06-07 RX ORDER — PHENYLEPHRINE 10 MG/250 ML(40 MCG/ML)IN 0.9 % SOD.CHLORIDE INTRAVENOUS
CONTINUOUS PRN
Status: DISCONTINUED | OUTPATIENT
Start: 2025-06-07 | End: 2025-06-08

## 2025-06-07 RX ORDER — PSYLLIUM HUSK 0.4 G
2 CAPSULE ORAL DAILY
Status: ON HOLD | COMMUNITY

## 2025-06-07 RX ORDER — POLYETHYLENE GLYCOL 3350 17 G/17G
17 POWDER, FOR SOLUTION ORAL DAILY PRN
Status: DISCONTINUED | OUTPATIENT
Start: 2025-06-07 | End: 2025-06-07

## 2025-06-07 RX ORDER — MIDAZOLAM HYDROCHLORIDE 1 MG/ML
INJECTION, SOLUTION INTRAMUSCULAR; INTRAVENOUS AS NEEDED
Status: DISCONTINUED | OUTPATIENT
Start: 2025-06-07 | End: 2025-06-08

## 2025-06-07 RX ADMIN — CIPROFLOXACIN 400 MG: 400 INJECTION, SOLUTION INTRAVENOUS at 16:23

## 2025-06-07 RX ADMIN — SUGAMMADEX 200 MG: 100 INJECTION, SOLUTION INTRAVENOUS at 23:38

## 2025-06-07 RX ADMIN — PROPOFOL 100 MG: 10 INJECTION, EMULSION INTRAVENOUS at 22:52

## 2025-06-07 RX ADMIN — ONDANSETRON 4 MG: 2 INJECTION, SOLUTION INTRAMUSCULAR; INTRAVENOUS at 15:23

## 2025-06-07 RX ADMIN — SODIUM CHLORIDE 500 ML: 0.9 INJECTION, SOLUTION INTRAVENOUS at 03:09

## 2025-06-07 RX ADMIN — MORPHINE SULFATE 2 MG: 2 INJECTION, SOLUTION INTRAMUSCULAR; INTRAVENOUS at 15:23

## 2025-06-07 RX ADMIN — METRONIDAZOLE 500 MG: 500 INJECTION, SOLUTION INTRAVENOUS at 12:05

## 2025-06-07 RX ADMIN — MIDAZOLAM 2 MG: 1 INJECTION INTRAMUSCULAR; INTRAVENOUS at 21:35

## 2025-06-07 RX ADMIN — CIPROFLOXACIN 400 MG: 400 INJECTION, SOLUTION INTRAVENOUS at 05:11

## 2025-06-07 RX ADMIN — SODIUM CHLORIDE, SODIUM LACTATE, POTASSIUM CHLORIDE, AND CALCIUM CHLORIDE 125 ML/HR: .6; .31; .03; .02 INJECTION, SOLUTION INTRAVENOUS at 10:21

## 2025-06-07 RX ADMIN — IOHEXOL 75 ML: 350 INJECTION, SOLUTION INTRAVENOUS at 03:06

## 2025-06-07 RX ADMIN — ACETAMINOPHEN 650 MG: 325 TABLET ORAL at 03:08

## 2025-06-07 RX ADMIN — FENTANYL CITRATE 100 MCG: 50 INJECTION INTRAMUSCULAR; INTRAVENOUS at 21:40

## 2025-06-07 RX ADMIN — PHENYLEPHRINE-NACL IV SOLUTION 10 MG/250ML-0.9% 0.2 MCG/KG/MIN: 10-0.9/25 SOLUTION at 22:10

## 2025-06-07 RX ADMIN — PROPOFOL 100 MG: 10 INJECTION, EMULSION INTRAVENOUS at 20:10

## 2025-06-07 RX ADMIN — METRONIDAZOLE 500 MG: 500 INJECTION, SOLUTION INTRAVENOUS at 03:53

## 2025-06-07 RX ADMIN — ROCURONIUM BROMIDE 20 MG: 10 INJECTION, SOLUTION INTRAVENOUS at 21:04

## 2025-06-07 RX ADMIN — SODIUM CHLORIDE, SODIUM LACTATE, POTASSIUM CHLORIDE, AND CALCIUM CHLORIDE 100 ML/HR: .6; .31; .03; .02 INJECTION, SOLUTION INTRAVENOUS at 06:28

## 2025-06-07 RX ADMIN — PROPOFOL 100 MG: 10 INJECTION, EMULSION INTRAVENOUS at 21:10

## 2025-06-07 RX ADMIN — ONDANSETRON 4 MG: 2 INJECTION, SOLUTION INTRAMUSCULAR; INTRAVENOUS at 21:40

## 2025-06-07 RX ADMIN — METRONIDAZOLE 500 MG: 500 INJECTION, SOLUTION INTRAVENOUS at 20:54

## 2025-06-07 RX ADMIN — SODIUM CHLORIDE, SODIUM LACTATE, POTASSIUM CHLORIDE, AND CALCIUM CHLORIDE 125 ML/HR: .6; .31; .03; .02 INJECTION, SOLUTION INTRAVENOUS at 16:06

## 2025-06-07 RX ADMIN — OXYCODONE HYDROCHLORIDE AND ACETAMINOPHEN 0.5 TABLET: 5; 325 TABLET ORAL at 07:56

## 2025-06-07 RX ADMIN — ROCURONIUM BROMIDE 20 MG: 10 INJECTION, SOLUTION INTRAVENOUS at 22:54

## 2025-06-07 RX ADMIN — FENTANYL CITRATE 150 MCG: 50 INJECTION INTRAMUSCULAR; INTRAVENOUS at 22:10

## 2025-06-07 SDOH — ECONOMIC STABILITY: FOOD INSECURITY: WITHIN THE PAST 12 MONTHS, THE FOOD YOU BOUGHT JUST DIDN'T LAST AND YOU DIDN'T HAVE MONEY TO GET MORE.: NEVER TRUE

## 2025-06-07 SDOH — SOCIAL STABILITY: SOCIAL INSECURITY: WERE YOU ABLE TO COMPLETE ALL THE BEHAVIORAL HEALTH SCREENINGS?: YES

## 2025-06-07 SDOH — ECONOMIC STABILITY: HOUSING INSECURITY: IN THE PAST 12 MONTHS, HOW MANY TIMES HAVE YOU MOVED WHERE YOU WERE LIVING?: 0

## 2025-06-07 SDOH — ECONOMIC STABILITY: HOUSING INSECURITY: IN THE LAST 12 MONTHS, WAS THERE A TIME WHEN YOU WERE NOT ABLE TO PAY THE MORTGAGE OR RENT ON TIME?: NO

## 2025-06-07 SDOH — ECONOMIC STABILITY: TRANSPORTATION INSECURITY: IN THE PAST 12 MONTHS, HAS LACK OF TRANSPORTATION KEPT YOU FROM MEDICAL APPOINTMENTS OR FROM GETTING MEDICATIONS?: NO

## 2025-06-07 SDOH — SOCIAL STABILITY: SOCIAL INSECURITY: WITHIN THE LAST YEAR, HAVE YOU BEEN HUMILIATED OR EMOTIONALLY ABUSED IN OTHER WAYS BY YOUR PARTNER OR EX-PARTNER?: NO

## 2025-06-07 SDOH — SOCIAL STABILITY: SOCIAL INSECURITY: WITHIN THE LAST YEAR, HAVE YOU BEEN AFRAID OF YOUR PARTNER OR EX-PARTNER?: NO

## 2025-06-07 SDOH — SOCIAL STABILITY: SOCIAL INSECURITY
WITHIN THE LAST YEAR, HAVE YOU BEEN RAPED OR FORCED TO HAVE ANY KIND OF SEXUAL ACTIVITY BY YOUR PARTNER OR EX-PARTNER?: NO

## 2025-06-07 SDOH — ECONOMIC STABILITY: INCOME INSECURITY: IN THE PAST 12 MONTHS HAS THE ELECTRIC, GAS, OIL, OR WATER COMPANY THREATENED TO SHUT OFF SERVICES IN YOUR HOME?: NO

## 2025-06-07 SDOH — ECONOMIC STABILITY: FOOD INSECURITY: WITHIN THE PAST 12 MONTHS, YOU WORRIED THAT YOUR FOOD WOULD RUN OUT BEFORE YOU GOT THE MONEY TO BUY MORE.: NEVER TRUE

## 2025-06-07 SDOH — ECONOMIC STABILITY: HOUSING INSECURITY: AT ANY TIME IN THE PAST 12 MONTHS, WERE YOU HOMELESS OR LIVING IN A SHELTER (INCLUDING NOW)?: NO

## 2025-06-07 SDOH — ECONOMIC STABILITY: FOOD INSECURITY: HOW HARD IS IT FOR YOU TO PAY FOR THE VERY BASICS LIKE FOOD, HOUSING, MEDICAL CARE, AND HEATING?: NOT HARD AT ALL

## 2025-06-07 SDOH — SOCIAL STABILITY: SOCIAL INSECURITY
WITHIN THE LAST YEAR, HAVE YOU BEEN KICKED, HIT, SLAPPED, OR OTHERWISE PHYSICALLY HURT BY YOUR PARTNER OR EX-PARTNER?: NO

## 2025-06-07 SDOH — SOCIAL STABILITY: SOCIAL INSECURITY: HAVE YOU HAD THOUGHTS OF HARMING ANYONE ELSE?: NO

## 2025-06-07 ASSESSMENT — ACTIVITIES OF DAILY LIVING (ADL)
HEARING - RIGHT EAR: FUNCTIONAL
LACK_OF_TRANSPORTATION: NO
GROOMING: INDEPENDENT
WALKS IN HOME: INDEPENDENT
TOILETING: INDEPENDENT
PATIENT'S MEMORY ADEQUATE TO SAFELY COMPLETE DAILY ACTIVITIES?: YES
ASSISTIVE_DEVICE: EYEGLASSES
BATHING: INDEPENDENT
LACK_OF_TRANSPORTATION: NO
ADEQUATE_TO_COMPLETE_ADL: YES
HEARING - LEFT EAR: FUNCTIONAL
LACK_OF_TRANSPORTATION: NO
FEEDING YOURSELF: INDEPENDENT
JUDGMENT_ADEQUATE_SAFELY_COMPLETE_DAILY_ACTIVITIES: YES
DRESSING YOURSELF: INDEPENDENT

## 2025-06-07 ASSESSMENT — COGNITIVE AND FUNCTIONAL STATUS - GENERAL
PATIENT BASELINE BEDBOUND: NO
DAILY ACTIVITIY SCORE: 24
MOBILITY SCORE: 24

## 2025-06-07 ASSESSMENT — ENCOUNTER SYMPTOMS
NAUSEA: 1
VOMITING: 0
FATIGUE: 0
WOUND: 0
MYALGIAS: 1
ABDOMINAL PAIN: 1
ARTHRALGIAS: 1
EYE PAIN: 0
FLANK PAIN: 0
CONSTIPATION: 0
SPEECH DIFFICULTY: 0
CONFUSION: 0
DIARRHEA: 0
DYSURIA: 0
HEMATURIA: 0
FREQUENCY: 0
COUGH: 0
AGITATION: 0
CHILLS: 0
EYE REDNESS: 0
SHORTNESS OF BREATH: 0
FEVER: 0
POLYPHAGIA: 0
WEAKNESS: 1
HEADACHES: 0
BRUISES/BLEEDS EASILY: 0

## 2025-06-07 ASSESSMENT — PAIN - FUNCTIONAL ASSESSMENT
PAIN_FUNCTIONAL_ASSESSMENT: 0-10

## 2025-06-07 ASSESSMENT — PAIN SCALES - GENERAL
PAINLEVEL_OUTOF10: 8
PAINLEVEL_OUTOF10: 3
PAINLEVEL_OUTOF10: 7
PAINLEVEL_OUTOF10: 5 - MODERATE PAIN

## 2025-06-07 ASSESSMENT — PAIN DESCRIPTION - LOCATION
LOCATION: ABDOMEN
LOCATION: ABDOMEN

## 2025-06-07 ASSESSMENT — LIFESTYLE VARIABLES
HOW MANY STANDARD DRINKS CONTAINING ALCOHOL DO YOU HAVE ON A TYPICAL DAY: PATIENT DOES NOT DRINK
SKIP TO QUESTIONS 9-10: 1
HOW OFTEN DO YOU HAVE 6 OR MORE DRINKS ON ONE OCCASION: NEVER
PRESCIPTION_ABUSE_PAST_12_MONTHS: NO
AUDIT-C TOTAL SCORE: 0
SUBSTANCE_ABUSE_PAST_12_MONTHS: NO
AUDIT-C TOTAL SCORE: 0
HOW OFTEN DO YOU HAVE A DRINK CONTAINING ALCOHOL: NEVER

## 2025-06-07 ASSESSMENT — PATIENT HEALTH QUESTIONNAIRE - PHQ9
SUM OF ALL RESPONSES TO PHQ9 QUESTIONS 1 & 2: 0
2. FEELING DOWN, DEPRESSED OR HOPELESS: NOT AT ALL
1. LITTLE INTEREST OR PLEASURE IN DOING THINGS: NOT AT ALL

## 2025-06-07 NOTE — ED TRIAGE NOTES
Pt presented to the ED with complaint of abdominal pain. Pt states it started about 1-2 hours ago at 10/10 pain. Pt was given fentanyl and Zofran in squad. Pt was seen her 1 month ago for the same thing.

## 2025-06-07 NOTE — H&P (VIEW-ONLY)
GENERAL SURGERY CONSULT    Patient: Rosemary Motta  Room: 3315/3315-A    Age: 79 y.o.   Gender: female  Attending: Dallas Montiel MD PhD    MRN: [id number]  Admission Date: 6/7/2025    PCP: Prema Romero DO         SUBJECTIVE     Chief Complaint  Abdominal Pain       HPI  Rosemary Motta is a 79 y.o. female on day 0 of admission presenting with Diverticulitis.  She reports a 2-day history of lower abdominal pain both the right and left side.  Pain radiates into her lower back.  This was a sudden onset on Friday.  She had associated nausea but no vomiting.  Denies fevers.  Reports she did have a normal bowel movement on Friday without any associated blood.  She came to the emergency room where she was found to have a normal WBC, but CT evaluation showed sigmoid diverticulitis with microperforation and small amount of free air.  She states that this is similar pain to her episode of diverticulitis with abscess in January (6 months ago) where she was admitted at another institution for 4 days on IV antibiotics.  She states she did have 1 episode of left lower quadrant pain a few months ago, but this resolved without intervention.  She gets colonoscopies on a regular basis and had her last colonoscopy within the last 3 years.  She has been admitted to the medicine service on IV Cipro and Flagyl due to her allergy to penicillins, and she does feel that her pain is somewhat improved in the last 12 hours.    ROS  Review of Systems   Constitutional:  Negative for chills, fatigue and fever.   HENT:  Positive for hearing loss. Negative for congestion and ear pain.    Eyes:  Negative for pain and redness.   Respiratory:  Negative for cough and shortness of breath.    Cardiovascular:  Negative for chest pain and leg swelling.   Gastrointestinal:  Positive for abdominal pain and nausea. Negative for constipation, diarrhea and vomiting.   Endocrine: Negative for polyphagia.   Genitourinary:  Negative for dysuria, flank  pain, frequency and hematuria.   Musculoskeletal:  Positive for arthralgias and myalgias.   Skin:  Negative for rash and wound.   Allergic/Immunologic: Negative for immunocompromised state.   Neurological:  Positive for weakness. Negative for speech difficulty and headaches.   Hematological:  Does not bruise/bleed easily.   Psychiatric/Behavioral:  Negative for agitation and confusion.         HISTORY     Past Medical History:   Diagnosis Date    Anxiety and depression     Breast cancer     CKD (chronic kidney disease)     HLD (hyperlipidemia)     HTN (hypertension)     Hypothyroidism     Pulmonary embolism     postop        Past Surgical History:   Procedure Laterality Date    BREAST BIOPSY      CARPAL TUNNEL RELEASE Bilateral      SECTION, CLASSIC      CHOLECYSTECTOMY      FOOT SURGERY Bilateral     bunions and corns    MASTECTOMY Right 2018    TOTAL HIP ARTHROPLASTY Left         Family History   Problem Relation Name Age of Onset    Breast cancer Mother  65        Allergies   Allergen Reactions    Penicillins Anaphylaxis and Syncope    Morphine Itching    Clarithromycin Nausea/vomiting    Hydrochlorothiazide Diarrhea    Tetracycline Nausea/vomiting    Cortisone Rash and Headache     Facial pain    Methylprednisolone Swelling and Rash     facial pain    Nifedipine Rash, Dizziness and Blurred vision    Prednisone Rash and Headache     Pain         Social History     Tobacco Use   Smoking Status Never   Smokeless Tobacco Never        Social History     Substance and Sexual Activity   Alcohol Use Not Currently        HOME MEDICATIONS  Current Outpatient Medications   Medication Instructions    anastrozole (Arimidex) 1 mg tablet Take 1 tablet (1 mg total) by mouth once daily.    artificial tears, dextran-hypomel-glycerin, 0.1-0.3-0.2 % ophthalmic solution 1 drop, ophthalmic (eye), 4 times daily PRN,      aspirin 81 mg EC tablet Take 1 tablet (81 mg) by mouth once daily.    calcium carbonate-vitamin D3 500 mg-5  "mcg (200 unit) tablet 2 tablets, oral, Daily    levothyroxine (Synthroid, Levoxyl) 25 mcg tablet 1 tablet, Daily    metoprolol tartrate (LOPRESSOR) 150 mg, oral, 2 times daily    olmesartan (BENIcar) 40 mg tablet 1 tablet, Daily    polyethylene glycol (GLYCOLAX, MIRALAX) 8.5 g, oral, Daily          OBJECTIVE   Last Recorded Vitals.  Blood pressure 130/73, pulse 98, temperature 37.8 °C (100 °F), temperature source Temporal, resp. rate 16, height 1.473 m (4' 10\"), weight 64.4 kg (142 lb), SpO2 97%.     PHYSICAL EXAM  Physical Exam   General: Well-developed, well-nourished and in no acute distress.  Head: Normocephalic. Atraumatic.  Neck/thyroid: Neck is supple.   Eyes: Pupils equal round and reactive to light. Conjunctiva normal.  ENMT: No masses or deformity of external nose. External ears without masses.  Respiratory/Chest:  Normal respiratory effort.  Breast: Status post right mastectomy.  Cardiovascular: Regular rate and rhythm.   Abdomen: Softly rounded.  Mild tenderness of all 4 quadrants with increased tenderness in the left lower quadrant.  No peritoneal signs.  Well-healed midline scar from the umbilicus down to the pubic symphysis.  Musculoskeletal: Joints and limbs are grossly normal.   Neuro: Oriented to person, place and time. No obvious neurological deficit.   Psych: Normal mood and affect.    RESULTS   Labs  Results for orders placed or performed during the hospital encounter of 06/07/25 (from the past 24 hours)   CBC and Auto Differential   Result Value Ref Range    WBC 8.1 4.4 - 11.3 x10*3/uL    nRBC 0.0 0.0 - 0.0 /100 WBCs    RBC 4.22 4.00 - 5.20 x10*6/uL    Hemoglobin 13.2 12.0 - 16.0 g/dL    Hematocrit 39.8 36.0 - 46.0 %    MCV 94 80 - 100 fL    MCH 31.3 26.0 - 34.0 pg    MCHC 33.2 32.0 - 36.0 g/dL    RDW 12.7 11.5 - 14.5 %    Platelets 169 150 - 450 x10*3/uL    Neutrophils % 80.3 40.0 - 80.0 %    Immature Granulocytes %, Automated 0.7 0.0 - 0.9 %    Lymphocytes % 14.4 13.0 - 44.0 %    Monocytes % " 3.7 2.0 - 10.0 %    Eosinophils % 0.7 0.0 - 6.0 %    Basophils % 0.2 0.0 - 2.0 %    Neutrophils Absolute 6.52 (H) 1.60 - 5.50 x10*3/uL    Immature Granulocytes Absolute, Automated 0.06 0.00 - 0.50 x10*3/uL    Lymphocytes Absolute 1.17 0.80 - 3.00 x10*3/uL    Monocytes Absolute 0.30 0.05 - 0.80 x10*3/uL    Eosinophils Absolute 0.06 0.00 - 0.40 x10*3/uL    Basophils Absolute 0.02 0.00 - 0.10 x10*3/uL   Comprehensive metabolic panel   Result Value Ref Range    Glucose 122 (H) 74 - 99 mg/dL    Sodium 143 136 - 145 mmol/L    Potassium 4.0 3.5 - 5.3 mmol/L    Chloride 107 98 - 107 mmol/L    Bicarbonate 28 21 - 32 mmol/L    Anion Gap 12 10 - 20 mmol/L    Urea Nitrogen 26 (H) 6 - 23 mg/dL    Creatinine 1.29 (H) 0.50 - 1.05 mg/dL    eGFR 42 (L) >60 mL/min/1.73m*2    Calcium 9.8 8.6 - 10.3 mg/dL    Albumin 4.3 3.4 - 5.0 g/dL    Alkaline Phosphatase 47 33 - 136 U/L    Total Protein 6.4 6.4 - 8.2 g/dL    AST 16 9 - 39 U/L    Bilirubin, Total 0.6 0.0 - 1.2 mg/dL    ALT 13 7 - 45 U/L   Lipase   Result Value Ref Range    Lipase 33 9 - 82 U/L   Lactate   Result Value Ref Range    Lactate 1.0 0.4 - 2.0 mmol/L   Ammonia   Result Value Ref Range    Ammonia 20 16 - 53 umol/L   Troponin I, High Sensitivity, Initial   Result Value Ref Range    Troponin I, High Sensitivity 6 0 - 13 ng/L   Troponin, High Sensitivity, 1 Hour   Result Value Ref Range    Troponin I, High Sensitivity 7 0 - 13 ng/L   TSH   Result Value Ref Range    Thyroid Stimulating Hormone 2.89 0.44 - 3.98 mIU/L   Urinalysis with Reflex Culture and Microscopic   Result Value Ref Range    Color, Urine Light-Yellow Light-Yellow, Yellow, Dark-Yellow    Appearance, Urine Clear Clear    Specific Gravity, Urine 1.028 1.005 - 1.035    pH, Urine 6.0 5.0, 5.5, 6.0, 6.5, 7.0, 7.5, 8.0    Protein, Urine NEGATIVE NEGATIVE, 10 (TRACE), 20 (TRACE) mg/dL    Glucose, Urine Normal Normal mg/dL    Blood, Urine NEGATIVE NEGATIVE mg/dL    Ketones, Urine NEGATIVE NEGATIVE mg/dL    Bilirubin,  Urine NEGATIVE NEGATIVE mg/dL    Urobilinogen, Urine Normal Normal mg/dL    Nitrite, Urine NEGATIVE NEGATIVE    Leukocyte Esterase, Urine 75 Ian/uL (A) NEGATIVE   Extra Urine Gray Tube   Result Value Ref Range    Extra Tube Hold for add-ons.    Microscopic Only, Urine   Result Value Ref Range    WBC, Urine 6-10 (A) 1-5, NONE /HPF    RBC, Urine 1-2 NONE, 1-2, 3-5 /HPF   CBC   Result Value Ref Range    WBC 8.6 4.4 - 11.3 x10*3/uL    nRBC 0.0 0.0 - 0.0 /100 WBCs    RBC 3.96 (L) 4.00 - 5.20 x10*6/uL    Hemoglobin 12.4 12.0 - 16.0 g/dL    Hematocrit 36.7 36.0 - 46.0 %    MCV 93 80 - 100 fL    MCH 31.3 26.0 - 34.0 pg    MCHC 33.8 32.0 - 36.0 g/dL    RDW 12.8 11.5 - 14.5 %    Platelets 144 (L) 150 - 450 x10*3/uL   Comprehensive metabolic panel   Result Value Ref Range    Glucose 158 (H) 74 - 99 mg/dL    Sodium 139 136 - 145 mmol/L    Potassium 3.5 3.5 - 5.3 mmol/L    Chloride 108 (H) 98 - 107 mmol/L    Bicarbonate 24 21 - 32 mmol/L    Anion Gap 11 10 - 20 mmol/L    Urea Nitrogen 25 (H) 6 - 23 mg/dL    Creatinine 1.17 (H) 0.50 - 1.05 mg/dL    eGFR 47 (L) >60 mL/min/1.73m*2    Calcium 8.7 8.6 - 10.3 mg/dL    Albumin 3.7 3.4 - 5.0 g/dL    Alkaline Phosphatase 43 33 - 136 U/L    Total Protein 5.7 (L) 6.4 - 8.2 g/dL    AST 16 9 - 39 U/L    Bilirubin, Total 0.6 0.0 - 1.2 mg/dL    ALT 13 7 - 45 U/L       Radiology Resutls  Imaging  CT abdomen pelvis w IV contrast  Result Date: 6/7/2025  1. Diverticulosis of the sigmoid colon. There is inflammatory change noted about the proximal to mid sigmoid colon most concerning for acute diverticulitis. Multiple free air locules are noted adjacent to this inflamed segment of sigmoid colon as well as small-to-moderate amount of free air within the upper abdomen consistent with perforation. No overt abscess identified.   MACRO: Donovan Ocampo discussed the significance and urgency of this critical finding by telephone with  KONG HILL on 6/7/2025 at 3:39 am. (**-RCF-**) Findings:  See  findings.   Signed by: Donovan Ocampo 6/7/2025 3:40 AM Dictation workstation:   [id number]      Cardiology, Vascular, and Other Imaging  No other imaging results found for the past 2 days         ASSESSMENT / PLAN     Assessment & Plan  Diverticulitis         Plan  79-year-old white female with recurrent sigmoid diverticulitis with microperforation and small amount of free air    Reviewed with the patient that she does have some free air and this is concerning for a worse episode of diverticulitis with perforation compared to her episode back in January.  Although she is afebrile and has a normal WBC, her exam is somewhat concerning given the diffuse tenderness.  Reviewed that if surgical intervention was required during her hospitalization, that a Hanna's procedure would be performed with a colostomy placement.  Given her advanced age, it would be likely that this colostomy would be permanent.  At this point, she is hesitant to have surgery and would like to give the antibiotics another 12 to 24 hours before making decision on surgery.  She does note that her last episode took almost 4 days before her abdominal pain improved.  If she demonstrates any clinical signs of worsening (fevers, worsening abdominal pain, tachycardia or hypotension or increased leukocytosis, that surgical intervention would be recommended at that time.  Although underlying colon cancer cannot be completely ruled out, this would be less likely given that she reports having a colonoscopy within the last 3 years.  Keep patient n.p.o. and off of anticoagulation in case she needs urgent surgical intervention.      Cynthia Haywood MD, FACS   [facility]  [address] [place] [address];   [employer] [facility]; [address]  [place]  [address] [phone]

## 2025-06-07 NOTE — ED NOTES
Pt arrives to ED via EMS from home.    Code Status:  Full Code    HPI     Chief Complaint   Patient presents with    Abdominal Pain     80 yo F with history of invasive carcinoma of the breast, lymphedema syndrome postmastectomy, postherpetic neuralgia, HTN, GERD, chronic headaches presents with left lower quadrant abdominal pain.  States the pain began approximately 2 hours ago, she was given analgesia by EMS prior to arrival with improvement in her symptoms.  She states she was seen 1 month ago for very similar symptoms and was told she had a hiatal hernia but they were otherwise unable to determine why she was having pain.  He states her pain subsequently resolved and has not recurred until today.  She does have 1 episode of nonbloody emesis earlier today.  She denies fevers, chest pain, palpitations, shortness of breath, diarrhea, constipation, dysuria, flank pain, vaginal bleeding.     /51   Pulse 81   Temp 36.8 °C (98.3 °F)   Resp 20   Wt 64.4 kg (142 lb)   SpO2 97%     Poly Coma Scale Score: 15      LDA:   Peripheral IV 20 G Left Antecubital (Active)   Earliest Known Present: 06/07/25   Placed by External Staff?: EMS  Size (Gauge): 20 G  Orientation: Left  Location: Antecubital   Number of days: 0        BACKGROUND  Medical History[1]  Surgical History[2]  Medications Ordered Prior to Encounter[3]     ASSESSMENT  ED Course as of 06/07/25 1416   Sat Jun 07, 2025   0155 EKG, interpreted by me: sinus rhythm, rate 77. Borderline left axis. No acute ST elevation or depression. Artifact noted throughout. . No evidence of acute STEMI at this time.   [JG]   0340 CT abdomen showing acute diverticulitis with free air concerning for perforation. Patient vital signs remain stable. Abdomen remains soft. Patient continues to rest comfortably. Given IV cipro and flagyl as patient has a documented penicillin allergy. [JG]   0345 General surgeon Dr. Yobany carney for diverticulitis with free air [JG]    3682 Dr. Haywood has reviewed patient imaging and recommends medical management for now. Patient [facility] be [facility] for medical management of diverticulitis with perforation. Dr. Haywood will see patient this morning. [JG]      ED Course User Index  [JG] Mae Sutton MD         Diagnoses as of 06/07/25 1416   Diverticulitis   Free intraperitoneal air       Medications Currently Running:  Continuous Medications[4]     Medications Given:  ED Medication Administration from 06/07/2025 0148 to 06/07/2025 1416         Date/Time Order Dose Route Action Action by     06/07/2025 0306 EDT iohexol (OMNIPaque) 350 mg iodine/mL solution 75 mL 75 mL intravenous Given SIMON Elena     06/07/2025 0308 EDT acetaminophen (Tylenol) tablet 650 mg 650 mg oral Given CoeRICKY     06/07/2025 0309 EDT sodium chloride 0.9 % bolus 500 mL 500 mL intravenous New Bag Slaughter, B 06/07/2025 0353 EDT metroNIDAZOLE (Flagyl) 500 mg in sodium chloride (iso)  mL 500 mg intravenous New Bag Slaughter, B 06/07/2025 0511 EDT ciprofloxacin (Cipro) 400 mg in dextrose 5%  mL 400 mg intravenous New Bag Slaughter, B 06/07/2025 0511 EDT metroNIDAZOLE (Flagyl) 500 mg in sodium chloride (iso)  mL 0 mg intravenous Stopped Slaughter, B 06/07/2025 0515 EDT sodium chloride 0.9 % bolus 500 mL 0 mL intravenous Stopped Slaughter, B 06/07/2025 0626 EDT ciprofloxacin (Cipro) 400 mg in dextrose 5%  mL 0 mg intravenous Stopped Slaughter, B 06/07/2025 0628 EDT lactated Ringer's infusion 100 mL/hr intravenous New Bag Coe, B 06/07/2025 0744 EDT morphine injection 2 mg 2 mg intravenous Not Given VALENTINO Mccord     06/07/2025 0756 EDT oxyCODONE-acetaminophen (Percocet) 5-325 mg per tablet 0.5 tablet 0.5 tablet oral Given VALENTINO Mccord     06/07/2025 1021 EDT lactated Ringer's infusion 125 mL/hr intravenous New Bag VALENTINO Mccord     06/07/2025 1054 EDT citalopram (CeleXA) tablet 10 mg -- oral Held by  provider LISA Winkler     06/07/2025 1054 EDT levothyroxine (Synthroid, Levoxyl) tablet 25 mcg -- oral Held by provider LISA Winkler     06/07/2025 1054 EDT metoprolol tartrate (Lopressor) tablet 150 mg -- oral Held by provider LISA Winkler     06/07/2025 1054 EDT rosuvastatin (Crestor) tablet 5 mg -- oral Held by provider LISA Winkler     06/07/2025 1055 EDT citalopram (CeleXA) tablet 10 mg -- oral Dose Auto Held LISA Winkler     06/07/2025 1055 EDT levothyroxine (Synthroid, Levoxyl) tablet 25 mcg -- oral Dose Auto Held LISA Winkler     06/07/2025 1055 EDT metoprolol tartrate (Lopressor) tablet 150 mg -- oral Dose Auto Held LISA Winkler     06/07/2025 1110 EDT levothyroxine (Synthroid, Levoxyl) tablet 25 mcg -- oral Unheld by provider BLAIR Guo     06/07/2025 1110 EDT metoprolol tartrate (Lopressor) tablet 150 mg -- oral Unheld by provider BLAIR Guo     06/07/2025 1110 EDT rosuvastatin (Crestor) tablet 5 mg -- oral Unheld by provider BLAIR Guo     06/07/2025 1135 EDT metoprolol tartrate (Lopressor) tablet 150 mg 150 mg oral Not Given KIMBERLY Bryan     06/07/2025 1136 EDT aspirin EC tablet 81 mg 81 mg oral Not Given KIMBERLY Bryan     06/07/2025 1136 EDT levothyroxine (Synthroid, Levoxyl) tablet 25 mcg 25 mcg oral Not Given KIMBERLY Bryan     06/07/2025 1136 EDT rosuvastatin (Crestor) tablet 5 mg 5 mg oral Not Given KIMBERLY Bryan     06/07/2025 1140 EDT lactated Ringer's infusion 100 mL/hr intravenous Rate/Dose Verify KIMBERLY Bryan     06/07/2025 1205 EDT metroNIDAZOLE (Flagyl) 500 mg in sodium chloride (iso)  mL 500 mg intravenous KIMBERLY Márquez     06/07/2025 1231 EDT acetaminophen (Tylenol) tablet 650 mg -- oral Held by provider LISA Winkler     06/07/2025 1231 EDT melatonin tablet 3 mg -- oral Held by provider LISA Winkler     06/07/2025 1238 EDT lactated Ringer's infusion 125 mL/hr intravenous Rate/Dose Verify KIMBERLY Bryan     06/07/2025 1238 EDT metroNIDAZOLE (Flagyl) 500 mg in sodium chloride (iso)  mL 0 mg intravenous Stopped  BryanKIMBERLY     06/07/2025 1246 EDT acetaminophen (Tylenol) tablet 650 mg -- oral Unheld by provider LISA Winkler     06/07/2025 1246 EDT melatonin tablet 3 mg -- oral Unheld by provider LISA Winkler     06/07/2025 1410 EDT lactated Ringer's infusion 0 mL/hr intravenous Stopped KIMBERLY Bryan     06/08/2025 0900 EDT citalopram (CeleXA) tablet 10 mg -- oral Dose Auto Held Cathi, LISA     06/09/2025 0900 EDT citalopram (CeleXA) tablet 10 mg -- oral Dose Auto Held LISA Winkler     06/10/2025 0900 EDT citalopram (CeleXA) tablet 10 mg -- oral Dose Auto Held Cathi, LISA     06/11/2025 0900 EDT citalopram (CeleXA) tablet 10 mg -- oral Dose Auto Held Cathi, LISA                 RESULTS    Imaging:  CT abdomen pelvis w IV contrast   Final Result   1. Diverticulosis of the sigmoid colon. There is inflammatory change   noted about the proximal to mid sigmoid colon most concerning for   acute diverticulitis. Multiple free air locules are noted adjacent to   this inflamed segment of sigmoid colon as well as small-to-moderate   amount of free air within the upper abdomen consistent with   perforation. No overt abscess identified.        MACRO:   Donovan Ocampo discussed the significance and urgency of this critical   finding by telephone with  KONG HILL on 6/7/2025 at 3:39 am.   (**-RCF-**) Findings:  See findings.        Signed by: Donovan Ocampo 6/7/2025 3:40 AM   Dictation workstation:   [id number]         }  Labs ::99  Abnormal Labs Reviewed   CBC WITH AUTO DIFFERENTIAL - Abnormal; Notable for the following components:       Result Value    Neutrophils Absolute 6.52 (*)     All other components within normal limits   COMPREHENSIVE METABOLIC PANEL - Abnormal; Notable for the following components:    Glucose 122 (*)     Urea Nitrogen 26 (*)     Creatinine 1.29 (*)     eGFR 42 (*)     All other components within normal limits   URINALYSIS WITH REFLEX CULTURE AND MICROSCOPIC - Abnormal; Notable for the following components:     Leukocyte Esterase, Urine 75 Ian/uL (*)     All other components within normal limits   MICROSCOPIC ONLY, URINE - Abnormal; Notable for the following components:    WBC, Urine 6-10 (*)     All other components within normal limits   CBC - Abnormal; Notable for the following components:    RBC 3.96 (*)     Platelets 144 (*)     All other components within normal limits   COMPREHENSIVE METABOLIC PANEL - Abnormal; Notable for the following components:    Glucose 158 (*)     Chloride 108 (*)     Urea Nitrogen 25 (*)     Creatinine 1.17 (*)     eGFR 47 (*)     Total Protein 5.7 (*)     All other components within normal limits                  [1]   Past Medical History:  Diagnosis Date    Anxiety and depression     Breast cancer     CKD (chronic kidney disease)     HLD (hyperlipidemia)     HTN (hypertension)     Hypothyroidism     Pulmonary embolism     postop   [2]   Past Surgical History:  Procedure Laterality Date    BREAST BIOPSY      CARPAL TUNNEL RELEASE Bilateral      SECTION, CLASSIC      CHOLECYSTECTOMY      FOOT SURGERY Bilateral     bunions and corns    MASTECTOMY Right 2018    TOTAL HIP ARTHROPLASTY Left    [3]   No current facility-administered medications on file prior to encounter.     Current Outpatient Medications on File Prior to Encounter   Medication Sig Dispense Refill    anastrozole (Arimidex) 1 mg tablet Take 1 tablet (1 mg total) by mouth once daily.      artificial tears, dextran-hypomel-glycerin, 0.1-0.3-0.2 % ophthalmic solution Administer 1 drop into affected eye(s) 4 times a day as needed.      aspirin 81 mg EC tablet Take 1 tablet (81 mg) by mouth once daily.      calcium carbonate-vitamin D3 500 mg-5 mcg (200 unit) tablet Take 2 tablets by mouth once daily.      levothyroxine (Synthroid, Levoxyl) 25 mcg tablet Take 1 tablet (25 mcg) by mouth once daily.      metoprolol tartrate (Lopressor) 50 mg tablet Take 3 tablets by mouth twice a day.      olmesartan (BENIcar) 40 mg tablet Take  1 tablet (40 mg) by mouth once daily.      polyethylene glycol (Glycolax, Miralax) 17 gram packet Take 8.5 g by mouth once daily.      [DISCONTINUED] calcium citrate 250 mg calcium tablet Calcium Citrate 1040 MG TABS   Refills: 0        Start : 20-Sep-2021   Active      [DISCONTINUED] rosuvastatin (Crestor) 5 mg tablet Take 1 tablet (5 mg) by mouth once daily.     [4] lactated Ringer's, 100 mL/hr, Last Rate: Stopped (06/07/25 1410)  lactated Ringer's, 125 mL/hr, Last Rate: 125 mL/hr (06/07/25 1238)       Nishi Bryan RN  06/07/25 1422

## 2025-06-07 NOTE — ED PROVIDER NOTES
HPI   Chief Complaint   Patient presents with    Abdominal Pain       HPI  78 yo F with history of invasive carcinoma of the breast, lymphedema syndrome postmastectomy, postherpetic neuralgia, HTN, GERD, chronic headaches presents with left lower quadrant abdominal pain.  States the pain began approximately 2 hours ago, she was given analgesia by EMS prior to arrival with improvement in her symptoms.  She states she was seen 1 month ago for very similar symptoms and was told she had a hiatal hernia but they were otherwise unable to determine why she was having pain.  He states her pain subsequently resolved and has not recurred until today.  She does have 1 episode of nonbloody emesis earlier today.  She denies fevers, chest pain, palpitations, shortness of breath, diarrhea, constipation, dysuria, flank pain, vaginal bleeding.      Patient History   Medical History[1]  Surgical History[2]  Family History[3]  Social History[4]    Physical Exam   ED Triage Vitals [06/07/25 0149]   Temperature Heart Rate Respirations BP   36.8 °C (98.3 °F) 78 20 (!) 191/71      Pulse Ox Temp src Heart Rate Source Patient Position   98 % -- -- --      BP Location FiO2 (%)     -- --       Physical Exam  Vitals reviewed.   HENT:      Mouth/Throat:      Pharynx: Oropharynx is clear.   Eyes:      Pupils: Pupils are equal, round, and reactive to light.   Cardiovascular:      Rate and Rhythm: Normal rate and regular rhythm.   Pulmonary:      Effort: Pulmonary effort is normal.      Breath sounds: Normal breath sounds. No stridor. No wheezing, rhonchi or rales.   Abdominal:      Palpations: Abdomen is soft.      Tenderness: There is generalized abdominal tenderness. There is no guarding or rebound.   Musculoskeletal:         General: Normal range of motion.      Cervical back: Normal range of motion.   Skin:     General: Skin is warm and dry.   Neurological:      General: No focal deficit present.      Mental Status: She is alert and oriented  to person, place, and time.           ED Course & MDM   ED Course as of 25 0620   Sat 2025   0155 EKG, interpreted by me: sinus rhythm, rate 77. Borderline left axis. No acute ST elevation or depression. Artifact noted throughout. . No evidence of acute STEMI at this time.   [JG]   0340 CT abdomen showing acute diverticulitis with free air concerning for perforation. Patient vital signs remain stable. Abdomen remains soft. Patient continues to rest comfortably. Given IV cipro and flagyl as patient has a documented penicillin allergy. [JG]   0345 General surgeon Dr. Haywood paged for diverticulitis with free air [JG]   0423 Dr. Haywood has reviewed patient imaging and recommends medical management for now. Patient will be admitted to [facility] for medical management of diverticulitis with perforation. Dr. Haywood will see patient this morning. [JG]      ED Course User Index  [JG] Mae Sutton MD         Diagnoses as of 25 0620   Diverticulitis   Free intraperitoneal air                 No data recorded     Poly Coma Scale Score: 15 (25 0151 : Carmen Barreto RN)                     Procedure  Procedures       [1]   Past Medical History:  Diagnosis Date    Complication of other artery following a procedure, not elsewhere classified, subsequent encounter     Postoperative pulmonary embolism, subsequent encounter    Personal history of other diseases of the circulatory system     History of cardiac murmur    Personal history of other diseases of the digestive system     History of gastroesophageal reflux (GERD)    Personal history of other specified conditions     History of epistaxis   [2]   Past Surgical History:  Procedure Laterality Date    CARPAL TUNNEL RELEASE  2018    Neuroplasty Decompression Median Nerve At Carpal Tunnel    CHOLECYSTECTOMY  2018    Cholecystectomy Laparoscopic    OTHER SURGICAL HISTORY  2021     section    OTHER SURGICAL HISTORY   09/29/2021    Breast biopsy    OTHER SURGICAL HISTORY  12/31/2018    Right mastectomy    OTHER SURGICAL HISTORY  04/30/2018    Total Hip Replacement Right    OTHER SURGICAL HISTORY  04/30/2018    Hip Replacement Left   [3]   Family History  Problem Relation Name Age of Onset    Breast cancer Mother  65   [4]   Social History  Tobacco Use    Smoking status: Never    Smokeless tobacco: Never   Vaping Use    Vaping status: Never Used   Substance Use Topics    Alcohol use: Not Currently    Drug use: Never        Mae Sutton MD  06/07/25 0621

## 2025-06-07 NOTE — H&P
"Medicine History & Physical:    CC: abd pain    HPI:  79F hx HTN, HLE, breast ca, diverticulosis, GERD, hypothyroidism, neuropathy, migraines, OA, anxiety/depression presents for abdominal pain    left lower quadrant abd pain with radiation to back. now improved. up to 10/10, now 5/10. nausea with small vomiting. no diarrhea, constipation, blood in stool or melena. subacute dry cough. no f/c, cp, sob, dysuria. slight ha, dizziness. no weakness, numbness    PMH: HTN, HLE, breast ca, diverticulosis, GERD, hypothyroidism, neuropathy, migraines, OA, anxiety/depression  PSH: R mastectomy, ccy, , JOSE bilateral, carpal tunnel  FH: n/a  SH: lives in [place]. no smoking, etoh  Meds: see med rec for full.  Allergies: extensive (see list)    ED Course:  bp 191/71, improved on own. no acute abdomen on exam. labs/imaging w/ cr 1.29 (baseline 1.02), cbc, lactate wnl. ua wnl. ekg wnl. ct a/p w/ perforated diverticulitis. s/p cipro/flagyl, 500cc ivf. d/w surgery, rec'd medical mgt. admitted to medicine.    ROS reviewed and negative other than noted in HPI    Visit Vitals  BP (!) 191/71   Pulse 78   Temp 36.8 °C (98.3 °F)   Resp 20   Ht 1.473 m (4' 10\")   Wt 64.4 kg (142 lb)   SpO2 98%   BMI 29.68 kg/m²   OB Status Postmenopausal   Smoking Status Never   BSA 1.62 m²   Physical Exam:  General: NAD, cooperative. no jaundice, pallor, rash, bruises  HEENT: NC/AT, MMM, no oral lesions or pharyngeal erythema. PERRL. no scleral icterus or conjunctival injection. neck soft w/o masses or LAD.  CV: RRR, no murmurs, rubs, or gallops. No JVD.  Chest: breathing unlabored. CTAB w/ adequate air-entry.  Abd: non-distended. BS+. soft, non-tender. no masses or organomegaly.  Extr: wwp, 2+ and symmetric peripheral pulses. no LE edema.  Neuro: AAOx3. CNII-XII intact. no focal findings.     Results:  - all relevant laboratory and radiographic data reviewed    Assessment/Plan:  79F hx HTN, HLE, breast ca, diverticulosis, GERD, " hypothyroidism, neuropathy, migraines, OA, anxiety/depression hospitalized for perforated diverticulitis    #diverticulitis c/b perforation:  #hx diverticulosis:  by 6/7 ct a/p. hds, no sepsis. recurrent, last episode 1/2025  - cipro/flagyl (6/7 - ); ivf; npo  - surgery consulted; pain/nausea scale    #HTN: stable; mtp, olmesartan  #HLE: home statin, asa  #breast ca: s/p R mastectomy; anastrozole   #GERD: stable off ppi  #hypothyroidism: home levo; f/u tsh  #neuropathy: not active  #migraines: not active; tylenol prn  #OA: tylenol prn  #anxiety/depression: stable off meds    #FEN/GI: npo; ivf  #PPx: scds  #Lines/Tubes/Drains: piv  #Analgesia/Sedation: as above  #Code: full  #Dispo: pending clinical improvement  #Contact: Vishnu [phone]    Juan Daniel Cerna MD

## 2025-06-07 NOTE — CARE PLAN
The patient's goals for the shift include  remain free from falls and hemodynamically stable    The clinical goals for the shift include  remain at a tolerable level of pain

## 2025-06-07 NOTE — PROGRESS NOTES
"[facility] MEDICINE PROGRESS NOTE    Subjective     Pt c/o 6/10 LLQ pain, denies nausea.    Objective     I personally reviewed all pertinent labwork, imaging and vital signs, as well as nursing, therapy, discharge planning and consult notes.     Visit Vitals  BP (!) 117/48   Pulse 73   Temp 36.8 °C (98.3 °F)   Resp 16   Ht 1.473 m (4' 10\")   Wt 64.4 kg (142 lb)   SpO2 99%   BMI 29.68 kg/m²   OB Status Postmenopausal   Smoking Status Never   BSA 1.62 m²       Vitals:    06/07/25 0149   Weight: 64.4 kg (142 lb)       Scheduled medications  Scheduled Medications[1]  Continuous medications  Continuous Medications[2]  PRN medications  PRN Medications[3]    The following labwork was reviewed:  Results for orders placed or performed during the hospital encounter of 06/07/25 (from the past 24 hours)   CBC and Auto Differential   Result Value Ref Range    WBC 8.1 4.4 - 11.3 x10*3/uL    nRBC 0.0 0.0 - 0.0 /100 WBCs    RBC 4.22 4.00 - 5.20 x10*6/uL    Hemoglobin 13.2 12.0 - 16.0 g/dL    Hematocrit 39.8 36.0 - 46.0 %    MCV 94 80 - 100 fL    MCH 31.3 26.0 - 34.0 pg    MCHC 33.2 32.0 - 36.0 g/dL    RDW 12.7 11.5 - 14.5 %    Platelets 169 150 - 450 x10*3/uL    Neutrophils % 80.3 40.0 - 80.0 %    Immature Granulocytes %, Automated 0.7 0.0 - 0.9 %    Lymphocytes % 14.4 13.0 - 44.0 %    Monocytes % 3.7 2.0 - 10.0 %    Eosinophils % 0.7 0.0 - 6.0 %    Basophils % 0.2 0.0 - 2.0 %    Neutrophils Absolute 6.52 (H) 1.60 - 5.50 x10*3/uL    Immature Granulocytes Absolute, Automated 0.06 0.00 - 0.50 x10*3/uL    Lymphocytes Absolute 1.17 0.80 - 3.00 x10*3/uL    Monocytes Absolute 0.30 0.05 - 0.80 x10*3/uL    Eosinophils Absolute 0.06 0.00 - 0.40 x10*3/uL    Basophils Absolute 0.02 0.00 - 0.10 x10*3/uL   Comprehensive metabolic panel   Result Value Ref Range    Glucose 122 (H) 74 - 99 mg/dL    Sodium 143 136 - 145 mmol/L    Potassium 4.0 3.5 - 5.3 mmol/L    Chloride 107 98 - 107 mmol/L    Bicarbonate 28 21 - 32 mmol/L    Anion Gap " 12 10 - 20 mmol/L    Urea Nitrogen 26 (H) 6 - 23 mg/dL    Creatinine 1.29 (H) 0.50 - 1.05 mg/dL    eGFR 42 (L) >60 mL/min/1.73m*2    Calcium 9.8 8.6 - 10.3 mg/dL    Albumin 4.3 3.4 - 5.0 g/dL    Alkaline Phosphatase 47 33 - 136 U/L    Total Protein 6.4 6.4 - 8.2 g/dL    AST 16 9 - 39 U/L    Bilirubin, Total 0.6 0.0 - 1.2 mg/dL    ALT 13 7 - 45 U/L   Lipase   Result Value Ref Range    Lipase 33 9 - 82 U/L   Lactate   Result Value Ref Range    Lactate 1.0 0.4 - 2.0 mmol/L   Ammonia   Result Value Ref Range    Ammonia 20 16 - 53 umol/L   Troponin I, High Sensitivity, Initial   Result Value Ref Range    Troponin I, High Sensitivity 6 0 - 13 ng/L   Troponin, High Sensitivity, 1 Hour   Result Value Ref Range    Troponin I, High Sensitivity 7 0 - 13 ng/L   TSH   Result Value Ref Range    Thyroid Stimulating Hormone 2.89 0.44 - 3.98 mIU/L   Urinalysis with Reflex Culture and Microscopic   Result Value Ref Range    Color, Urine Light-Yellow Light-Yellow, Yellow, Dark-Yellow    Appearance, Urine Clear Clear    Specific Gravity, Urine 1.028 1.005 - 1.035    pH, Urine 6.0 5.0, 5.5, 6.0, 6.5, 7.0, 7.5, 8.0    Protein, Urine NEGATIVE NEGATIVE, 10 (TRACE), 20 (TRACE) mg/dL    Glucose, Urine Normal Normal mg/dL    Blood, Urine NEGATIVE NEGATIVE mg/dL    Ketones, Urine NEGATIVE NEGATIVE mg/dL    Bilirubin, Urine NEGATIVE NEGATIVE mg/dL    Urobilinogen, Urine Normal Normal mg/dL    Nitrite, Urine NEGATIVE NEGATIVE    Leukocyte Esterase, Urine 75 Ian/uL (A) NEGATIVE   Microscopic Only, Urine   Result Value Ref Range    WBC, Urine 6-10 (A) 1-5, NONE /HPF    RBC, Urine 1-2 NONE, 1-2, 3-5 /HPF   CBC   Result Value Ref Range    WBC 8.6 4.4 - 11.3 x10*3/uL    nRBC 0.0 0.0 - 0.0 /100 WBCs    RBC 3.96 (L) 4.00 - 5.20 x10*6/uL    Hemoglobin 12.4 12.0 - 16.0 g/dL    Hematocrit 36.7 36.0 - 46.0 %    MCV 93 80 - 100 fL    MCH 31.3 26.0 - 34.0 pg    MCHC 33.8 32.0 - 36.0 g/dL    RDW 12.8 11.5 - 14.5 %    Platelets 144 (L) 150 - 450 x10*3/uL    Comprehensive metabolic panel   Result Value Ref Range    Glucose 158 (H) 74 - 99 mg/dL    Sodium 139 136 - 145 mmol/L    Potassium 3.5 3.5 - 5.3 mmol/L    Chloride 108 (H) 98 - 107 mmol/L    Bicarbonate 24 21 - 32 mmol/L    Anion Gap 11 10 - 20 mmol/L    Urea Nitrogen 25 (H) 6 - 23 mg/dL    Creatinine 1.17 (H) 0.50 - 1.05 mg/dL    eGFR 47 (L) >60 mL/min/1.73m*2    Calcium 8.7 8.6 - 10.3 mg/dL    Albumin 3.7 3.4 - 5.0 g/dL    Alkaline Phosphatase 43 33 - 136 U/L    Total Protein 5.7 (L) 6.4 - 8.2 g/dL    AST 16 9 - 39 U/L    Bilirubin, Total 0.6 0.0 - 1.2 mg/dL    ALT 13 7 - 45 U/L       The following imaging was reviewed:  CT abdomen pelvis w IV contrast  Result Date: 6/7/2025  1. Diverticulosis of the sigmoid colon. There is inflammatory change noted about the proximal to mid sigmoid colon most concerning for acute diverticulitis. Multiple free air locules are noted adjacent to this inflamed segment of sigmoid colon as well as small-to-moderate amount of free air within the upper abdomen consistent with perforation. No overt abscess identified.   MACRO: Donovan Ocampo discussed the significance and urgency of this critical finding by telephone with  KONG HILL on 6/7/2025 at 3:39 am. (**-RCF-**) Findings:  See findings.   Signed by: Donovan Ocampo 6/7/2025 3:40 AM Dictation workstation:   [id number]      Constitutional: Well developed, awake, alert, calm, oriented x4, no acute distress, cooperative   Eyes: EOMI, clear sclerae   ENMT: mucous membranes moist, no lesions seen   Head/Neck: Neck supple, no apparent injury, head atraumatic   Respiratory/Thorax: CTAB, good chest expansion, respirations even and unlabored   Cardiovascular: Regular rate and rhythm, no murmurs/rubs/gallops, normal S1 and S 2   Gastrointestinal: Abdomen nondistended, soft, tender, hypoactive BS, no bruits   Musculoskeletal: ROM intact, no joint swelling, normal  strength   Extremities: no cyanosis, contusions or clubbing, or edema    Neurological: no focal deficit, pt alert and oriented x4   Psychological: Appropriate affect and behavior, pleasant   Skin: Warm and dry, no lesions, no rashes       Medical History[4]    Assessment/Plan     Acute diverticulitis c/b perforation  Surgery consulted, continue lactated Ringer's IVF, NPO  Home oral meds continued by surgery  Continue Cipro and Flagyl pending urine C+S  Continue pain and nausea regimen     MENG  No hx CKD, likely pre-renal 2/2 above  Olmesartan continued by surgery  Continue IV fluids as above, BMP in am    HTN  BP controlled, home BP meds continued by surgery    DVT ppx: per surgery protocol    Discharge disposition  Discharge when medically stable         Alicia Farley CNP  [facility] Medicine         [1] anastrozole, 1 mg, oral, Daily  aspirin, 81 mg, oral, Daily  ciprofloxacin, 400 mg, intravenous, q12h  [Held by provider] citalopram, 10 mg, oral, Daily  levothyroxine, 25 mcg, oral, Daily  metoprolol tartrate, 150 mg, oral, BID  metroNIDAZOLE, 500 mg, intravenous, q8h  olmesartan, 40 mg, oral, Daily  rosuvastatin, 5 mg, oral, Daily  [2] lactated Ringer's, 100 mL/hr, Last Rate: 100 mL/hr (06/07/25 1140)  lactated Ringer's, 125 mL/hr, Last Rate: 125 mL/hr (06/07/25 1238)  [3] PRN medications: [Held by provider] acetaminophen **OR** [DISCONTINUED] acetaminophen **OR** [DISCONTINUED] acetaminophen, [Held by provider] melatonin, morphine, morphine, [DISCONTINUED] ondansetron **OR** ondansetron  [4]   Past Medical History:  Diagnosis Date    Anxiety and depression     Breast cancer     CKD (chronic kidney disease)     HLD (hyperlipidemia)     HTN (hypertension)     Hypothyroidism     Pulmonary embolism     postop

## 2025-06-07 NOTE — CONSULTS
GENERAL SURGERY CONSULT    Patient: Rosemary Motta  Room: 3315/3315-A    Age: 79 y.o.   Gender: female  Attending: Dallas Montiel MD PhD    MRN: [id number]  Admission Date: 6/7/2025    PCP: Prema Romero DO         SUBJECTIVE     Chief Complaint  Abdominal Pain       HPI  Rosemary Motta is a 79 y.o. female on day 0 of admission presenting with Diverticulitis.  She reports a 2-day history of lower abdominal pain both the right and left side.  Pain radiates into her lower back.  This was a sudden onset on Friday.  She had associated nausea but no vomiting.  Denies fevers.  Reports she did have a normal bowel movement on Friday without any associated blood.  She came to the emergency room where she was found to have a normal WBC, but CT evaluation showed sigmoid diverticulitis with microperforation and small amount of free air.  She states that this is similar pain to her episode of diverticulitis with abscess in January (6 months ago) where she was admitted at another institution for 4 days on IV antibiotics.  She states she did have 1 episode of left lower quadrant pain a few months ago, but this resolved without intervention.  She gets colonoscopies on a regular basis and had her last colonoscopy within the last 3 years.  She has been admitted to the medicine service on IV Cipro and Flagyl due to her allergy to penicillins, and she does feel that her pain is somewhat improved in the last 12 hours.    ROS  Review of Systems   Constitutional:  Negative for chills, fatigue and fever.   HENT:  Positive for hearing loss. Negative for congestion and ear pain.    Eyes:  Negative for pain and redness.   Respiratory:  Negative for cough and shortness of breath.    Cardiovascular:  Negative for chest pain and leg swelling.   Gastrointestinal:  Positive for abdominal pain and nausea. Negative for constipation, diarrhea and vomiting.   Endocrine: Negative for polyphagia.   Genitourinary:  Negative for dysuria, flank  pain, frequency and hematuria.   Musculoskeletal:  Positive for arthralgias and myalgias.   Skin:  Negative for rash and wound.   Allergic/Immunologic: Negative for immunocompromised state.   Neurological:  Positive for weakness. Negative for speech difficulty and headaches.   Hematological:  Does not bruise/bleed easily.   Psychiatric/Behavioral:  Negative for agitation and confusion.         HISTORY     Past Medical History:   Diagnosis Date    Anxiety and depression     Breast cancer     CKD (chronic kidney disease)     HLD (hyperlipidemia)     HTN (hypertension)     Hypothyroidism     Pulmonary embolism     postop        Past Surgical History:   Procedure Laterality Date    BREAST BIOPSY      CARPAL TUNNEL RELEASE Bilateral      SECTION, CLASSIC      CHOLECYSTECTOMY      FOOT SURGERY Bilateral     bunions and corns    MASTECTOMY Right 2018    TOTAL HIP ARTHROPLASTY Left         Family History   Problem Relation Name Age of Onset    Breast cancer Mother  65        Allergies   Allergen Reactions    Penicillins Anaphylaxis and Syncope    Morphine Itching    Clarithromycin Nausea/vomiting    Hydrochlorothiazide Diarrhea    Tetracycline Nausea/vomiting    Cortisone Rash and Headache     Facial pain    Methylprednisolone Swelling and Rash     facial pain    Nifedipine Rash, Dizziness and Blurred vision    Prednisone Rash and Headache     Pain         Social History     Tobacco Use   Smoking Status Never   Smokeless Tobacco Never        Social History     Substance and Sexual Activity   Alcohol Use Not Currently        HOME MEDICATIONS  Current Outpatient Medications   Medication Instructions    anastrozole (Arimidex) 1 mg tablet Take 1 tablet (1 mg total) by mouth once daily.    artificial tears, dextran-hypomel-glycerin, 0.1-0.3-0.2 % ophthalmic solution 1 drop, ophthalmic (eye), 4 times daily PRN,      aspirin 81 mg EC tablet Take 1 tablet (81 mg) by mouth once daily.    calcium carbonate-vitamin D3 500 mg-5  "mcg (200 unit) tablet 2 tablets, oral, Daily    levothyroxine (Synthroid, Levoxyl) 25 mcg tablet 1 tablet, Daily    metoprolol tartrate (LOPRESSOR) 150 mg, oral, 2 times daily    olmesartan (BENIcar) 40 mg tablet 1 tablet, Daily    polyethylene glycol (GLYCOLAX, MIRALAX) 8.5 g, oral, Daily          OBJECTIVE   Last Recorded Vitals.  Blood pressure 130/73, pulse 98, temperature 37.8 °C (100 °F), temperature source Temporal, resp. rate 16, height 1.473 m (4' 10\"), weight 64.4 kg (142 lb), SpO2 97%.     PHYSICAL EXAM  Physical Exam   General: Well-developed, well-nourished and in no acute distress.  Head: Normocephalic. Atraumatic.  Neck/thyroid: Neck is supple.   Eyes: Pupils equal round and reactive to light. Conjunctiva normal.  ENMT: No masses or deformity of external nose. External ears without masses.  Respiratory/Chest:  Normal respiratory effort.  Breast: Status post right mastectomy.  Cardiovascular: Regular rate and rhythm.   Abdomen: Softly rounded.  Mild tenderness of all 4 quadrants with increased tenderness in the left lower quadrant.  No peritoneal signs.  Well-healed midline scar from the umbilicus down to the pubic symphysis.  Musculoskeletal: Joints and limbs are grossly normal.   Neuro: Oriented to person, place and time. No obvious neurological deficit.   Psych: Normal mood and affect.    RESULTS   Labs  Results for orders placed or performed during the hospital encounter of 06/07/25 (from the past 24 hours)   CBC and Auto Differential   Result Value Ref Range    WBC 8.1 4.4 - 11.3 x10*3/uL    nRBC 0.0 0.0 - 0.0 /100 WBCs    RBC 4.22 4.00 - 5.20 x10*6/uL    Hemoglobin 13.2 12.0 - 16.0 g/dL    Hematocrit 39.8 36.0 - 46.0 %    MCV 94 80 - 100 fL    MCH 31.3 26.0 - 34.0 pg    MCHC 33.2 32.0 - 36.0 g/dL    RDW 12.7 11.5 - 14.5 %    Platelets 169 150 - 450 x10*3/uL    Neutrophils % 80.3 40.0 - 80.0 %    Immature Granulocytes %, Automated 0.7 0.0 - 0.9 %    Lymphocytes % 14.4 13.0 - 44.0 %    Monocytes % " 3.7 2.0 - 10.0 %    Eosinophils % 0.7 0.0 - 6.0 %    Basophils % 0.2 0.0 - 2.0 %    Neutrophils Absolute 6.52 (H) 1.60 - 5.50 x10*3/uL    Immature Granulocytes Absolute, Automated 0.06 0.00 - 0.50 x10*3/uL    Lymphocytes Absolute 1.17 0.80 - 3.00 x10*3/uL    Monocytes Absolute 0.30 0.05 - 0.80 x10*3/uL    Eosinophils Absolute 0.06 0.00 - 0.40 x10*3/uL    Basophils Absolute 0.02 0.00 - 0.10 x10*3/uL   Comprehensive metabolic panel   Result Value Ref Range    Glucose 122 (H) 74 - 99 mg/dL    Sodium 143 136 - 145 mmol/L    Potassium 4.0 3.5 - 5.3 mmol/L    Chloride 107 98 - 107 mmol/L    Bicarbonate 28 21 - 32 mmol/L    Anion Gap 12 10 - 20 mmol/L    Urea Nitrogen 26 (H) 6 - 23 mg/dL    Creatinine 1.29 (H) 0.50 - 1.05 mg/dL    eGFR 42 (L) >60 mL/min/1.73m*2    Calcium 9.8 8.6 - 10.3 mg/dL    Albumin 4.3 3.4 - 5.0 g/dL    Alkaline Phosphatase 47 33 - 136 U/L    Total Protein 6.4 6.4 - 8.2 g/dL    AST 16 9 - 39 U/L    Bilirubin, Total 0.6 0.0 - 1.2 mg/dL    ALT 13 7 - 45 U/L   Lipase   Result Value Ref Range    Lipase 33 9 - 82 U/L   Lactate   Result Value Ref Range    Lactate 1.0 0.4 - 2.0 mmol/L   Ammonia   Result Value Ref Range    Ammonia 20 16 - 53 umol/L   Troponin I, High Sensitivity, Initial   Result Value Ref Range    Troponin I, High Sensitivity 6 0 - 13 ng/L   Troponin, High Sensitivity, 1 Hour   Result Value Ref Range    Troponin I, High Sensitivity 7 0 - 13 ng/L   TSH   Result Value Ref Range    Thyroid Stimulating Hormone 2.89 0.44 - 3.98 mIU/L   Urinalysis with Reflex Culture and Microscopic   Result Value Ref Range    Color, Urine Light-Yellow Light-Yellow, Yellow, Dark-Yellow    Appearance, Urine Clear Clear    Specific Gravity, Urine 1.028 1.005 - 1.035    pH, Urine 6.0 5.0, 5.5, 6.0, 6.5, 7.0, 7.5, 8.0    Protein, Urine NEGATIVE NEGATIVE, 10 (TRACE), 20 (TRACE) mg/dL    Glucose, Urine Normal Normal mg/dL    Blood, Urine NEGATIVE NEGATIVE mg/dL    Ketones, Urine NEGATIVE NEGATIVE mg/dL    Bilirubin,  Urine NEGATIVE NEGATIVE mg/dL    Urobilinogen, Urine Normal Normal mg/dL    Nitrite, Urine NEGATIVE NEGATIVE    Leukocyte Esterase, Urine 75 Ian/uL (A) NEGATIVE   Extra Urine Gray Tube   Result Value Ref Range    Extra Tube Hold for add-ons.    Microscopic Only, Urine   Result Value Ref Range    WBC, Urine 6-10 (A) 1-5, NONE /HPF    RBC, Urine 1-2 NONE, 1-2, 3-5 /HPF   CBC   Result Value Ref Range    WBC 8.6 4.4 - 11.3 x10*3/uL    nRBC 0.0 0.0 - 0.0 /100 WBCs    RBC 3.96 (L) 4.00 - 5.20 x10*6/uL    Hemoglobin 12.4 12.0 - 16.0 g/dL    Hematocrit 36.7 36.0 - 46.0 %    MCV 93 80 - 100 fL    MCH 31.3 26.0 - 34.0 pg    MCHC 33.8 32.0 - 36.0 g/dL    RDW 12.8 11.5 - 14.5 %    Platelets 144 (L) 150 - 450 x10*3/uL   Comprehensive metabolic panel   Result Value Ref Range    Glucose 158 (H) 74 - 99 mg/dL    Sodium 139 136 - 145 mmol/L    Potassium 3.5 3.5 - 5.3 mmol/L    Chloride 108 (H) 98 - 107 mmol/L    Bicarbonate 24 21 - 32 mmol/L    Anion Gap 11 10 - 20 mmol/L    Urea Nitrogen 25 (H) 6 - 23 mg/dL    Creatinine 1.17 (H) 0.50 - 1.05 mg/dL    eGFR 47 (L) >60 mL/min/1.73m*2    Calcium 8.7 8.6 - 10.3 mg/dL    Albumin 3.7 3.4 - 5.0 g/dL    Alkaline Phosphatase 43 33 - 136 U/L    Total Protein 5.7 (L) 6.4 - 8.2 g/dL    AST 16 9 - 39 U/L    Bilirubin, Total 0.6 0.0 - 1.2 mg/dL    ALT 13 7 - 45 U/L       Radiology Resutls  Imaging  CT abdomen pelvis w IV contrast  Result Date: 6/7/2025  1. Diverticulosis of the sigmoid colon. There is inflammatory change noted about the proximal to mid sigmoid colon most concerning for acute diverticulitis. Multiple free air locules are noted adjacent to this inflamed segment of sigmoid colon as well as small-to-moderate amount of free air within the upper abdomen consistent with perforation. No overt abscess identified.   MACRO: Donovan Ocampo discussed the significance and urgency of this critical finding by telephone with  KONG HILL on 6/7/2025 at 3:39 am. (**-RCF-**) Findings:  See  findings.   Signed by: Donovan Ocampo 6/7/2025 3:40 AM Dictation workstation:   [id number]      Cardiology, Vascular, and Other Imaging  No other imaging results found for the past 2 days         ASSESSMENT / PLAN     Assessment & Plan  Diverticulitis         Plan  79-year-old white female with recurrent sigmoid diverticulitis with microperforation and small amount of free air    Reviewed with the patient that she does have some free air and this is concerning for a worse episode of diverticulitis with perforation compared to her episode back in January.  Although she is afebrile and has a normal WBC, her exam is somewhat concerning given the diffuse tenderness.  Reviewed that if surgical intervention was required during her hospitalization, that a Hanna's procedure would be performed with a colostomy placement.  Given her advanced age, it would be likely that this colostomy would be permanent.  At this point, she is hesitant to have surgery and would like to give the antibiotics another 12 to 24 hours before making decision on surgery.  She does note that her last episode took almost 4 days before her abdominal pain improved.  If she demonstrates any clinical signs of worsening (fevers, worsening abdominal pain, tachycardia or hypotension or increased leukocytosis, that surgical intervention would be recommended at that time.  Although underlying colon cancer cannot be completely ruled out, this would be less likely given that she reports having a colonoscopy within the last 3 years.  Keep patient n.p.o. and off of anticoagulation in case she needs urgent surgical intervention.      Cynthia Haywood MD, FACS   [facility]  [address] [place] [address];   [facility]; [address]  [place]  [address] [phone]

## 2025-06-08 ENCOUNTER — APPOINTMENT (OUTPATIENT)
Dept: CARDIOLOGY | Facility: HOSPITAL | Age: 80
DRG: 853 | End: 2025-06-08
Payer: MEDICARE

## 2025-06-08 ENCOUNTER — APPOINTMENT (OUTPATIENT)
Dept: RADIOLOGY | Facility: HOSPITAL | Age: 80
DRG: 853 | End: 2025-06-08
Payer: MEDICARE

## 2025-06-08 PROBLEM — K57.20 DIVERTICULITIS OF LARGE INTESTINE WITH PERFORATION WITHOUT BLEEDING: Status: ACTIVE | Noted: 2025-06-07

## 2025-06-08 LAB
ALBUMIN SERPL BCP-MCNC: 3.1 G/DL (ref 3.4–5)
ALP SERPL-CCNC: 39 U/L (ref 33–136)
ALT SERPL W P-5'-P-CCNC: 15 U/L (ref 7–45)
ANION GAP SERPL CALC-SCNC: 11 MMOL/L (ref 10–20)
ANION GAP SERPL CALC-SCNC: 12 MMOL/L (ref 10–20)
APPEARANCE UR: CLEAR
AST SERPL W P-5'-P-CCNC: 22 U/L (ref 9–39)
BACTERIA UR CULT: NORMAL
BILIRUB SERPL-MCNC: 0.9 MG/DL (ref 0–1.2)
BILIRUB UR STRIP.AUTO-MCNC: NEGATIVE MG/DL
BUN SERPL-MCNC: 11 MG/DL (ref 6–23)
BUN SERPL-MCNC: 16 MG/DL (ref 6–23)
CALCIUM SERPL-MCNC: 7.7 MG/DL (ref 8.6–10.3)
CALCIUM SERPL-MCNC: 8.2 MG/DL (ref 8.6–10.3)
CHLORIDE SERPL-SCNC: 107 MMOL/L (ref 98–107)
CHLORIDE SERPL-SCNC: 107 MMOL/L (ref 98–107)
CO2 SERPL-SCNC: 22 MMOL/L (ref 21–32)
CO2 SERPL-SCNC: 23 MMOL/L (ref 21–32)
COLOR UR: ABNORMAL
CORTIS SERPL-MCNC: 8.1 UG/DL (ref 2.5–20)
CREAT SERPL-MCNC: 0.96 MG/DL (ref 0.5–1.05)
CREAT SERPL-MCNC: 0.98 MG/DL (ref 0.5–1.05)
EGFRCR SERPLBLD CKD-EPI 2021: 58 ML/MIN/1.73M*2
EGFRCR SERPLBLD CKD-EPI 2021: 60 ML/MIN/1.73M*2
ERYTHROCYTE [DISTWIDTH] IN BLOOD BY AUTOMATED COUNT: 13.2 % (ref 11.5–14.5)
GLUCOSE SERPL-MCNC: 100 MG/DL (ref 74–99)
GLUCOSE SERPL-MCNC: 152 MG/DL (ref 74–99)
GLUCOSE UR STRIP.AUTO-MCNC: NORMAL MG/DL
HCT VFR BLD AUTO: 34 % (ref 36–46)
HGB BLD-MCNC: 11.4 G/DL (ref 12–16)
KETONES UR STRIP.AUTO-MCNC: ABNORMAL MG/DL
LACTATE SERPL-SCNC: 1.1 MMOL/L (ref 0.4–2)
LEUKOCYTE ESTERASE UR QL STRIP.AUTO: NEGATIVE
MAGNESIUM SERPL-MCNC: 1.33 MG/DL (ref 1.6–2.4)
MCH RBC QN AUTO: 31.4 PG (ref 26–34)
MCHC RBC AUTO-ENTMCNC: 33.5 G/DL (ref 32–36)
MCV RBC AUTO: 94 FL (ref 80–100)
MUCOUS THREADS #/AREA URNS AUTO: ABNORMAL /LPF
NITRITE UR QL STRIP.AUTO: NEGATIVE
NRBC BLD-RTO: 0 /100 WBCS (ref 0–0)
PH UR STRIP.AUTO: 5.5 [PH]
PLATELET # BLD AUTO: 124 X10*3/UL (ref 150–450)
POTASSIUM SERPL-SCNC: 3.3 MMOL/L (ref 3.5–5.3)
POTASSIUM SERPL-SCNC: 3.4 MMOL/L (ref 3.5–5.3)
PROT SERPL-MCNC: 4.9 G/DL (ref 6.4–8.2)
PROT UR STRIP.AUTO-MCNC: NEGATIVE MG/DL
RBC # BLD AUTO: 3.63 X10*6/UL (ref 4–5.2)
RBC # UR STRIP.AUTO: ABNORMAL MG/DL
RBC #/AREA URNS AUTO: >20 /HPF
SODIUM SERPL-SCNC: 137 MMOL/L (ref 136–145)
SODIUM SERPL-SCNC: 139 MMOL/L (ref 136–145)
SP GR UR STRIP.AUTO: 1.01
UROBILINOGEN UR STRIP.AUTO-MCNC: NORMAL MG/DL
WBC # BLD AUTO: 8.8 X10*3/UL (ref 4.4–11.3)
WBC #/AREA URNS AUTO: ABNORMAL /HPF

## 2025-06-08 PROCEDURE — 37799 UNLISTED PX VASCULAR SURGERY: CPT | Performed by: SURGERY

## 2025-06-08 PROCEDURE — 83735 ASSAY OF MAGNESIUM: CPT | Performed by: STUDENT IN AN ORGANIZED HEALTH CARE EDUCATION/TRAINING PROGRAM

## 2025-06-08 PROCEDURE — 81001 URINALYSIS AUTO W/SCOPE: CPT | Performed by: NURSE PRACTITIONER

## 2025-06-08 PROCEDURE — 2500000004 HC RX 250 GENERAL PHARMACY W/ HCPCS (ALT 636 FOR OP/ED): Performed by: SURGERY

## 2025-06-08 PROCEDURE — 80048 BASIC METABOLIC PNL TOTAL CA: CPT | Mod: CCI | Performed by: STUDENT IN AN ORGANIZED HEALTH CARE EDUCATION/TRAINING PROGRAM

## 2025-06-08 PROCEDURE — 2500000004 HC RX 250 GENERAL PHARMACY W/ HCPCS (ALT 636 FOR OP/ED): Performed by: INTERNAL MEDICINE

## 2025-06-08 PROCEDURE — 93005 ELECTROCARDIOGRAM TRACING: CPT

## 2025-06-08 PROCEDURE — 2500000001 HC RX 250 WO HCPCS SELF ADMINISTERED DRUGS (ALT 637 FOR MEDICARE OP): Performed by: INTERNAL MEDICINE

## 2025-06-08 PROCEDURE — 2500000002 HC RX 250 W HCPCS SELF ADMINISTERED DRUGS (ALT 637 FOR MEDICARE OP, ALT 636 FOR OP/ED): Performed by: SURGERY

## 2025-06-08 PROCEDURE — 82533 TOTAL CORTISOL: CPT | Mod: PORLAB | Performed by: INTERNAL MEDICINE

## 2025-06-08 PROCEDURE — 99024 POSTOP FOLLOW-UP VISIT: CPT | Performed by: SURGERY

## 2025-06-08 PROCEDURE — 84075 ASSAY ALKALINE PHOSPHATASE: CPT | Performed by: SURGERY

## 2025-06-08 PROCEDURE — 2020000001 HC ICU ROOM DAILY

## 2025-06-08 PROCEDURE — 36415 COLL VENOUS BLD VENIPUNCTURE: CPT | Performed by: NURSE PRACTITIONER

## 2025-06-08 PROCEDURE — 71045 X-RAY EXAM CHEST 1 VIEW: CPT | Performed by: RADIOLOGY

## 2025-06-08 PROCEDURE — 2500000005 HC RX 250 GENERAL PHARMACY W/O HCPCS: Performed by: STUDENT IN AN ORGANIZED HEALTH CARE EDUCATION/TRAINING PROGRAM

## 2025-06-08 PROCEDURE — 37799 UNLISTED PX VASCULAR SURGERY: CPT | Performed by: INTERNAL MEDICINE

## 2025-06-08 PROCEDURE — 2500000004 HC RX 250 GENERAL PHARMACY W/ HCPCS (ALT 636 FOR OP/ED): Performed by: STUDENT IN AN ORGANIZED HEALTH CARE EDUCATION/TRAINING PROGRAM

## 2025-06-08 PROCEDURE — 83605 ASSAY OF LACTIC ACID: CPT | Performed by: INTERNAL MEDICINE

## 2025-06-08 PROCEDURE — 99221 1ST HOSP IP/OBS SF/LOW 40: CPT | Performed by: INTERNAL MEDICINE

## 2025-06-08 PROCEDURE — 2500000004 HC RX 250 GENERAL PHARMACY W/ HCPCS (ALT 636 FOR OP/ED): Mod: JZ | Performed by: ANESTHESIOLOGY

## 2025-06-08 PROCEDURE — 85027 COMPLETE CBC AUTOMATED: CPT | Performed by: SURGERY

## 2025-06-08 PROCEDURE — 93010 ELECTROCARDIOGRAM REPORT: CPT | Performed by: INTERNAL MEDICINE

## 2025-06-08 PROCEDURE — P9047 ALBUMIN (HUMAN), 25%, 50ML: HCPCS | Performed by: INTERNAL MEDICINE

## 2025-06-08 PROCEDURE — 2500000001 HC RX 250 WO HCPCS SELF ADMINISTERED DRUGS (ALT 637 FOR MEDICARE OP): Performed by: SURGERY

## 2025-06-08 PROCEDURE — 2500000005 HC RX 250 GENERAL PHARMACY W/O HCPCS: Performed by: INTERNAL MEDICINE

## 2025-06-08 PROCEDURE — 99232 SBSQ HOSP IP/OBS MODERATE 35: CPT | Performed by: NURSE PRACTITIONER

## 2025-06-08 PROCEDURE — 71045 X-RAY EXAM CHEST 1 VIEW: CPT

## 2025-06-08 PROCEDURE — 87040 BLOOD CULTURE FOR BACTERIA: CPT | Mod: PORLAB | Performed by: NURSE PRACTITIONER

## 2025-06-08 RX ORDER — ENOXAPARIN SODIUM 100 MG/ML
30 INJECTION SUBCUTANEOUS EVERY 24 HOURS
Status: ACTIVE | OUTPATIENT
Start: 2025-06-09

## 2025-06-08 RX ORDER — HYDROCODONE BITARTRATE AND ACETAMINOPHEN 7.5; 325 MG/1; MG/1
1 TABLET ORAL EVERY 6 HOURS PRN
Status: DISPENSED | OUTPATIENT
Start: 2025-06-08

## 2025-06-08 RX ORDER — LIDOCAINE HYDROCHLORIDE 10 MG/ML
0.1 INJECTION, SOLUTION EPIDURAL; INFILTRATION; INTRACAUDAL; PERINEURAL ONCE
Status: DISCONTINUED | OUTPATIENT
Start: 2025-06-08 | End: 2025-06-08 | Stop reason: HOSPADM

## 2025-06-08 RX ORDER — MAGNESIUM SULFATE HEPTAHYDRATE 40 MG/ML
4 INJECTION, SOLUTION INTRAVENOUS ONCE
Status: DISPENSED | OUTPATIENT
Start: 2025-06-08

## 2025-06-08 RX ORDER — DILTIAZEM HYDROCHLORIDE 5 MG/ML
0.25 INJECTION INTRAVENOUS ONCE
Status: COMPLETED | OUTPATIENT
Start: 2025-06-08 | End: 2025-06-08

## 2025-06-08 RX ORDER — ONDANSETRON HYDROCHLORIDE 2 MG/ML
4 INJECTION, SOLUTION INTRAVENOUS ONCE AS NEEDED
Status: DISCONTINUED | OUTPATIENT
Start: 2025-06-08 | End: 2025-06-08 | Stop reason: HOSPADM

## 2025-06-08 RX ORDER — LABETALOL HYDROCHLORIDE 5 MG/ML
5 INJECTION, SOLUTION INTRAVENOUS EVERY 4 HOURS PRN
Status: DISPENSED | OUTPATIENT
Start: 2025-06-08

## 2025-06-08 RX ORDER — HYDROMORPHONE HYDROCHLORIDE 0.2 MG/ML
0.2 INJECTION INTRAMUSCULAR; INTRAVENOUS; SUBCUTANEOUS EVERY 5 MIN PRN
Status: DISCONTINUED | OUTPATIENT
Start: 2025-06-08 | End: 2025-06-08 | Stop reason: HOSPADM

## 2025-06-08 RX ORDER — ALBUMIN HUMAN 250 G/1000ML
25 SOLUTION INTRAVENOUS ONCE
Status: COMPLETED | OUTPATIENT
Start: 2025-06-08 | End: 2025-06-08

## 2025-06-08 RX ORDER — CEFEPIME HYDROCHLORIDE 2 G/50ML
2 INJECTION, SOLUTION INTRAVENOUS EVERY 12 HOURS
Status: DISPENSED | OUTPATIENT
Start: 2025-06-08

## 2025-06-08 RX ORDER — ALBUTEROL SULFATE 0.83 MG/ML
2.5 SOLUTION RESPIRATORY (INHALATION) ONCE AS NEEDED
Status: DISCONTINUED | OUTPATIENT
Start: 2025-06-08 | End: 2025-06-08 | Stop reason: HOSPADM

## 2025-06-08 RX ORDER — LABETALOL HYDROCHLORIDE 5 MG/ML
5 INJECTION, SOLUTION INTRAVENOUS ONCE AS NEEDED
Status: DISCONTINUED | OUTPATIENT
Start: 2025-06-08 | End: 2025-06-08 | Stop reason: HOSPADM

## 2025-06-08 RX ORDER — DROPERIDOL 2.5 MG/ML
0.62 INJECTION, SOLUTION INTRAMUSCULAR; INTRAVENOUS ONCE AS NEEDED
Status: DISCONTINUED | OUTPATIENT
Start: 2025-06-08 | End: 2025-06-08 | Stop reason: HOSPADM

## 2025-06-08 RX ORDER — POTASSIUM CHLORIDE 14.9 MG/ML
20 INJECTION INTRAVENOUS ONCE
Status: COMPLETED | OUTPATIENT
Start: 2025-06-08 | End: 2025-06-08

## 2025-06-08 RX ORDER — METOPROLOL TARTRATE 50 MG/1
50 TABLET ORAL 2 TIMES DAILY
Status: DISPENSED | OUTPATIENT
Start: 2025-06-08

## 2025-06-08 RX ORDER — HYDRALAZINE HYDROCHLORIDE 20 MG/ML
5 INJECTION INTRAMUSCULAR; INTRAVENOUS EVERY 30 MIN PRN
Status: DISCONTINUED | OUTPATIENT
Start: 2025-06-08 | End: 2025-06-08 | Stop reason: HOSPADM

## 2025-06-08 RX ORDER — SODIUM CHLORIDE, SODIUM LACTATE, POTASSIUM CHLORIDE, CALCIUM CHLORIDE 600; 310; 30; 20 MG/100ML; MG/100ML; MG/100ML; MG/100ML
100 INJECTION, SOLUTION INTRAVENOUS CONTINUOUS
Status: DISCONTINUED | OUTPATIENT
Start: 2025-06-08 | End: 2025-06-08 | Stop reason: HOSPADM

## 2025-06-08 RX ORDER — DIPHENHYDRAMINE HYDROCHLORIDE 50 MG/ML
12.5 INJECTION, SOLUTION INTRAMUSCULAR; INTRAVENOUS ONCE AS NEEDED
Status: DISCONTINUED | OUTPATIENT
Start: 2025-06-08 | End: 2025-06-08 | Stop reason: HOSPADM

## 2025-06-08 RX ORDER — MEPERIDINE HYDROCHLORIDE 25 MG/ML
12.5 INJECTION INTRAMUSCULAR; INTRAVENOUS; SUBCUTANEOUS EVERY 10 MIN PRN
Status: DISCONTINUED | OUTPATIENT
Start: 2025-06-08 | End: 2025-06-08 | Stop reason: HOSPADM

## 2025-06-08 RX ORDER — POTASSIUM CHLORIDE 14.9 MG/ML
20 INJECTION INTRAVENOUS
Status: DISPENSED | OUTPATIENT
Start: 2025-06-08 | End: 2025-06-09

## 2025-06-08 RX ORDER — SODIUM CHLORIDE, SODIUM LACTATE, POTASSIUM CHLORIDE, CALCIUM CHLORIDE 600; 310; 30; 20 MG/100ML; MG/100ML; MG/100ML; MG/100ML
100 INJECTION, SOLUTION INTRAVENOUS CONTINUOUS
Status: DISCONTINUED | OUTPATIENT
Start: 2025-06-08 | End: 2025-06-08

## 2025-06-08 RX ORDER — BACITRACIN ZINC 500 UNIT/G
OINTMENT (GRAM) TOPICAL DAILY
Status: DISPENSED | OUTPATIENT
Start: 2025-06-08

## 2025-06-08 RX ORDER — NOREPINEPHRINE BITARTRATE/D5W 8 MG/250ML
0-.5 PLASTIC BAG, INJECTION (ML) INTRAVENOUS CONTINUOUS
Status: DISPENSED | OUTPATIENT
Start: 2025-06-08

## 2025-06-08 RX ORDER — HYDROMORPHONE HYDROCHLORIDE 0.2 MG/ML
0.2 INJECTION INTRAMUSCULAR; INTRAVENOUS; SUBCUTANEOUS EVERY 2 HOUR PRN
Status: DISCONTINUED | OUTPATIENT
Start: 2025-06-08 | End: 2025-06-08

## 2025-06-08 RX ORDER — ACETAMINOPHEN 325 MG/1
650 TABLET ORAL EVERY 4 HOURS PRN
Status: DISPENSED | OUTPATIENT
Start: 2025-06-08

## 2025-06-08 RX ORDER — METRONIDAZOLE 500 MG/100ML
500 INJECTION, SOLUTION INTRAVENOUS EVERY 12 HOURS
Status: DISPENSED | OUTPATIENT
Start: 2025-06-08

## 2025-06-08 RX ADMIN — SODIUM CHLORIDE, POTASSIUM CHLORIDE, SODIUM LACTATE AND CALCIUM CHLORIDE 250 ML: 600; 310; 30; 20 INJECTION, SOLUTION INTRAVENOUS at 12:10

## 2025-06-08 RX ADMIN — CIPROFLOXACIN 400 MG: 400 INJECTION, SOLUTION INTRAVENOUS at 05:16

## 2025-06-08 RX ADMIN — HYDROMORPHONE HYDROCHLORIDE 0.2 MG: 0.2 INJECTION, SOLUTION INTRAMUSCULAR; INTRAVENOUS; SUBCUTANEOUS at 01:28

## 2025-06-08 RX ADMIN — CEFEPIME HYDROCHLORIDE 2 G: 2 INJECTION, SOLUTION INTRAVENOUS at 13:14

## 2025-06-08 RX ADMIN — BACITRACIN ZINC: 500 OINTMENT TOPICAL at 09:10

## 2025-06-08 RX ADMIN — CEFEPIME HYDROCHLORIDE 2 G: 2 INJECTION, SOLUTION INTRAVENOUS at 22:02

## 2025-06-08 RX ADMIN — ALBUMIN HUMAN 25 G: 0.25 SOLUTION INTRAVENOUS at 10:05

## 2025-06-08 RX ADMIN — ROSUVASTATIN CALCIUM 5 MG: 5 TABLET, COATED ORAL at 09:14

## 2025-06-08 RX ADMIN — DILTIAZEM HYDROCHLORIDE 16.5 MG: 5 INJECTION, SOLUTION INTRAVENOUS at 21:02

## 2025-06-08 RX ADMIN — HYDROMORPHONE HYDROCHLORIDE 0.5 MG: 0.5 INJECTION, SOLUTION INTRAMUSCULAR; INTRAVENOUS; SUBCUTANEOUS at 18:37

## 2025-06-08 RX ADMIN — SODIUM CHLORIDE, POTASSIUM CHLORIDE, SODIUM LACTATE AND CALCIUM CHLORIDE 75 ML/HR: 600; 310; 30; 20 INJECTION, SOLUTION INTRAVENOUS at 18:34

## 2025-06-08 RX ADMIN — POTASSIUM CHLORIDE 20 MEQ: 14.9 INJECTION, SOLUTION INTRAVENOUS at 22:28

## 2025-06-08 RX ADMIN — POTASSIUM CHLORIDE 20 MEQ: 14.9 INJECTION, SOLUTION INTRAVENOUS at 09:11

## 2025-06-08 RX ADMIN — SODIUM CHLORIDE, POTASSIUM CHLORIDE, SODIUM LACTATE AND CALCIUM CHLORIDE 125 ML/HR: 600; 310; 30; 20 INJECTION, SOLUTION INTRAVENOUS at 07:24

## 2025-06-08 RX ADMIN — NOREPINEPHRINE BITARTRATE 0.01 MCG/KG/MIN: 8 INJECTION, SOLUTION INTRAVENOUS at 10:36

## 2025-06-08 RX ADMIN — HYDRALAZINE HYDROCHLORIDE 10 MG: 20 INJECTION INTRAMUSCULAR; INTRAVENOUS at 18:20

## 2025-06-08 RX ADMIN — MAGNESIUM SULFATE HEPTAHYDRATE 4 G: 40 INJECTION, SOLUTION INTRAVENOUS at 22:28

## 2025-06-08 RX ADMIN — HYDROCODONE BITARTRATE AND ACETAMINOPHEN 1 TABLET: 7.5; 325 TABLET ORAL at 09:17

## 2025-06-08 RX ADMIN — METOPROLOL TARTRATE 50 MG: 50 TABLET, FILM COATED ORAL at 20:42

## 2025-06-08 RX ADMIN — ANASTROZOLE 1 MG: 1 TABLET, COATED ORAL at 09:14

## 2025-06-08 RX ADMIN — METRONIDAZOLE 500 MG: 500 INJECTION, SOLUTION INTRAVENOUS at 02:47

## 2025-06-08 RX ADMIN — HYDROMORPHONE HYDROCHLORIDE 0.2 MG: 0.2 INJECTION, SOLUTION INTRAMUSCULAR; INTRAVENOUS; SUBCUTANEOUS at 01:01

## 2025-06-08 RX ADMIN — METRONIDAZOLE 500 MG: 500 INJECTION, SOLUTION INTRAVENOUS at 15:21

## 2025-06-08 RX ADMIN — HYDROMORPHONE HYDROCHLORIDE 0.5 MG: 0.5 INJECTION, SOLUTION INTRAMUSCULAR; INTRAVENOUS; SUBCUTANEOUS at 02:37

## 2025-06-08 RX ADMIN — SODIUM CHLORIDE, POTASSIUM CHLORIDE, SODIUM LACTATE AND CALCIUM CHLORIDE 100 ML/HR: 600; 310; 30; 20 INJECTION, SOLUTION INTRAVENOUS at 09:11

## 2025-06-08 RX ADMIN — ACETAMINOPHEN 650 MG: 325 TABLET ORAL at 18:20

## 2025-06-08 RX ADMIN — SODIUM CHLORIDE, POTASSIUM CHLORIDE, SODIUM LACTATE AND CALCIUM CHLORIDE 125 ML/HR: 600; 310; 30; 20 INJECTION, SOLUTION INTRAVENOUS at 02:44

## 2025-06-08 RX ADMIN — LABETALOL HYDROCHLORIDE 5 MG: 5 INJECTION, SOLUTION INTRAVENOUS at 15:28

## 2025-06-08 RX ADMIN — HYDROMORPHONE HYDROCHLORIDE 0.5 MG: 0.5 INJECTION, SOLUTION INTRAMUSCULAR; INTRAVENOUS; SUBCUTANEOUS at 10:12

## 2025-06-08 RX ADMIN — LEVOTHYROXINE SODIUM 25 MCG: 0.03 TABLET ORAL at 09:14

## 2025-06-08 RX ADMIN — HYDROMORPHONE HYDROCHLORIDE 0.5 MG: 0.5 INJECTION, SOLUTION INTRAMUSCULAR; INTRAVENOUS; SUBCUTANEOUS at 06:28

## 2025-06-08 SDOH — HEALTH STABILITY: MENTAL HEALTH: CURRENT SMOKER: 0

## 2025-06-08 ASSESSMENT — COGNITIVE AND FUNCTIONAL STATUS - GENERAL
CLIMB 3 TO 5 STEPS WITH RAILING: TOTAL
EATING MEALS: A LOT
PERSONAL GROOMING: A LOT
MOBILITY SCORE: 6
DRESSING REGULAR LOWER BODY CLOTHING: A LOT
WALKING IN HOSPITAL ROOM: TOTAL
TURNING FROM BACK TO SIDE WHILE IN FLAT BAD: TOTAL
MOVING TO AND FROM BED TO CHAIR: TOTAL
CLIMB 3 TO 5 STEPS WITH RAILING: TOTAL
STANDING UP FROM CHAIR USING ARMS: TOTAL
DRESSING REGULAR LOWER BODY CLOTHING: TOTAL
MOVING FROM LYING ON BACK TO SITTING ON SIDE OF FLAT BED WITH BEDRAILS: A LOT
HELP NEEDED FOR BATHING: A LOT
STANDING UP FROM CHAIR USING ARMS: TOTAL
DRESSING REGULAR UPPER BODY CLOTHING: TOTAL
PERSONAL GROOMING: TOTAL
MOBILITY SCORE: 7
MOVING TO AND FROM BED TO CHAIR: TOTAL
TOILETING: A LOT
TOILETING: TOTAL
MOVING FROM LYING ON BACK TO SITTING ON SIDE OF FLAT BED WITH BEDRAILS: TOTAL
EATING MEALS: TOTAL
DRESSING REGULAR UPPER BODY CLOTHING: A LOT
TURNING FROM BACK TO SIDE WHILE IN FLAT BAD: TOTAL
DAILY ACTIVITIY SCORE: 12
WALKING IN HOSPITAL ROOM: TOTAL
DAILY ACTIVITIY SCORE: 6
HELP NEEDED FOR BATHING: TOTAL

## 2025-06-08 ASSESSMENT — PAIN SCALES - GENERAL
PAINLEVEL_OUTOF10: 5 - MODERATE PAIN
PAINLEVEL_OUTOF10: 3
PAINLEVEL_OUTOF10: 3
PAINLEVEL_OUTOF10: 7
PAINLEVEL_OUTOF10: 4
PAINLEVEL_OUTOF10: 8
PAINLEVEL_OUTOF10: 10 - WORST POSSIBLE PAIN
PAINLEVEL_OUTOF10: 3
PAINLEVEL_OUTOF10: 6
PAINLEVEL_OUTOF10: 3
PAINLEVEL_OUTOF10: 5 - MODERATE PAIN
PAINLEVEL_OUTOF10: 3
PAINLEVEL_OUTOF10: 3
PAIN_LEVEL: 0
PAINLEVEL_OUTOF10: 5 - MODERATE PAIN
PAINLEVEL_OUTOF10: 3
PAINLEVEL_OUTOF10: 3
PAINLEVEL_OUTOF10: 7
PAINLEVEL_OUTOF10: 3

## 2025-06-08 ASSESSMENT — PAIN DESCRIPTION - DESCRIPTORS
DESCRIPTORS: SORE
DESCRIPTORS: SORE
DESCRIPTORS: DISCOMFORT;BURNING;PRESSURE
DESCRIPTORS: SORE

## 2025-06-08 ASSESSMENT — PAIN DESCRIPTION - ORIENTATION
ORIENTATION: MID;RIGHT
ORIENTATION: RIGHT
ORIENTATION: MID
ORIENTATION: MID

## 2025-06-08 ASSESSMENT — ENCOUNTER SYMPTOMS
FATIGUE: 1
FEVER: 0
SPEECH DIFFICULTY: 0
HEMATURIA: 0
EYE REDNESS: 0
WOUND: 1
WEAKNESS: 1
ARTHRALGIAS: 1
AGITATION: 0
COUGH: 0
FLANK PAIN: 0
NAUSEA: 0
MYALGIAS: 1
SHORTNESS OF BREATH: 0
CHILLS: 0
ABDOMINAL PAIN: 1
BRUISES/BLEEDS EASILY: 1
VOMITING: 0
CONFUSION: 0
EYE PAIN: 0
HEADACHES: 0

## 2025-06-08 ASSESSMENT — PAIN DESCRIPTION - LOCATION
LOCATION: ABDOMEN

## 2025-06-08 NOTE — ANESTHESIA PROCEDURE NOTES
Arterial Line:    Date/Time: 6/7/2025 10:31 PM    Staffing  Performed: attending   Authorized by: Victor Manuel Felix MD    Performed by: Victor Manuel Felix MD    An arterial line was placed. Procedure performed using surface landmarks.in the OR for the following indication(s): continuous blood pressure monitoring and blood sampling needed.    A 20 gauge (size), 1 and 3/4 inch (length), Arrow (type) catheter was placed into the Left radial artery, secured by Tegadeleonides   Seldinger technique used.  Events:  patient tolerated procedure well with no complications.

## 2025-06-08 NOTE — ANESTHESIA PROCEDURE NOTES
Peripheral IV  Date/Time: 6/7/2025 10:00 PM      Placement  Needle size: 20 G  Laterality: left  Location: hand  Local anesthetic: none  Site prep: alcohol  Technique: anatomical landmarks  Attempts: 1

## 2025-06-08 NOTE — ANESTHESIA PROCEDURE NOTES
Airway  Date/Time: 6/7/2025 9:42 PM  Reason: elective    Airway not difficult    Staffing  Performed: CRNA   Authorized by: DAISY Mccarty    Performed by: DAISY Mccarty  Patient location during procedure: OR    Patient Condition  Indications for airway management: anesthesia  Patient position: sniffing  Planned trial extubation  Sedation level: deep     Final Airway Details   Preoxygenated: yes  Final airway type: endotracheal airway  Successful airway: ETT  Cuffed: yes   Successful intubation technique: video laryngoscopy  Adjuncts used in placement: intubating stylet  Endotracheal tube insertion site: oral  Blade: Anthony  Blade size: #3  ETT size (mm): 7.5  Cormack-Lehane Classification: grade I - full view of glottis  Placement verified by: chest auscultation and capnometry   Measured from: lips  ETT to lips (cm): 20  Number of attempts at approach: 1    Additional Comments  Atraumatic, dentition and lips in pre-induction condition.

## 2025-06-08 NOTE — OP NOTE
Exploratory laparotomy with partial colectomy and colostomy placement Operative Note     Date: 2025  OR Location: [facility]    Name: Rosemary Motta, : 1945, Age: 79 y.o., MRN: [id number], Sex: female    Diagnosis  Preoperative diagnosis       Perforated sigmoid diverticulitis Postoperative diagnosis       Perforated sigmoid diverticulitis     Procedures  Exploratory laparotomy with partial colectomy and colostomy placement  [address] - MN COLECTOMY PRTL W/END COLOSTOMY & CLSR DSTL [facility]      Surgeons      * Cynthia Haywood - Primary    Resident/Fellow/Other Assistant:  Kamala Tompkins SA    Staff:   Surgical Assistant: Kamala Benson Person: Vandana  Circulator: Gurpreet    Anesthesia Staff: CRNA: VANDANA Mccarty-CRNA; VANDANA Samson-CRNA    Procedure Summary  Anesthesia: Anesthesia type not filed in the log.  ASA: ASA status not filed in the log.  Estimated Blood Loss: 25mL  Intra-op Medications:   Administrations occurring from 25 2130 to 25 0025:   Medication Name Total Dose   ciprofloxacin (Cipro) 400 mg in dextrose 5%  mL Cannot be calculated   fentaNYL PF 0.05 mg/mL 250 mcg   hydrALAZINE (Apresoline) injection 10 mg Cannot be calculated   metroNIDAZOLE (Flagyl) 500 mg in sodium chloride (iso)  mL Cannot be calculated   midazolam (Versed) 1 mg/1 mL 2 mg   morphine injection 1 mg Cannot be calculated   morphine injection 2 mg Cannot be calculated   phenylephrine (Dylan-Synephrine) 10 mg in sodium chloride 0.9% 250 mL (0.04 mg/mL) infusion (premix) 1.74 mg   propofol (Diprivan) injection 10 mg/mL 100 mg   rocuronium (ZeMuron) 50 mg/5 mL injection 20 mg   sugammadex (Bridion) 200 mg/2 mL injection 200 mg              Anesthesia Record               Intraprocedure I/O Totals          Intake    Phenylephrine Drip 0.00 mL    The total shown is the total volume documented since Anesthesia Start was filed.    Total Intake 0 mL          Specimen:   ID Type Source Tests Collected by Time    1 : SIGMOID COLON Tissue COLON - SIGMOID RESECTION SURGICAL PATHOLOGY EXAM Cynthia Haywood MD 6/7/2025 2231                 Drains and/or Catheters:   Colostomy LUQ (Active)       External Urinary Catheter Female (Active)   Present on Admission to Healthcare Facility N 06/07/25 1534   External Catheter Status In place 06/07/25 1534       Tourniquet Times:         Implants:     Findings: Short segment of mid sigmoid colon with inflammation and perforation.  Moderate amount of purulent fluid throughout the abdomen.    Indications: Rosemary Motta is an 79 y.o. female who is having surgery for Diverticulitis [K57.92].  She was admitted with a diagnosis of diverticulitis with microperforation.  She was admitted and started on IV antibiotics.  In the evening, her exam was concerning enough and surgical intervention was recommended.  She elected to proceed with surgery for with a Hanna's procedure.  Benefits and risk of this were reviewed with the patient and she signed consent.    The patient was seen in the preoperative area. The risks, benefits, complications, treatment options, non-operative alternatives, expected recovery and outcomes were discussed with the patient. The possibilities of reaction to medication, pulmonary aspiration, injury to surrounding structures, bleeding, recurrent infection, the need for additional procedures, failure to diagnose a condition, and creating a complication requiring transfusion or operation were discussed with the patient. The patient concurred with the proposed plan, giving informed consent.  The site of surgery was properly noted/marked if necessary per policy. The patient has been actively warmed in preoperative area. Preoperative antibiotics are not indicated.she was kept on her scheduled antibiotics medications.  Venous thrombosis prophylaxis have been ordered including bilateral sequential compression devices    Procedure Details:   She was brought into the operating  room and was laying in the supine position.  After placement under general anesthesia, knee-high ZAHRAA hose and SCDs were placed on bilateral lower extremities.  A Gabriel catheter was placed for urinary drainage.  An NG tube was placed for gastric drainage (at the 54 cm maria a).  The abdomen then was prepped with ChloraPrep and draped usual sterile fashion.  A pause was taken the correct patient and procedure were identified.    At this time a midline laparotomy incision was made from the mid epigastric region down to the pubic symphysis.  The previous midline incision below the umbilicus was excised for clean edges.  Upon entering into the abdomen, a moderate amount of purulent fluid was removed.  There was 1 loop of small bowel that was indurated but appeared to be next to the area of the diverticulitis.  On examination of the abdomen, the NG tube was palpated and repositioned at the 54 cm maria a.  The liver palpated normal without any obvious lesions.  The small bowel was run from the ligament of Treitz down to the terminal ileum without any abnormalities except for a short segment of indurated small bowel which was next to the diverticulitis.  The ascending, transverse and descending colon all appeared within normal limits.  She had a small fibroid on her uterus.  The left ovary/fallopian tube was somewhat indurated due to the associated diverticulitis, but the right fallopian tube/ovary was all within normal limits.  The sigmoid colon had a short segment of inflammation.  Doing a combination of finger fracture and sharp dissection, the sigmoid colon was mobilized away from the lateral abdominal wall.  The sigmoid colon then was mobilized by opening up the white line of Toldt.  This dissection went up to but not including the Bonnick flexure.  At this time healthy tissue both proximal and distal to the area of induration was identified and a JHONY stapler was used to staple off the bowel.  The LigaSure device was used to  take down the mesentery.  This dissection was kept close to the bowel as the concern for underlying malignancy was very minimal.  Once the specimen was removed, the remaining descending and proximal sigmoid colon was mobilized in anticipation of a colostomy formation.  At this time the abdomen then was copiously irrigated.  All the bowel was allowed to fall back in its natural position.  A 15 Hungarian Patrice drain was pulled in from the right lower quadrant and placed in the deep pelvis and left lower quadrant areas to decrease risk of postoperative abscess.  The drain was sewn in position using a 2-0 nylon suture and eventually placed to bulb suction.    At this time a standard ostomy opening was created in the left rectus muscle just slightly above the level of the umbilicus.  The colostomy was pulled through the opening with no tension on the bowel.  At this time the midline incision was closed using a running 0 PDS suture x 2.  The wound was irrigated and hemostasis was achieved using cautery.  The skin was loosely reapproximated using staples, and later strips of Telfa were placed between the staples to decrease risk of postoperative seroma and wound infection.    A towel then covered the midline incision and attention was placed towards the colostomy.  The staple line was removed using cautery.  A standard colostomy was created using multiple interrupted stitches of 2-0 Vicryl suture.  Several of the stitches were placed in a Diana fashion, but only interrupted stitches were used near the area of the mesentery.  Once the stitches were all placed, they were tied and cut.  The bowel remained pink and viable at the end of the procedure.  A standard colostomy pouch was applied.  At this time the midline incision then was dressed with gauze/tape dressing.  The patient then was awoken from anesthesia and taken to the recovery room in fair condition.  She will be sent to the ICU postoperatively due to the need for  intermittent pressor support perioperatively.    Complications: None.  Counts: Correct x 2  Drains: 15 Belarusian Patrice drain in the pelvis and left lower quadrant.    Evidence of Infection: Yes; Purulent fluid Below the level of the fascia (organ/space)  Complications:  None; patient tolerated the procedure well.    Disposition: ICU - extubated and stable.  Condition: stable         Task Performed by RNFA or Surgical Assistant:  The surgical assist assisted with positioning, prepping, surgical retraction and closure during the procedure.          Additional Details:     Attending Attestation: I performed the procedure.    Cynthia Haywood  Phone Number: [phone]

## 2025-06-08 NOTE — CONSULTS
Infectious Disease Inpatient Consult    Inpatient consult to Infectious Diseases  Consult performed by: Mikal Ceballos MD  Consult ordered by: Jr Calles MD        Primary MD: Prema Romero DO    Reason For Consult  Acute diverticulitis, penicillin allergy, bowel perforation      Assessment/Plan:    #Sepsis  #Acute diverticulitis  #Bowel perforation s/p ostomy 6/7/2025  No blood cultures were obtained.  Patient started on ciprofloxacin and Flagyl due to penicillin allergy.  Patient was found to have short segment of mid sigmoid colon with inflammation and perforation with moderate amount of purulent fluid throughout the abdomen.  No cultures obtained.    #Penicillin allergy  Reports history of syncope when she was a child.  Discussed with the patient that it is safe to get cefepime as it does not have cross-reactivity    #CKD  Estimated Creatinine Clearance: 35.5 mL/min (by C-G formula based on SCr of 0.98 mg/dL).    HTN, HLD  Breast cancer s/p right mastectomy, on anastrozole  GERD  Hypothyroidism    Recommendations:    -DC Cipro  -Start cefepime 2 g every 12hr  -Renally dose antibiotic  -No cultures obtained  -Decrease Flagyl dose to 500 mg twice daily  -Thank you for consult.  Will continue to follow    Complex antimicrobial therapy counseling and treatment.  Rationale for switching antibiotics discussed.  Education provided regarding long-term antimicrobial therapy as well as side effects. Discussed with ICU attending.  Cipro discontinued to minimize the risk of C. difficile      Mikal Ceballos MD  Date of service: 6/8/2025  Time of service: 10:42 AM      History Of Present Illness  Rosemary Motta is a 79 y.o. female with PMHx of HTN, breast ca, diverticulosis, GERD, hypothyroidism, neuropathy, migraines, OA, anxiety/depression presented to the hospital with complaint of abdominal pain, sudden onset along with nausea and vomiting.    On admission, afebrile but then had a Tmax of 100.6, HR 96.   Labs showed WBC count 8.1, Hb 13.2, platelet 169, potassium 4, creatinine 1.29, AST ALT normal.  UA shows pyuria.  Urine cultures were drawn and patient started on ciprofloxacin and Flagyl.  CT abdomen/pelvis shows findings concerning for acute diverticulitis with perforation.  Patient was taken emergently to the OR yesterday.  ID consulted for further antibody recommendation     Past Medical History  She has a past medical history of Anxiety and depression, Breast cancer, CKD (chronic kidney disease), HLD (hyperlipidemia), HTN (hypertension), Hypothyroidism, and Pulmonary embolism.    Surgical History  She has a past surgical history that includes Cholecystectomy; Carpal tunnel release (Bilateral);  section, classic; Total hip arthroplasty (Left); Mastectomy (Right, 2018); Breast biopsy; Foot surgery (Bilateral); and Colostomy (2025).     Social History     Occupational History    Not on file   Tobacco Use    Smoking status: Never    Smokeless tobacco: Never   Vaping Use    Vaping status: Never Used   Substance and Sexual Activity    Alcohol use: Not Currently    Drug use: Never    Sexual activity: Not on file     Travel History   Travel since 24    No documented travel since 24              Family History  Family History[1]  Allergies  Penicillins, Morphine, Clarithromycin, Hydrochlorothiazide, Tetracycline, Cortisone, Methylprednisolone, Nifedipine, and Prednisone     Immunization History   Administered Date(s) Administered    COVID-19, mRNA, LNP-S, PF, 30 mcg/0.3 mL dose 2021, 2021, 2021    Flu vaccine (IIV4), preservative free *Check age/dose* 2016, 2018, 2020    Flu vaccine, trivalent, preservative free, HIGH-DOSE, age 65y+ (Fluzone) 2015, 2017    Influenza, Seasonal, Quadrivalent, Adjuvanted 10/01/2021, 10/11/2022    Influenza, trivalent, adjuvanted 10/12/2019    Pfizer COVID-19 vaccine, 12 years and older, (30mcg/0.3mL) (Comirnaty)  "10/10/2023, 10/24/2024    Pfizer COVID-19 vaccine, bivalent, age 12 years and older (30 mcg/0.3 mL) 10/24/2022    Pfizer Gray Cap SARS-CoV-2 2022    Pneumococcal conjugate vaccine, 13-valent (PREVNAR 13) 2017    Pneumococcal polysaccharide vaccine, 23-valent, age 2 years and older (PNEUMOVAX 23) 2018    Tdap vaccine, age 7 year and older (BOOSTRIX, ADACEL) 2017     Medications  Home medications:  Prescriptions Prior to Admission[2]  Current medications:  Scheduled medications  Scheduled Medications[3]  Continuous medications  Continuous Medications[4]  PRN medications  PRN Medications[5]    Review of Systems     Constitutional: Reports fever, chills  HENT:  Denies ear discharge, ear pain, sore throat    Eyes:  Denies photophobia, eye drainage  Respiratory:  Denies cough, choking, chest tightness, shortness of breath  Cardiovascular:  Denies chest pain, palpitations  Gastrointestinal: Reports abdominal pain, nausea, vomiting  Genitourinary:  Denies dysuria, flank pain, frequency  Musculoskeletal:  Denies joint pain  Skin:  Denies ulcer and rash   Neurological:  Denies light-headedness, numbness and headaches.    Objective  Range of Vitals (last 24 hours)  Heart Rate:  []   Temp:  [36.8 °C (98.3 °F)-38.1 °C (100.6 °F)]   Resp:  [12-28]   BP: (117-173)/(48-73)   Weight:  [64.7 kg (142 lb 10.2 oz)-66.7 kg (147 lb 0.8 oz)]   SpO2:  [79 %-100 %]   Daily Weight  25 : 66.7 kg (147 lb 0.8 oz)    Body mass index is 30.73 kg/m².     Physical Exam  /51 (BP Location: Right leg)   Pulse 80   Temp 36.9 °C (98.5 °F) (Temporal)   Resp 13   Ht 1.473 m (4' 10\")   Wt 66.7 kg (147 lb 0.8 oz)   SpO2 100%   BMI 30.73 kg/m²   Temp (24hrs), Av.3 °C (99.2 °F), Min:36.8 °C (98.3 °F), Max:38.1 °C (100.6 °F)      General: alert, oriented, NAD  HEENT: No conjunctival pallor, no scleral icterus  Lungs: bilaterally clear to auscultation, no wheezing  Heart: regular rate and rhythm, no " "murmur  Abdomen: soft, ostomy bag, abdominal binder  Neuro: AAO x 3  Extremities: no edema, no cyanosis  Skin: No rashes or ulcers  MSK: No joint inflammation     Relevant Results    Labs  Results from last 72 hours   Lab Units 06/08/25 0420 06/07/25  0540 06/07/25  0222   WBC AUTO x10*3/uL 8.8 8.6 8.1   HEMOGLOBIN g/dL 11.4* 12.4 13.2   HEMATOCRIT % 34.0* 36.7 39.8   PLATELETS AUTO x10*3/uL 124* 144* 169   NEUTROS PCT AUTO %  --   --  80.3   LYMPHS PCT AUTO %  --   --  14.4   MONOS PCT AUTO %  --   --  3.7   EOS PCT AUTO %  --   --  0.7     Results from last 72 hours   Lab Units 06/08/25 0420 06/07/25  0540 06/07/25 0222   SODIUM mmol/L 139 139 143   POTASSIUM mmol/L 3.4* 3.5 4.0   CHLORIDE mmol/L 107 108* 107   CO2 mmol/L 23 24 28   BUN mg/dL 16 25* 26*   CREATININE mg/dL 0.98 1.17* 1.29*   GLUCOSE mg/dL 152* 158* 122*   CALCIUM mg/dL 7.7* 8.7 9.8   ANION GAP mmol/L 12 11 12   EGFR mL/min/1.73m*2 58* 47* 42*     Results from last 72 hours   Lab Units 06/08/25 0420 06/07/25 0540 06/07/25 0222   ALK PHOS U/L 39 43 47   BILIRUBIN TOTAL mg/dL 0.9 0.6 0.6   PROTEIN TOTAL g/dL 4.9* 5.7* 6.4   ALT U/L 15 13 13   AST U/L 22 16 16   ALBUMIN g/dL 3.1* 3.7 4.3     Estimated Creatinine Clearance: 35.5 mL/min (by C-G formula based on SCr of 0.98 mg/dL).  C-Reactive Protein   Date Value Ref Range Status   03/02/2024 <0.10 <1.00 mg/dL Final     Sedimentation Rate   Date Value Ref Range Status   03/02/2024 8 0 - 30 mm/h Final     No results found for: \"HIV1X2\", \"HIVCONF\", \"IZJJFV0DU\"  No results found for: \"HEPCABINIT\", \"HEPCAB\", \"HCVPCRQUANT\"    Microbiology  No results found for the last 14 days.      Imaging  CT abdomen pelvis w IV contrast  Result Date: 6/7/2025  1. Diverticulosis of the sigmoid colon. There is inflammatory change noted about the proximal to mid sigmoid colon most concerning for acute diverticulitis. Multiple free air locules are noted adjacent to this inflamed segment of sigmoid colon as well as " small-to-moderate amount of free air within the upper abdomen consistent with perforation. No overt abscess identified.   MACRO: Donovan Ocampo discussed the significance and urgency of this critical finding by telephone with  KONG HILL on 6/7/2025 at 3:39 am. (**-RCF-**) Findings:  See findings.   Signed by: Donovan Ocampo 6/7/2025 3:40 AM Dictation workstation:   [id number]             [1]   Family History  Problem Relation Name Age of Onset    Breast cancer Mother  65   [2]   Medications Prior to Admission   Medication Sig Dispense Refill Last Dose/Taking    anastrozole (Arimidex) 1 mg tablet Take 1 tablet (1 mg total) by mouth once daily.       artificial tears, dextran-hypomel-glycerin, 0.1-0.3-0.2 % ophthalmic solution Administer 1 drop into affected eye(s) 4 times a day as needed.       aspirin 81 mg EC tablet Take 1 tablet (81 mg) by mouth once daily.       calcium carbonate-vitamin D3 500 mg-5 mcg (200 unit) tablet Take 2 tablets by mouth once daily.       levothyroxine (Synthroid, Levoxyl) 25 mcg tablet Take 1 tablet (25 mcg) by mouth once daily.       metoprolol tartrate (Lopressor) 50 mg tablet Take 3 tablets by mouth twice a day.       olmesartan (BENIcar) 40 mg tablet Take 1 tablet (40 mg) by mouth once daily.       polyethylene glycol (Glycolax, Miralax) 17 gram packet Take 8.5 g by mouth once daily.      [3] albumin human, 25 g, intravenous, Once  anastrozole, 1 mg, oral, Daily  aspirin, 81 mg, oral, Daily  bacitracin, , Topical, Daily  cefepime, 2 g, intravenous, q12h  [Held by provider] citalopram, 10 mg, oral, Daily  [START ON 6/9/2025] enoxaparin, 30 mg, subcutaneous, q24h  levothyroxine, 25 mcg, oral, Daily  metoprolol tartrate, 150 mg, oral, BID  metroNIDAZOLE, 500 mg, intravenous, q12h  potassium chloride, 20 mEq, intravenous, Once  rosuvastatin, 5 mg, oral, Daily    [4] lactated Ringer's, 125 mL/hr, Last Rate: Stopped (06/08/25 0916)  lactated Ringer's, 100 mL/hr, Last Rate: 100 mL/hr  (06/08/25 0911)  norepinephrine, 0-0.5 mcg/kg/min, Last Rate: 0.01 mcg/kg/min (06/08/25 1036)    [5] PRN medications: acetaminophen, hydrALAZINE, HYDROcodone-acetaminophen, HYDROmorphone, melatonin, [DISCONTINUED] ondansetron **OR** ondansetron

## 2025-06-08 NOTE — SIGNIFICANT EVENT
After the patient had talked with her family, she has now elected to proceed with surgical intervention tonight.  Will do a Hanna's procedure with end colostomy.  Benefits and risk of this procedure were reviewed with the patient.  Risk included, but not limited to, infection, bleeding, injury to surrounding tissue, possible need for other procedure, nonresolution of all symptoms, hernia formation at incision sites, wound complications/infection, possible need for further surgery, allergic reaction to medication and even death.

## 2025-06-08 NOTE — PROGRESS NOTES
Physical Therapy                 Therapy Communication Note    Patient Name: Rosemary Motta  MRN: [id number]  Department: [facility]  Room: 03/03-A  Today's Date: 6/8/2025     Discipline: Physical Therapy    Missed Visit: PT Missed Visit: Yes     Missed Visit Reason: Patient placed on medical hold (initiated pt set-up for PT EVAL/mobility at 1413 however pt BP fluctuating low/high so RN asked to defer until later. On later attempt RN notes ongoing BP issues, pt not stable today for mobility)

## 2025-06-08 NOTE — PROGRESS NOTES
"    GENERAL SURGERY / TRAUMA PROGRESS NOTE    Patient: Rosemary Motta  Room: 03/03-A    Age: 79 y.o.   Gender: female  Attending: Dallas Montiel MD PhD    MRN: [id number]  Admission Date: 6/7/2025    PCP: Prema Romero DO       Rosemary Motta is a 79 y.o. female on day 1  of admission presenting with Diverticulitis of large intestine with perforation without bleeding.    SUBJECTIVE   Interval History:  Pressors started for hypotension overnight.  Beta-blocker on hold.  Patient states that she is having some incisional pain, but this is different than the pain in her abdomen from yesterday.  NG tube output is bilious.  Abdominal drain is cloudy, serosanguineous output.  Colostomy is pink and viable.  Urine output is marginal.    ROS  Review of Systems   Constitutional:  Positive for fatigue. Negative for chills and fever.   HENT:  Positive for hearing loss. Negative for congestion and ear pain.    Eyes:  Negative for pain and redness.   Respiratory:  Negative for cough and shortness of breath.    Cardiovascular:  Positive for leg swelling. Negative for chest pain.   Gastrointestinal:  Positive for abdominal pain. Negative for nausea and vomiting.   Genitourinary:  Negative for flank pain and hematuria.   Musculoskeletal:  Positive for arthralgias and myalgias.   Skin:  Positive for wound. Negative for rash.   Allergic/Immunologic: Negative for immunocompromised state.   Neurological:  Positive for weakness. Negative for speech difficulty and headaches.   Hematological:  Bruises/bleeds easily.   Psychiatric/Behavioral:  Negative for agitation and confusion.           OBJECTIVE   Last Recorded Vitals  Blood pressure (!) 119/39, pulse 80, temperature 36.9 °C (98.5 °F), temperature source Temporal, resp. rate 10, height 1.473 m (4' 10\"), weight 66.7 kg (147 lb 0.8 oz), SpO2 100%.    Intake/Output last 3 Shifts:  I/O last 3 completed shifts:  In: 5283.1 (79.2 mL/kg) [I.V.:4183.1 (62.7 mL/kg); IV Piggyback:1100]  Out: " 450 (6.7 mL/kg) [Urine:290 (0.1 mL/kg/hr); Emesis/NG output:50; Drains:110]  Weight: 66.7 kg     PHYSICAL EXAM  Physical Exam   General: Well-developed, well-nourished and in no acute distress.  Head: Normocephalic. Atraumatic.  Neck/thyroid: Neck is supple.   Eyes: Pupils equal round and reactive to light. Conjunctiva normal.  ENMT: No masses or deformity of external nose. External ears without masses.  Respiratory/Chest:  Normal respiratory effort.  Breast: s/p right mastectomy  Cardiovascular: Regular rate and rhythm.   Abdomen: Soft and nondistended.  Midline incision is clean, dry and intact.  Colostomy in the left lower quadrant is pink and viable with minimal serosanguineous output.  Abdominal drain is cloudy with serosanguineous fluid.  Musculoskeletal: Joints and limbs are grossly normal.   Neuro: Oriented to person, place and time. No obvious neurological deficit.   Psych: Normal mood and affect.  Awake and talkative.    RESULTS   Labs  Results for orders placed or performed during the hospital encounter of 06/07/25 (from the past 24 hours)   CBC   Result Value Ref Range    WBC 8.8 4.4 - 11.3 x10*3/uL    nRBC 0.0 0.0 - 0.0 /100 WBCs    RBC 3.63 (L) 4.00 - 5.20 x10*6/uL    Hemoglobin 11.4 (L) 12.0 - 16.0 g/dL    Hematocrit 34.0 (L) 36.0 - 46.0 %    MCV 94 80 - 100 fL    MCH 31.4 26.0 - 34.0 pg    MCHC 33.5 32.0 - 36.0 g/dL    RDW 13.2 11.5 - 14.5 %    Platelets 124 (L) 150 - 450 x10*3/uL   Comprehensive metabolic panel   Result Value Ref Range    Glucose 152 (H) 74 - 99 mg/dL    Sodium 139 136 - 145 mmol/L    Potassium 3.4 (L) 3.5 - 5.3 mmol/L    Chloride 107 98 - 107 mmol/L    Bicarbonate 23 21 - 32 mmol/L    Anion Gap 12 10 - 20 mmol/L    Urea Nitrogen 16 6 - 23 mg/dL    Creatinine 0.98 0.50 - 1.05 mg/dL    eGFR 58 (L) >60 mL/min/1.73m*2    Calcium 7.7 (L) 8.6 - 10.3 mg/dL    Albumin 3.1 (L) 3.4 - 5.0 g/dL    Alkaline Phosphatase 39 33 - 136 U/L    Total Protein 4.9 (L) 6.4 - 8.2 g/dL    AST 22 9 - 39 U/L     Bilirubin, Total 0.9 0.0 - 1.2 mg/dL    ALT 15 7 - 45 U/L   Lactate   Result Value Ref Range    Lactate 1.1 0.4 - 2.0 mmol/L       Radiology Resutls  Imaging  XR chest 1 view  Result Date: 6/8/2025  A nasogastric tube projects into the stomach beyond the field of imaging.   Mild cardiomegaly.   Minimal linear bibasilar atelectasis. Otherwise no sign of acute cardiopulmonary disease.     MACRO: None   Signed by: Mello Fried 6/8/2025 12:33 PM Dictation workstation:   [id number]    CT abdomen pelvis w IV contrast  Result Date: 6/7/2025  1. Diverticulosis of the sigmoid colon. There is inflammatory change noted about the proximal to mid sigmoid colon most concerning for acute diverticulitis. Multiple free air locules are noted adjacent to this inflamed segment of sigmoid colon as well as small-to-moderate amount of free air within the upper abdomen consistent with perforation. No overt abscess identified.   MACRO: Donovan Ocampo discussed the significance and urgency of this critical finding by telephone with  KONG HILL on 6/7/2025 at 3:39 am. (**-RCF-**) Findings:  See findings.   Signed by: Donovan Ocampo 6/7/2025 3:40 AM Dictation workstation:   [id number]      Cardiology, Vascular, and Other Imaging  No other imaging results found for the past 2 days         ASSESSMENT / PLAN     Assessment & Plan  Diverticulitis of large intestine with perforation without bleeding         PLAN  79-year-old white female s/p Hanna's procedure for perforated sigmoid diverticulitis 6/7/2025    Continue NG tube decompression until she has return of bowel function.  Routine colostomy care.  Encourage patient to get out of bed at least up to a chair today.  She will need PT/OT evaluation.  Discussed possible need for short-term rehab placement upon discharge pending PT/OT evaluations.  Continue Gabriel catheter due to need for ongoing monitoring of urine output in a critically ill patient.  Other supportive care (IV  fluids, pressor support, etc.) per medicine service/ICU team.  Antibiotics per infectious disease.  Pathology pending.  PUD/DVT prophylaxis.      Cynthia Haywood MD, FACS  [facility]  [address] [place] [address];   [address]; [address]  [place]  [address] [phone]

## 2025-06-08 NOTE — ANESTHESIA PREPROCEDURE EVALUATION
Patient: Rosemary Motta    Procedure Information       Anesthesia Start Date/Time: 06/07/25 2135    Procedure: Exploratory laparotomy with partial colectomy and colostomy placement    Location: [facility] / [facility]    Surgeons: Cynthia Haywood MD            Relevant Problems   Cardiac   (+) HTN (hypertension)      Neuro   (+) Chronic headaches      GI   (+) GERD (gastroesophageal reflux disease)      Musculoskeletal   (+) Osteoarthritis      GYN   (+) Invasive carcinoma of breast       Clinical information reviewed:   Tobacco  Allergies  Meds  Problems  Med Hx  Surg Hx   Fam Hx  Soc   Hx        NPO Detail:  No data recorded     Physical Exam    Airway  Mallampati: II  TM distance: >3 FB  Neck ROM: full  Mouth opening: 3 or more finger widths     Cardiovascular - normal exam   Dental - normal exam     Pulmonary - normal exam   Abdominal - normal exam           Anesthesia Plan    History of general anesthesia?: yes  History of complications of general anesthesia?: no    ASA 3 - emergent     general   (A-line  GA)  The patient is not a current smoker.    intravenous induction   Postoperative pain plan includes opioids.  Anesthetic plan and risks discussed with patient.  Use of blood products discussed with patient who consented to blood products.    Plan discussed with CRNA.

## 2025-06-08 NOTE — PROGRESS NOTES
"[facility] MEDICINE PROGRESS NOTE    Subjective     Pt denies nausea, says pain meds are effective.     Objective     I personally reviewed all pertinent labwork, imaging and vital signs, as well as nursing, therapy, discharge planning and consult notes.     Visit Vitals  /51 (BP Location: Right leg)   Pulse 78   Temp 36.9 °C (98.5 °F) (Temporal)   Resp 14   Ht 1.473 m (4' 10\")   Wt 66.7 kg (147 lb 0.8 oz)   SpO2 100%   BMI 30.73 kg/m²   OB Status Postmenopausal   Smoking Status Never   BSA 1.65 m²       Vitals:    06/08/25 0600   Weight: 66.7 kg (147 lb 0.8 oz)       Scheduled medications  Scheduled Medications[1]  Continuous medications  Continuous Medications[2]  PRN medications  PRN Medications[3]    The following labwork was reviewed:  Results for orders placed or performed during the hospital encounter of 06/07/25 (from the past 24 hours)   CBC   Result Value Ref Range    WBC 8.8 4.4 - 11.3 x10*3/uL    nRBC 0.0 0.0 - 0.0 /100 WBCs    RBC 3.63 (L) 4.00 - 5.20 x10*6/uL    Hemoglobin 11.4 (L) 12.0 - 16.0 g/dL    Hematocrit 34.0 (L) 36.0 - 46.0 %    MCV 94 80 - 100 fL    MCH 31.4 26.0 - 34.0 pg    MCHC 33.5 32.0 - 36.0 g/dL    RDW 13.2 11.5 - 14.5 %    Platelets 124 (L) 150 - 450 x10*3/uL   Comprehensive metabolic panel   Result Value Ref Range    Glucose 152 (H) 74 - 99 mg/dL    Sodium 139 136 - 145 mmol/L    Potassium 3.4 (L) 3.5 - 5.3 mmol/L    Chloride 107 98 - 107 mmol/L    Bicarbonate 23 21 - 32 mmol/L    Anion Gap 12 10 - 20 mmol/L    Urea Nitrogen 16 6 - 23 mg/dL    Creatinine 0.98 0.50 - 1.05 mg/dL    eGFR 58 (L) >60 mL/min/1.73m*2    Calcium 7.7 (L) 8.6 - 10.3 mg/dL    Albumin 3.1 (L) 3.4 - 5.0 g/dL    Alkaline Phosphatase 39 33 - 136 U/L    Total Protein 4.9 (L) 6.4 - 8.2 g/dL    AST 22 9 - 39 U/L    Bilirubin, Total 0.9 0.0 - 1.2 mg/dL    ALT 15 7 - 45 U/L   Lactate   Result Value Ref Range    Lactate 1.1 0.4 - 2.0 mmol/L       The following imaging was reviewed:  CT abdomen pelvis w IV " contrast  Result Date: 6/7/2025  1. Diverticulosis of the sigmoid colon. There is inflammatory change noted about the proximal to mid sigmoid colon most concerning for acute diverticulitis. Multiple free air locules are noted adjacent to this inflamed segment of sigmoid colon as well as small-to-moderate amount of free air within the upper abdomen consistent with perforation. No overt abscess identified.   MACRO: Donovan Ocampo discussed the significance and urgency of this critical finding by telephone with  KONG HILL on 6/7/2025 at 3:39 am. (**-RCF-**) Findings:  See findings.   Signed by: Donovan Ocampo 6/7/2025 3:40 AM Dictation workstation:   [id number]      Constitutional: Well developed, awake, alert, calm, oriented x4, no acute distress, cooperative   Eyes: EOMI, clear sclerae   ENMT: mucous membranes moist, no lesions seen   Head/Neck: Neck supple, no apparent injury, head atraumatic   Respiratory/Thorax: CTAB, good chest expansion, respirations even and unlabored, pt is on 4L O2   Cardiovascular: Regular rate and rhythm, no murmurs/rubs/gallops, normal S1 and S 2   Gastrointestinal: Abdomen nondistended, soft, tender, hypoactive BS, no bruits, NGT in place with clear brown/green output   Musculoskeletal: ROM intact, no joint swelling, normal  strength   Extremities: no cyanosis, contusions or clubbing, or edema   Neurological: no focal deficit, pt alert and oriented x4   Psychological: Appropriate affect and behavior, pleasant   Skin: Warm and dry, no lesions, no rashes       Medical History[4]    Assessment/Plan     Recurrent diverticulitis c/b perforation  POD #1 partial colectomy and colostomy placement   Management per surgery service  ID on board, pt is on cefepime and Flagyl   NG in place to LIS  Continue pain and nausea regimen    Acute hypoxic respiratory failure  Pt is on 4L O2, not on home O2  Wean O2 as tolerated to maintain O2 sat >91%, pt may need home O2 eval      MENG on CKD, resolved    Likely pre-renal 2/2 above, baseline creatinine is ~1  Olmesartan held by surgery for mild hypotension    DVT ppx: Lovenox    Discharge disposition  Discharge when medically stable         Alicia Farley CNP  [facility]         [1] albumin human, 25 g, intravenous, Once  anastrozole, 1 mg, oral, Daily  aspirin, 81 mg, oral, Daily  bacitracin, , Topical, Daily  cefepime, 2 g, intravenous, q12h  [Held by provider] citalopram, 10 mg, oral, Daily  [START ON 6/9/2025] enoxaparin, 30 mg, subcutaneous, q24h  levothyroxine, 25 mcg, oral, Daily  metoprolol tartrate, 150 mg, oral, BID  metroNIDAZOLE, 500 mg, intravenous, q12h  potassium chloride, 20 mEq, intravenous, Once  rosuvastatin, 5 mg, oral, Daily     [2] lactated Ringer's, 125 mL/hr, Last Rate: Stopped (06/08/25 0916)  lactated Ringer's, 100 mL/hr, Last Rate: 100 mL/hr (06/08/25 0911)  norepinephrine, 0-0.5 mcg/kg/min, Last Rate: 0.01 mcg/kg/min (06/08/25 1036)     [3] PRN medications: acetaminophen, hydrALAZINE, HYDROcodone-acetaminophen, HYDROmorphone, melatonin, [DISCONTINUED] ondansetron **OR** ondansetron  [4]   Past Medical History:  Diagnosis Date    Anxiety and depression     Breast cancer     CKD (chronic kidney disease)     HLD (hyperlipidemia)     HTN (hypertension)     Hypothyroidism     Pulmonary embolism     postop

## 2025-06-08 NOTE — CONSULTS
Critical Care Medicine Consult      Reason For Consult  Sepsis    History Of Present Illness  Rosemary Motta is a 79 y.o. female with underlying history below.    Patient has underlying history of diverticulitis with last attack being January where she is admitted at [facility] in [place].    He presented to the hospital on June 7, 2025 with abdominal pain.  Imaging study consistent with perforated diverticulitis.  Patient was taken to the OR yesterday evening with partial colectomy and colostomy placement.  She was admitted to the ICU postoperatively for closer monitoring.    On today's encounter she is awake responsive answering questions appropriately.  She states that her abdominal pain is much better.  She denies any prior history of coronary artery disease, no strokes, no heart attacks, no lung disease.    Lactic acid is currently running 125 cc an hour.  Medical History[1]  Surgical History[2]  Prescriptions Prior to Admission[3]  Penicillins, Morphine, Clarithromycin, Hydrochlorothiazide, Tetracycline, Cortisone, Methylprednisolone, Nifedipine, and Prednisone  Social History[4]  Family History[5]    Scheduled Medications:   Scheduled Medications[6]     Continuous Medications:   Continuous Medications[7]     PRN Medications:   PRN Medications[8]    Review of Systems:  Review of Systems negative other than stated above    Objective   Vitals:  Most Recent:  Vitals:    06/08/25 0800   BP:    Pulse: 83   Resp: 13   Temp:    SpO2:        24hr Min/Max:  Temp  Min: 36.8 °C (98.3 °F)  Max: 38.1 °C (100.6 °F)  Pulse  Min: 73  Max: 100  BP  Min: 117/55  Max: 173/49  Resp  Min: 12  Max: 28  SpO2  Min: 79 %  Max: 100 %    LDA:   Arterial Line 06/07/25 Left Radial (Active)   Placement Date/Time: 06/07/25 (c) 2234   Size: 20 G  Orientation: Left  Location: Radial  Securement Method: Transparent dressing  Patient Tolerance: Tolerated well   Number of days: 0       Urethral Catheter 16 Fr. (Active)   Placement Date:  06/07/25   Placed by: in OR  Tube Size (Fr.): 16 Fr.  Catheter Balloon Size: 10 mL  Urine Returned: Yes   Number of days: 1       Closed/Suction Drain 1 Right RLQ Bulb (Active)   Placement Date: 06/07/25   Tube Number: 1  Orientation: Right  Location: RLQ  Drain Tube Type: Bulb   Number of days: 1       NG/OG/Feeding Tube NG - [place] sump Right nostril (Active)   Placement Date: 06/07/25   Type of Tube: NG/OG Tube  Tube Type: NG - [place] sump  Tube Location: Right nostril   Number of days: 1       Colostomy LUQ (Active)   Placement Date: 06/07/25   Location: LUQ   Number of days: 1         Vent settings:       Hemodynamic parameters for last 24 hours:         Intake/Output Summary (Last 24 hours) at 6/8/2025 0809  Last data filed at 6/8/2025 0609  Gross per 24 hour   Intake 4483.08 ml   Output 450 ml   Net 4033.08 ml           Physical exam:    Physical Exam  Vitals and nursing note reviewed.   Constitutional:       Appearance: Normal appearance.   HENT:      Head: Normocephalic and atraumatic.      Nose:      Comments: NG in place     Mouth/Throat:      Mouth: Mucous membranes are dry.   Eyes:      Extraocular Movements: Extraocular movements intact.      Pupils: Pupils are equal, round, and reactive to light.   Cardiovascular:      Rate and Rhythm: Normal rate and regular rhythm.   Pulmonary:      Effort: Pulmonary effort is normal.      Breath sounds: Normal breath sounds.   Abdominal:      Comments: Ostomy present, abdomen is soft   Genitourinary:     Comments: Gabriel present with yellow urine  Musculoskeletal:         General: No swelling.   Skin:     General: Skin is dry.      Capillary Refill: Capillary refill takes 2 to 3 seconds.   Neurological:      General: No focal deficit present.      Mental Status: She is alert and oriented to person, place, and time.   Psychiatric:         Mood and Affect: Mood normal.         Behavior: Behavior normal.          Lab/Radiology/Diagnostic Review:  Results for orders  placed or performed during the hospital encounter of 06/07/25 (from the past 24 hours)   CBC   Result Value Ref Range    WBC 8.8 4.4 - 11.3 x10*3/uL    nRBC 0.0 0.0 - 0.0 /100 WBCs    RBC 3.63 (L) 4.00 - 5.20 x10*6/uL    Hemoglobin 11.4 (L) 12.0 - 16.0 g/dL    Hematocrit 34.0 (L) 36.0 - 46.0 %    MCV 94 80 - 100 fL    MCH 31.4 26.0 - 34.0 pg    MCHC 33.5 32.0 - 36.0 g/dL    RDW 13.2 11.5 - 14.5 %    Platelets 124 (L) 150 - 450 x10*3/uL   Comprehensive metabolic panel   Result Value Ref Range    Glucose 152 (H) 74 - 99 mg/dL    Sodium 139 136 - 145 mmol/L    Potassium 3.4 (L) 3.5 - 5.3 mmol/L    Chloride 107 98 - 107 mmol/L    Bicarbonate 23 21 - 32 mmol/L    Anion Gap 12 10 - 20 mmol/L    Urea Nitrogen 16 6 - 23 mg/dL    Creatinine 0.98 0.50 - 1.05 mg/dL    eGFR 58 (L) >60 mL/min/1.73m*2    Calcium 7.7 (L) 8.6 - 10.3 mg/dL    Albumin 3.1 (L) 3.4 - 5.0 g/dL    Alkaline Phosphatase 39 33 - 136 U/L    Total Protein 4.9 (L) 6.4 - 8.2 g/dL    AST 22 9 - 39 U/L    Bilirubin, Total 0.9 0.0 - 1.2 mg/dL    ALT 15 7 - 45 U/L     Imaging  CT abdomen pelvis w IV contrast  Result Date: 6/7/2025  1. Diverticulosis of the sigmoid colon. There is inflammatory change noted about the proximal to mid sigmoid colon most concerning for acute diverticulitis. Multiple free air locules are noted adjacent to this inflamed segment of sigmoid colon as well as small-to-moderate amount of free air within the upper abdomen consistent with perforation. No overt abscess identified.   MACRO: Donovan Ocampo discussed the significance and urgency of this critical finding by telephone with  KONG HILL on 6/7/2025 at 3:39 am. (**-RCF-**) Findings:  See findings.   Signed by: Donovan Ocampo 6/7/2025 3:40 AM Dictation workstation:   [id number]      Cardiology, Vascular, and Other Imaging  No other imaging results found for the past 7 days      Assessment/Plan   Assessment & Plan  Diverticulitis of large intestine with perforation without  bleeding    79-year-old female with underlying history of hypertension, GERD, hypothyroidism, osteoarthritis, diverticulosis, recent diverticulitis in January 2025 admitted to the ICU after undergoing exploratory laparotomy with partial colectomy due to perforated diverticulitis.    Neuro   - Patient is alert and oriented conversing   - History of anxiety depression, on Celexa at home.  This can be restarted tomorrow    Respiratory   - No issues, nasal cannula saturating 100%    Cardiac/hemodynamics   - Currently dynamically stable, not requiring pressor support   - Continue with IV fluid, LR at 100 cc an hour   - Arterial line in place keep MAP greater than 65   - Check lactic acid    Renal  - Acute kidney injury  - Serum creatinine improved to 0.98 mg/dL from 1.17 mg/dL yesterday  - Continue IV fluids, monitor urinary output  - Hypokalemia  - Mild, potassium 3.4 mmol/L today will replace  GI  - Perforated diverticulitis status post surgical repair with partial colectomy and colostomy placement  - NG in place currently low intermittent suction  - Surgical teams following  - currently NPO per surgical team     - Gabriel catheter in place    ID  - Perforated diverticulitis  - Currently on Cipro and Flagyl.  Documented anaphylaxis with penicillin.  Patient states that she passed out as a teenager thinks that there was some tongue swelling but unclear  - Currently afebrile, absent leukocytosis  - Maintain on current antibiotics    Endocrine  - History of hypothyroidism on Synthroid    Heme/onc  - Hemoglobin stable 11.4, platelets of 124,000  - History of breast cancer, continue with anastrozole    Skin   - No issues    DVT prophylaxis   - Lovenox    Code Status: full  Jr Calles MD    I spent 38 minutes of cumulative critical care time with the patient.  Greater than 50% of that time was spent in the direct collaboration and or coordination of care of the patient.     Dragon dictation software was used to dictate  this note and thus there may be minor errors in translation/transcription including garbled speech or misspellings. Please contact for clarification if needed.                 [1]   Past Medical History:  Diagnosis Date    Anxiety and depression     Breast cancer     CKD (chronic kidney disease)     HLD (hyperlipidemia)     HTN (hypertension)     Hypothyroidism     Pulmonary embolism     postop   [2]   Past Surgical History:  Procedure Laterality Date    BREAST BIOPSY      CARPAL TUNNEL RELEASE Bilateral      SECTION, CLASSIC      CHOLECYSTECTOMY      COLOSTOMY  2025    Hanna's procedure    FOOT SURGERY Bilateral     bunions and corns    MASTECTOMY Right 2018    TOTAL HIP ARTHROPLASTY Left    [3]   Medications Prior to Admission   Medication Sig Dispense Refill Last Dose/Taking    anastrozole (Arimidex) 1 mg tablet Take 1 tablet (1 mg total) by mouth once daily.       artificial tears, dextran-hypomel-glycerin, 0.1-0.3-0.2 % ophthalmic solution Administer 1 drop into affected eye(s) 4 times a day as needed.       aspirin 81 mg EC tablet Take 1 tablet (81 mg) by mouth once daily.       calcium carbonate-vitamin D3 500 mg-5 mcg (200 unit) tablet Take 2 tablets by mouth once daily.       levothyroxine (Synthroid, Levoxyl) 25 mcg tablet Take 1 tablet (25 mcg) by mouth once daily.       metoprolol tartrate (Lopressor) 50 mg tablet Take 3 tablets by mouth twice a day.       olmesartan (BENIcar) 40 mg tablet Take 1 tablet (40 mg) by mouth once daily.       polyethylene glycol (Glycolax, Miralax) 17 gram packet Take 8.5 g by mouth once daily.      [4]   Social History  Tobacco Use    Smoking status: Never    Smokeless tobacco: Never   Vaping Use    Vaping status: Never Used   Substance Use Topics    Alcohol use: Not Currently    Drug use: Never   [5]   Family History  Problem Relation Name Age of Onset    Breast cancer Mother  65   [6] anastrozole, 1 mg, oral, Daily  aspirin, 81 mg, oral, Daily  bacitracin,  , Topical, Daily  ciprofloxacin, 400 mg, intravenous, q12h  [Held by provider] citalopram, 10 mg, oral, Daily  [START ON 6/9/2025] enoxaparin, 30 mg, subcutaneous, q24h  levothyroxine, 25 mcg, oral, Daily  metoprolol tartrate, 150 mg, oral, BID  metroNIDAZOLE, 500 mg, intravenous, q8h  rosuvastatin, 5 mg, oral, Daily  [7] lactated Ringer's, 125 mL/hr, Last Rate: 125 mL/hr (06/08/25 0609)  lactated Ringer's, 125 mL/hr, Last Rate: 125 mL/hr (06/08/25 0724)  [8] PRN medications: acetaminophen, hydrALAZINE, HYDROcodone-acetaminophen, HYDROmorphone, melatonin, [DISCONTINUED] ondansetron **OR** ondansetron

## 2025-06-08 NOTE — ANESTHESIA POSTPROCEDURE EVALUATION
Patient: Rosemary Motta    Procedure Summary       Date: 06/07/25 Room / Location: [facility] OR 01 / Virtual [facility] OR    Anesthesia Start: 2135 Anesthesia Stop: 06/08/25 0000    Procedure: Exploratory laparotomy with partial colectomy and colostomy placement Diagnosis:       Diverticulitis      (Diverticulitis [K57.92])    Surgeons: Cynthia Haywood MD Responsible Provider: DAISY Samson    Anesthesia Type: general ASA Status: 3 - Emergent            Anesthesia Type: general    Vitals Value Taken Time   /76 06/08/25 00:47   Temp 97.4 06/08/25 00:47   Pulse 87 06/08/25 00:47   Resp 18 06/08/25 00:47   SpO2 100 06/08/25 00:47       Anesthesia Post Evaluation    Patient location during evaluation: PACU  Patient participation: complete - patient participated  Level of consciousness: awake  Pain score: 0  Pain management: adequate  Airway patency: patent  Cardiovascular status: acceptable  Respiratory status: acceptable  Hydration status: acceptable  Postoperative Nausea and Vomiting: none        There were no known notable events for this encounter.

## 2025-06-09 ENCOUNTER — APPOINTMENT (OUTPATIENT)
Dept: CARDIOLOGY | Facility: HOSPITAL | Age: 80
DRG: 853 | End: 2025-06-09
Payer: MEDICARE

## 2025-06-09 VITALS
RESPIRATION RATE: 16 BRPM | BODY MASS INDEX: 30.87 KG/M2 | DIASTOLIC BLOOD PRESSURE: 50 MMHG | TEMPERATURE: 98.4 F | SYSTOLIC BLOOD PRESSURE: 113 MMHG | OXYGEN SATURATION: 100 % | WEIGHT: 147.05 LBS | HEART RATE: 75 BPM | HEIGHT: 58 IN

## 2025-06-09 LAB
ALBUMIN SERPL BCP-MCNC: 3.1 G/DL (ref 3.4–5)
ALP SERPL-CCNC: 43 U/L (ref 33–136)
ALT SERPL W P-5'-P-CCNC: 12 U/L (ref 7–45)
ANION GAP SERPL CALC-SCNC: 14 MMOL/L (ref 10–20)
AORTIC VALVE MEAN GRADIENT: 5 MMHG
AORTIC VALVE PEAK VELOCITY: 1.61 M/S
AST SERPL W P-5'-P-CCNC: 24 U/L (ref 9–39)
AV PEAK GRADIENT: 10 MMHG
AVA (PEAK VEL): 1.98 CM2
AVA (VTI): 2.31 CM2
BILIRUB SERPL-MCNC: 0.6 MG/DL (ref 0–1.2)
BUN SERPL-MCNC: 11 MG/DL (ref 6–23)
CALCIUM SERPL-MCNC: 7.8 MG/DL (ref 8.6–10.3)
CHLORIDE SERPL-SCNC: 106 MMOL/L (ref 98–107)
CO2 SERPL-SCNC: 23 MMOL/L (ref 21–32)
CREAT SERPL-MCNC: 0.88 MG/DL (ref 0.5–1.05)
EGFRCR SERPLBLD CKD-EPI 2021: 67 ML/MIN/1.73M*2
EJECTION FRACTION APICAL 4 CHAMBER: 60.1
EJECTION FRACTION: 55 %
ERYTHROCYTE [DISTWIDTH] IN BLOOD BY AUTOMATED COUNT: 13.4 % (ref 11.5–14.5)
GLUCOSE SERPL-MCNC: 94 MG/DL (ref 74–99)
HCT VFR BLD AUTO: 33.4 % (ref 36–46)
HGB BLD-MCNC: 11.1 G/DL (ref 12–16)
HOLD SPECIMEN: NORMAL
LEFT ATRIUM VOLUME AREA LENGTH INDEX BSA: 22 ML/M2
LEFT VENTRICLE INTERNAL DIMENSION DIASTOLE: 4.41 CM (ref 3.5–6)
LEFT VENTRICULAR OUTFLOW TRACT DIAMETER: 1.98 CM
LV EJECTION FRACTION BIPLANE: 55 %
MAGNESIUM SERPL-MCNC: 2.43 MG/DL (ref 1.6–2.4)
MCH RBC QN AUTO: 32 PG (ref 26–34)
MCHC RBC AUTO-ENTMCNC: 33.2 G/DL (ref 32–36)
MCV RBC AUTO: 96 FL (ref 80–100)
MITRAL VALVE E/A RATIO: 1.26
MITRAL VALVE E/E' RATIO: 8.96
NRBC BLD-RTO: 0 /100 WBCS (ref 0–0)
PHOSPHATE SERPL-MCNC: 1.7 MG/DL (ref 2.5–4.9)
PLATELET # BLD AUTO: 125 X10*3/UL (ref 150–450)
POTASSIUM SERPL-SCNC: 4.1 MMOL/L (ref 3.5–5.3)
PROT SERPL-MCNC: 5.1 G/DL (ref 6.4–8.2)
RBC # BLD AUTO: 3.47 X10*6/UL (ref 4–5.2)
RIGHT VENTRICLE FREE WALL PEAK S': 15.18 CM/S
RIGHT VENTRICLE PEAK SYSTOLIC PRESSURE: 28 MMHG
SODIUM SERPL-SCNC: 139 MMOL/L (ref 136–145)
TRICUSPID ANNULAR PLANE SYSTOLIC EXCURSION: 3 CM
WBC # BLD AUTO: 7 X10*3/UL (ref 4.4–11.3)

## 2025-06-09 PROCEDURE — 97530 THERAPEUTIC ACTIVITIES: CPT | Mod: GP | Performed by: PHYSICAL THERAPIST

## 2025-06-09 PROCEDURE — 99024 POSTOP FOLLOW-UP VISIT: CPT | Performed by: SURGERY

## 2025-06-09 PROCEDURE — 93306 TTE W/DOPPLER COMPLETE: CPT

## 2025-06-09 PROCEDURE — 2500000004 HC RX 250 GENERAL PHARMACY W/ HCPCS (ALT 636 FOR OP/ED): Performed by: SURGERY

## 2025-06-09 PROCEDURE — 99291 CRITICAL CARE FIRST HOUR: CPT | Performed by: INTERNAL MEDICINE

## 2025-06-09 PROCEDURE — 97165 OT EVAL LOW COMPLEX 30 MIN: CPT | Mod: GO

## 2025-06-09 PROCEDURE — 2500000001 HC RX 250 WO HCPCS SELF ADMINISTERED DRUGS (ALT 637 FOR MEDICARE OP): Performed by: INTERNAL MEDICINE

## 2025-06-09 PROCEDURE — 99233 SBSQ HOSP IP/OBS HIGH 50: CPT | Performed by: PHYSICIAN ASSISTANT

## 2025-06-09 PROCEDURE — 2500000001 HC RX 250 WO HCPCS SELF ADMINISTERED DRUGS (ALT 637 FOR MEDICARE OP): Performed by: SURGERY

## 2025-06-09 PROCEDURE — 2500000004 HC RX 250 GENERAL PHARMACY W/ HCPCS (ALT 636 FOR OP/ED): Performed by: STUDENT IN AN ORGANIZED HEALTH CARE EDUCATION/TRAINING PROGRAM

## 2025-06-09 PROCEDURE — 2500000004 HC RX 250 GENERAL PHARMACY W/ HCPCS (ALT 636 FOR OP/ED): Performed by: INTERNAL MEDICINE

## 2025-06-09 PROCEDURE — 97530 THERAPEUTIC ACTIVITIES: CPT | Mod: GO

## 2025-06-09 PROCEDURE — 2500000001 HC RX 250 WO HCPCS SELF ADMINISTERED DRUGS (ALT 637 FOR MEDICARE OP): Performed by: STUDENT IN AN ORGANIZED HEALTH CARE EDUCATION/TRAINING PROGRAM

## 2025-06-09 PROCEDURE — 83735 ASSAY OF MAGNESIUM: CPT | Performed by: INTERNAL MEDICINE

## 2025-06-09 PROCEDURE — 85027 COMPLETE CBC AUTOMATED: CPT | Performed by: SURGERY

## 2025-06-09 PROCEDURE — 93306 TTE W/DOPPLER COMPLETE: CPT | Performed by: INTERNAL MEDICINE

## 2025-06-09 PROCEDURE — 2500000005 HC RX 250 GENERAL PHARMACY W/O HCPCS: Performed by: INTERNAL MEDICINE

## 2025-06-09 PROCEDURE — 2500000002 HC RX 250 W HCPCS SELF ADMINISTERED DRUGS (ALT 637 FOR MEDICARE OP, ALT 636 FOR OP/ED): Performed by: SURGERY

## 2025-06-09 PROCEDURE — 37799 UNLISTED PX VASCULAR SURGERY: CPT | Performed by: SURGERY

## 2025-06-09 PROCEDURE — 99222 1ST HOSP IP/OBS MODERATE 55: CPT | Performed by: INTERNAL MEDICINE

## 2025-06-09 PROCEDURE — 2500000001 HC RX 250 WO HCPCS SELF ADMINISTERED DRUGS (ALT 637 FOR MEDICARE OP): Performed by: PHARMACIST

## 2025-06-09 PROCEDURE — 84075 ASSAY ALKALINE PHOSPHATASE: CPT | Performed by: SURGERY

## 2025-06-09 PROCEDURE — 2060000001 HC INTERMEDIATE ICU ROOM DAILY

## 2025-06-09 PROCEDURE — 84100 ASSAY OF PHOSPHORUS: CPT | Performed by: INTERNAL MEDICINE

## 2025-06-09 PROCEDURE — 97162 PT EVAL MOD COMPLEX 30 MIN: CPT | Mod: GP | Performed by: PHYSICAL THERAPIST

## 2025-06-09 RX ORDER — ESOMEPRAZOLE MAGNESIUM 40 MG/1
40 GRANULE, DELAYED RELEASE ORAL
Status: DISCONTINUED | OUTPATIENT
Start: 2025-06-10 | End: 2025-06-19 | Stop reason: HOSPADM

## 2025-06-09 RX ORDER — METOPROLOL TARTRATE 50 MG/1
50 TABLET ORAL ONCE
Status: COMPLETED | OUTPATIENT
Start: 2025-06-09 | End: 2025-06-09

## 2025-06-09 RX ORDER — PANTOPRAZOLE SODIUM 40 MG/1
40 TABLET, DELAYED RELEASE ORAL
Status: DISCONTINUED | OUTPATIENT
Start: 2025-06-10 | End: 2025-06-19 | Stop reason: HOSPADM

## 2025-06-09 RX ORDER — METOPROLOL TARTRATE 50 MG/1
100 TABLET ORAL 2 TIMES DAILY
Status: DISCONTINUED | OUTPATIENT
Start: 2025-06-09 | End: 2025-06-19 | Stop reason: HOSPADM

## 2025-06-09 RX ORDER — PANTOPRAZOLE SODIUM 40 MG/10ML
40 INJECTION, POWDER, LYOPHILIZED, FOR SOLUTION INTRAVENOUS
Status: DISCONTINUED | OUTPATIENT
Start: 2025-06-10 | End: 2025-06-19 | Stop reason: HOSPADM

## 2025-06-09 RX ORDER — ENOXAPARIN SODIUM 100 MG/ML
40 INJECTION SUBCUTANEOUS EVERY 24 HOURS
Status: DISCONTINUED | OUTPATIENT
Start: 2025-06-10 | End: 2025-06-10

## 2025-06-09 RX ORDER — LORAZEPAM 0.5 MG/1
0.5 TABLET ORAL EVERY 8 HOURS PRN
Status: DISPENSED | OUTPATIENT
Start: 2025-06-09 | End: 2025-06-11

## 2025-06-09 RX ADMIN — METOPROLOL TARTRATE 100 MG: 50 TABLET, FILM COATED ORAL at 21:50

## 2025-06-09 RX ADMIN — METOPROLOL TARTRATE 50 MG: 50 TABLET, FILM COATED ORAL at 08:08

## 2025-06-09 RX ADMIN — BACITRACIN ZINC: 500 OINTMENT TOPICAL at 08:08

## 2025-06-09 RX ADMIN — ENOXAPARIN SODIUM 30 MG: 30 INJECTION SUBCUTANEOUS at 08:08

## 2025-06-09 RX ADMIN — POTASSIUM PHOSPHATE, MONOBASIC AND POTASSIUM PHOSPHATE, DIBASIC 15 MMOL: 224; 236 INJECTION, SOLUTION, CONCENTRATE INTRAVENOUS at 11:34

## 2025-06-09 RX ADMIN — ROSUVASTATIN CALCIUM 5 MG: 5 TABLET, COATED ORAL at 08:08

## 2025-06-09 RX ADMIN — ANASTROZOLE 1 MG: 1 TABLET, COATED ORAL at 08:08

## 2025-06-09 RX ADMIN — POTASSIUM CHLORIDE 20 MEQ: 14.9 INJECTION, SOLUTION INTRAVENOUS at 00:49

## 2025-06-09 RX ADMIN — HYDROCODONE BITARTRATE AND ACETAMINOPHEN 1 TABLET: 7.5; 325 TABLET ORAL at 10:04

## 2025-06-09 RX ADMIN — LORAZEPAM 0.5 MG: 0.5 TABLET ORAL at 09:07

## 2025-06-09 RX ADMIN — HYDROCODONE BITARTRATE AND ACETAMINOPHEN 1 TABLET: 7.5; 325 TABLET ORAL at 21:50

## 2025-06-09 RX ADMIN — CEFEPIME HYDROCHLORIDE 2 G: 2 INJECTION, SOLUTION INTRAVENOUS at 10:52

## 2025-06-09 RX ADMIN — HYDROMORPHONE HYDROCHLORIDE 0.5 MG: 0.5 INJECTION, SOLUTION INTRAMUSCULAR; INTRAVENOUS; SUBCUTANEOUS at 14:49

## 2025-06-09 RX ADMIN — CEFEPIME HYDROCHLORIDE 2 G: 2 INJECTION, SOLUTION INTRAVENOUS at 21:50

## 2025-06-09 RX ADMIN — LEVOTHYROXINE SODIUM 25 MCG: 0.03 TABLET ORAL at 08:08

## 2025-06-09 RX ADMIN — ASPIRIN 81 MG: 81 TABLET, COATED ORAL at 08:08

## 2025-06-09 RX ADMIN — METOPROLOL TARTRATE 50 MG: 50 TABLET, FILM COATED ORAL at 13:47

## 2025-06-09 RX ADMIN — METRONIDAZOLE 500 MG: 500 INJECTION, SOLUTION INTRAVENOUS at 03:18

## 2025-06-09 RX ADMIN — METRONIDAZOLE 500 MG: 500 INJECTION, SOLUTION INTRAVENOUS at 14:20

## 2025-06-09 ASSESSMENT — ENCOUNTER SYMPTOMS
SHORTNESS OF BREATH: 0
DIARRHEA: 0
BRUISES/BLEEDS EASILY: 0
COUGH: 0
NUMBNESS: 0
AGITATION: 0
WHEEZING: 0
SORE THROAT: 0
HALLUCINATIONS: 0
DIAPHORESIS: 0
FATIGUE: 0
BACK PAIN: 0
DIZZINESS: 0
MYALGIAS: 1
HEMATURIA: 0
TROUBLE SWALLOWING: 0
ABDOMINAL PAIN: 1
FREQUENCY: 0
LIGHT-HEADEDNESS: 0
FACIAL SWELLING: 0
CONFUSION: 0
JOINT SWELLING: 0
APPETITE CHANGE: 0
WOUND: 1
CHILLS: 0
FEVER: 0
PALPITATIONS: 1
WOUND: 0
CHEST TIGHTNESS: 0
WEAKNESS: 0
ARTHRALGIAS: 1
CONSTIPATION: 0
SPEECH DIFFICULTY: 0
FATIGUE: 1
WEAKNESS: 1
HEADACHES: 0
BLOOD IN STOOL: 0
VOMITING: 0
EYE REDNESS: 0
DYSURIA: 0
NAUSEA: 0
BRUISES/BLEEDS EASILY: 1
FLANK PAIN: 0
EYE PAIN: 0

## 2025-06-09 ASSESSMENT — ACTIVITIES OF DAILY LIVING (ADL)
BATHING_ASSISTANCE: MAXIMAL
ADL_ASSISTANCE: INDEPENDENT

## 2025-06-09 ASSESSMENT — PAIN SCALES - GENERAL
PAINLEVEL_OUTOF10: 5 - MODERATE PAIN
PAINLEVEL_OUTOF10: 7
PAINLEVEL_OUTOF10: 4
PAINLEVEL_OUTOF10: 3
PAINLEVEL_OUTOF10: 3
PAINLEVEL_OUTOF10: 0 - NO PAIN
PAINLEVEL_OUTOF10: 0 - NO PAIN
PAINLEVEL_OUTOF10: 3
PAINLEVEL_OUTOF10: 0 - NO PAIN
PAINLEVEL_OUTOF10: 0 - NO PAIN
PAINLEVEL_OUTOF10: 4
PAINLEVEL_OUTOF10: 4
PAINLEVEL_OUTOF10: 0 - NO PAIN

## 2025-06-09 ASSESSMENT — COGNITIVE AND FUNCTIONAL STATUS - GENERAL
PERSONAL GROOMING: A LITTLE
TOILETING: A LOT
DRESSING REGULAR UPPER BODY CLOTHING: A LOT
WALKING IN HOSPITAL ROOM: TOTAL
EATING MEALS: A LITTLE
MOVING FROM LYING ON BACK TO SITTING ON SIDE OF FLAT BED WITH BEDRAILS: A LOT
TURNING FROM BACK TO SIDE WHILE IN FLAT BAD: A LOT
MOBILITY SCORE: 10
HELP NEEDED FOR BATHING: A LOT
DRESSING REGULAR LOWER BODY CLOTHING: TOTAL
STANDING UP FROM CHAIR USING ARMS: A LOT
MOVING TO AND FROM BED TO CHAIR: A LOT
CLIMB 3 TO 5 STEPS WITH RAILING: TOTAL
DAILY ACTIVITIY SCORE: 13

## 2025-06-09 ASSESSMENT — PAIN DESCRIPTION - DESCRIPTORS
DESCRIPTORS: SORE
DESCRIPTORS: ACHING;BURNING;SHARP
DESCRIPTORS: BURNING;ACHING

## 2025-06-09 ASSESSMENT — PAIN DESCRIPTION - ORIENTATION
ORIENTATION: RIGHT;LEFT
ORIENTATION: RIGHT;LEFT;MID

## 2025-06-09 ASSESSMENT — PAIN DESCRIPTION - LOCATION
LOCATION: ABDOMEN
LOCATION: ABDOMEN

## 2025-06-09 NOTE — PROGRESS NOTES
Rosemary Motta is a 79 y.o. female on day 2 of admission presenting with Diverticulitis of large intestine with perforation without bleeding.      Subjective   Rosemary Motta is a 79F hx HTN, HLE, breast ca, diverticulosis, GERD, hypothyroidism, neuropathy, migraines, OA, anxiety/depression presented 6/7/25 for abdominal pain, left lower quadrant abd pain with radiation to back. now improved. up to 10/10, now 5/10. nausea with small vomiting. no diarrhea, constipation, blood in stool or melena. subacute dry cough. no acute abdomen on exam. labs/imaging w/ cr 1.29 (baseline 1.02), cbc, lactate wnl. ua wnl. ekg wnl. ct a/p w/ perforated diverticulitis. Seen by surgery and is now s/p partial colectomy and colostomy placement 6/7/25. Had hypotension and was monitored closely in ICU. UA without UTI      6/9/2025: Had tachycardia overnight, Nocturnist evaluated ECG and diagnosed as new onset a fib with RVR, given Cardizem x1, resumed home metoprolol, echo ordered, cardiology consulted- defer OAC to surgery/cardiology discretion given very recent surgery. HR 98 this morning, afebrile since tmax 101F yesterday evening. CBC/BMP reviewed, no leukocytosis, hgb stable at 11.1. K/Mg are acceptable range  Follow up blood cultures and echo  NG-tube was removed this morning as was Gabriel.  Patient will trial clear liquid diet today    Patient back in normal sinus rhythm.  I discussed with Dr. Quintanilla, cardiologist who at this time does not recommend any oral anticoagulation given this was a isolated incident.  Will need to monitor further if this recurs then will need to reconsider this.    Rounded on patient on this date with Patient's family           Review of Systems   Constitutional:  Negative for appetite change, chills, diaphoresis, fatigue and fever.   HENT:  Negative for congestion, ear pain, facial swelling, hearing loss, nosebleeds, sore throat, tinnitus and trouble swallowing.    Eyes:  Negative for pain.    Respiratory:  Negative for cough, chest tightness, shortness of breath and wheezing.    Cardiovascular:  Positive for palpitations (Resolved). Negative for chest pain and leg swelling.   Gastrointestinal:  Positive for abdominal pain. Negative for blood in stool, constipation, diarrhea, nausea and vomiting.   Genitourinary:  Negative for dysuria, flank pain, frequency, hematuria and urgency.   Musculoskeletal:  Negative for back pain and joint swelling.   Skin:  Negative for rash and wound.   Neurological:  Negative for dizziness, syncope, weakness, light-headedness, numbness and headaches.   Hematological:  Does not bruise/bleed easily.   Psychiatric/Behavioral:  Negative for behavioral problems, hallucinations and suicidal ideas.           Objective     Last Recorded Vitals  /67   Pulse 96   Temp 36.8 °C (98.2 °F) (Temporal)   Resp 21   Wt 66.7 kg (147 lb 0.8 oz)   SpO2 100%     Image Results  Imaging  XR chest 1 view  Result Date: 6/8/2025  A nasogastric tube projects into the stomach beyond the field of imaging.   Mild cardiomegaly.   Minimal linear bibasilar atelectasis. Otherwise no sign of acute cardiopulmonary disease.     MACRO: None   Signed by: Mello Fried 6/8/2025 12:33 PM Dictation workstation:   [id number]    CT abdomen pelvis w IV contrast  Result Date: 6/7/2025  1. Diverticulosis of the sigmoid colon. There is inflammatory change noted about the proximal to mid sigmoid colon most concerning for acute diverticulitis. Multiple free air locules are noted adjacent to this inflamed segment of sigmoid colon as well as small-to-moderate amount of free air within the upper abdomen consistent with perforation. No overt abscess identified.   MACRO: Donovan Ocampo discussed the significance and urgency of this critical finding by telephone with  KONG HILL on 6/7/2025 at 3:39 am. (**-RCF-**) Findings:  See findings.   Signed by: Donovan Ocampo 6/7/2025 3:40 AM Dictation workstation:    [id number]      Cardiology, Vascular, and Other Imaging  No other imaging results found for the past 7 days       Lab Results  Results for orders placed or performed during the hospital encounter of 06/07/25 (from the past 24 hours)   Lactate   Result Value Ref Range    Lactate 1.1 0.4 - 2.0 mmol/L   Cortisol   Result Value Ref Range    Cortisol 8.1 2.5 - 20.0 ug/dL   Urinalysis with Reflex Culture and Microscopic   Result Value Ref Range    Color, Urine Light-Yellow Light-Yellow, Yellow, Dark-Yellow    Appearance, Urine Clear Clear    Specific Gravity, Urine 1.014 1.005 - 1.035    pH, Urine 5.5 5.0, 5.5, 6.0, 6.5, 7.0, 7.5, 8.0    Protein, Urine NEGATIVE NEGATIVE, 10 (TRACE), 20 (TRACE) mg/dL    Glucose, Urine Normal Normal mg/dL    Blood, Urine 0.03 (TRACE) (A) NEGATIVE mg/dL    Ketones, Urine 10 (1+) (A) NEGATIVE mg/dL    Bilirubin, Urine NEGATIVE NEGATIVE mg/dL    Urobilinogen, Urine Normal Normal mg/dL    Nitrite, Urine NEGATIVE NEGATIVE    Leukocyte Esterase, Urine NEGATIVE NEGATIVE   Extra Urine Gray Tube   Result Value Ref Range    Extra Tube Hold for add-ons.    Urinalysis Microscopic   Result Value Ref Range    WBC, Urine 1-5 1-5, NONE /HPF    RBC, Urine >20 (A) NONE, 1-2, 3-5 /HPF    Mucus, Urine FEW Reference range not established. /LPF   Basic metabolic panel   Result Value Ref Range    Glucose 100 (H) 74 - 99 mg/dL    Sodium 137 136 - 145 mmol/L    Potassium 3.3 (L) 3.5 - 5.3 mmol/L    Chloride 107 98 - 107 mmol/L    Bicarbonate 22 21 - 32 mmol/L    Anion Gap 11 10 - 20 mmol/L    Urea Nitrogen 11 6 - 23 mg/dL    Creatinine 0.96 0.50 - 1.05 mg/dL    eGFR 60 (L) >60 mL/min/1.73m*2    Calcium 8.2 (L) 8.6 - 10.3 mg/dL   Magnesium   Result Value Ref Range    Magnesium 1.33 (L) 1.60 - 2.40 mg/dL   CBC   Result Value Ref Range    WBC 7.0 4.4 - 11.3 x10*3/uL    nRBC 0.0 0.0 - 0.0 /100 WBCs    RBC 3.47 (L) 4.00 - 5.20 x10*6/uL    Hemoglobin 11.1 (L) 12.0 - 16.0 g/dL    Hematocrit 33.4 (L) 36.0 - 46.0 %     MCV 96 80 - 100 fL    MCH 32.0 26.0 - 34.0 pg    MCHC 33.2 32.0 - 36.0 g/dL    RDW 13.4 11.5 - 14.5 %    Platelets 125 (L) 150 - 450 x10*3/uL   Comprehensive metabolic panel   Result Value Ref Range    Glucose 94 74 - 99 mg/dL    Sodium 139 136 - 145 mmol/L    Potassium 4.1 3.5 - 5.3 mmol/L    Chloride 106 98 - 107 mmol/L    Bicarbonate 23 21 - 32 mmol/L    Anion Gap 14 10 - 20 mmol/L    Urea Nitrogen 11 6 - 23 mg/dL    Creatinine 0.88 0.50 - 1.05 mg/dL    eGFR 67 >60 mL/min/1.73m*2    Calcium 7.8 (L) 8.6 - 10.3 mg/dL    Albumin 3.1 (L) 3.4 - 5.0 g/dL    Alkaline Phosphatase 43 33 - 136 U/L    Total Protein 5.1 (L) 6.4 - 8.2 g/dL    AST 24 9 - 39 U/L    Bilirubin, Total 0.6 0.0 - 1.2 mg/dL    ALT 12 7 - 45 U/L   Magnesium   Result Value Ref Range    Magnesium 2.43 (H) 1.60 - 2.40 mg/dL   Phosphorus   Result Value Ref Range    Phosphorus 1.7 (L) 2.5 - 4.9 mg/dL        Medications  Scheduled medications:  Scheduled Medications[1]  Continuous medications:  Continuous Medications[2]  PRN medications:  PRN Medications[3]     Physical Exam  Constitutional:       General: She is not in acute distress.     Appearance: Normal appearance.   HENT:      Head: Normocephalic and atraumatic.      Right Ear: External ear normal.      Left Ear: External ear normal.      Nose: Nose normal.      Mouth/Throat:      Mouth: Mucous membranes are moist.      Pharynx: Oropharynx is clear.   Eyes:      Extraocular Movements: Extraocular movements intact.      Conjunctiva/sclera: Conjunctivae normal.      Pupils: Pupils are equal, round, and reactive to light.   Cardiovascular:      Rate and Rhythm: Normal rate and regular rhythm.      Pulses: Normal pulses.      Heart sounds: Murmur heard.   Pulmonary:      Effort: Pulmonary effort is normal. No respiratory distress.      Breath sounds: Normal breath sounds. No wheezing, rhonchi or rales.   Abdominal:      General: Bowel sounds are normal.      Palpations: Abdomen is soft.      Tenderness:  There is abdominal tenderness. There is no right CVA tenderness, left CVA tenderness, guarding or rebound.      Comments: LOLA drain with bloody output  Left-sided ostomy is pink with small amount of hard brown stool in it   Musculoskeletal:         General: No swelling. Normal range of motion.      Cervical back: Normal range of motion and neck supple.   Skin:     General: Skin is warm and dry.      Capillary Refill: Capillary refill takes less than 2 seconds.      Findings: No lesion or rash.   Neurological:      General: No focal deficit present.      Mental Status: She is alert and oriented to person, place, and time. Mental status is at baseline.   Psychiatric:         Mood and Affect: Mood normal.         Behavior: Behavior normal.                  Assessment/Plan      Recurrent acute Diverticulitis c/b perforation s/p partial colectomy and colostomy placement 6/7/25  PCN allergy of anaphylaxis   cefepime/flagyl  NGT- defer management to general surgery service  Routine colostomy care  Monitor closely in ICU    Acute hypoxic respiratory failure  Resolved    MENG on CKD  Resolved    Hypokalemia, mild  Resolved    New onset A fib with RVR  Given Cardizem x1  Resume home metoprolol  Cardiology consult  Echo  Monitor on tele  [id number] of 4-Patient back in normal sinus rhythm.  I discussed with Dr. Quintanilla, cardiologist who at this time does not recommend any oral anticoagulation given this was a isolated incident.  Will need to monitor further if this recurs then will need to reconsider this.    HTN  HLE  Monitor blood pressure, resume hypertensives as able    Breast CA on anastrozole  Status post mastectomy    GERD    Hypothyroidism  Continue Synthroid  Recent TSH reviewed    Neuropathy  Migraines  OA  Anxiety/Depression    Code Status: Full Code                 DVT ppx: SCDs, Lovenox      Please see orders for more complete plan    Bouchra Rodriguez PA-C         [1] anastrozole, 1 mg, oral, Daily  aspirin,  81 mg, oral, Daily  bacitracin, , Topical, Daily  cefepime, 2 g, intravenous, q12h  [Held by provider] citalopram, 10 mg, oral, Daily  enoxaparin, 30 mg, subcutaneous, q24h  levothyroxine, 25 mcg, oral, Daily  metoprolol tartrate, 50 mg, oral, BID  metroNIDAZOLE, 500 mg, intravenous, q12h  rosuvastatin, 5 mg, oral, Daily    [2] lactated Ringer's, 75 mL/hr, Last Rate: 75 mL/hr (06/08/25 5003)  norepinephrine, 0-0.5 mcg/kg/min, Last Rate: Stopped (06/08/25 1411)    [3] PRN medications: acetaminophen, hydrALAZINE, HYDROcodone-acetaminophen, HYDROmorphone, labetaloL, melatonin, [DISCONTINUED] ondansetron **OR** ondansetron

## 2025-06-09 NOTE — PROGRESS NOTES
Occupational Therapy    Evaluation/Treatment    Patient Name: Rosemary Motta  MRN: [id number]  Department: [facility]  Room: 03/03-A  Today's Date: 06/09/25  Time Calculation  Start Time: 1246  Stop Time: 1328  Time Calculation (min): 42 min       Assessment:  OT Assessment: Pt demonstrates decreased endurance, cognition, ADLs, functional transfers, functional mobility and bed mobility compared to baseline. Pt will benefit from skilled OT to address impairments in function.  Prognosis: Good  Barriers to Discharge Home: Cognition needs, Physical needs, Caregiver assistance  Caregiver Assistance: Caregiver assistance needed per identified barriers - however, level of patient's required assistance exceeds assistance available at home  Cognition Needs: 24hr supervision for safety awareness needed, Insight of patient limited regarding functional ability/needs  Physical Needs: Ambulating household distances limited by function/safety, 24hr mobility assistance needed, 24hr ADL assistance needed, High falls risk due to function or environment  Evaluation/Treatment Tolerance: Patient tolerated treatment well  Medical Staff Made Aware: Yes  End of Session Communication: Bedside nurse  End of Session Patient Position: Up in chair, Alarm on  OT Assessment Results: Decreased ADL status, Decreased safe judgment during ADL, Decreased cognition, Decreased endurance, Decreased functional mobility  Prognosis: Good  Evaluation/Treatment Tolerance: Patient tolerated treatment well  Medical Staff Made Aware: Yes  Plan:  Treatment Interventions: ADL retraining, Functional transfer training, Endurance training, Cognitive reorientation, Patient/family training, Equipment evaluation/education, Compensatory technique education  OT Frequency: 4 times per week  OT Discharge Recommendations: Moderate intensity level of continued care  OT Recommended Transfer Status: Assist of 2  OT - OK to Discharge: Yes  Treatment Interventions: ADL retraining,  Functional transfer training, Endurance training, Cognitive reorientation, Patient/family training, Equipment evaluation/education, Compensatory technique education    Subjective   Current Problem:  1. Diverticulitis  Case Request Operating Room: Exploratory laparotomy with partial colectomy and colostomy placement    Case Request Operating Room: Exploratory laparotomy with partial colectomy and colostomy placement    Surgical Pathology Exam    Surgical Pathology Exam      2. Free intraperitoneal air        3. Atrial fibrillation, unspecified type (Multi)        4. CRISOSTOMO (dyspnea on exertion)  Transthoracic Echo Complete    Transthoracic Echo Complete        OT Visit Info:  OT Received On: 06/09/25  General Visit Info:   General  Reason for Referral: recent abdominal surgery  Past Medical History Relevant to Rehab: HTN, HLE, breast ca, diverticulosis, GERD, hypothyroidism, neuropathy, migraines, OA, anxiety/depression presented 6/7/25 for abdominal pain, left lower quadrant abd pain with radiation to back  Family/Caregiver Present: No  Co-Treatment: PT  Co-Treatment Reason: increase pt safety and functional mobility  Prior to Session Communication: Bedside nurse  Patient Position Received: Bed, 3 rail up, Alarm on  General Comment: Pt willing to participate in therapy   Precautions:  Medical Precautions: Abdominal precautions, Fall precautions     Date/Time Vitals Session Patient Position Pulse Resp SpO2 BP MAP (mmHg)    06/09/25 1400 --  --  82  27  97 %  --  --     06/09/25 1500 --  --  78  23  --  --  --                 Pain:  Pain Assessment  Pain Assessment: 0-10  0-10 (Numeric) Pain Score: 4  Response to Interventions: Relief, Content/relaxed    Objective   Cognition:  Overall Cognitive Status: Impaired  Arousal/Alertness: Inconsistent responses to stimuli  Orientation Level: Disoriented to place, Disoriented to time, Disoriented to situation (repeatedly asking where she was or if she was still in hospital.  Oriented to board for visual cues.)  Following Commands: Follows one step commands with increased time  Safety Judgment: Decreased awareness of need for assistance  Problem Solving: Assistance required to identify errors made  Cognition Comments: reports she feels confused  Memory: Exceptions to WFL  Insight: Mild  Impulsive: Within functional limits  Processing Speed: Delayed           Home Living:  Type of Home: House  Lives With: Spouse  Home Adaptive Equipment: Walker rolling or standard  Home Layout: One level  Home Access: Stairs to enter with rails  Entrance Stairs-Number of Steps: 3  Bathroom Shower/Tub: Walk-in shower  Bathroom Toilet: Standard  Bathroom Equipment: Grab bars in shower, Grab bars around toilet  Prior Function:  Level of [place]: Independent with ADLs and functional transfers, Independent with homemaking with ambulation  Receives Help From: Family  ADL Assistance: Independent  Homemaking Assistance: Independent  Ambulatory Assistance: Independent  Prior Function Comments: pt reports no falls at home, independent at baseline.     ADL:  Eating Assistance: Stand by  Grooming Assistance: Minimal  Bathing Assistance: Maximal  UE Dressing Assistance: Moderate  LE Dressing Assistance: Maximal  Toileting Assistance with Device: Maximal  Functional Assistance: Maximal  Activities of Daily Living: Grooming  Grooming Level of Assistance: Minimum assistance, Setup  Grooming Where Assessed: Edge of bed, Chair  Grooming Comments: min assist to brush back of head seated EOB, min assist after setup for teeth brushing and face wash in chair.  Activity Tolerance:  Endurance: Decreased tolerance for upright activites  Activity Tolerance Comments: no report of dizziness thorughout session, fearful during transitions     Bed Mobility/Transfers: Bed Mobility  Bed Mobility: Yes  Bed Mobility 1  Bed Mobility 1: Supine to sitting  Level of Assistance 1: Moderate assistance, +2, Moderate verbal cues  Bed Mobility  Comments 1: instructed on log roll technique    Transfers  Transfer: Yes  Transfer 1  Transfer From 1: Sit to, Stand to  Transfer to 1: Stand, Sit  Technique 1: Sit to stand, Stand to sit  Transfer Level of Assistance 1: Arm in arm assistance, Moderate assistance, +2  Trials/Comments 1: abdomen pilllow held into place      Functional Mobility:  Functional Mobility  Functional Mobility Performed: Yes  Functional Mobility 1  Surface 1: Level tile  Device 1: No device  Assistance 1: Arm in arm assistance, Moderate assistance, Minimal verbal cues  Comments 1: MOD x2 assist few steps to chair with arms, pt held pillow in place over abdomen during transitions  Sitting Balance:  Static Sitting Balance  Static Sitting-Balance Support: Feet supported, No upper extremity supported  Static Sitting-Comment/Number of Minutes: initially sitting balance required max assisted, decreased to GCA over 15 minute period  Standing Balance:  Static Standing Balance  Static Standing-Balance Support: Bilateral upper extremity supported  Static Standing-Level of Assistance: Moderate assistance (x2)     Therapy/Activity: Therapeutic Activity  Therapeutic Activity Performed: Yes  Therapeutic Activity 1: bed mobility, functional transfer training, functional mobility, abdominal precaution education, re orientation, grooming activities.     Vision:Vision - Basic Assessment  Current Vision: Wears glasses all the time  Sensation:  Light Touch: No apparent deficits  Strength:  Strength Comments: BUEs grossl 4-/5 as demonstrated during session     Coordination:  Movements are Fluid and Coordinated: Yes   Hand Function:  Hand Function  Gross Grasp: Functional  Coordination: Functional  Extremities: RUE   RUE : Within Functional Limits and LUE   LUE: Within Functional Limits      Outcome Measures: [facility] Daily Activity  Putting on and taking off regular lower body clothing: Total  Bathing (including washing, rinsing, drying): A lot  Putting on and  taking off regular upper body clothing: A lot  Toileting, which includes using toilet, bedpan or urinal: A lot  Taking care of personal grooming such as brushing teeth: A little  Eating Meals: A little  Daily Activity - Total Score: 13        Education Documentation  Body Mechanics, taught by Gloria Jolley OT at 6/9/2025  3:04 PM.  Learner: Patient  Readiness: Acceptance  Method: Explanation  Response: Verbalizes Understanding    Precautions, taught by Gloria Jolley OT at 6/9/2025  3:04 PM.  Learner: Patient  Readiness: Acceptance  Method: Explanation  Response: Verbalizes Understanding    ADL Training, taught by Gloria Jolley OT at 6/9/2025  3:04 PM.  Learner: Patient  Readiness: Acceptance  Method: Explanation  Response: Verbalizes Understanding    Education Comments  No comments found.        OP EDUCATION:       Goals:  Encounter Problems       Encounter Problems (Active)       ADLs       Patient will complete toileting including hygiene clothing management/hygiene with stand by assist level of assistance. (Progressing)       Start:  06/09/25    Expected End:  06/23/25               COGNITION/SAFETY       Patient will demonstrated orientation x 4 with verbal cues. (Progressing)       Start:  06/09/25    Expected End:  06/23/25       ORIENTATION            MOBILITY       Patient will perform Functional mobility Household distances/Community Distances with stand by assist level of assistance and least restrictive device in order to improve safety and functional mobility. (Progressing)       Start:  06/09/25    Expected End:  06/23/25               TRANSFERS       Patient will perform bed mobility stand by assist level of assistance and log roll technique in order to improve safety and independence with mobility (Progressing)       Start:  06/09/25    Expected End:  06/23/25            Patient will complete functional transfers with least restrictive device with stand by assist level of assistance.  (Progressing)       Start:  06/09/25    Expected End:  06/23/25

## 2025-06-09 NOTE — CONSULTS
Ostomy Care Consult     Visit Date: 6/9/2025      Patient Name: Rosemary Motta         MRN: [id number]                  Colostomy LUQ (Active)   Placement Date: 06/07/25   Location: LUQ   Number of days: 2      Colostomy LUQ (Active)   Present on Admission to Healthcare Facility N 06/09/25 1133   Stomal Appliance 2 piece;Clean;Dry;Intact 06/09/25 1133   Site/Stoma Assessment Clean;Intact;[place] 06/09/25 1133   Stoma Size (cm) 41 cm 06/09/25 1133   Peristomal Assessment Clean;Intact 06/09/25 1133   Treatment Site care 06/09/25 1133   Drainage Characteristics Brown 06/09/25 1133   Output (mL) 0 mL 06/09/25 1133     79 yr old patient with hx of HTN, HLE, Breast CA, GERD, Hypothyroidism, who is POD #2 from partial colectomy and ostomy placement. Introduced myself and role to patient and family. Patient given ostomy education to review. Patient denies pain at this time.     Assessment  Ostomy Type: End Colostomy  Size: 41        Color: red/pink              Protruding: federico in place   Functioning: 10ml of brown liquid  Mucocutaneous junction: sutures intact  Peristomal Skin: pink  Pouching supplies: Adhesive remover wipe/spray, mild soap and water, gauze, no sting barrier wipe, barrier ring, convex one piece Andrew pouch.      Education: Patient,  of patient, son and daughter participated in education. Patient and family involved in daily ostomy care of emptying pouch and releasing gas.   Plan:   Bedside nursing assess pouch and stoma Q shift. Cleanse with mild soap and water, pat dry. Apply no sting barrier, barrier ring, and convex one piece pouch every 3 days/prn if leaking. Empty bag when 1/3 way full. Will follow up with patient on Wednesday for education and bag change.     Please contact me with questions or changes in patient condition.  Wound/Ostomy Care    [phone]                 Mae Ocampo RN  6/9/2025  11:39 AM

## 2025-06-09 NOTE — PROGRESS NOTES
Critical Care Daily Progress Notes        Subjective   Patient is a 79 y.o. female admitted on 6/7/2025  1:49 AM with the following indication(s) for ICU care: Exploratory laparotomy with partial colectomy due to perforated diverticulitis.    .     Interval History:   79-year-old female with underlying history of hypertension, GERD, hypothyroidism, osteoarthritis, diverticulosis, recent diverticulitis in January 2025 admitted to the ICU after undergoing exploratory laparotomy with partial colectomy due to perforated diverticulitis.    6/9/2025: Surgically patient is doing better.  Surgery planning for taking of NG and starting on clear liquid diet.  Patient had a episode of atrial fibrillation with tachycardia last night requiring the Cardizem push.  Patient takes 150 mg twice daily of metoprolol at home.  Increase the dose to 100 mg of metoprolol from 50 twice a day and eventually reach 150 twice a day.  Hemodynamically stable, off Levophed for 20 hours.     Scheduled Medications:   Scheduled Medications[1]     Continuous Medications:   Continuous Medications[2]     PRN Medications:   PRN Medications[3]    Objective   Vitals:  Most Recent:  Vitals:    06/09/25 1135   BP:    Pulse:    Resp:    Temp:    SpO2: 95%       24hr Min/Max:  Temp  Min: 36.4 °C (97.5 °F)  Max: 38.3 °C (101 °F)  Pulse  Min: 73  Max: 135  BP  Min: 93/51  Max: 201/57  Resp  Min: 10  Max: 32  SpO2  Min: 92 %  Max: 100 %    LDA:  Arterial Line 06/07/25 Left Radial (Active)   Placement Date/Time: 06/07/25 (c) 2232   Size: 20 G  Orientation: Left  Location: Radial  Securement Method: Transparent dressing  Patient Tolerance: Tolerated well   Number of days: 1       Closed/Suction Drain 1 Right RLQ Bulb (Active)   Placement Date: 06/07/25   Tube Number: 1  Orientation: Right  Location: RLQ  Drain Tube Type: Bulb   Number of days: 2       Colostomy LUQ (Active)   Placement Date: 06/07/25   Location: LUQ   Number of days: 2         Vent settings:        Hemodynamic parameters for last 24 hours:       I/O:    Intake/Output Summary (Last 24 hours) at 6/9/2025 1152  Last data filed at 6/9/2025 1144  Gross per 24 hour   Intake 1294.58 ml   Output 2375 ml   Net -1080.42 ml       Physical Exam:   Physical Exam   Vitals and nursing note reviewed.   Constitutional:       Appearance: Normal appearance.   HENT:      Head: Normocephalic and atraumatic.      Nose:      Comments: NG in place     Mouth/Throat:      Mouth: Mucous membranes are dry.   Eyes:      Extraocular Movements: Extraocular movements intact.      Pupils: Pupils are equal, round, and reactive to light.   Cardiovascular:      Rate and Rhythm: Normal rate and regular rhythm.   Pulmonary:      Effort: Pulmonary effort is normal.      Breath sounds: Normal breath sounds.   Abdominal:      Comments: Ostomy present, abdomen is soft   Genitourinary:     Comments: Gabriel present with yellow urine  Musculoskeletal:         General: No swelling.   Skin:     General: Skin is dry.      Capillary Refill: Capillary refill takes 2 to 3 seconds.   Neurological:      General: No focal deficit present.      Mental Status: She is alert and oriented to person, place, and time.   Psychiatric:         Mood and Affect: Mood normal.         Behavior: Behavior normal.     Lab/Radiology/Diagnostic Review:  Results for orders placed or performed during the hospital encounter of 06/07/25 (from the past 24 hours)   Cortisol   Result Value Ref Range    Cortisol 8.1 2.5 - 20.0 ug/dL   Blood Culture    Specimen: Peripheral Venipuncture; Blood culture   Result Value Ref Range    Blood Culture Loaded on Instrument - Culture in progress    Blood Culture    Specimen: Peripheral Venipuncture; Blood culture   Result Value Ref Range    Blood Culture Loaded on Instrument - Culture in progress    Urinalysis with Reflex Culture and Microscopic   Result Value Ref Range    Color, Urine Light-Yellow Light-Yellow, Yellow, Dark-Yellow    Appearance,  Urine Clear Clear    Specific Gravity, Urine 1.014 1.005 - 1.035    pH, Urine 5.5 5.0, 5.5, 6.0, 6.5, 7.0, 7.5, 8.0    Protein, Urine NEGATIVE NEGATIVE, 10 (TRACE), 20 (TRACE) mg/dL    Glucose, Urine Normal Normal mg/dL    Blood, Urine 0.03 (TRACE) (A) NEGATIVE mg/dL    Ketones, Urine 10 (1+) (A) NEGATIVE mg/dL    Bilirubin, Urine NEGATIVE NEGATIVE mg/dL    Urobilinogen, Urine Normal Normal mg/dL    Nitrite, Urine NEGATIVE NEGATIVE    Leukocyte Esterase, Urine NEGATIVE NEGATIVE   Extra Urine Gray Tube   Result Value Ref Range    Extra Tube Hold for add-ons.    Urinalysis Microscopic   Result Value Ref Range    WBC, Urine 1-5 1-5, NONE /HPF    RBC, Urine >20 (A) NONE, 1-2, 3-5 /HPF    Mucus, Urine FEW Reference range not established. /LPF   Basic metabolic panel   Result Value Ref Range    Glucose 100 (H) 74 - 99 mg/dL    Sodium 137 136 - 145 mmol/L    Potassium 3.3 (L) 3.5 - 5.3 mmol/L    Chloride 107 98 - 107 mmol/L    Bicarbonate 22 21 - 32 mmol/L    Anion Gap 11 10 - 20 mmol/L    Urea Nitrogen 11 6 - 23 mg/dL    Creatinine 0.96 0.50 - 1.05 mg/dL    eGFR 60 (L) >60 mL/min/1.73m*2    Calcium 8.2 (L) 8.6 - 10.3 mg/dL   Magnesium   Result Value Ref Range    Magnesium 1.33 (L) 1.60 - 2.40 mg/dL   CBC   Result Value Ref Range    WBC 7.0 4.4 - 11.3 x10*3/uL    nRBC 0.0 0.0 - 0.0 /100 WBCs    RBC 3.47 (L) 4.00 - 5.20 x10*6/uL    Hemoglobin 11.1 (L) 12.0 - 16.0 g/dL    Hematocrit 33.4 (L) 36.0 - 46.0 %    MCV 96 80 - 100 fL    MCH 32.0 26.0 - 34.0 pg    MCHC 33.2 32.0 - 36.0 g/dL    RDW 13.4 11.5 - 14.5 %    Platelets 125 (L) 150 - 450 x10*3/uL   Comprehensive metabolic panel   Result Value Ref Range    Glucose 94 74 - 99 mg/dL    Sodium 139 136 - 145 mmol/L    Potassium 4.1 3.5 - 5.3 mmol/L    Chloride 106 98 - 107 mmol/L    Bicarbonate 23 21 - 32 mmol/L    Anion Gap 14 10 - 20 mmol/L    Urea Nitrogen 11 6 - 23 mg/dL    Creatinine 0.88 0.50 - 1.05 mg/dL    eGFR 67 >60 mL/min/1.73m*2    Calcium 7.8 (L) 8.6 - 10.3 mg/dL     Albumin 3.1 (L) 3.4 - 5.0 g/dL    Alkaline Phosphatase 43 33 - 136 U/L    Total Protein 5.1 (L) 6.4 - 8.2 g/dL    AST 24 9 - 39 U/L    Bilirubin, Total 0.6 0.0 - 1.2 mg/dL    ALT 12 7 - 45 U/L   Magnesium   Result Value Ref Range    Magnesium 2.43 (H) 1.60 - 2.40 mg/dL   Phosphorus   Result Value Ref Range    Phosphorus 1.7 (L) 2.5 - 4.9 mg/dL   Transthoracic Echo Complete   Result Value Ref Range    BSA 1.65 m2     Imaging  XR chest 1 view  Result Date: 6/8/2025  A nasogastric tube projects into the stomach beyond the field of imaging.   Mild cardiomegaly.   Minimal linear bibasilar atelectasis. Otherwise no sign of acute cardiopulmonary disease.     MACRO: None   Signed by: Mello Fried 6/8/2025 12:33 PM Dictation workstation:   [id number]    CT abdomen pelvis w IV contrast  Result Date: 6/7/2025  1. Diverticulosis of the sigmoid colon. There is inflammatory change noted about the proximal to mid sigmoid colon most concerning for acute diverticulitis. Multiple free air locules are noted adjacent to this inflamed segment of sigmoid colon as well as small-to-moderate amount of free air within the upper abdomen consistent with perforation. No overt abscess identified.   MACRO: Donovan Ocampo discussed the significance and urgency of this critical finding by telephone with  KONG HILL on 6/7/2025 at 3:39 am. (**-RCF-**) Findings:  See findings.   Signed by: Donovan Ocampo 6/7/2025 3:40 AM Dictation workstation:   [id number]      Cardiology, Vascular, and Other Imaging  Electrocardiogram, 12-lead PRN ACS symptoms  Result Date: 6/9/2025  Sinus rhythm Atrial premature complexes RSR' in V1 or V2, right VCD or RVH                       Assessment/Plan   Assessment & Plan  Diverticulitis of large intestine with perforation without bleeding    79-year-old female with underlying history of hypertension, GERD, hypothyroidism, osteoarthritis, diverticulosis, recent diverticulitis in January 2025 admitted to the  ICU after undergoing exploratory laparotomy with partial colectomy due to perforated diverticulitis.     Neuro              - Patient is alert and oriented conversing              - History of anxiety depression, on Celexa at home.  This can be restarted tomorrow     Respiratory              - No issues, nasal cannula saturating 100%     Cardiac/hemodynamics              - Currently dynamically stable, not requiring pressor support              - Continue with IV fluid, LR at 100 cc an hour              - Arterial line in place keep MAP greater than 65              - Check lactic acid  6/9/2025: Patient had a episode of atrial fibrillation with tachycardia last night requiring the Cardizem push.    Patient takes 150 mg twice daily of metoprolol at home.    Increased the dose to 100 mg of metoprolol from 50 twice a day and eventually reach 150 twice a day.    Replaced, potassium, phosphorus and magnesium  Hemodynamically stable, off Levophed for 20 hours.      Renal  - Acute kidney injury  - Serum creatinine improved to 0.98 mg/dL from 1.17 mg/dL yesterday  - Continue IV fluids, monitor urinary output  - Hypokalemia  - Mild, potassium 3.4 mmol/L today will replace  6/9/2025: Replaced, potassium, phosphorus and magnesium    GI  - Perforated diverticulitis status post surgical repair with partial colectomy and colostomy placement  - NG in place currently low intermittent suction  - Surgical teams following  - currently NPO per surgical team                - Gabriel catheter in place     ID  - Perforated diverticulitis  - Currently on Cipro and Flagyl.  Documented anaphylaxis with penicillin.  Patient states that she passed out as a teenager thinks that there was some tongue swelling but unclear  - Currently afebrile, absent leukocytosis  - Maintain on current antibiotics  6/9/2025: Surgically patient is doing better.  Surgery planning for taking of NG and starting on clear liquid diet.    Defer the duration of cefepime  and Flagyl to ID    Endocrine  - History of hypothyroidism on Synthroid  TSH within acceptable range     Heme/onc  - Hemoglobin stable 11.4, platelets of 124,000  - History of breast cancer, continue with anastrozole     DVT prophylaxis              - Lovenox    I spent 30 minutes of cumulative critical care time with the patient.  Greater than 50% of that time was spent in the direct collaboration and or coordination of care of the patient.   Discussed with the general surgeon, will transfer the patient to stepdown.     Dragon dictation software was used to dictate this note and thus there may be minor errors in translation/transcription including garbled speech or misspellings. Please contact for clarification if needed.        [1] anastrozole, 1 mg, oral, Daily  aspirin, 81 mg, oral, Daily  bacitracin, , Topical, Daily  cefepime, 2 g, intravenous, q12h  [START ON 6/10/2025] enoxaparin, 40 mg, subcutaneous, q24h  [START ON 6/10/2025] pantoprazole, 40 mg, oral, Daily before breakfast   Or  [START ON 6/10/2025] esomeprazole, 40 mg, nasoduodenal tube, Daily before breakfast   Or  [START ON 6/10/2025] pantoprazole, 40 mg, intravenous, Daily before breakfast  levothyroxine, 25 mcg, oral, Daily  metoprolol tartrate, 100 mg, oral, BID  metroNIDAZOLE, 500 mg, intravenous, q12h  potassium phosphate, 15 mmol, intravenous, Once    [2] lactated Ringer's, 75 mL/hr, Last Rate: 75 mL/hr (06/08/25 6823)    [3] PRN medications: acetaminophen, hydrALAZINE, HYDROcodone-acetaminophen, HYDROmorphone, labetaloL, LORazepam, melatonin, [DISCONTINUED] ondansetron **OR** ondansetron

## 2025-06-09 NOTE — PROGRESS NOTES
Physical Therapy    Physical Therapy Evaluation    Patient Name: Rosemary Motta  MRN: [id number]  Today's Date: 6/9/2025   Time Calculation  Start Time: 1247  Stop Time: 1326  Time Calculation (min): 39 min  03/03-A    Assessment/Plan   PT Assessment  PT Assessment Results: Decreased mobility, Decreased cognition, Pain (ABD restrictions)  Rehab Prognosis: Good  Barriers to Discharge Home: Cognition needs, Physical needs  Cognition Needs: 24hr supervision for safety awareness needed  Physical Needs: Stair navigation into home limited by function/safety, Ambulating household distances limited by function/safety, 24hr mobility assistance needed  Evaluation/Treatment Tolerance: Patient tolerated treatment well  End of Session Communication: Bedside nurse  Assessment Comment: Recommend [facility] for Skilled PT mobility training postop ABD SURG: pt was independent ambulator with NO AD prior to adm  End of Session Patient Position: Alarm on, Up in chair  IP OR SWING BED PT PLAN  Inpatient or Swing Bed: Inpatient  PT Plan  Treatment/Interventions: Bed mobility, Transfer training, Gait training, Neuromuscular re-education, Therapeutic exercise, Therapeutic activity  PT Plan: Ongoing PT  PT Frequency: 6 times per week  PT Discharge Recommendations: Moderate intensity level of continued care  PT - OK to Discharge: Yes    Subjective     Current Problem:  1. Diverticulitis  Case Request Operating Room: Exploratory laparotomy with partial colectomy and colostomy placement    Case Request Operating Room: Exploratory laparotomy with partial colectomy and colostomy placement    Surgical Pathology Exam    Surgical Pathology Exam      2. Free intraperitoneal air        3. Atrial fibrillation, unspecified type (Multi)        4. CRISOSTOMO (dyspnea on exertion)  Transthoracic Echo Complete    Transthoracic Echo Complete        Problem List[1]    General Visit Information:  General  Reason for Referral: sudden onset abd pain: perf  "diverticulitis, 6/7 ABD SURG ex lap with partial colectomy  Referred By: PT FOR IMPAIRED MOBILITY/GAIT TRAINING  Past Medical History Relevant to Rehab: HTN, HLE, breast ca, diverticulosis, GERD, hypothyroidism, neuropathy, migraines, OA, anxiety/depression presented 6/7/25 for abdominal pain, left lower quadrant abd pain with radiation to back  Co-Treatment: OT  Co-Treatment Reason: optimize pt safety while focus on discipline specific goals  Prior to Session Communication: Bedside nurse  Patient Position Received: Bed, 3 rail up, Alarm on  General Comment: pt seen in ICU 1003, pleasantly confused (repeatedly tells story of events prior to adm) , alert and conversant but disoriented    Home Living:  Home Living  Type of Home: House  Lives With: Spouse  Home Adaptive Equipment: Walker rolling or standard  Home Layout: One level  Home Access: Stairs to enter without rails (has \"posts\")  Entrance Stairs-Rails: None  Entrance Stairs-Number of Steps: 3    Prior Level of Function:  Prior Function Per Pt/Caregiver Report  Level of [place]: Independent with ADLs and functional transfers, Independent with homemaking with ambulation  Ambulatory Assistance: Independent (no AD used prior)    Precautions:  Precautions  Medical Precautions: Fall precautions  Post-Surgical Precautions: Abdominal surgery precautions  Precautions Comment: new L colectomy pouch, bulb drain RLQ abd     Objective     Pain:  Pain Assessment  Pain Assessment: 0-10  0-10 (Numeric) Pain Score: 4  Pain Type: Surgical pain  Pain Location: Abdomen  Clinical Progression: Gradually worsening (5/10 whiel dangling EOB)  Effect of Pain on Daily Activities: reduced OOB mobility  Pain Interventions: Splinting, Ambulation/increased activity (pillow splint to ABD alvino for coughing and mobility)  Response to Interventions: Content/relaxed (in chair)    Cognition:  Cognition  Overall Cognitive Status: Impaired  Orientation Level: Disoriented to place, Disoriented " "to time, Disoriented to situation  Memory: Exceptions to WFL (forgetful, uncertain baseline)    General Assessments:  General Observation  General Observation: supine to dangle EOB extended period >5 min to work on improved sitting balance/tolerance/confidence. stood and took steps to chair. pt pleased to be sitting in chair.   Activity Tolerance  Endurance: Decreased tolerance for upright activites  Activity Tolerance Comments: apprehensive, abd pain     Dynamic Sitting Balance  Dynamic Sitting-Comments: poor initially with max support x2, progressed to only CGAx1 with prolonged sitting time and use of pillow splint to ABD  Dynamic Standing Balance  Dynamic Standing-Comments: poor: assist x2    Functional Assessments:     Bed Mobility 1  Bed Mobility 1: Side lying right to sit  Level of Assistance 1: Moderate assistance, Maximum verbal cues, Maximum tactile cues, +2  Bed Mobility Comments 1: LOG ROLL TECH instructed/return demo  Transfer 1  Transfer From 1: Bed to  Transfer to 1: Bed, Chair with arms  Technique 1: Sit to stand, Stand to sit  Transfer Device 1:  (\"bear hug\" pt could not use walker yet d/t grasping pillow splint)  Transfer Level of Assistance 1: Moderate assistance, +2, Moderate verbal cues, Moderate tactile cues  Ambulation/Gait Training 1  Device 1: No device  Assistance 1: Arm in arm assistance, Moderate assistance (x2)  Quality of Gait 1: Forward flexed posture  Comments/Distance (ft) 1: 3  Stairs  Stairs: No       Extremity/Trunk Assessments:        RLE   RLE : Within Functional Limits  LLE   LLE : Within Functional Limits    Outcome Measures:     [facility] Basic Mobility  Turning from your back to your side while in a flat bed without using bedrails: A lot  Moving from lying on your back to sitting on the side of a flat bed without using bedrails: A lot  Moving to and from bed to chair (including a wheelchair): A lot  Standing up from a chair using your arms (e.g. wheelchair or bedside chair): A " lot  To walk in hospital room: Total  Climbing 3-5 steps with railing: Total  Basic Mobility - Total Score: 10       Goals:  Encounter Problems       Encounter Problems (Active)       Mobility       STG - Patient will ambulate household distance using FWW initially, no more than SBA x1       Start:  06/09/25    Expected End:  06/23/25               PT Transfers       STG - Transfer from bed to chair using FWW initially, no more than SBA x1       Start:  06/09/25    Expected End:  06/23/25            STG - Patient to transfer to and from sit to supine DEMO LOG ROLL TECH (for ABD precautions) with no more than min Ax1       Start:  06/09/25    Expected End:  06/23/25               Pain - Adult            Education Documentation  Body Mechanics, taught by Yara Griffith, PT at 6/9/2025  3:06 PM.  Learner: Patient  Readiness: Acceptance  Method: Explanation, Demonstration  Response: Verbalizes Understanding, Needs Reinforcement  Comment: abd prec, log roll tech    Mobility Training, taught by Yara Griffith, PT at 6/9/2025  3:06 PM.  Learner: Patient  Readiness: Acceptance  Method: Explanation, Demonstration  Response: Verbalizes Understanding, Needs Reinforcement  Comment: abd prec, log roll tech    Education Comments  No comments found.              [1]   Patient Active Problem List  Diagnosis    Axillary lymphadenopathy    Chronic headaches    Fat necrosis of breast    GERD (gastroesophageal reflux disease)    Hemorrhoid    HTN (hypertension)    Invasive carcinoma of breast    Lymphedema syndrome, postmastectomy    Osteoarthritis    Post herpetic neuralgia    Diverticulitis of large intestine with perforation without bleeding

## 2025-06-09 NOTE — CONSULTS
"Consults  History Of Present Illness:    Rosemary Motta is a 79 y.o. female presenting with abdominal pain and found to have perforated diverticulitis.  She is status post emergency laparotomy on 2025.  Yesterday had a bout of paroxysmal rapid atrial fibrillation.  Reviewed EKG personally.  Currently in sinus rhythm.  Asymptomatic other than abdominal pain and having a cough.    PMH: HTN, HLE, breast ca, diverticulosis, GERD, hypothyroidism, neuropathy, migraines, OA, anxiety/depression  PSH: R mastectomy, ccy, , JOSE bilateral, carpal tunnel  FH: n/a  SH: lives in [place]. no smoking, etoh.    She has no prior cardiac history.     Last Recorded Vitals:  Vitals:    25 1100 25 1135 25 1200 25 1300   BP: 151/55      BP Location:       Pulse: 87  81 100   Resp: 20  22 24   Temp: 36.4 °C (97.5 °F)      TempSrc: Temporal      SpO2: 95% 95% 96% 92%   Weight:       Height:           Last Labs:  CBC - 2025:  5:31 AM  7.0 11.1 125    33.4      CMP - 2025:  5:31 AM  7.8 5.1 24 --- 0.6   1.7 3.1 12 43      PTT - 2025: 11:29 AM  1.0   11.1 26     Troponin I, High Sensitivity   Date/Time Value Ref Range Status   2025 03:25 AM 7 0 - 13 ng/L Final   2025 02:22 AM 6 0 - 13 ng/L Final   2025 11:29 AM 6 0 - 13 ng/L Final     VLDL   Date/Time Value Ref Range Status   2020 10:00 AM 29 0 - 40 mg/dL Final      Last I/O:  I/O last 3 completed shifts:  In: 4427.7 (66.4 mL/kg) [I.V.:4227.7 (63.4 mL/kg); IV Piggyback:200]  Out: 2305 (34.6 mL/kg) [Urine:1820 (0.8 mL/kg/hr); Emesis/NG output:250; Drains:235]  Weight: 66.7 kg     Past Cardiology Tests (Last 3 Years):  EKG:  Electrocardiogram, 12-lead PRN ACS symptoms 2025 (Preliminary)      ECG 12 lead 2025      ECG 12 lead 2024    Echo:  No results found for this or any previous visit from the past 1095 days.    Ejection Fractions:  No results found for: \"EF\"  Cath:  No results found for this or " any previous visit from the past 1095 days.    Stress Test:  No results found for this or any previous visit from the past 1095 days.    Cardiac Imaging:  No results found for this or any previous visit from the past 1095 days.      Past Medical History:  She has a past medical history of Anxiety and depression, Breast cancer, CKD (chronic kidney disease), HLD (hyperlipidemia), HTN (hypertension), Hypothyroidism, and Pulmonary embolism.    Past Surgical History:  She has a past surgical history that includes Cholecystectomy; Carpal tunnel release (Bilateral);  section, classic; Total hip arthroplasty (Left); Mastectomy (Right, 2018); Breast biopsy; Foot surgery (Bilateral); and Colostomy (2025).      Social History:  She reports that she has never smoked. She has never used smokeless tobacco. She reports that she does not currently use alcohol. She reports that she does not use drugs.    Family History:  Family History[1]     Allergies:  Penicillins, Morphine, Clarithromycin, Hydrochlorothiazide, Tetracycline, Cortisone, Methylprednisolone, Nifedipine, and Prednisone    Inpatient Medications:  Scheduled Medications[2]  PRN Medications[3]  Continuous Medications[4]  Outpatient Medications:  Current Outpatient Medications   Medication Instructions    anastrozole (Arimidex) 1 mg tablet Take 1 tablet (1 mg total) by mouth once daily.    artificial tears, dextran-hypomel-glycerin, 0.1-0.3-0.2 % ophthalmic solution 1 drop, ophthalmic (eye), 4 times daily PRN,      aspirin 81 mg EC tablet Take 1 tablet (81 mg) by mouth once daily.    calcium carbonate-vitamin D3 500 mg-5 mcg (200 unit) tablet 2 tablets, oral, Daily    levothyroxine (Synthroid, Levoxyl) 25 mcg tablet 1 tablet, Daily    metoprolol tartrate (LOPRESSOR) 150 mg, oral, 2 times daily    olmesartan (BENIcar) 40 mg tablet 1 tablet, Daily    polyethylene glycol (GLYCOLAX, MIRALAX) 8.5 g, oral, Daily       Physical Exam:  Physical Exam  Vitals reviewed.    Constitutional:       Appearance: Normal appearance.   Neck:      Vascular: No carotid bruit or JVD.   Cardiovascular:      Rate and Rhythm: Normal rate and regular rhythm.      Pulses: Normal pulses.      Heart sounds: Normal heart sounds, S1 normal and S2 normal.   Pulmonary:      Effort: Pulmonary effort is normal. No respiratory distress.      Breath sounds: No wheezing or rales.   Abdominal:      General: Abdomen is flat.      Palpations: Abdomen is soft.   Musculoskeletal:      Right lower leg: No edema.      Left lower leg: No edema.   Skin:     General: Skin is warm.   Neurological:      Mental Status: She is alert and oriented to person, place, and time. Mental status is at baseline.   Psychiatric:         Mood and Affect: Mood normal.         Behavior: Behavior normal.           Assessment/Plan   1-paroxysmal atrial fibrillation-in setting of sepsis, perforated diverticulitis and postop state.  Transient and has resolved now.  No change in management.  Continue treating underlying medical problems.  Cardiology will sign off.  She was advised to follow-up with her PCP and with us as needed if there is any future recurrence of atrial fibrillation which seems unlikely at this point.    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  Outpatient follow-up with cardiology after patient recovers for CHAPIS.      Peripheral IV 20 G Left Antecubital (Active)   Site Assessment Clean;Dry;Intact 06/09/25 0912   Dressing Type Transparent 06/09/25 0912   Line Status Flushed 06/09/25 0912   Dressing Status Clean;Dry;Occlusive 06/09/25 0912   Number of days: 2       Peripheral IV 06/07/25 20 G Left Hand (Active)   Site Assessment Clean;Dry;Intact 06/09/25 0912   Dressing Type Transparent 06/09/25 0912   Line Status Infusing 06/09/25 0912   Dressing Status Dry;Occlusive;Clean 06/09/25 0912   Number of days: 2       Peripheral IV 06/08/25 20 G  Anterior;Left;Upper Arm (Active)   Site Assessment Clean;Dry;Intact 06/09/25 0912   Dressing Type Transparent 06/09/25 0912   Line Status Flushed 06/09/25 0912   Dressing Status Clean;Dry;Occlusive 06/09/25 0912   Number of days: 1       Arterial Line 06/07/25 Left Radial (Active)   Site Assessment Clean;Dry;Intact 06/09/25 0912   Line Status Pulsatile blood flow 06/09/25 0912   Art Line Waveform Appropriate 06/09/25 0912   Art Line Interventions Zeroed and calibrated;Connections checked and tightened 06/09/25 0912   Color/Movement/Sensation Capillary refill less than 3 sec;Distal pulses palpable 06/09/25 0912   Dressing Type Transparent 06/09/25 0912   Dressing Status Clean;Dry;Occlusive 06/09/25 0912   Number of days: 2       Code Status:  Full Code          Mona Quintanilla MD         [1]   Family History  Problem Relation Name Age of Onset    Breast cancer Mother  65   [2]   Scheduled medications   Medication Dose Route Frequency    anastrozole  1 mg oral Daily    aspirin  81 mg oral Daily    bacitracin   Topical Daily    cefepime  2 g intravenous q12h    [START ON 6/10/2025] enoxaparin  40 mg subcutaneous q24h    [START ON 6/10/2025] pantoprazole  40 mg oral Daily before breakfast    Or    [START ON 6/10/2025] esomeprazole  40 mg nasoduodenal tube Daily before breakfast    Or    [START ON 6/10/2025] pantoprazole  40 mg intravenous Daily before breakfast    levothyroxine  25 mcg oral Daily    metoprolol tartrate  100 mg oral BID    metroNIDAZOLE  500 mg intravenous q12h    potassium phosphate  15 mmol intravenous Once   [3]   PRN medications   Medication    acetaminophen    hydrALAZINE    HYDROcodone-acetaminophen    HYDROmorphone    labetaloL    LORazepam    melatonin    ondansetron   [4]   Continuous Medications   Medication Dose Last Rate

## 2025-06-09 NOTE — SIGNIFICANT EVENT
MD messaged regarding pt w/ new-onset afib w/ rvr on tele, confirmed w/ ekg. asx. other vss. improved to 100 range s/p iv dilt 25mg/kg x1. resume home mtp, titrate up if needed. suspect afib due to perforation/peritonitis w/ ongoing fevers, recent surgery. less likely pe. will order repeat lytes, tte. recent normal tsh on file. bvysx4mgxh 4, will defer anticoagulation until able to discuss w/ Dr. Haywood and cardiology in light of recent surgery.

## 2025-06-09 NOTE — PROGRESS NOTES
edication Adjustment    The following medication(s) was/were adjusted for Rosemary Motta after discussion with the provider due to altered renal function.    Medication(s) adjusted:   Enoxaparin increased to 40 mg daily for CrCl 39.5 mL/min     Nishi Millan, PharmD

## 2025-06-09 NOTE — PROGRESS NOTES
Remote coverage  Writer attempted to reach patient by phone. There was no answer. Reviewing chart noted patient lives with spouse and no assistance needed. Patient s/p exploratory laparotomy with partial colectomy do to perforated diverticulitis. Wound care consulted and educating patient and family on ostomy care. PT/OT is ordered - await evaluations to assist with discharge planning home with [facility] vs SNF pending on functional mobility and medical plan of treatment. IV antibiotics to continue through 06/12. Care Transition team to follow for discharge planning and assist as needed. DWAIN Pandey

## 2025-06-09 NOTE — PROGRESS NOTES
oRsemary Motta is a 79 y.o. female on day 2 of admission presenting with Diverticulitis of large intestine with perforation without bleeding.      Assessment/Plan:     #Sepsis, POA, resolved  #Acute diverticulitis  #Bowel perforation s/p ostomy 6/7/2025  No blood cultures were obtained.  Patient started on ciprofloxacin and Flagyl due to penicillin allergy.  Patient was found to have short segment of mid sigmoid colon with inflammation and perforation with moderate amount of purulent fluid throughout the abdomen.  No cultures obtained.     #Penicillin allergy  Reports history of syncope when she was a child.  Discussed with the patient that it is safe to get cefepime as it does not have cross-reactivity.  Tolerated cefepime well without any issues     #CKD  Estimated Creatinine Clearance: 39.5 mL/min (by C-G formula based on SCr of 0.88 mg/dL).     HTN, HLD  Breast cancer s/p right mastectomy, on anastrozole  GERD  Hypothyroidism     Recommendations:     -Cont cefepime 2 g every 12hr  -Cont Flagyl 500 twice daily  -Continue antibiotics through 6/12/25 to finish 5 day course   -Renally dose antibiotic  -No OR cultures obtained  -Will continue to follow         Mikal Ceballos MD  Date of service: 6/9/2025  Time of service: 12:41 PM      Subjective   Interval History: Yesterday had a Tmax of 101..  Patient denies any complaint.  Extubated        Review of Systems  Denies: fever, chills, nausea, vomiting, dysuria    Objective   Range of Vitals (last 24 hours)  Heart Rate:  []   Temp:  [36.4 °C (97.5 °F)-38.3 °C (101 °F)]   Resp:  [11-32]   BP: ()/(43-81)   SpO2:  [92 %-100 %]  Daily Weight  06/08/25 : 66.7 kg (147 lb 0.8 oz)   Body mass index is 30.73 kg/m².      General: alert, oriented, NAD  HEENT: No conjunctival pallor, no scleral icterus  Abdomen: soft, ostomy bag, abdominal binder  Neuro: AAO x 3  Extremities: no edema, no cyanosis  Skin: No rashes   MSK: No joint inflammation    Antibiotics  bacitracin  - 500 unit/gram  cefepime - 2 gram/50 mL  metroNIDAZOLE - 500 mg/100 mL      Relevant Results  Labs  Results from last 72 hours   Lab Units 06/09/25  0531 06/08/25  0420 06/07/25  0540 06/07/25  0222   WBC AUTO x10*3/uL 7.0 8.8 8.6 8.1   HEMOGLOBIN g/dL 11.1* 11.4* 12.4 13.2   HEMATOCRIT % 33.4* 34.0* 36.7 39.8   PLATELETS AUTO x10*3/uL 125* 124* 144* 169   NEUTROS PCT AUTO %  --   --   --  80.3   LYMPHS PCT AUTO %  --   --   --  14.4   MONOS PCT AUTO %  --   --   --  3.7   EOS PCT AUTO %  --   --   --  0.7     Results from last 72 hours   Lab Units 06/09/25  0531 06/08/25 2051 06/08/25  0420   SODIUM mmol/L 139 137 139   POTASSIUM mmol/L 4.1 3.3* 3.4*   CHLORIDE mmol/L 106 107 107   CO2 mmol/L 23 22 23   BUN mg/dL 11 11 16   CREATININE mg/dL 0.88 0.96 0.98   GLUCOSE mg/dL 94 100* 152*   CALCIUM mg/dL 7.8* 8.2* 7.7*   ANION GAP mmol/L 14 11 12   EGFR mL/min/1.73m*2 67 60* 58*   PHOSPHORUS mg/dL 1.7*  --   --      Results from last 72 hours   Lab Units 06/09/25  0531 06/08/25 0420 06/07/25  0540   ALK PHOS U/L 43 39 43   BILIRUBIN TOTAL mg/dL 0.6 0.9 0.6   PROTEIN TOTAL g/dL 5.1* 4.9* 5.7*   ALT U/L 12 15 13   AST U/L 24 22 16   ALBUMIN g/dL 3.1* 3.1* 3.7     Estimated Creatinine Clearance: 39.5 mL/min (by C-G formula based on SCr of 0.88 mg/dL).  C-Reactive Protein   Date Value Ref Range Status   03/02/2024 <0.10 <1.00 mg/dL Final       Microbiology  No results found for the last 14 days.      Imaging    XR chest 1 view  Result Date: 6/8/2025  A nasogastric tube projects into the stomach beyond the field of imaging.   Mild cardiomegaly.   Minimal linear bibasilar atelectasis. Otherwise no sign of acute cardiopulmonary disease.     MACRO: None   Signed by: Mello Fried 6/8/2025 12:33 PM Dictation workstation:   [id number]    CT abdomen pelvis w IV contrast  Result Date: 6/7/2025  1. Diverticulosis of the sigmoid colon. There is inflammatory change noted about the proximal to mid sigmoid colon most  concerning for acute diverticulitis. Multiple free air locules are noted adjacent to this inflamed segment of sigmoid colon as well as small-to-moderate amount of free air within the upper abdomen consistent with perforation. No overt abscess identified.   MACRO: Donovan Ocampo discussed the significance and urgency of this critical finding by telephone with  KONG HILL on 6/7/2025 at 3:39 am. (**-RCF-**) Findings:  See findings.   Signed by: Donovan Ocampo 6/7/2025 3:40 AM Dictation workstation:   [id number]

## 2025-06-09 NOTE — PROGRESS NOTES
"    GENERAL SURGERY / TRAUMA PROGRESS NOTE    Patient: Rosemary Motta  Room: 03/03-A    Age: 79 y.o.   Gender: female  Attending: Joseph Mcbride MD    MRN: [id number]  Admission Date: 6/7/2025    PCP: Prema Romero DO       Rosemary Motta is a 79 y.o. female on day 2  of admission presenting with Diverticulitis of large intestine with perforation without bleeding.    SUBJECTIVE   Interval History:  Patient went into [facility] with RVR overnight and was febrile and fever back.  Patient off pressors this morning.  NG tube output is clearing up old output still in cannister.  Abdominal drain is cloudy, serosanguineous output.  Colostomy is pink and viable.     ROS  Review of Systems   Constitutional:  Positive for fatigue. Negative for chills and fever.   HENT:  Positive for hearing loss. Negative for congestion and ear pain.    Eyes:  Negative for pain and redness.   Respiratory:  Negative for cough and shortness of breath.    Cardiovascular:  Positive for leg swelling. Negative for chest pain.   Gastrointestinal:  Positive for abdominal pain. Negative for nausea and vomiting.   Genitourinary:  Negative for flank pain and hematuria.   Musculoskeletal:  Positive for arthralgias and myalgias.   Skin:  Positive for wound. Negative for rash.   Allergic/Immunologic: Negative for immunocompromised state.   Neurological:  Positive for weakness. Negative for speech difficulty and headaches.   Hematological:  Bruises/bleeds easily.   Psychiatric/Behavioral:  Negative for agitation and confusion.           OBJECTIVE   Last Recorded Vitals  Blood pressure 158/66, pulse 79, temperature 36.8 °C (98.2 °F), temperature source Temporal, resp. rate 11, height 1.473 m (4' 10\"), weight 66.7 kg (147 lb 0.8 oz), SpO2 99%.    Intake/Output last 3 Shifts:  I/O last 3 completed shifts:  In: 4427.7 (66.4 mL/kg) [I.V.:4227.7 (63.4 mL/kg); IV Piggyback:200]  Out: 2305 (34.6 mL/kg) [Urine:1820 (0.8 mL/kg/hr); Emesis/NG output:250; " Drains:235]  Weight: 66.7 kg     PHYSICAL EXAM  Physical Exam   General: Well-developed, well-nourished and in no acute distress.  Head: Normocephalic. Atraumatic.  Neck/thyroid: Neck is supple.   Eyes: Pupils equal round and reactive to light. Conjunctiva normal.  ENMT: No masses or deformity of external nose. External ears without masses.  Respiratory/Chest:  Normal respiratory effort.  Breast: s/p right mastectomy  Cardiovascular: Regular rate and rhythm.   Abdomen: Soft and nondistended.  Midline incision is clean, dry and intact.  Colostomy in the left lower quadrant is pink and viable with minimal serosanguineous output.  Abdominal drain with serosanguineous fluid.  Musculoskeletal: Joints and limbs are grossly normal.   Neuro: Oriented to person, place and time. No obvious neurological deficit.   Psych: Normal mood and affect.  Awake and talkative.    RESULTS   Labs  Results for orders placed or performed during the hospital encounter of 06/07/25 (from the past 24 hours)   Cortisol   Result Value Ref Range    Cortisol 8.1 2.5 - 20.0 ug/dL   Urinalysis with Reflex Culture and Microscopic   Result Value Ref Range    Color, Urine Light-Yellow Light-Yellow, Yellow, Dark-Yellow    Appearance, Urine Clear Clear    Specific Gravity, Urine 1.014 1.005 - 1.035    pH, Urine 5.5 5.0, 5.5, 6.0, 6.5, 7.0, 7.5, 8.0    Protein, Urine NEGATIVE NEGATIVE, 10 (TRACE), 20 (TRACE) mg/dL    Glucose, Urine Normal Normal mg/dL    Blood, Urine 0.03 (TRACE) (A) NEGATIVE mg/dL    Ketones, Urine 10 (1+) (A) NEGATIVE mg/dL    Bilirubin, Urine NEGATIVE NEGATIVE mg/dL    Urobilinogen, Urine Normal Normal mg/dL    Nitrite, Urine NEGATIVE NEGATIVE    Leukocyte Esterase, Urine NEGATIVE NEGATIVE   Extra Urine Gray Tube   Result Value Ref Range    Extra Tube Hold for add-ons.    Urinalysis Microscopic   Result Value Ref Range    WBC, Urine 1-5 1-5, NONE /HPF    RBC, Urine >20 (A) NONE, 1-2, 3-5 /HPF    Mucus, Urine FEW Reference range not  established. /LPF   Basic metabolic panel   Result Value Ref Range    Glucose 100 (H) 74 - 99 mg/dL    Sodium 137 136 - 145 mmol/L    Potassium 3.3 (L) 3.5 - 5.3 mmol/L    Chloride 107 98 - 107 mmol/L    Bicarbonate 22 21 - 32 mmol/L    Anion Gap 11 10 - 20 mmol/L    Urea Nitrogen 11 6 - 23 mg/dL    Creatinine 0.96 0.50 - 1.05 mg/dL    eGFR 60 (L) >60 mL/min/1.73m*2    Calcium 8.2 (L) 8.6 - 10.3 mg/dL   Magnesium   Result Value Ref Range    Magnesium 1.33 (L) 1.60 - 2.40 mg/dL   CBC   Result Value Ref Range    WBC 7.0 4.4 - 11.3 x10*3/uL    nRBC 0.0 0.0 - 0.0 /100 WBCs    RBC 3.47 (L) 4.00 - 5.20 x10*6/uL    Hemoglobin 11.1 (L) 12.0 - 16.0 g/dL    Hematocrit 33.4 (L) 36.0 - 46.0 %    MCV 96 80 - 100 fL    MCH 32.0 26.0 - 34.0 pg    MCHC 33.2 32.0 - 36.0 g/dL    RDW 13.4 11.5 - 14.5 %    Platelets 125 (L) 150 - 450 x10*3/uL   Comprehensive metabolic panel   Result Value Ref Range    Glucose 94 74 - 99 mg/dL    Sodium 139 136 - 145 mmol/L    Potassium 4.1 3.5 - 5.3 mmol/L    Chloride 106 98 - 107 mmol/L    Bicarbonate 23 21 - 32 mmol/L    Anion Gap 14 10 - 20 mmol/L    Urea Nitrogen 11 6 - 23 mg/dL    Creatinine 0.88 0.50 - 1.05 mg/dL    eGFR 67 >60 mL/min/1.73m*2    Calcium 7.8 (L) 8.6 - 10.3 mg/dL    Albumin 3.1 (L) 3.4 - 5.0 g/dL    Alkaline Phosphatase 43 33 - 136 U/L    Total Protein 5.1 (L) 6.4 - 8.2 g/dL    AST 24 9 - 39 U/L    Bilirubin, Total 0.6 0.0 - 1.2 mg/dL    ALT 12 7 - 45 U/L   Magnesium   Result Value Ref Range    Magnesium 2.43 (H) 1.60 - 2.40 mg/dL   Phosphorus   Result Value Ref Range    Phosphorus 1.7 (L) 2.5 - 4.9 mg/dL       Radiology Resutls  Imaging  XR chest 1 view  Result Date: 6/8/2025  A nasogastric tube projects into the stomach beyond the field of imaging.   Mild cardiomegaly.   Minimal linear bibasilar atelectasis. Otherwise no sign of acute cardiopulmonary disease.     MACRO: None   Signed by: Mello Fried 6/8/2025 12:33 PM Dictation workstation:   [id number]      Cardiology,  Vascular, and Other Imaging  No other imaging results found for the past 2 days         ASSESSMENT / PLAN     Assessment & Plan  Diverticulitis of large intestine with perforation without bleeding         PLAN  79-year-old white female s/p Hanna's procedure for perforated sigmoid diverticulitis 6/7/2025. In ICU off of pressors.    Remove NGT   Routine colostomy care.  Encourage patient to get out of bed at least up to a chair today.  She will need PT/OT evaluation.  Discussed possible need for short-term rehab placement upon discharge pending PT/OT evaluations.  Void trial and removal Gabriel catheter  Other supportive care (IV fluids, pressor support, etc.) per medicine service/ICU team.  Antibiotics per infectious disease.  Pathology pending.  PUD/DVT prophylaxis.      Patient will be discussed with Attending Physician Dr. Yobany Amaral PGY4  General Surgery Service

## 2025-06-10 ENCOUNTER — TELEPHONE (OUTPATIENT)
Dept: CARDIOLOGY | Facility: HOSPITAL | Age: 80
End: 2025-06-10
Payer: MEDICARE

## 2025-06-10 ENCOUNTER — APPOINTMENT (OUTPATIENT)
Dept: CARDIOLOGY | Facility: HOSPITAL | Age: 80
DRG: 853 | End: 2025-06-10
Payer: MEDICARE

## 2025-06-10 DIAGNOSIS — R93.1 ABNORMAL ECHOCARDIOGRAM: Primary | ICD-10-CM

## 2025-06-10 DIAGNOSIS — I35.8 AORTIC VALVE MASS: ICD-10-CM

## 2025-06-10 LAB
ALBUMIN SERPL BCP-MCNC: 3 G/DL (ref 3.4–5)
ALP SERPL-CCNC: 41 U/L (ref 33–136)
ALT SERPL W P-5'-P-CCNC: 11 U/L (ref 7–45)
ANION GAP SERPL CALC-SCNC: 9 MMOL/L (ref 10–20)
APTT PPP: 22 SECONDS (ref 26–36)
AST SERPL W P-5'-P-CCNC: 22 U/L (ref 9–39)
ATRIAL RATE: 182 BPM
ATRIAL RATE: 220 BPM
ATRIAL RATE: 85 BPM
BASOPHILS # BLD AUTO: 0.02 X10*3/UL (ref 0–0.1)
BASOPHILS NFR BLD AUTO: 0.3 %
BILIRUB SERPL-MCNC: 0.6 MG/DL (ref 0–1.2)
BUN SERPL-MCNC: 12 MG/DL (ref 6–23)
CALCIUM SERPL-MCNC: 7.9 MG/DL (ref 8.6–10.3)
CHLORIDE SERPL-SCNC: 108 MMOL/L (ref 98–107)
CO2 SERPL-SCNC: 24 MMOL/L (ref 21–32)
CREAT SERPL-MCNC: 0.84 MG/DL (ref 0.5–1.05)
EGFRCR SERPLBLD CKD-EPI 2021: 70 ML/MIN/1.73M*2
EOSINOPHIL # BLD AUTO: 0.13 X10*3/UL (ref 0–0.4)
EOSINOPHIL NFR BLD AUTO: 2.2 %
ERYTHROCYTE [DISTWIDTH] IN BLOOD BY AUTOMATED COUNT: 13.3 % (ref 11.5–14.5)
GLUCOSE SERPL-MCNC: 95 MG/DL (ref 74–99)
HCT VFR BLD AUTO: 32.6 % (ref 36–46)
HGB BLD-MCNC: 10.9 G/DL (ref 12–16)
IMM GRANULOCYTES # BLD AUTO: 0.06 X10*3/UL (ref 0–0.5)
IMM GRANULOCYTES NFR BLD AUTO: 1 % (ref 0–0.9)
LYMPHOCYTES # BLD AUTO: 1.09 X10*3/UL (ref 0.8–3)
LYMPHOCYTES NFR BLD AUTO: 18.8 %
MAGNESIUM SERPL-MCNC: 2.05 MG/DL (ref 1.6–2.4)
MCH RBC QN AUTO: 31.7 PG (ref 26–34)
MCHC RBC AUTO-ENTMCNC: 33.4 G/DL (ref 32–36)
MCV RBC AUTO: 95 FL (ref 80–100)
MONOCYTES # BLD AUTO: 0.3 X10*3/UL (ref 0.05–0.8)
MONOCYTES NFR BLD AUTO: 5.2 %
NEUTROPHILS # BLD AUTO: 4.2 X10*3/UL (ref 1.6–5.5)
NEUTROPHILS NFR BLD AUTO: 72.5 %
NRBC BLD-RTO: 0 /100 WBCS (ref 0–0)
P AXIS: 55 DEGREES
P OFFSET: 188 MS
P ONSET: 133 MS
PLATELET # BLD AUTO: 143 X10*3/UL (ref 150–450)
POTASSIUM SERPL-SCNC: 3.8 MMOL/L (ref 3.5–5.3)
PR INTERVAL: 174 MS
PROT SERPL-MCNC: 5.3 G/DL (ref 6.4–8.2)
Q ONSET: 219 MS
Q ONSET: 220 MS
Q ONSET: 220 MS
QRS COUNT: 14 BEATS
QRS COUNT: 27 BEATS
QRS COUNT: 28 BEATS
QRS DURATION: 74 MS
QRS DURATION: 82 MS
QRS DURATION: 84 MS
QT INTERVAL: 248 MS
QT INTERVAL: 268 MS
QT INTERVAL: 360 MS
QTC CALCULATION(BAZETT): 410 MS
QTC CALCULATION(BAZETT): 428 MS
QTC CALCULATION(BAZETT): 456 MS
QTC FREDERICIA: 347 MS
QTC FREDERICIA: 382 MS
QTC FREDERICIA: 404 MS
R AXIS: 12 DEGREES
R AXIS: 46 DEGREES
R AXIS: 5 DEGREES
RBC # BLD AUTO: 3.44 X10*6/UL (ref 4–5.2)
SODIUM SERPL-SCNC: 137 MMOL/L (ref 136–145)
T AXIS: -3 DEGREES
T AXIS: 254 DEGREES
T AXIS: 98 DEGREES
T OFFSET: 343 MS
T OFFSET: 354 MS
T OFFSET: 400 MS
UFH PPP CHRO-ACNC: 0.7 IU/ML (ref ?–1.1)
UFH PPP CHRO-ACNC: 0.7 IU/ML (ref ?–1.1)
VENTRICULAR RATE: 165 BPM
VENTRICULAR RATE: 174 BPM
VENTRICULAR RATE: 85 BPM
WBC # BLD AUTO: 5.8 X10*3/UL (ref 4.4–11.3)

## 2025-06-10 PROCEDURE — 2500000004 HC RX 250 GENERAL PHARMACY W/ HCPCS (ALT 636 FOR OP/ED): Performed by: INTERNAL MEDICINE

## 2025-06-10 PROCEDURE — 2500000001 HC RX 250 WO HCPCS SELF ADMINISTERED DRUGS (ALT 637 FOR MEDICARE OP): Performed by: INTERNAL MEDICINE

## 2025-06-10 PROCEDURE — 97116 GAIT TRAINING THERAPY: CPT | Mod: GP,CQ | Performed by: PHYSICAL THERAPY ASSISTANT

## 2025-06-10 PROCEDURE — 97530 THERAPEUTIC ACTIVITIES: CPT | Mod: GP,CQ | Performed by: PHYSICAL THERAPY ASSISTANT

## 2025-06-10 PROCEDURE — 2500000004 HC RX 250 GENERAL PHARMACY W/ HCPCS (ALT 636 FOR OP/ED): Performed by: NURSE PRACTITIONER

## 2025-06-10 PROCEDURE — 36415 COLL VENOUS BLD VENIPUNCTURE: CPT | Performed by: NURSE PRACTITIONER

## 2025-06-10 PROCEDURE — 80053 COMPREHEN METABOLIC PANEL: CPT | Performed by: INTERNAL MEDICINE

## 2025-06-10 PROCEDURE — 93005 ELECTROCARDIOGRAM TRACING: CPT

## 2025-06-10 PROCEDURE — 99233 SBSQ HOSP IP/OBS HIGH 50: CPT | Performed by: PHYSICIAN ASSISTANT

## 2025-06-10 PROCEDURE — 36415 COLL VENOUS BLD VENIPUNCTURE: CPT | Performed by: PHYSICIAN ASSISTANT

## 2025-06-10 PROCEDURE — 93010 ELECTROCARDIOGRAM REPORT: CPT | Performed by: INTERNAL MEDICINE

## 2025-06-10 PROCEDURE — 97530 THERAPEUTIC ACTIVITIES: CPT | Mod: GO,CO

## 2025-06-10 PROCEDURE — 99024 POSTOP FOLLOW-UP VISIT: CPT | Performed by: SURGERY

## 2025-06-10 PROCEDURE — 85025 COMPLETE CBC W/AUTO DIFF WBC: CPT | Performed by: PHYSICIAN ASSISTANT

## 2025-06-10 PROCEDURE — 83735 ASSAY OF MAGNESIUM: CPT | Performed by: PHYSICIAN ASSISTANT

## 2025-06-10 PROCEDURE — 85730 THROMBOPLASTIN TIME PARTIAL: CPT | Performed by: NURSE PRACTITIONER

## 2025-06-10 PROCEDURE — 99232 SBSQ HOSP IP/OBS MODERATE 35: CPT | Performed by: NURSE PRACTITIONER

## 2025-06-10 PROCEDURE — 85520 HEPARIN ASSAY: CPT | Performed by: NURSE PRACTITIONER

## 2025-06-10 PROCEDURE — 97535 SELF CARE MNGMENT TRAINING: CPT | Mod: GO,CO

## 2025-06-10 PROCEDURE — 2060000001 HC INTERMEDIATE ICU ROOM DAILY

## 2025-06-10 RX ORDER — DILTIAZEM HCL/D5W 125 MG/125
5-15 PLASTIC BAG, INJECTION (ML) INTRAVENOUS CONTINUOUS
Status: DISCONTINUED | OUTPATIENT
Start: 2025-06-10 | End: 2025-06-10

## 2025-06-10 RX ORDER — BETHANECHOL CHLORIDE 10 MG/1
25 TABLET ORAL ONCE
Status: COMPLETED | OUTPATIENT
Start: 2025-06-10 | End: 2025-06-10

## 2025-06-10 RX ORDER — HEPARIN SODIUM 10000 [USP'U]/100ML
0-4500 INJECTION, SOLUTION INTRAVENOUS CONTINUOUS
Status: DISCONTINUED | OUTPATIENT
Start: 2025-06-10 | End: 2025-06-19

## 2025-06-10 RX ORDER — METOPROLOL TARTRATE 1 MG/ML
5 INJECTION, SOLUTION INTRAVENOUS ONCE
Status: COMPLETED | OUTPATIENT
Start: 2025-06-10 | End: 2025-06-10

## 2025-06-10 RX ADMIN — METRONIDAZOLE 500 MG: 500 INJECTION, SOLUTION INTRAVENOUS at 16:15

## 2025-06-10 RX ADMIN — HEPARIN SODIUM 1300 UNITS/HR: 10000 INJECTION, SOLUTION INTRAVENOUS at 11:35

## 2025-06-10 RX ADMIN — CEFEPIME HYDROCHLORIDE 2 G: 2 INJECTION, SOLUTION INTRAVENOUS at 10:14

## 2025-06-10 RX ADMIN — METOPROLOL TARTRATE 100 MG: 50 TABLET, FILM COATED ORAL at 09:49

## 2025-06-10 RX ADMIN — METOPROLOL TARTRATE 100 MG: 50 TABLET, FILM COATED ORAL at 20:04

## 2025-06-10 RX ADMIN — LEVOTHYROXINE SODIUM 25 MCG: 0.03 TABLET ORAL at 09:49

## 2025-06-10 RX ADMIN — BETHANECHOL CHLORIDE 25 MG: 10 TABLET ORAL at 11:50

## 2025-06-10 RX ADMIN — ANASTROZOLE 1 MG: 1 TABLET, COATED ORAL at 09:50

## 2025-06-10 RX ADMIN — PANTOPRAZOLE SODIUM 40 MG: 40 TABLET, DELAYED RELEASE ORAL at 06:39

## 2025-06-10 RX ADMIN — METOPROLOL TARTRATE 5 MG: 5 INJECTION INTRAVENOUS at 10:22

## 2025-06-10 RX ADMIN — Medication 5 MG/HR: at 10:34

## 2025-06-10 RX ADMIN — CEFEPIME HYDROCHLORIDE 2 G: 2 INJECTION, SOLUTION INTRAVENOUS at 22:14

## 2025-06-10 RX ADMIN — HYDROCODONE BITARTRATE AND ACETAMINOPHEN 1 TABLET: 7.5; 325 TABLET ORAL at 09:51

## 2025-06-10 RX ADMIN — METRONIDAZOLE 500 MG: 500 INJECTION, SOLUTION INTRAVENOUS at 03:21

## 2025-06-10 RX ADMIN — ASPIRIN 81 MG: 81 TABLET, COATED ORAL at 09:50

## 2025-06-10 RX ADMIN — ENOXAPARIN SODIUM 40 MG: 40 INJECTION SUBCUTANEOUS at 10:03

## 2025-06-10 RX ADMIN — BACITRACIN ZINC: 500 OINTMENT TOPICAL at 10:28

## 2025-06-10 ASSESSMENT — ENCOUNTER SYMPTOMS
AGITATION: 0
ABDOMINAL PAIN: 1
TROUBLE SWALLOWING: 0
NAUSEA: 0
FLANK PAIN: 0
MYALGIAS: 1
COUGH: 0
BACK PAIN: 0
BRUISES/BLEEDS EASILY: 0
HEADACHES: 0
PALPITATIONS: 1
FACIAL SWELLING: 0
FREQUENCY: 0
DIZZINESS: 0
HALLUCINATIONS: 0
BLOOD IN STOOL: 0
WEAKNESS: 1
HEMATURIA: 0
ARTHRALGIAS: 1
JOINT SWELLING: 0
VOMITING: 0
WEAKNESS: 0
BRUISES/BLEEDS EASILY: 1
CHILLS: 0
SORE THROAT: 0
EYE PAIN: 0
LIGHT-HEADEDNESS: 0
CONSTIPATION: 0
CHEST TIGHTNESS: 0
APPETITE CHANGE: 0
WHEEZING: 0
DIARRHEA: 0
DYSURIA: 0
FATIGUE: 0
NUMBNESS: 0
WOUND: 1
FEVER: 0
FATIGUE: 1
SPEECH DIFFICULTY: 0
SHORTNESS OF BREATH: 0
WOUND: 0
CONFUSION: 0
DIAPHORESIS: 0
EYE REDNESS: 0

## 2025-06-10 ASSESSMENT — COGNITIVE AND FUNCTIONAL STATUS - GENERAL
TOILETING: A LOT
WALKING IN HOSPITAL ROOM: A LOT
MOVING FROM LYING ON BACK TO SITTING ON SIDE OF FLAT BED WITH BEDRAILS: A LOT
EATING MEALS: A LITTLE
DRESSING REGULAR UPPER BODY CLOTHING: A LOT
HELP NEEDED FOR BATHING: A LOT
DRESSING REGULAR LOWER BODY CLOTHING: TOTAL
MOVING TO AND FROM BED TO CHAIR: A LOT
STANDING UP FROM CHAIR USING ARMS: A LOT
CLIMB 3 TO 5 STEPS WITH RAILING: TOTAL
PERSONAL GROOMING: A LITTLE
MOBILITY SCORE: 11
DAILY ACTIVITIY SCORE: 13
TURNING FROM BACK TO SIDE WHILE IN FLAT BAD: A LOT

## 2025-06-10 ASSESSMENT — PAIN - FUNCTIONAL ASSESSMENT
PAIN_FUNCTIONAL_ASSESSMENT: 0-10

## 2025-06-10 ASSESSMENT — PAIN SCALES - GENERAL
PAINLEVEL_OUTOF10: 3
PAINLEVEL_OUTOF10: 4
PAINLEVEL_OUTOF10: 1
PAINLEVEL_OUTOF10: 4
PAINLEVEL_OUTOF10: 4
PAINLEVEL_OUTOF10: 1
PAINLEVEL_OUTOF10: 2
PAINLEVEL_OUTOF10: 3

## 2025-06-10 ASSESSMENT — ACTIVITIES OF DAILY LIVING (ADL)
LACK_OF_TRANSPORTATION: NO
HOME_MANAGEMENT_TIME_ENTRY: 12

## 2025-06-10 ASSESSMENT — PAIN DESCRIPTION - ORIENTATION: ORIENTATION: LOWER;UPPER

## 2025-06-10 ASSESSMENT — PAIN DESCRIPTION - DESCRIPTORS
DESCRIPTORS: ACHING;DISCOMFORT
DESCRIPTORS: SORE;ACHING

## 2025-06-10 ASSESSMENT — PAIN DESCRIPTION - LOCATION: LOCATION: BACK

## 2025-06-10 NOTE — PROGRESS NOTES
Rosemary Motta is a 79 y.o. female on day 3 of admission presenting with Diverticulitis of large intestine with perforation without bleeding.      Assessment/Plan:     #Acute diverticulitis  #Bowel perforation s/p ostomy 6/7/2025  #Sepsis, POA, resolved  Patient started on ciprofloxacin and Flagyl due to penicillin allergy.  Patient was found to have short segment of mid sigmoid colon with inflammation and perforation with moderate amount of purulent fluid throughout the abdomen.  No cultures obtained.     #Penicillin allergy  Reports history of syncope when she was a child.  Discussed with the patient that it is safe to get cefepime as it does not have cross-reactivity.  Tolerated cefepime well without any issues     #CKD  Estimated Creatinine Clearance: 43.4 mL/min (by C-G formula based on SCr of 0.84 mg/dL).     HTN, HLD  Breast cancer s/p right mastectomy, on anastrozole  GERD  Hypothyroidism  Paroxysmal A-fib     Recommendations:     -Cont cefepime 2 g every 12hr  -Cont Flagyl 500 twice daily  -Continue antibiotics through 6/12/25 to finish 5 day course   -Renally dose antibiotic  -Will continue to follow         Mikal Ceballos MD  Date of service: 6/10/2025  Time of service: 12:02 PM      Subjective   Interval History: No acute events overnight.  Patient denies any new complaint.        Review of Systems  Denies: fever, chills, nausea, vomiting, dysuria    Objective   Range of Vitals (last 24 hours)  Heart Rate:  []   Temp:  [36 °C (96.8 °F)-36.8 °C (98.3 °F)]   Resp:  [16-27]   BP: (107-166)/(67-78)   Weight:  [72.7 kg (160 lb 4.4 oz)]   SpO2:  [92 %-97 %]  Daily Weight  06/10/25 : 72.7 kg (160 lb 4.4 oz)   Body mass index is 33.5 kg/m².      General: alert, oriented, NAD  HEENT: No conjunctival pallor, no scleral icterus  Abdomen: soft, ostomy bag, abdominal binder  Neuro: AAO x 3  Extremities: no edema, no cyanosis  Skin: No rashes   MSK: No joint inflammation    Antibiotics  bacitracin - 500  unit/gram  cefepime - 2 gram/50 mL  metroNIDAZOLE - 500 mg/100 mL      Relevant Results  Labs  Results from last 72 hours   Lab Units 06/10/25  0458 06/09/25  0531 06/08/25  0420   WBC AUTO x10*3/uL 5.8 7.0 8.8   HEMOGLOBIN g/dL 10.9* 11.1* 11.4*   HEMATOCRIT % 32.6* 33.4* 34.0*   PLATELETS AUTO x10*3/uL 143* 125* 124*   NEUTROS PCT AUTO % 72.5  --   --    LYMPHS PCT AUTO % 18.8  --   --    MONOS PCT AUTO % 5.2  --   --    EOS PCT AUTO % 2.2  --   --      Results from last 72 hours   Lab Units 06/10/25  0458 06/09/25  0531 06/08/25  2051   SODIUM mmol/L 137 139 137   POTASSIUM mmol/L 3.8 4.1 3.3*   CHLORIDE mmol/L 108* 106 107   CO2 mmol/L 24 23 22   BUN mg/dL 12 11 11   CREATININE mg/dL 0.84 0.88 0.96   GLUCOSE mg/dL 95 94 100*   CALCIUM mg/dL 7.9* 7.8* 8.2*   ANION GAP mmol/L 9* 14 11   EGFR mL/min/1.73m*2 70 67 60*   PHOSPHORUS mg/dL  --  1.7*  --      Results from last 72 hours   Lab Units 06/10/25  0458 06/09/25  0531 06/08/25  0420   ALK PHOS U/L 41 43 39   BILIRUBIN TOTAL mg/dL 0.6 0.6 0.9   PROTEIN TOTAL g/dL 5.3* 5.1* 4.9*   ALT U/L 11 12 15   AST U/L 22 24 22   ALBUMIN g/dL 3.0* 3.1* 3.1*     Estimated Creatinine Clearance: 43.4 mL/min (by C-G formula based on SCr of 0.84 mg/dL).  C-Reactive Protein   Date Value Ref Range Status   03/02/2024 <0.10 <1.00 mg/dL Final       Microbiology  No results found for the last 14 days.      Imaging    XR chest 1 view  Result Date: 6/8/2025  A nasogastric tube projects into the stomach beyond the field of imaging.   Mild cardiomegaly.   Minimal linear bibasilar atelectasis. Otherwise no sign of acute cardiopulmonary disease.     MACRO: None   Signed by: Mello Fried 6/8/2025 12:33 PM Dictation workstation:   [id number]    CT abdomen pelvis w IV contrast  Result Date: 6/7/2025  1. Diverticulosis of the sigmoid colon. There is inflammatory change noted about the proximal to mid sigmoid colon most concerning for acute diverticulitis. Multiple free air locules are  noted adjacent to this inflamed segment of sigmoid colon as well as small-to-moderate amount of free air within the upper abdomen consistent with perforation. No overt abscess identified.   MACRO: Donovan Ocampo discussed the significance and urgency of this critical finding by telephone with  KONG HILL on 6/7/2025 at 3:39 am. (**-RCF-**) Findings:  See findings.   Signed by: Donovan Ocampo 6/7/2025 3:40 AM Dictation workstation:   [id number]

## 2025-06-10 NOTE — CARE PLAN
The patient's goals for the shift include  pt will rest comfortably through night    The clinical goals for the shift include no falls    Over the shift, the patient did make progress toward the following goals. Barriers to progression include education. Recommendations to address these barriers include education reinforcement.

## 2025-06-10 NOTE — PROGRESS NOTES
Physical Therapy    Physical Therapy Treatment    Patient Name: Rosemary Motta  MRN: [id number]  Department: [address]  Room: 2025/2025-A  Today's Date: 6/10/2025  Time Calculation  Start Time: 0835 (Simultaneous filing. User may not have seen previous data.)  Stop Time: 0902 (Simultaneous filing. User may not have seen previous data.)  Time Calculation (min): 27 min (Simultaneous filing. User may not have seen previous data.)         Assessment/Plan   PT Assessment  Barriers to Discharge Home: Cognition needs, Physical needs  End of Session Communication: Bedside nurse  Assessment Comment: pt demonstrating good progress toward all goals. pt with decreased assist this date.  End of Session Patient Position: Alarm on, Up in chair     PT Plan  Treatment/Interventions: Bed mobility, Transfer training, Gait training, Neuromuscular re-education, Therapeutic exercise, Therapeutic activity  PT Plan: Ongoing PT  PT Frequency: 6 times per week  PT Discharge Recommendations: Moderate intensity level of continued care  PT - OK to Discharge: Yes    PT Visit Info:  PT Received On: 06/10/25  Response to Previous Treatment: Patient with no complaints from previous session.     General Visit Information:   General  Reason for Referral: sudden onset and pain: perf diverticulitis, 6/7 ABD SURG ex lap with partial colectomy  Referred By: PT FOR IMPAIRED MOBILITY/GAIT TRAINING  Past Medical History Relevant to Rehab: HTN, HLE, breast ca, diverticulosis, GERD, hypothyroidism, neuropathy, migraines, OA, anxiety/depression presented 6/7/25 for abdominal pain, left lower quadrant abd pain with radiation to back  Prior to Session Communication: Bedside nurse  Patient Position Received: Bed, 3 rail up, Alarm on  Preferred Learning Style: verbal  General Comment: Pt agreeable and cooperative to therapy      Precautions:  Precautions  Medical Precautions: Fall precautions  Post-Surgical Precautions: Abdominal surgery precautions  Precautions  Comment: new L colectomy pouch, bulb drain RLQ abd      Pain Assessment  Pain Assessment: 0-10  0-10 (Numeric) Pain Score: 4 (with activity)  Pain Type: Acute pain  Pain Location: Abdomen  Cognition:  Cognition  Overall Cognitive Status: Impaired  Orientation Level: Disoriented to time            Treatments:  Balance/Neuromuscular Re-Education  Balance/Neuromuscular Re-Education Activity Performed: Yes  Balance/Neuromuscular Re-Education Activity 1: pt stood multiple attempts 1>3 minutes with MIn Ax1 at wheeled walker. pt with cues for safety and technique. No overt LOB.    Bed Mobility  Bed Mobility: Yes  Bed Mobility 1  Bed Mobility 1: Supine to sitting, Sitting to supine  Level of Assistance 1: Minimum assistance, +2, Minimal verbal cues  Bed Mobility Comments 1: cues for improved safety and technique    Ambulation/Gait Training  Ambulation/Gait Training Performed: Yes  Ambulation/Gait Training 1  Surface 1: Level tile  Device 1: Rolling walker  Assistance 1: Minimum assistance, Minimal verbal cues (Ax2)  Quality of Gait 1: Forward flexed posture, Antalgic, Inconsistent stride length  Comments/Distance (ft) 1: 5' forward and back x 3  Transfers  Transfer: Yes  Transfer 1  Technique 1: Sit to stand, Stand to sit  Transfer Device 1: Walker  Transfer Level of Assistance 1: Moderate assistance, +2, Moderate verbal cues, Moderate tactile cues (pt progressed to MIN Ax2)  Trials/Comments 1: cues for hand placement and safety.  Transfers 2  Technique 2: Stand pivot  Transfer Device 2: Walker  Transfer Level of Assistance 2: Minimum assistance, +2, Minimal verbal cues  Trials/Comments 2: cues for safety and technique.    Outcome Measures:  [facility] Basic Mobility  Turning from your back to your side while in a flat bed without using bedrails: A lot  Moving from lying on your back to sitting on the side of a flat bed without using bedrails: A lot  Moving to and from bed to chair (including a wheelchair): A lot  Standing up  from a chair using your arms (e.g. wheelchair or bedside chair): A lot  To walk in hospital room: A lot  Climbing 3-5 steps with railing: Total  Basic Mobility - Total Score: 11    Education Documentation  Body Mechanics, taught by Richelle Trotter PTA at 6/10/2025  9:25 AM.  Learner: Patient  Readiness: Acceptance  Method: Explanation  Response: Verbalizes Understanding, Needs Reinforcement    Mobility Training, taught by Richelle Trotter PTA at 6/10/2025  9:25 AM.  Learner: Patient  Readiness: Acceptance  Method: Explanation  Response: Verbalizes Understanding, Needs Reinforcement    Body Mechanics, taught by Richelle Trotter PTA at 6/10/2025  9:25 AM.  Learner: Patient  Readiness: Acceptance  Method: Explanation  Response: Verbalizes Understanding, Needs Reinforcement    Precautions, taught by Richelle Trotter PTA at 6/10/2025  9:25 AM.  Learner: Patient  Readiness: Acceptance  Method: Explanation  Response: Verbalizes Understanding, Needs Reinforcement    ADL Training, taught by Richelle Trotter PTA at 6/10/2025  9:25 AM.  Learner: Patient  Readiness: Acceptance  Method: Explanation  Response: Verbalizes Understanding, Needs Reinforcement    Education Comments  No comments found.               Encounter Problems       Encounter Problems (Active)       Mobility       STG - Patient will ambulate household distance using FWW initially, no more than SBA x1 (Progressing)       Start:  06/09/25    Expected End:  06/23/25               PT Transfers       STG - Transfer from bed to chair using FWW initially, no more than SBA x1 (Progressing)       Start:  06/09/25    Expected End:  06/23/25            STG - Patient to transfer to and from sit to supine DEMO LOG ROLL TECH (for ABD precautions) with no more than min Ax1 (Progressing)       Start:  06/09/25    Expected End:  06/23/25               Pain - Adult

## 2025-06-10 NOTE — PROGRESS NOTES
Occupational Therapy    Occupational Therapy Treatment    Name: Rosemary Motta  MRN: [id number]  Department: [facility]  Room: 2025/2025-A  Date: 06/10/25       Assessment:  OT Assessment: Pt progressed to requiring decreased assist of min A x1-2 for functional transfers and limited mobility tolerating < min household distance. Pt voiced c/o dizziness when going from supine to sitting, symptoms quickly resolved with seated rest break.  Barriers to Discharge Home: Cognition needs, Physical needs, Caregiver assistance  End of Session Communication: Bedside nurse  End of Session Patient Position: Alarm on, Up in chair  Plan:  Treatment Interventions: ADL retraining, Functional transfer training, Endurance training, Cognitive reorientation, Patient/family training, Equipment evaluation/education, Compensatory technique education  OT Frequency: 4 times per week  OT Discharge Recommendations: Moderate intensity level of continued care  OT Recommended Transfer Status: Assist of 2  OT - OK to Discharge: Yes    Subjective     OT Visit Info:  OT Received On: 06/10/25  General:  General  Prior to Session Communication: Bedside nurse  Patient Position Received: Bed, 3 rail up, Alarm on  General Comment: Pt agreeable and cooperative to therapy  Precautions:  Medical Precautions: Fall precautions  Post-Surgical Precautions: Abdominal surgery precautions  Precautions Comment: new L colectomy pouch, bulb drain RLQ abd      Pain Assessment:  Pain Assessment  Pain Assessment: 0-10  0-10 (Numeric) Pain Score: 4 (with activity)  Pain Type: Acute pain  Pain Location: Abdomen  Pain Interventions: Repositioned, Distraction    Objective   Cognition:  Orientation Level: Disoriented to time  Activities of Daily Living:           LE Dressing  LE Dressing: Yes  Adult Briefs Level of Assistance: Maximum assistance  LE Dressing Where Assessed: Chair         Functional Standing Tolerance:  Functional Standing Tolerance  Activity: Pt completed x3  static stands tolerating for 1-2 minutes with min A x1-2 using FWW for stability.  Bed Mobility/Transfers: Bed Mobility 1  Bed Mobility 1: Supine to sitting  Level of Assistance 1: Minimum assistance, +2, Moderate verbal cues    Transfer 1  Technique 1: Sit to stand, Stand to sit  Transfer Device 1: Walker  Transfer Level of Assistance 1: Minimum assistance, +2, Moderate verbal cues  Trials/Comments 1: initially requiring min A x2 progressing to min A x1.        Functional Mobility:  Functional Mobility 1  Comments 1: Pt completed functional mobility < min household distance x2 trials using FWW with min A x1-2.    Therapy/Activity:      Therapeutic Activity  Therapeutic Activity 1: Pt tolerated sitting EOB for 5 minutes with CGA.       Strength:  Strength  Strength Comments: Ellie johnson 4-/5 as demonstrated during session    Outcome Measures:  [facility] Daily Activity  Putting on and taking off regular lower body clothing: Total  Bathing (including washing, rinsing, drying): A lot  Putting on and taking off regular upper body clothing: A lot  Toileting, which includes using toilet, bedpan or urinal: A lot  Taking care of personal grooming such as brushing teeth: A little  Eating Meals: A little  Daily Activity - Total Score: 13        Education Documentation  No documentation found.  Education Comments  No comments found.      Goals:  Encounter Problems       Encounter Problems (Active)       ADLs       Patient will complete toileting including hygiene clothing management/hygiene with stand by assist level of assistance. (Progressing)       Start:  06/09/25    Expected End:  06/23/25               COGNITION/SAFETY       Patient will demonstrated orientation x 4 with verbal cues. (Progressing)       Start:  06/09/25    Expected End:  06/23/25       ORIENTATION            MOBILITY       Patient will perform Functional mobility Household distances/Community Distances with stand by assist level of assistance and least  restrictive device in order to improve safety and functional mobility. (Progressing)       Start:  06/09/25    Expected End:  06/23/25               TRANSFERS       Patient will perform bed mobility stand by assist level of assistance and log roll technique in order to improve safety and independence with mobility (Progressing)       Start:  06/09/25    Expected End:  06/23/25            Patient will complete functional transfers with least restrictive device with stand by assist level of assistance. (Progressing)       Start:  06/09/25    Expected End:  06/23/25

## 2025-06-10 NOTE — PROGRESS NOTES
"    GENERAL SURGERY / TRAUMA PROGRESS NOTE    Patient: Rosemary Motta  Room: 2025/2025-A    Age: 79 y.o.   Gender: female  Attending: Joseph Mcbride MD    MRN: [id number]  Admission Date: 6/7/2025    PCP: Prema Romero DO       Rosemary Motta is a 79 y.o. female on day 3  of admission presenting with Diverticulitis of large intestine with perforation without bleeding.    SUBJECTIVE   Interval History:  No nausea or vomiting without the NG tube.  Patient voiding without Gabriel.  Starting to have gas and small amount of stool in the colostomy bag.  Complains of incisional pain.    ROS  Review of Systems   Constitutional:  Positive for fatigue. Negative for chills and fever.   HENT:  Positive for hearing loss. Negative for congestion and ear pain.    Eyes:  Negative for pain and redness.   Respiratory:  Negative for cough and shortness of breath.    Cardiovascular:  Positive for leg swelling. Negative for chest pain.   Gastrointestinal:  Positive for abdominal pain. Negative for nausea and vomiting.   Genitourinary:  Negative for flank pain and hematuria.   Musculoskeletal:  Positive for arthralgias and myalgias.   Skin:  Positive for wound. Negative for rash.   Allergic/Immunologic: Negative for immunocompromised state.   Neurological:  Positive for weakness. Negative for speech difficulty and headaches.   Hematological:  Bruises/bleeds easily.   Psychiatric/Behavioral:  Negative for agitation and confusion.           OBJECTIVE   Last Recorded Vitals  Blood pressure 136/78, pulse 94, temperature 36.5 °C (97.7 °F), temperature source Temporal, resp. rate 16, height 1.473 m (4' 10\"), weight 72.7 kg (160 lb 4.4 oz), SpO2 94%.    Intake/Output last 3 Shifts:  I/O last 3 completed shifts:  In: 1447.7 (19.9 mL/kg) [P.O.:100; I.V.:1097.7 (15.1 mL/kg); NG/GT:200; IV Piggyback:50]  Out: 2595 (35.7 mL/kg) [Urine:2090 (0.8 mL/kg/hr); Emesis/NG output:375; Drains:130]  Weight: 72.7 kg     PHYSICAL EXAM  Physical Exam   General: " Well-developed, well-nourished and in no acute distress.  Head: Normocephalic. Atraumatic.  Neck/thyroid: Neck is supple.   Eyes: Pupils equal round and reactive to light. Conjunctiva normal.  ENMT: No masses or deformity of external nose. External ears without masses.  Respiratory/Chest:  Normal respiratory effort.  Breast: s/p right mastectomy  Cardiovascular: Regular rate and rhythm.   Abdomen: Soft and nondistended.  Midline incision is clean, dry and intact.  Colostomy in the left lower quadrant is pink and viable with small amount of stool and moderate amount of gas output.  Abdominal drain with serosanguineous fluid.  Musculoskeletal: Joints and limbs are grossly normal.   Neuro: Oriented to person, place and time. No obvious neurological deficit.   Psych: Normal mood and affect.  Awake and talkative.    RESULTS   Labs  Results for orders placed or performed during the hospital encounter of 06/07/25 (from the past 24 hours)   Comprehensive metabolic panel   Result Value Ref Range    Glucose 95 74 - 99 mg/dL    Sodium 137 136 - 145 mmol/L    Potassium 3.8 3.5 - 5.3 mmol/L    Chloride 108 (H) 98 - 107 mmol/L    Bicarbonate 24 21 - 32 mmol/L    Anion Gap 9 (L) 10 - 20 mmol/L    Urea Nitrogen 12 6 - 23 mg/dL    Creatinine 0.84 0.50 - 1.05 mg/dL    eGFR 70 >60 mL/min/1.73m*2    Calcium 7.9 (L) 8.6 - 10.3 mg/dL    Albumin 3.0 (L) 3.4 - 5.0 g/dL    Alkaline Phosphatase 41 33 - 136 U/L    Total Protein 5.3 (L) 6.4 - 8.2 g/dL    AST 22 9 - 39 U/L    Bilirubin, Total 0.6 0.0 - 1.2 mg/dL    ALT 11 7 - 45 U/L   CBC and Auto Differential   Result Value Ref Range    WBC 5.8 4.4 - 11.3 x10*3/uL    nRBC 0.0 0.0 - 0.0 /100 WBCs    RBC 3.44 (L) 4.00 - 5.20 x10*6/uL    Hemoglobin 10.9 (L) 12.0 - 16.0 g/dL    Hematocrit 32.6 (L) 36.0 - 46.0 %    MCV 95 80 - 100 fL    MCH 31.7 26.0 - 34.0 pg    MCHC 33.4 32.0 - 36.0 g/dL    RDW 13.3 11.5 - 14.5 %    Platelets 143 (L) 150 - 450 x10*3/uL    Neutrophils % 72.5 40.0 - 80.0 %     Immature Granulocytes %, Automated 1.0 (H) 0.0 - 0.9 %    Lymphocytes % 18.8 13.0 - 44.0 %    Monocytes % 5.2 2.0 - 10.0 %    Eosinophils % 2.2 0.0 - 6.0 %    Basophils % 0.3 0.0 - 2.0 %    Neutrophils Absolute 4.20 1.60 - 5.50 x10*3/uL    Immature Granulocytes Absolute, Automated 0.06 0.00 - 0.50 x10*3/uL    Lymphocytes Absolute 1.09 0.80 - 3.00 x10*3/uL    Monocytes Absolute 0.30 0.05 - 0.80 x10*3/uL    Eosinophils Absolute 0.13 0.00 - 0.40 x10*3/uL    Basophils Absolute 0.02 0.00 - 0.10 x10*3/uL   Magnesium   Result Value Ref Range    Magnesium 2.05 1.60 - 2.40 mg/dL   Electrocardiogram, 12-lead PRN ACS symptoms   Result Value Ref Range    Ventricular Rate 174 BPM    Atrial Rate 220 BPM    QRS Duration 82 ms    QT Interval 268 ms    QTC Calculation(Bazett) 456 ms    R Axis 46 degrees    T Axis 98 degrees    QRS Count 28 beats    Q Onset 220 ms    T Offset 354 ms    QTC Fredericia 382 ms   aPTT - baseline   Result Value Ref Range    aPTT 22 (L) 26 - 36 seconds   Electrocardiogram, 12-lead PRN ACS symptoms   Result Value Ref Range    Ventricular Rate 85 BPM    Atrial Rate 85 BPM    HI Interval 174 ms    QRS Duration 84 ms    QT Interval 360 ms    QTC Calculation(Bazett) 428 ms    P Axis 55 degrees    R Axis 5 degrees    T Axis -3 degrees    QRS Count 14 beats    Q Onset 220 ms    P Onset 133 ms    P Offset 188 ms    T Offset 400 ms    QTC Fredericia 404 ms       Radiology Resutls  Imaging  No results found.      Cardiology, Vascular, and Other Imaging  Electrocardiogram, 12-lead PRN ACS symptoms  Result Date: 6/10/2025  Normal sinus rhythm Normal ECG When compared with ECG of 10-CHAVA-2025 10:11, (unconfirmed) Sinus rhythm has replaced Atrial fibrillation Vent. rate has decreased BY  89 BPM ST no longer depressed in Anterolateral leads T wave inversion now evident in Inferior leads Nonspecific T wave abnormality no longer evident in Lateral leads    Electrocardiogram, 12-lead PRN ACS symptoms  Result Date:  6/10/2025  Atrial fibrillation with rapid ventricular response Nonspecific ST and T wave abnormality Abnormal ECG When compared with ECG of 10-CHAVA-2025 10:11, (unconfirmed) No significant change was found    Electrocardiogram, 12-lead PRN ACS symptoms  Result Date: 6/10/2025  Atrial fibrillation with rapid ventricular response ST & T wave abnormality, consider inferior ischemia ST & T wave abnormality, consider anterolateral ischemia Abnormal ECG When compared with ECG of 07-JUN-2025 01:53, PREVIOUS ECG IS PRESENT    Transthoracic Echo Complete  Result Date: 6/9/2025              [place] [address]      Phone [phone] Fax [phone] TRANSTHORACIC ECHOCARDIOGRAM REPORT Patient Name:       APTTI Haynes Physician:    [id number] Mona Quintanilla MD Study Date:         6/9/2025             Ordering Provider:    [id number] NAEL WOLF MRN/PID:            [id number]             Fellow: Accession#:         [id number]         Nurse: Date of Birth/Age:  1945 / 79      Sonographer:          Richelle Grey RDCS                     years Gender Assigned at  F                    Additional Staff: Birth: Height:             147.32 cm            Admit Date:           6/8/2025 Weight:             66.68 kg             Admission Status:     Inpatient -                                                                Routine BSA / BMI:          1.60 m2 / 30.72      Department Location:  [facility]                     kg/m2 Blood Pressure: 158 /66 mmHg Study Type:    TRANSTHORACIC ECHO (TTE) COMPLETE Diagnosis/ICD: Paroxysmal atrial fibrillation-I48.0; Dyspnea, unspecified-R06.00 Indication:    Dyspnea CPT Codes:     Echo Complete w Full [id number] Patient History: Pertinent History: HTN. Study Detail: The following Echo studies were  performed: 2D, M-Mode, Doppler and               color flow.  PHYSICIAN INTERPRETATION: Left Ventricle: Left ventricular ejection fraction is low normal calculated by Valente's biplane at 55%. There are no regional wall motion abnormalities. The left ventricular cavity size is normal. There is normal septal and normal posterior left ventricular wall thickness. Spectral Doppler shows a normal pattern of left ventricular diastolic filling. Left Atrium: The left atrial size is normal. Right Ventricle: The right ventricle is normal in size. There is normal right ventricular global systolic function. Right Atrium: The right atrial size is normal. Aortic Valve: The aortic valve is trileaflet. The aortic valve area by VTI is 2.31 cmï¿½ with a peak velocity of 1.61 m/s. The peak and mean gradients are 10 mmHg and 5 mmHg, respectively, with a dimensionless index of 0.75. There is no evidence of aortic valve regurgitation. 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 Mitral Valve: The mitral valve is normal in structure. There is trace mitral valve regurgitation. The E Vmax is 1.00 m/s. Tricuspid Valve: The tricuspid valve is structurally normal. There is trace tricuspid regurgitation. The Doppler estimated right ventricular systolic pressure (RVSP) is within normal limits at 28 mmHg. Pulmonic Valve: The pulmonic valve is structurally normal. There is trace pulmonic valve regurgitation. Pericardium: No pericardial effusion noted. Aorta: The aortic root is normal. Systemic Veins: The inferior vena cava appears normal in size, IVC inspiratory collapse was not assessed.  CONCLUSIONS:  1. Left ventricular ejection fraction is low normal calculated by Valente's biplane at 55%.  2. There is normal right ventricular global systolic function.  3. The Doppler estimated RVSP is within normal limits at 28 mmHg.  4. 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 QUANTITATIVE DATA SUMMARY:  2D MEASUREMENTS:              Normal Ranges: IVSd:            0.72 cm     (0.6-1.1cm) LVPWd:           0.71 cm     (0.6-1.1cm) LVIDd:           4.41 cm     (3.9-5.9cm) LVIDs:           2.79 cm LV Mass Index:   59.5 g/m2 LVEDV Index:     22.62 ml/m2 LV % FS          36.8 %  LEFT ATRIUM:                  Normal Ranges: LA Vol A4C:        31.5 ml    (22+/-6mL/m2) LA Vol A2C:        28.9 ml LA Vol BP:         35.2 ml LA Vol Index A4C:  19.7ml/m2 LA Vol Index A2C:  18.1 ml/m2 LA Vol Index BP:   22.0 ml/m2 LA Area A4C:       14.4 cm2 LA Area A2C:       11.8 cm2 LA Major Axis A4C: 5.6 cm LA Major Axis A2C: 4.1 cm LA Volume Index:   21.8 ml/m2 LA Vol A4C:        29.0 ml LA Vol A2C:        27.9 ml LA Vol Index BSA:  17.8 ml/m2  RIGHT ATRIUM:          Normal Ranges: RA Area A4C:  10.0 cm2  AORTA MEASUREMENTS:         Normal Ranges: Ao Sinus, d:        3.20 cm (2.1-3.5cm) Ao STJ, d:          2.40 cm (1.7-3.4cm) Asc Ao, d:          2.50 cm (2.1-3.4cm)  LV SYSTOLIC FUNCTION:                      Normal Ranges: EF-A4C View:    60 % (>=55%) EF-A2C View:    52 % EF-Biplane:     55 % LV EF Reported: 55 %  LV DIASTOLIC FUNCTION:           Normal Ranges: MV Peak E:             1.00 m/s  (0.7-1.2 m/s) MV Peak A:             0.80 m/s  (0.42-0.7 m/s) E/A Ratio:             1.26      (1.0-2.2) MV e'                  0.111 m/s (>8.0) MV lateral e'          0.11 m/s MV medial e'           0.11 m/s E/e' Ratio:            9.04      (<8.0)  MITRAL VALVE:          Normal Ranges: MV DT:        122 msec (150-240msec)  AORTIC VALVE:                      Normal Ranges: AoV Vmax:                1.61 m/s  (<=1.7m/s) AoV Peak PG:             10.4 mmHg (<20mmHg) AoV Mean P.3 mmHg  (1.7-11.5mmHg) LVOT Max Kael:            1.04 m/s  (<=1.1m/s) AoV VTI:                 32.01 cm  (18-25cm) LVOT VTI:                24.08 cm LVOT Diameter:           1.98 cm   (1.8-2.4cm) AoV Area, VTI:           2.31 cm2  (2.5-5.5cm2) AoV Area,Vmax:           1.98 cm2  (2.5-4.5cm2) AoV  Dimensionless Index: 0.75  RIGHT VENTRICLE: RV Basal 2.42 cm RV Mid   1.90 cm RV Major 6.8 cm TAPSE:   30.3 mm RV s'    0.15 m/s  TRICUSPID VALVE/RVSP:          Normal Ranges: Peak TR Velocity:     2.50 m/s Est. RA Pressure:     3 mmHg RV Syst Pressure:     28 mmHg  (< 30mmHg) IVC Diam:             1.04 cm TV e'                 0.1 m/s  AORTA: Asc Ao Diam 3.19 cm  [id number] Mona Quintanilla MD Electronically signed on 6/9/2025 at 5:35:08 PM  ** Final **            ASSESSMENT / PLAN     Assessment & Plan  Diverticulitis of large intestine with perforation without bleeding         PLAN  79-year-old white female s/p Hanna's procedure for perforated sigmoid diverticulitis 6/7/2025    Advance diet to regular diet.  Routine colostomy care.  Encourage patient to get out of bed at least up to a chair today.  She will need PT/OT evaluation.  Discussed possible need for short-term rehab placement upon discharge pending PT/OT evaluations.  Other supportive care per medicine/cardiology/infectious disease.  Antibiotics per infectious disease.  Pathology pending.  PUD/DVT prophylaxis.      Cynthia Haywood MD  [address]  [address] [phone]

## 2025-06-10 NOTE — NURSING NOTE
0958 - patients heart rate became elevated in the 170s, patient was checked patient was resting in chair visiting with daughter who was bedside. Patient was assisted back to bed and and EKG was performed showing Afib RVR, at this time the RASHEED Shook is at bedside. Dr. Mcbride had also present at this time. Meds were ordered and given at this time.     1034 patients HR still elevated a Cardizem infusion was started at 5 ml/he per the order.     1115 patient has converted to NSR with a HR in the 80s, Cardizem was discontinued at this time. Providers notified. Another EKG performed to verify rhythm.    1135 aPTT resulted and heparin drip was started at appropriate weight based dose, verified with DENNIS Mercado.    1620 heparin assay back 0.47 therapeutic no adjustments made to rate at this time.

## 2025-06-10 NOTE — TELEPHONE ENCOUNTER
----- Message from Gisella LORENZO sent at 6/10/2025  7:58 AM EDT -----  Regarding: FW: CHAPIS in 6 weeks  Can you please place order.  ----- Message -----  From: Gold Barrientos PA-C  Sent: 6/10/2025   7:52 AM EDT  To: Gisella Maharaj  Subject: CHAPIS in 6 weeks                                   Per Dwight needs CHAPIS in 6 weeks please.   Nicole can place the order.  Thanks

## 2025-06-10 NOTE — DOCUMENTATION CLARIFICATION NOTE
"    PATIENT:               PATTI ACOSTA  ACCT #:                  [id number]  MRN:                       [id number]  :                       1945  ADMIT DATE:       2025 1:49 AM  DISCH DATE:  RESPONDING PROVIDER #:        [id number]          PROVIDER RESPONSE TEXT:    Peritonitis    CDI QUERY TEXT:    Clarification      Instruction:    Based on your assessment of the patient and the clinical information, please provide the requested documentation by clicking on the appropriate radio button and enter any additional information if prompted.    Question: Please further clarify diagnosis associated with clinical indicators    When answering this query, please exercise your independent professional judgment. The fact that a question is being asked, does not imply that any particular answer is desired or expected.    The patient's clinical indicators include:  Clinical Information:  - 78yo female admitted with MENG and diverticulitis with perforation. Taken to OR and developed Afib, ARF hypoxic and hypotension.    Clinical Indicators:  -  Dr Meadows OP note: \"   Upon entering into the abdomen, a moderate amount of purulent fluid was removed. \"    Treatment: Surgery with colostomy, double antibiotic therapy.    Risk Factors: Diverticulitis with perforation.  Options provided:  -- Peritonitis  -- Peritoneal/Peritoneum inflammation  -- Other - I will add my own diagnosis  -- Refer to Clinical Documentation Reviewer    Query created by: Desi Love on 6/10/2025 11:57 AM      Electronically signed by:  AISHA MEADOWS MD 6/10/2025 2:02 PM          "

## 2025-06-10 NOTE — PROGRESS NOTES
Rosemary Motta is a 79 y.o. female on day 3 of admission presenting with Diverticulitis of large intestine with perforation without bleeding.      Subjective   Rosemary Motta is a 79F hx HTN, HLE, breast ca, diverticulosis, GERD, hypothyroidism, neuropathy, migraines, OA, anxiety/depression presented 6/7/25 for abdominal pain, left lower quadrant abd pain with radiation to back. now improved. up to 10/10, now 5/10. nausea with small vomiting. no diarrhea, constipation, blood in stool or melena. subacute dry cough. no acute abdomen on exam. labs/imaging w/ cr 1.29 (baseline 1.02), cbc, lactate wnl. ua wnl. ekg wnl. ct a/p w/ perforated diverticulitis. Seen by surgery and is now s/p partial colectomy and colostomy placement 6/7/25. Had hypotension and was monitored closely in ICU. UA without UTI. Patient noted to have tachycardia 6/9/25, Nocturnist evaluated ECG and diagnosed as new onset a fib with RVR, given Cardizem x1, resumed home metoprolol, cardiology consulted. Patient back in normal sinus rhythm.  I discussed with Dr. Quintanilla, cardiologist who at this time does not recommend any oral anticoagulation given this was a isolated incident.  Will need to monitor further if this recurs then will need to reconsider this. 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  Outpatient follow-up with cardiology after patient recovers for CHAPIS. NG-tube was removed 6/9/25 as was Gabriel.     6/10/2025: No acute events overnight. Vitals stable, /76 this morning. CBC/BMP/Mg reviewed, no leukocytosis, hgb stable at 10.9. 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  Outpatient follow-up with cardiology after patient recovers for CHAPIS.    Addendum: Called to patient's room as patient heart rate noted to be in the upper 170s to 180s.  Patient evaluated with Dr. Brunner at bedside, EKG  revealed atrial fibrillation with RVR.  Given IV Lopressor x 1 with improvement in heart rate to 120s.  Started on Cardizem drip by cardiology.  Shortly after Cardizem drip was given patient converted back to normal sinus rhythm.  Patient felt a little lightheaded during this time but that completely resolved once heart rate was improved.  She was feeling pressure in her abdomen at this time but then was able to urinate and this had completely resolved.    Rounded on patient on this date with Pharmacist, RN, and Patient's family            Review of Systems   Constitutional:  Negative for appetite change, chills, diaphoresis, fatigue and fever.   HENT:  Negative for congestion, ear pain, facial swelling, hearing loss, nosebleeds, sore throat, tinnitus and trouble swallowing.    Eyes:  Negative for pain.   Respiratory:  Negative for cough, chest tightness, shortness of breath and wheezing.    Cardiovascular:  Positive for palpitations (Resolved). Negative for chest pain and leg swelling.   Gastrointestinal:  Positive for abdominal pain. Negative for blood in stool, constipation, diarrhea, nausea and vomiting.   Genitourinary:  Positive for urgency. Negative for dysuria, flank pain, frequency and hematuria.   Musculoskeletal:  Negative for back pain and joint swelling.   Skin:  Negative for rash and wound.   Neurological:  Negative for dizziness, syncope, weakness, light-headedness, numbness and headaches.   Hematological:  Does not bruise/bleed easily.   Psychiatric/Behavioral:  Negative for behavioral problems, hallucinations and suicidal ideas.           Objective     Last Recorded Vitals  /76 (BP Location: Right leg, Patient Position: Lying)   Pulse 90   Temp 36 °C (96.8 °F) (Temporal)   Resp 18   Wt 72.7 kg (160 lb 4.4 oz)   SpO2 94%     Image Results  Imaging  XR chest 1 view  Result Date: 6/8/2025  A nasogastric tube projects into the stomach beyond the field of imaging.   Mild cardiomegaly.    Minimal linear bibasilar atelectasis. Otherwise no sign of acute cardiopulmonary disease.     MACRO: None   Signed by: Mello Fried 6/8/2025 12:33 PM Dictation workstation:   [id number]    CT abdomen pelvis w IV contrast  Result Date: 6/7/2025  1. Diverticulosis of the sigmoid colon. There is inflammatory change noted about the proximal to mid sigmoid colon most concerning for acute diverticulitis. Multiple free air locules are noted adjacent to this inflamed segment of sigmoid colon as well as small-to-moderate amount of free air within the upper abdomen consistent with perforation. No overt abscess identified.   MACRO: Donovan Ocampo discussed the significance and urgency of this critical finding by telephone with  KONG HILL on 6/7/2025 at 3:39 am. (**-RCF-**) Findings:  See findings.   Signed by: Donovan Ocampo 6/7/2025 3:40 AM Dictation workstation:   [id number]      Cardiology, Vascular, and Other Imaging  Transthoracic Echo Complete  Result Date: 6/9/2025              [place] [address]      Phone [phone] Fax [phone] TRANSTHORACIC ECHOCARDIOGRAM REPORT Patient Name:       PATTI Haynes Physician:    [id number] Mona Quintanilla MD Study Date:         6/9/2025             Ordering Provider:    [id number] NAEL WOLF MRN/PID:            [id number]             Fellow: Accession#:         [id number]         Nurse: Date of Birth/Age:  1945 / 79      Sonographer:          Richelle Grey RDCS                     years Gender Assigned at  F                    Additional Staff: Birth: Height:             147.32 cm            Admit Date:           6/8/2025 Weight:             66.68 kg             Admission Status:     Inpatient -                                                                Routine BSA /  BMI:          1.60 m2 / 30.72      Department Location:  [facility]                     kg/m2 Blood Pressure: 158 /66 mmHg Study Type:    TRANSTHORACIC ECHO (TTE) COMPLETE Diagnosis/ICD: Paroxysmal atrial fibrillation-I48.0; Dyspnea, unspecified-R06.00 Indication:    Dyspnea CPT Codes:     Echo Complete w Full [id number] Patient History: Pertinent History: HTN. Study Detail: The following Echo studies were performed: 2D, M-Mode, Doppler and               color flow.  PHYSICIAN INTERPRETATION: Left Ventricle: Left ventricular ejection fraction is low normal calculated by Valente's biplane at 55%. There are no regional wall motion abnormalities. The left ventricular cavity size is normal. There is normal septal and normal posterior left ventricular wall thickness. Spectral Doppler shows a normal pattern of left ventricular diastolic filling. Left Atrium: The left atrial size is normal. Right Ventricle: The right ventricle is normal in size. There is normal right ventricular global systolic function. Right Atrium: The right atrial size is normal. Aortic Valve: The aortic valve is trileaflet. The aortic valve area by VTI is 2.31 cmï¿½ with a peak velocity of 1.61 m/s. The peak and mean gradients are 10 mmHg and 5 mmHg, respectively, with a dimensionless index of 0.75. There is no evidence of aortic valve regurgitation. 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 Mitral Valve: The mitral valve is normal in structure. There is trace mitral valve regurgitation. The E Vmax is 1.00 m/s. Tricuspid Valve: The tricuspid valve is structurally normal. There is trace tricuspid regurgitation. The Doppler estimated right ventricular systolic pressure (RVSP) is within normal limits at 28 mmHg. Pulmonic Valve: The pulmonic valve is structurally normal. There is trace pulmonic valve regurgitation. Pericardium: No pericardial effusion noted. Aorta: The aortic root is normal. Systemic Veins: The inferior vena cava appears  normal in size, IVC inspiratory collapse was not assessed.  CONCLUSIONS:  1. Left ventricular ejection fraction is low normal calculated by Valente's biplane at 55%.  2. There is normal right ventricular global systolic function.  3. The Doppler estimated RVSP is within normal limits at 28 mmHg.  4. 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 QUANTITATIVE DATA SUMMARY:  2D MEASUREMENTS:             Normal Ranges: IVSd:            0.72 cm     (0.6-1.1cm) LVPWd:           0.71 cm     (0.6-1.1cm) LVIDd:           4.41 cm     (3.9-5.9cm) LVIDs:           2.79 cm LV Mass Index:   59.5 g/m2 LVEDV Index:     22.62 ml/m2 LV % FS          36.8 %  LEFT ATRIUM:                  Normal Ranges: LA Vol A4C:        31.5 ml    (22+/-6mL/m2) LA Vol A2C:        28.9 ml LA Vol BP:         35.2 ml LA Vol Index A4C:  19.7ml/m2 LA Vol Index A2C:  18.1 ml/m2 LA Vol Index BP:   22.0 ml/m2 LA Area A4C:       14.4 cm2 LA Area A2C:       11.8 cm2 LA Major Axis A4C: 5.6 cm LA Major Axis A2C: 4.1 cm LA Volume Index:   21.8 ml/m2 LA Vol A4C:        29.0 ml LA Vol A2C:        27.9 ml LA Vol Index BSA:  17.8 ml/m2  RIGHT ATRIUM:          Normal Ranges: RA Area A4C:  10.0 cm2  AORTA MEASUREMENTS:         Normal Ranges: Ao Sinus, d:        3.20 cm (2.1-3.5cm) Ao STJ, d:          2.40 cm (1.7-3.4cm) Asc Ao, d:          2.50 cm (2.1-3.4cm)  LV SYSTOLIC FUNCTION:                      Normal Ranges: EF-A4C View:    60 % (>=55%) EF-A2C View:    52 % EF-Biplane:     55 % LV EF Reported: 55 %  LV DIASTOLIC FUNCTION:           Normal Ranges: MV Peak E:             1.00 m/s  (0.7-1.2 m/s) MV Peak A:             0.80 m/s  (0.42-0.7 m/s) E/A Ratio:             1.26      (1.0-2.2) MV e'                  0.111 m/s (>8.0) MV lateral e'          0.11 m/s MV medial e'           0.11 m/s E/e' Ratio:            9.04      (<8.0)  MITRAL VALVE:          Normal Ranges: MV DT:        122 msec (150-240msec)  AORTIC VALVE:                      Normal Ranges: AoV  Vmax:                1.61 m/s  (<=1.7m/s) AoV Peak PG:             10.4 mmHg (<20mmHg) AoV Mean P.3 mmHg  (1.7-11.5mmHg) LVOT Max Kael:            1.04 m/s  (<=1.1m/s) AoV VTI:                 32.01 cm  (18-25cm) LVOT VTI:                24.08 cm LVOT Diameter:           1.98 cm   (1.8-2.4cm) AoV Area, VTI:           2.31 cm2  (2.5-5.5cm2) AoV Area,Vmax:           1.98 cm2  (2.5-4.5cm2) AoV Dimensionless Index: 0.75  RIGHT VENTRICLE: RV Basal 2.42 cm RV Mid   1.90 cm RV Major 6.8 cm TAPSE:   30.3 mm RV s'    0.15 m/s  TRICUSPID VALVE/RVSP:          Normal Ranges: Peak TR Velocity:     2.50 m/s Est. RA Pressure:     3 mmHg RV Syst Pressure:     28 mmHg  (< 30mmHg) IVC Diam:             1.04 cm TV e'                 0.1 m/s  AORTA: Asc Ao Diam 3.19 cm  [id number] Mona Quintanilla MD Electronically signed on 2025 at 5:35:08 PM  ** Final **     Electrocardiogram, 12-lead PRN ACS symptoms  Result Date: 2025  Sinus rhythm Atrial premature complexes RSR' in V1 or V2, right VCD or RVH         Lab Results  Results for orders placed or performed during the hospital encounter of 25 (from the past 24 hours)   Transthoracic Echo Complete   Result Value Ref Range    LVOT diam 1.98 cm    LV Biplane EF 55 %    MV E/A ratio 1.26     MV avg E/e' ratio 8.96     Tricuspid annular plane systolic excursion 3.0 cm    AV mn grad 5 mmHg    LA vol index A/L 22.0 ml/m2    AV pk kael 1.61 m/s    LV EF 55 %    RV free wall pk S' 15.18 cm/s    RVSP 28 mmHg    LVIDd 4.41 cm    Aortic Valve Area by Continuity of VTI 2.31 cm2    Aortic Valve Area by Continuity of Peak Velocity 1.98 cm2    AV pk grad 10 mmHg    LV A4C EF 60.1    Comprehensive metabolic panel   Result Value Ref Range    Glucose 95 74 - 99 mg/dL    Sodium 137 136 - 145 mmol/L    Potassium 3.8 3.5 - 5.3 mmol/L    Chloride 108 (H) 98 - 107 mmol/L    Bicarbonate 24 21 - 32 mmol/L    Anion Gap 9 (L) 10 - 20 mmol/L    Urea Nitrogen 12 6 - 23 mg/dL    Creatinine  0.84 0.50 - 1.05 mg/dL    eGFR 70 >60 mL/min/1.73m*2    Calcium 7.9 (L) 8.6 - 10.3 mg/dL    Albumin 3.0 (L) 3.4 - 5.0 g/dL    Alkaline Phosphatase 41 33 - 136 U/L    Total Protein 5.3 (L) 6.4 - 8.2 g/dL    AST 22 9 - 39 U/L    Bilirubin, Total 0.6 0.0 - 1.2 mg/dL    ALT 11 7 - 45 U/L   CBC and Auto Differential   Result Value Ref Range    WBC 5.8 4.4 - 11.3 x10*3/uL    nRBC 0.0 0.0 - 0.0 /100 WBCs    RBC 3.44 (L) 4.00 - 5.20 x10*6/uL    Hemoglobin 10.9 (L) 12.0 - 16.0 g/dL    Hematocrit 32.6 (L) 36.0 - 46.0 %    MCV 95 80 - 100 fL    MCH 31.7 26.0 - 34.0 pg    MCHC 33.4 32.0 - 36.0 g/dL    RDW 13.3 11.5 - 14.5 %    Platelets 143 (L) 150 - 450 x10*3/uL    Neutrophils % 72.5 40.0 - 80.0 %    Immature Granulocytes %, Automated 1.0 (H) 0.0 - 0.9 %    Lymphocytes % 18.8 13.0 - 44.0 %    Monocytes % 5.2 2.0 - 10.0 %    Eosinophils % 2.2 0.0 - 6.0 %    Basophils % 0.3 0.0 - 2.0 %    Neutrophils Absolute 4.20 1.60 - 5.50 x10*3/uL    Immature Granulocytes Absolute, Automated 0.06 0.00 - 0.50 x10*3/uL    Lymphocytes Absolute 1.09 0.80 - 3.00 x10*3/uL    Monocytes Absolute 0.30 0.05 - 0.80 x10*3/uL    Eosinophils Absolute 0.13 0.00 - 0.40 x10*3/uL    Basophils Absolute 0.02 0.00 - 0.10 x10*3/uL   Magnesium   Result Value Ref Range    Magnesium 2.05 1.60 - 2.40 mg/dL        Medications  Scheduled medications:  Scheduled Medications[1]  Continuous medications:  Continuous Medications[2]  PRN medications:  PRN Medications[3]     Physical Exam  Constitutional:       General: She is not in acute distress.     Appearance: Normal appearance.   HENT:      Head: Normocephalic and atraumatic.      Right Ear: External ear normal.      Left Ear: External ear normal.      Nose: Nose normal.      Mouth/Throat:      Mouth: Mucous membranes are moist.      Pharynx: Oropharynx is clear.   Eyes:      Extraocular Movements: Extraocular movements intact.      Conjunctiva/sclera: Conjunctivae normal.      Pupils: Pupils are equal, round, and  reactive to light.   Cardiovascular:      Rate and Rhythm: Tachycardia present. Rhythm irregular.      Pulses: Normal pulses.      Heart sounds: Murmur heard.   Pulmonary:      Effort: Pulmonary effort is normal. No respiratory distress.      Breath sounds: Normal breath sounds. No wheezing, rhonchi or rales.   Abdominal:      General: Bowel sounds are normal.      Palpations: Abdomen is soft.      Tenderness: There is abdominal tenderness. There is no right CVA tenderness, left CVA tenderness, guarding or rebound.      Comments: LOLA drain with bloody output  Left-sided ostomy is pink with small amount of hard brown stool in it   Musculoskeletal:         General: No swelling. Normal range of motion.      Cervical back: Normal range of motion and neck supple.   Skin:     General: Skin is warm and dry.      Capillary Refill: Capillary refill takes less than 2 seconds.      Findings: No lesion or rash.   Neurological:      General: No focal deficit present.      Mental Status: She is alert and oriented to person, place, and time. Mental status is at baseline.   Psychiatric:         Mood and Affect: Mood normal.         Behavior: Behavior normal.                  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  Outpatient follow-up with cardiology after patient recovers for CHAPIS.  Monitor on tele  [id number] of 4-started on heparin  drip 6/10/2025 with clearance from general surgery team    HTN  HLE  Monitor blood pressure, resume hypertensives as able    Breast CA on anastrozole  Status post mastectomy    GERD    Hypothyroidism  Continue Synthroid  Recent TSH reviewed    Neuropathy  Migraines  OA  Anxiety/Depression    Code Status: Full Code                 DVT ppx: SCDs, Lovenox      Please see orders for more complete plan    Bouchra Rodriguez PA-C         [1] anastrozole, 1 mg, oral, Daily  aspirin, 81 mg, oral, Daily  bacitracin, , Topical, Daily  cefepime, 2 g, intravenous, q12h  enoxaparin, 40 mg, subcutaneous, q24h  pantoprazole, 40 mg, oral, Daily before breakfast   Or  esomeprazole, 40 mg, nasoduodenal tube, Daily before breakfast   Or  pantoprazole, 40 mg, intravenous, Daily before breakfast  levothyroxine, 25 mcg, oral, Daily  metoprolol tartrate, 100 mg, oral, BID  metroNIDAZOLE, 500 mg, intravenous, q12h     [2]    [3] PRN medications: acetaminophen, hydrALAZINE, HYDROcodone-acetaminophen, HYDROmorphone, labetaloL, LORazepam, melatonin, [DISCONTINUED] ondansetron **OR** ondansetron

## 2025-06-10 NOTE — CARE PLAN
The patient's goals for the shift include  staying informed    The clinical goals for the shift include patient will remain hemodynamically stable throughout this shift    Over the shift, the patient did not make progress toward the following goals. Barriers to progression include understanding. Recommendations to address these barriers include eduucation.

## 2025-06-10 NOTE — PROGRESS NOTES
06/10/25 1208   Discharge Planning   Living Arrangements Spouse/significant other   Support Systems Spouse/significant other;Children   Assistance Needed none   Type of Residence Private residence   Number of Stairs to Enter Residence 3   Number of Stairs Within Residence 0   Do you have animals or pets at home? Yes   Type of Animals or Pets dog and cat   Who is requesting discharge planning? Provider   Home or Post Acute Services None   Expected Discharge Disposition SNF   Does the patient need discharge transport arranged? Yes   RoundTrip coordination needed? Yes   Has discharge transport been arranged? No   Financial Resource Strain   How hard is it for you to pay for the very basics like food, housing, medical care, and heating? Not hard   Housing Stability   In the last 12 months, was there a time when you were not able to pay the mortgage or rent on time? N   In the past 12 months, how many times have you moved where you were living? 0   At any time in the past 12 months, were you homeless or living in a shelter (including now)? N   Transportation Needs   In the past 12 months, has lack of transportation kept you from medical appointments or from getting medications? no   In the past 12 months, has lack of transportation kept you from meetings, work, or from getting things needed for daily living? No   Patient Choice   Provider Choice list and CMS website (https://medicare.gov/care-compare#search) for post-acute Quality and Resource Measure Data were provided and reviewed with: Family;Patient   Patient / Family choosing to utilize agency / facility established prior to hospitalization Yes   Stroke Family Assessment   Stroke Family Assessment Needed No   Intensity of Service   Intensity of Service 0-30 min     Spoke with pt and daughter Vishnu. Explained TCC role in Care Transitions and verified address, phone number and emergency contact information. PCP is  Heather last seen in May and preferred pharmacy is   [facility], denies issues obtaining or affording medications and takes as ordered. Pt is independent, lives at home with her  and feels safe.  Plan is to discharge to a SNF with her new colostomy for education/ skilled nursing and PT/OT. SW to provide the SNF choice list for them to review as [facility] I have reviewed the IMM with them and provided the [employer] phone number they have denied questions. SW to provide a copy and collect a signature. [facility] 13/10. ADOD is 6/14. CT to follow. Niharika WINNN/RN-TCC     [id number] TCC followed up with daughter Autumn regarding SNF choice list and she stated she had not received it but her dad and her aunt should be in the room now. I then reminded SW. SNF choices will need to be collected tomorrow after they have time to review, CT to follow. Niharika ARECHIGA/RN-TCC

## 2025-06-10 NOTE — PROGRESS NOTES
[facility] Heart and Vascular Cardiology  Patient Name: Rosemary Motta  Patient : 1945  Room/Bed: -A    HPI from cardiology consult:  Rosemary Motta is a 79 y.o. female presenting with abdominal pain and found to have perforated diverticulitis.  She is status post emergency laparotomy on 2025.  Yesterday had a bout of paroxysmal rapid atrial fibrillation.  Reviewed EKG personally.  Currently in sinus rhythm.  Asymptomatic other than abdominal pain and having a cough.     PMH: HTN, HLE, breast ca, diverticulosis, GERD, hypothyroidism, neuropathy, migraines, OA, anxiety/depression  PSH: R mastectomy, ccy, , JOES bilateral, carpal tunnel  FH: n/a  SH: lives in [place]. no smoking, etoh.     She has no prior cardiac history.    Allergies:  Penicillins, Morphine, Clarithromycin, Hydrochlorothiazide, Tetracycline, Cortisone, Methylprednisolone, Nifedipine, and Prednisone    Subjective Data:  6/10/25: Lying in bed in no acute distress.  Daughter, Vishnu, at bedside. Cardiology was asked to resee patient d/t AF with RVR.  AF on telemetry with -180's initially. S/p IV metoprolol x1 with improvement HR down to 110-120's.     Overnight Events:  AF with RVR this morning    Objective Data:  Vitals:    25 2317 06/10/25 0430 06/10/25 0631 06/10/25 0754   BP: 107/67 165/70  166/76   BP Location: Left arm Right leg  Right leg   Patient Position: Lying Lying  Lying   Pulse: 67 90  90   Resp: 17 17  18   Temp: 36.7 °C (98.1 °F) 36.2 °C (97.1 °F)  36 °C (96.8 °F)   TempSrc: Temporal Temporal  Temporal   SpO2: 93% 93%  94%   Weight:   72.7 kg (160 lb 4.4 oz)    Height:           Reviewed the following Labs:  Results from last 7 days   Lab Units 06/10/25  0458 25  0531 25  0420   WBC AUTO x10*3/uL 5.8 7.0 8.8   HEMOGLOBIN g/dL 10.9* 11.1* 11.4*   HEMATOCRIT % 32.6* 33.4* 34.0*   PLATELETS AUTO x10*3/uL 143* 125* 124*       Results from last 7 days   Lab Units 06/10/25  1248  "25  0531 25  0420   SODIUM mmol/L 137 139 137 139   POTASSIUM mmol/L 3.8 4.1 3.3* 3.4*   CHLORIDE mmol/L 108* 106 107 107   CO2 mmol/L 24 23 22 23   BUN mg/dL 12 11 11 16   CREATININE mg/dL 0.84 0.88 0.96 0.98   CALCIUM mg/dL 7.9* 7.8* 8.2* 7.7*   PROTEIN TOTAL g/dL 5.3* 5.1*  --  4.9*   BILIRUBIN TOTAL mg/dL 0.6 0.6  --  0.9   ALK PHOS U/L 41 43  --  39   ALT U/L 11 12  --  15   AST U/L 22 24  --  22   GLUCOSE mg/dL 95 94 100* 152*       Results from last 7 days   Lab Units 06/10/25  0458 25   MAGNESIUM mg/dL 2.05 2.43* 1.33*       No results found for: \"LDLF\"     No results found for: \"BNP\"    No results found for: \"TROPHS\"     No results found for: \"TSH\", \"FT4\"        No results found for: \"HGBA1C\"    Intake/Output    Intake/Output Summary (Last 24 hours) at 6/10/2025 1106  Last data filed at 6/10/2025 0930  Gross per 24 hour   Intake 321.08 ml   Output 1125 ml   Net -803.92 ml      I/O last 2 completed shifts:  In: 403.1 (5.5 mL/kg) [P.O.:100; I.V.:53.1 (0.7 mL/kg); NG/GT:200; IV Piggyback:50]  Out: 1535 (21.1 mL/kg) [Urine:1300 (0.7 mL/kg/hr); Emesis/NG output:175; Drains:60]  Weight: 72.7 kg     Past Medical History:  She has a past medical history of Anxiety and depression, Breast cancer, CKD (chronic kidney disease), HLD (hyperlipidemia), HTN (hypertension), Hypothyroidism, and Pulmonary embolism.    Past Surgical History:  She has a past surgical history that includes Cholecystectomy; Carpal tunnel release (Bilateral);  section, classic; Total hip arthroplasty (Left); Mastectomy (Right, 2018); Breast biopsy; Foot surgery (Bilateral); and Colostomy (2025).      Social History:  She reports that she has never smoked. She has never used smokeless tobacco. She reports that she does not currently use alcohol. She reports that she does not use drugs.    Family History:  Family History[1]    Outpatient Medications  Medications Ordered Prior to " Encounter[2]    Scheduled medications  Scheduled Medications[3]  Continuous medications  Continuous Medications[4]  PRN medications  PRN Medications[5]   Prescriptions Prior to Admission[6]    Reviewed the following cardiology tests:    Echo 6/9/25:   1. Left ventricular ejection fraction is low normal calculated by Valente's biplane at 55%.   2. There is normal right ventricular global systolic function.   3. The Doppler estimated RVSP is within normal limits at 28 mmHg.   4. 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  EF   Date/Time Value Ref Range Status   06/09/2025 10:13 AM 55 %         Physical Exam:  Vitals reviewed.   Constitutional:       Appearance: Not in distress.   Pulmonary:      Effort: Pulmonary effort is normal.      Breath sounds: Normal breath sounds.   Cardiovascular:      PMI at left midclavicular line. Tachycardia present. Irregularly irregular rhythm. S1 with normal intensity. S2 with normal intensity.       Murmurs: There is no murmur.   Edema:     Peripheral edema absent.   Abdominal:      General: Bowel sounds are normal.   Neurological:      Mental Status: Alert and oriented to person, place and time.       Assessment/Plan:   1-paroxysmal atrial fibrillation-in setting of sepsis, perforated diverticulitis and postop state.  Transient and has resolved now.  No change in management.  Continue treating underlying medical problems.  Cardiology will sign off.  She was advised to follow-up with her PCP and with us as needed if there is any future recurrence of atrial fibrillation which seems unlikely at this point.  6/10/25: Cardiology asked to resee patient for recurrent AF with RVR.  AF on telemetry with -180's initially. S/p IV metoprolol 5 mg x1 with improvement HR down to 110-120's. Started on diltiazem infusion. She only required diltiazem infusion briefly and then self converted to SR.  Metoprolol tartrate was increased yesterday evening to 100 mg BID which should be  continued.   Hypertension - Yes, 1 point, Female - Yes, 1 point, and Age over 75 years - 2 points  [id number] score is at least 4.   Discussed with Dr. RONAK Santana via [employer] secure chat regarding anticoagulation.  Surgery team okay with heparin infusion with no bolus.  Heparin infusion initiated.  Transition to oral anticoagulation with apixaban when okay with surgery team/per hospitalist when able.     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  Outpatient follow-up with cardiology after patient recovers for CHAPIS.  6/10/25: Blood cultures x2 from 6/8/25 are negative x1 day.  ID following.  Plan is for outpatient CHAPIS in 6 weeks.     Code Status  Full Code      Yasmine Chicas, APRN-CNP          [1]   Family History  Problem Relation Name Age of Onset    Breast cancer Mother  65   [2]   No current facility-administered medications on file prior to encounter.     Current Outpatient Medications on File Prior to Encounter   Medication Sig Dispense Refill    anastrozole (Arimidex) 1 mg tablet Take 1 tablet (1 mg total) by mouth once daily.      artificial tears, dextran-hypomel-glycerin, 0.1-0.3-0.2 % ophthalmic solution Administer 1 drop into affected eye(s) 4 times a day as needed.      aspirin 81 mg EC tablet Take 1 tablet (81 mg) by mouth once daily.      calcium carbonate-vitamin D3 500 mg-5 mcg (200 unit) tablet Take 2 tablets by mouth once daily.      levothyroxine (Synthroid, Levoxyl) 25 mcg tablet Take 1 tablet (25 mcg) by mouth once daily.      metoprolol tartrate (Lopressor) 50 mg tablet Take 3 tablets by mouth twice a day.      olmesartan (BENIcar) 40 mg tablet Take 1 tablet (40 mg) by mouth once daily.      polyethylene glycol (Glycolax, Miralax) 17 gram packet Take 8.5 g by mouth once daily.      [DISCONTINUED] calcium citrate 250 mg calcium tablet Calcium Citrate 1040 MG TABS   Refills: 0        Start : 20-Sep-2021   Active       [DISCONTINUED] rosuvastatin (Crestor) 5 mg tablet Take 1 tablet (5 mg) by mouth once daily.     [3] anastrozole, 1 mg, oral, Daily  aspirin, 81 mg, oral, Daily  bacitracin, , Topical, Daily  bethanechol, 25 mg, oral, Once  cefepime, 2 g, intravenous, q12h  pantoprazole, 40 mg, oral, Daily before breakfast   Or  esomeprazole, 40 mg, nasoduodenal tube, Daily before breakfast   Or  pantoprazole, 40 mg, intravenous, Daily before breakfast  levothyroxine, 25 mcg, oral, Daily  metoprolol tartrate, 100 mg, oral, BID  metroNIDAZOLE, 500 mg, intravenous, q12h    [4] dilTIAZem, 5-15 mg/hr, Last Rate: 5 mg/hr (06/10/25 1034)  heparin, 0-4,500 Units/hr    [5] PRN medications: acetaminophen, heparin, hydrALAZINE, HYDROcodone-acetaminophen, HYDROmorphone, labetaloL, LORazepam, melatonin, [DISCONTINUED] ondansetron **OR** ondansetron  [6]   Medications Prior to Admission   Medication Sig Dispense Refill Last Dose/Taking    anastrozole (Arimidex) 1 mg tablet Take 1 tablet (1 mg total) by mouth once daily.       artificial tears, dextran-hypomel-glycerin, 0.1-0.3-0.2 % ophthalmic solution Administer 1 drop into affected eye(s) 4 times a day as needed.       aspirin 81 mg EC tablet Take 1 tablet (81 mg) by mouth once daily.       calcium carbonate-vitamin D3 500 mg-5 mcg (200 unit) tablet Take 2 tablets by mouth once daily.       levothyroxine (Synthroid, Levoxyl) 25 mcg tablet Take 1 tablet (25 mcg) by mouth once daily.       metoprolol tartrate (Lopressor) 50 mg tablet Take 3 tablets by mouth twice a day.       olmesartan (BENIcar) 40 mg tablet Take 1 tablet (40 mg) by mouth once daily.       polyethylene glycol (Glycolax, Miralax) 17 gram packet Take 8.5 g by mouth once daily.

## 2025-06-11 ENCOUNTER — APPOINTMENT (OUTPATIENT)
Dept: RADIOLOGY | Facility: HOSPITAL | Age: 80
DRG: 853 | End: 2025-06-11
Payer: MEDICARE

## 2025-06-11 ENCOUNTER — APPOINTMENT (OUTPATIENT)
Dept: CARDIOLOGY | Facility: HOSPITAL | Age: 80
DRG: 853 | End: 2025-06-11
Payer: MEDICARE

## 2025-06-11 LAB
ALBUMIN SERPL BCP-MCNC: 2.8 G/DL (ref 3.4–5)
ALP SERPL-CCNC: 40 U/L (ref 33–136)
ALT SERPL W P-5'-P-CCNC: 10 U/L (ref 7–45)
ANION GAP SERPL CALC-SCNC: 11 MMOL/L (ref 10–20)
AST SERPL W P-5'-P-CCNC: 17 U/L (ref 9–39)
BASOPHILS # BLD AUTO: 0.03 X10*3/UL (ref 0–0.1)
BASOPHILS NFR BLD AUTO: 0.6 %
BILIRUB SERPL-MCNC: 0.4 MG/DL (ref 0–1.2)
BUN SERPL-MCNC: 12 MG/DL (ref 6–23)
CALCIUM SERPL-MCNC: 7.6 MG/DL (ref 8.6–10.3)
CHLORIDE SERPL-SCNC: 106 MMOL/L (ref 98–107)
CO2 SERPL-SCNC: 27 MMOL/L (ref 21–32)
CREAT SERPL-MCNC: 0.8 MG/DL (ref 0.5–1.05)
EGFRCR SERPLBLD CKD-EPI 2021: 75 ML/MIN/1.73M*2
EOSINOPHIL # BLD AUTO: 0.2 X10*3/UL (ref 0–0.4)
EOSINOPHIL NFR BLD AUTO: 3.8 %
ERYTHROCYTE [DISTWIDTH] IN BLOOD BY AUTOMATED COUNT: 13 % (ref 11.5–14.5)
ERYTHROCYTE [DISTWIDTH] IN BLOOD BY AUTOMATED COUNT: 13 % (ref 11.5–14.5)
GLUCOSE SERPL-MCNC: 104 MG/DL (ref 74–99)
HCT VFR BLD AUTO: 32.2 % (ref 36–46)
HCT VFR BLD AUTO: 36.4 % (ref 36–46)
HGB BLD-MCNC: 10.7 G/DL (ref 12–16)
HGB BLD-MCNC: 12.3 G/DL (ref 12–16)
IMM GRANULOCYTES # BLD AUTO: 0.18 X10*3/UL (ref 0–0.5)
IMM GRANULOCYTES NFR BLD AUTO: 3.4 % (ref 0–0.9)
LYMPHOCYTES # BLD AUTO: 1.33 X10*3/UL (ref 0.8–3)
LYMPHOCYTES NFR BLD AUTO: 25 %
MAGNESIUM SERPL-MCNC: 1.75 MG/DL (ref 1.6–2.4)
MCH RBC QN AUTO: 31.7 PG (ref 26–34)
MCH RBC QN AUTO: 31.9 PG (ref 26–34)
MCHC RBC AUTO-ENTMCNC: 33.2 G/DL (ref 32–36)
MCHC RBC AUTO-ENTMCNC: 33.8 G/DL (ref 32–36)
MCV RBC AUTO: 94 FL (ref 80–100)
MCV RBC AUTO: 95 FL (ref 80–100)
MONOCYTES # BLD AUTO: 0.34 X10*3/UL (ref 0.05–0.8)
MONOCYTES NFR BLD AUTO: 6.4 %
NEUTROPHILS # BLD AUTO: 3.23 X10*3/UL (ref 1.6–5.5)
NEUTROPHILS NFR BLD AUTO: 60.8 %
NRBC BLD-RTO: 0 /100 WBCS (ref 0–0)
NRBC BLD-RTO: 0 /100 WBCS (ref 0–0)
PLATELET # BLD AUTO: 163 X10*3/UL (ref 150–450)
PLATELET # BLD AUTO: 165 X10*3/UL (ref 150–450)
POTASSIUM SERPL-SCNC: 3.7 MMOL/L (ref 3.5–5.3)
PROT SERPL-MCNC: 4.9 G/DL (ref 6.4–8.2)
RBC # BLD AUTO: 3.38 X10*6/UL (ref 4–5.2)
RBC # BLD AUTO: 3.86 X10*6/UL (ref 4–5.2)
SODIUM SERPL-SCNC: 140 MMOL/L (ref 136–145)
UFH PPP CHRO-ACNC: 0.4 IU/ML (ref ?–1.1)
UFH PPP CHRO-ACNC: 0.5 IU/ML (ref ?–1.1)
UFH PPP CHRO-ACNC: 0.9 IU/ML (ref ?–1.1)
UFH PPP CHRO-ACNC: 1 IU/ML (ref ?–1.1)
WBC # BLD AUTO: 5.3 X10*3/UL (ref 4.4–11.3)
WBC # BLD AUTO: 6.3 X10*3/UL (ref 4.4–11.3)

## 2025-06-11 PROCEDURE — 74177 CT ABD & PELVIS W/CONTRAST: CPT | Performed by: STUDENT IN AN ORGANIZED HEALTH CARE EDUCATION/TRAINING PROGRAM

## 2025-06-11 PROCEDURE — 36415 COLL VENOUS BLD VENIPUNCTURE: CPT | Performed by: NURSE PRACTITIONER

## 2025-06-11 PROCEDURE — 2500000004 HC RX 250 GENERAL PHARMACY W/ HCPCS (ALT 636 FOR OP/ED): Performed by: INTERNAL MEDICINE

## 2025-06-11 PROCEDURE — 2500000001 HC RX 250 WO HCPCS SELF ADMINISTERED DRUGS (ALT 637 FOR MEDICARE OP): Performed by: INTERNAL MEDICINE

## 2025-06-11 PROCEDURE — 99232 SBSQ HOSP IP/OBS MODERATE 35: CPT | Performed by: NURSE PRACTITIONER

## 2025-06-11 PROCEDURE — 2500000002 HC RX 250 W HCPCS SELF ADMINISTERED DRUGS (ALT 637 FOR MEDICARE OP, ALT 636 FOR OP/ED): Performed by: PHYSICIAN ASSISTANT

## 2025-06-11 PROCEDURE — 93005 ELECTROCARDIOGRAM TRACING: CPT

## 2025-06-11 PROCEDURE — 2500000004 HC RX 250 GENERAL PHARMACY W/ HCPCS (ALT 636 FOR OP/ED): Mod: JW | Performed by: PHYSICIAN ASSISTANT

## 2025-06-11 PROCEDURE — 2500000004 HC RX 250 GENERAL PHARMACY W/ HCPCS (ALT 636 FOR OP/ED): Mod: JZ | Performed by: INTERNAL MEDICINE

## 2025-06-11 PROCEDURE — 99233 SBSQ HOSP IP/OBS HIGH 50: CPT | Performed by: PHYSICIAN ASSISTANT

## 2025-06-11 PROCEDURE — 2060000001 HC INTERMEDIATE ICU ROOM DAILY

## 2025-06-11 PROCEDURE — 85025 COMPLETE CBC W/AUTO DIFF WBC: CPT | Performed by: PHYSICIAN ASSISTANT

## 2025-06-11 PROCEDURE — 76937 US GUIDE VASCULAR ACCESS: CPT

## 2025-06-11 PROCEDURE — 85027 COMPLETE CBC AUTOMATED: CPT | Performed by: NURSE PRACTITIONER

## 2025-06-11 PROCEDURE — 2550000001 HC RX 255 CONTRASTS: Performed by: INTERNAL MEDICINE

## 2025-06-11 PROCEDURE — 2500000004 HC RX 250 GENERAL PHARMACY W/ HCPCS (ALT 636 FOR OP/ED): Performed by: NURSE PRACTITIONER

## 2025-06-11 PROCEDURE — 85520 HEPARIN ASSAY: CPT | Performed by: NURSE PRACTITIONER

## 2025-06-11 PROCEDURE — 2500000005 HC RX 250 GENERAL PHARMACY W/O HCPCS: Performed by: PHYSICIAN ASSISTANT

## 2025-06-11 PROCEDURE — 93010 ELECTROCARDIOGRAM REPORT: CPT | Performed by: INTERNAL MEDICINE

## 2025-06-11 PROCEDURE — 2500000005 HC RX 250 GENERAL PHARMACY W/O HCPCS: Performed by: NURSE PRACTITIONER

## 2025-06-11 PROCEDURE — 74177 CT ABD & PELVIS W/CONTRAST: CPT

## 2025-06-11 PROCEDURE — 80053 COMPREHEN METABOLIC PANEL: CPT | Performed by: INTERNAL MEDICINE

## 2025-06-11 PROCEDURE — 2500000005 HC RX 250 GENERAL PHARMACY W/O HCPCS: Performed by: INTERNAL MEDICINE

## 2025-06-11 PROCEDURE — 83735 ASSAY OF MAGNESIUM: CPT | Performed by: PHYSICIAN ASSISTANT

## 2025-06-11 PROCEDURE — 2500000001 HC RX 250 WO HCPCS SELF ADMINISTERED DRUGS (ALT 637 FOR MEDICARE OP): Performed by: NURSE PRACTITIONER

## 2025-06-11 PROCEDURE — 99024 POSTOP FOLLOW-UP VISIT: CPT | Performed by: SURGERY

## 2025-06-11 RX ORDER — TIZANIDINE 2 MG/1
2 TABLET ORAL EVERY 6 HOURS PRN
Status: DISCONTINUED | OUTPATIENT
Start: 2025-06-11 | End: 2025-06-19 | Stop reason: HOSPADM

## 2025-06-11 RX ORDER — DILTIAZEM HYDROCHLORIDE 30 MG/1
30 TABLET, FILM COATED ORAL EVERY 6 HOURS
Status: DISCONTINUED | OUTPATIENT
Start: 2025-06-11 | End: 2025-06-16

## 2025-06-11 RX ORDER — DILTIAZEM HYDROCHLORIDE 5 MG/ML
5 INJECTION INTRAVENOUS ONCE
Status: DISCONTINUED | OUTPATIENT
Start: 2025-06-11 | End: 2025-06-11

## 2025-06-11 RX ORDER — LIDOCAINE 560 MG/1
1 PATCH PERCUTANEOUS; TOPICAL; TRANSDERMAL DAILY
Status: DISCONTINUED | OUTPATIENT
Start: 2025-06-11 | End: 2025-06-19 | Stop reason: HOSPADM

## 2025-06-11 RX ORDER — KETOROLAC TROMETHAMINE 30 MG/ML
15 INJECTION, SOLUTION INTRAMUSCULAR; INTRAVENOUS ONCE
Status: COMPLETED | OUTPATIENT
Start: 2025-06-11 | End: 2025-06-11

## 2025-06-11 RX ORDER — DILTIAZEM HYDROCHLORIDE 5 MG/ML
0.25 INJECTION INTRAVENOUS ONCE AS NEEDED
Status: DISCONTINUED | OUTPATIENT
Start: 2025-06-11 | End: 2025-06-19 | Stop reason: HOSPADM

## 2025-06-11 RX ADMIN — HYDROMORPHONE HYDROCHLORIDE 0.5 MG: 0.5 INJECTION, SOLUTION INTRAMUSCULAR; INTRAVENOUS; SUBCUTANEOUS at 14:41

## 2025-06-11 RX ADMIN — HYDROMORPHONE HYDROCHLORIDE 0.5 MG: 0.5 INJECTION, SOLUTION INTRAMUSCULAR; INTRAVENOUS; SUBCUTANEOUS at 19:50

## 2025-06-11 RX ADMIN — HYDRALAZINE HYDROCHLORIDE 10 MG: 20 INJECTION INTRAMUSCULAR; INTRAVENOUS at 15:56

## 2025-06-11 RX ADMIN — HYDROMORPHONE HYDROCHLORIDE 0.5 MG: 0.5 INJECTION, SOLUTION INTRAMUSCULAR; INTRAVENOUS; SUBCUTANEOUS at 10:51

## 2025-06-11 RX ADMIN — HYDROMORPHONE HYDROCHLORIDE 0.4 MG: 0.5 INJECTION, SOLUTION INTRAMUSCULAR; INTRAVENOUS; SUBCUTANEOUS at 09:17

## 2025-06-11 RX ADMIN — CEFEPIME HYDROCHLORIDE 2 G: 2 INJECTION, SOLUTION INTRAVENOUS at 22:16

## 2025-06-11 RX ADMIN — METRONIDAZOLE 500 MG: 500 INJECTION, SOLUTION INTRAVENOUS at 02:38

## 2025-06-11 RX ADMIN — HYDROCODONE BITARTRATE AND ACETAMINOPHEN 1 TABLET: 7.5; 325 TABLET ORAL at 00:12

## 2025-06-11 RX ADMIN — TIZANIDINE 2 MG: 2 TABLET ORAL at 09:17

## 2025-06-11 RX ADMIN — METOPROLOL TARTRATE 100 MG: 50 TABLET, FILM COATED ORAL at 09:00

## 2025-06-11 RX ADMIN — LEVOTHYROXINE SODIUM 25 MCG: 0.03 TABLET ORAL at 09:00

## 2025-06-11 RX ADMIN — LIDOCAINE 4% 1 PATCH: 40 PATCH TOPICAL at 10:52

## 2025-06-11 RX ADMIN — HYDROCODONE BITARTRATE AND ACETAMINOPHEN 1 TABLET: 7.5; 325 TABLET ORAL at 22:16

## 2025-06-11 RX ADMIN — DILTIAZEM HYDROCHLORIDE 30 MG: 30 TABLET, FILM COATED ORAL at 18:34

## 2025-06-11 RX ADMIN — DILTIAZEM HYDROCHLORIDE 30 MG: 30 TABLET, FILM COATED ORAL at 12:30

## 2025-06-11 RX ADMIN — HYDROCODONE BITARTRATE AND ACETAMINOPHEN 1 TABLET: 7.5; 325 TABLET ORAL at 15:56

## 2025-06-11 RX ADMIN — METOPROLOL TARTRATE 100 MG: 50 TABLET, FILM COATED ORAL at 19:51

## 2025-06-11 RX ADMIN — HYDROMORPHONE HYDROCHLORIDE 0.5 MG: 0.5 INJECTION, SOLUTION INTRAMUSCULAR; INTRAVENOUS; SUBCUTANEOUS at 16:29

## 2025-06-11 RX ADMIN — IOHEXOL 75 ML: 350 INJECTION, SOLUTION INTRAVENOUS at 21:29

## 2025-06-11 RX ADMIN — ANASTROZOLE 1 MG: 1 TABLET, COATED ORAL at 09:03

## 2025-06-11 RX ADMIN — METRONIDAZOLE 500 MG: 500 INJECTION, SOLUTION INTRAVENOUS at 14:41

## 2025-06-11 RX ADMIN — HEPARIN SODIUM 1300 UNITS/HR: 10000 INJECTION, SOLUTION INTRAVENOUS at 04:38

## 2025-06-11 RX ADMIN — KETOROLAC TROMETHAMINE 15 MG: 30 INJECTION, SOLUTION INTRAMUSCULAR at 16:29

## 2025-06-11 RX ADMIN — PANTOPRAZOLE SODIUM 40 MG: 40 TABLET, DELAYED RELEASE ORAL at 06:34

## 2025-06-11 RX ADMIN — Medication 3 MG: at 00:12

## 2025-06-11 RX ADMIN — CEFEPIME HYDROCHLORIDE 2 G: 2 INJECTION, SOLUTION INTRAVENOUS at 10:45

## 2025-06-11 RX ADMIN — HYDROCODONE BITARTRATE AND ACETAMINOPHEN 1 TABLET: 7.5; 325 TABLET ORAL at 09:00

## 2025-06-11 RX ADMIN — ASPIRIN 81 MG: 81 TABLET, COATED ORAL at 09:00

## 2025-06-11 ASSESSMENT — COGNITIVE AND FUNCTIONAL STATUS - GENERAL
STANDING UP FROM CHAIR USING ARMS: A LOT
MOBILITY SCORE: 16
TOILETING: A LOT
HELP NEEDED FOR BATHING: A LOT
TURNING FROM BACK TO SIDE WHILE IN FLAT BAD: A LITTLE
WALKING IN HOSPITAL ROOM: A LOT
DRESSING REGULAR LOWER BODY CLOTHING: A LITTLE
MOVING TO AND FROM BED TO CHAIR: A LITTLE
DAILY ACTIVITIY SCORE: 19
CLIMB 3 TO 5 STEPS WITH RAILING: A LOT

## 2025-06-11 ASSESSMENT — ENCOUNTER SYMPTOMS
ABDOMINAL PAIN: 1
COUGH: 0
CONFUSION: 0
JOINT SWELLING: 0
CHEST TIGHTNESS: 0
WEAKNESS: 0
BACK PAIN: 1
APPETITE CHANGE: 0
WHEEZING: 0
FATIGUE: 0
WOUND: 0
TROUBLE SWALLOWING: 0
BRUISES/BLEEDS EASILY: 1
SPEECH DIFFICULTY: 0
DYSURIA: 0
BRUISES/BLEEDS EASILY: 0
DIZZINESS: 0
FEVER: 0
BLOOD IN STOOL: 0
NUMBNESS: 0
AGITATION: 0
FLANK PAIN: 0
HALLUCINATIONS: 0
HEMATURIA: 0
WEAKNESS: 1
DIAPHORESIS: 0
EYE REDNESS: 0
FACIAL SWELLING: 0
EYE PAIN: 0
SHORTNESS OF BREATH: 0
CONSTIPATION: 0
NAUSEA: 0
ARTHRALGIAS: 1
FREQUENCY: 0
FATIGUE: 1
CHILLS: 0
LIGHT-HEADEDNESS: 0
WOUND: 1
VOMITING: 0
MYALGIAS: 1
HEADACHES: 0
DIARRHEA: 0
PALPITATIONS: 1
SORE THROAT: 0

## 2025-06-11 ASSESSMENT — PAIN - FUNCTIONAL ASSESSMENT
PAIN_FUNCTIONAL_ASSESSMENT: 0-10

## 2025-06-11 ASSESSMENT — PAIN SCALES - GENERAL
PAINLEVEL_OUTOF10: 2
PAINLEVEL_OUTOF10: 6
PAINLEVEL_OUTOF10: 9
PAINLEVEL_OUTOF10: 5 - MODERATE PAIN
PAINLEVEL_OUTOF10: 5 - MODERATE PAIN
PAINLEVEL_OUTOF10: 8
PAINLEVEL_OUTOF10: 2
PAINLEVEL_OUTOF10: 10 - WORST POSSIBLE PAIN

## 2025-06-11 ASSESSMENT — PAIN DESCRIPTION - DESCRIPTORS
DESCRIPTORS: ACHING
DESCRIPTORS: ACHING

## 2025-06-11 ASSESSMENT — PAIN DESCRIPTION - LOCATION
LOCATION: BACK
LOCATION: ABDOMEN
LOCATION: GROIN
LOCATION: BACK
LOCATION: HIP

## 2025-06-11 ASSESSMENT — PAIN DESCRIPTION - ORIENTATION
ORIENTATION: RIGHT
ORIENTATION: RIGHT

## 2025-06-11 NOTE — PROGRESS NOTES
Occupational Therapy                 Therapy Communication Note    Patient Name: Rosemary Motta  MRN: [id number]  Department: [address]  Room: 2025/2025-A  Today's Date: 6/11/2025     Discipline: Occupational Therapy    Missed Visit: OT Missed Visit: Yes     Missed Visit Reason: Missed Visit Reason: Patient placed on medical hold (per RN)    Missed Time: Attempt    Comment: Medical hold per RN

## 2025-06-11 NOTE — PROGRESS NOTES
"    GENERAL SURGERY / TRAUMA PROGRESS NOTE    Patient: Rosemary Motta  Room: 2025/2025-A    Age: 79 y.o.   Gender: female  Attending: Joseph Mcbride MD    MRN: [id number]  Admission Date: 6/7/2025    PCP: Prema Romero DO       Rosemary Motta is a 79 y.o. female on day 4  of admission presenting with Diverticulitis of large intestine with perforation without bleeding.    SUBJECTIVE   Interval History:  No nausea this morning.  Patient tolerating regular diet having scant amounts of stool in colostomy bag.  LOLA drain still with 50 cc of serosanguineous output.    ROS  Review of Systems   Constitutional:  Positive for fatigue. Negative for chills and fever.   HENT:  Positive for hearing loss. Negative for congestion and ear pain.    Eyes:  Negative for pain and redness.   Respiratory:  Negative for cough and shortness of breath.    Cardiovascular:  Positive for leg swelling. Negative for chest pain.   Gastrointestinal:  Positive for abdominal pain. Negative for nausea and vomiting.   Genitourinary:  Negative for flank pain and hematuria.   Musculoskeletal:  Positive for arthralgias and myalgias.   Skin:  Positive for wound. Negative for rash.   Allergic/Immunologic: Negative for immunocompromised state.   Neurological:  Positive for weakness. Negative for speech difficulty and headaches.   Hematological:  Bruises/bleeds easily.   Psychiatric/Behavioral:  Negative for agitation and confusion.           OBJECTIVE   Last Recorded Vitals  Blood pressure 152/65, pulse 73, temperature 36.3 °C (97.4 °F), temperature source Temporal, resp. rate 16, height 1.473 m (4' 10\"), weight 69 kg (152 lb 1.9 oz), SpO2 93%.    Intake/Output last 3 Shifts:  I/O last 3 completed shifts:  In: 1129.8 (16.4 mL/kg) [P.O.:236; I.V.:143.8 (2.1 mL/kg); IV Piggyback:750]  Out: 3205 (46.4 mL/kg) [Urine:3150 (1.3 mL/kg/hr); Drains:50; Stool:5]  Weight: 69 kg     PHYSICAL EXAM  Physical Exam   General: Well-developed, well-nourished and in no acute " distress.  Head: Normocephalic. Atraumatic.  Neck/thyroid: Neck is supple.   Eyes: Pupils equal round and reactive to light. Conjunctiva normal.  ENMT: No masses or deformity of external nose. External ears without masses.  Respiratory/Chest:  Normal respiratory effort.  Breast: s/p right mastectomy  Cardiovascular: Regular rate and rhythm.   Abdomen: Soft and nondistended.  Midline incision is clean, dry and intact.  Colostomy in the left lower quadrant is pink and viable with small amounts of stool and moderate amount of gas output.  Abdominal drain with serosanguineous fluid.  Musculoskeletal: Joints and limbs are grossly normal.   Neuro: Oriented to person, place and time. No obvious neurological deficit.   Psych: Normal mood and affect.  Awake and talkative.    RESULTS   Labs  Results for orders placed or performed during the hospital encounter of 06/07/25 (from the past 24 hours)   Electrocardiogram, 12-lead PRN ACS symptoms   Result Value Ref Range    Ventricular Rate 174 BPM    Atrial Rate 220 BPM    QRS Duration 82 ms    QT Interval 268 ms    QTC Calculation(Bazett) 456 ms    R Axis 46 degrees    T Axis 98 degrees    QRS Count 28 beats    Q Onset 220 ms    T Offset 354 ms    QTC Fredericia 382 ms   aPTT - baseline   Result Value Ref Range    aPTT 22 (L) 26 - 36 seconds   Electrocardiogram, 12-lead PRN ACS symptoms   Result Value Ref Range    Ventricular Rate 85 BPM    Atrial Rate 85 BPM    VA Interval 174 ms    QRS Duration 84 ms    QT Interval 360 ms    QTC Calculation(Bazett) 428 ms    P Axis 55 degrees    R Axis 5 degrees    T Axis -3 degrees    QRS Count 14 beats    Q Onset 220 ms    P Onset 133 ms    P Offset 188 ms    T Offset 400 ms    QTC Fredericia 404 ms   Heparin Assay, UFH   Result Value Ref Range    Heparin Unfractionated 0.7 See Comment Below for Therapeutic Ranges IU/mL   Heparin Assay, UFH   Result Value Ref Range    Heparin Unfractionated 0.7 See Comment Below for Therapeutic Ranges IU/mL    Comprehensive metabolic panel   Result Value Ref Range    Glucose 104 (H) 74 - 99 mg/dL    Sodium 140 136 - 145 mmol/L    Potassium 3.7 3.5 - 5.3 mmol/L    Chloride 106 98 - 107 mmol/L    Bicarbonate 27 21 - 32 mmol/L    Anion Gap 11 10 - 20 mmol/L    Urea Nitrogen 12 6 - 23 mg/dL    Creatinine 0.80 0.50 - 1.05 mg/dL    eGFR 75 >60 mL/min/1.73m*2    Calcium 7.6 (L) 8.6 - 10.3 mg/dL    Albumin 2.8 (L) 3.4 - 5.0 g/dL    Alkaline Phosphatase 40 33 - 136 U/L    Total Protein 4.9 (L) 6.4 - 8.2 g/dL    AST 17 9 - 39 U/L    Bilirubin, Total 0.4 0.0 - 1.2 mg/dL    ALT 10 7 - 45 U/L   CBC and Auto Differential   Result Value Ref Range    WBC 5.3 4.4 - 11.3 x10*3/uL    nRBC 0.0 0.0 - 0.0 /100 WBCs    RBC 3.38 (L) 4.00 - 5.20 x10*6/uL    Hemoglobin 10.7 (L) 12.0 - 16.0 g/dL    Hematocrit 32.2 (L) 36.0 - 46.0 %    MCV 95 80 - 100 fL    MCH 31.7 26.0 - 34.0 pg    MCHC 33.2 32.0 - 36.0 g/dL    RDW 13.0 11.5 - 14.5 %    Platelets 163 150 - 450 x10*3/uL    Neutrophils % 60.8 40.0 - 80.0 %    Immature Granulocytes %, Automated 3.4 (H) 0.0 - 0.9 %    Lymphocytes % 25.0 13.0 - 44.0 %    Monocytes % 6.4 2.0 - 10.0 %    Eosinophils % 3.8 0.0 - 6.0 %    Basophils % 0.6 0.0 - 2.0 %    Neutrophils Absolute 3.23 1.60 - 5.50 x10*3/uL    Immature Granulocytes Absolute, Automated 0.18 0.00 - 0.50 x10*3/uL    Lymphocytes Absolute 1.33 0.80 - 3.00 x10*3/uL    Monocytes Absolute 0.34 0.05 - 0.80 x10*3/uL    Eosinophils Absolute 0.20 0.00 - 0.40 x10*3/uL    Basophils Absolute 0.03 0.00 - 0.10 x10*3/uL   Magnesium   Result Value Ref Range    Magnesium 1.75 1.60 - 2.40 mg/dL   Heparin Assay, UFH   Result Value Ref Range    Heparin Unfractionated 1.0 See Comment Below for Therapeutic Ranges IU/mL       Radiology Resutls  Imaging  No results found.      Cardiology, Vascular, and Other Imaging  Electrocardiogram, 12-lead PRN ACS symptoms  Result Date: 6/10/2025  Atrial fibrillation with rapid ventricular response Nonspecific ST and T wave abnormality  Abnormal ECG When compared with ECG of 10-CHAVA-2025 10:11, (unconfirmed) No significant change was found Confirmed by Bob Hu ([id number]) on 6/10/2025 1:23:23 PM    Electrocardiogram, 12-lead PRN ACS symptoms  Result Date: 6/10/2025  Normal sinus rhythm Normal ECG When compared with ECG of 10-CHAVA-2025 10:11, (unconfirmed) Sinus rhythm has replaced Atrial fibrillation Vent. rate has decreased BY  89 BPM ST no longer depressed in Anterolateral leads T wave inversion now evident in Inferior leads Nonspecific T wave abnormality no longer evident in Lateral leads Confirmed by Bob Hu ([id number]) on 6/10/2025 1:23:11 PM    Transthoracic Echo Complete  Result Date: 6/9/2025              [place] [address]      Phone [phone] Fax [phone] TRANSTHORACIC ECHOCARDIOGRAM REPORT Patient Name:       PATTI ACOSTA       [place] Physician:    [id number] Moan Quintanilla MD Study Date:         6/9/2025             Ordering Provider:    [id number] NAEL WOLF MRN/PID:            [id number]             Fellow: Accession#:         [id number]         Nurse: Date of Birth/Age:  1945 / 79      Sonographer:          Richelle Grey RDCS                     years Gender Assigned at  F                    Additional Staff: Birth: Height:             147.32 cm            Admit Date:           6/8/2025 Weight:             66.68 kg             Admission Status:     Inpatient -                                                                Routine BSA / BMI:          1.60 m2 / 30.72      Department Location:  [facility]                     kg/m2 Blood Pressure: 158 /66 mmHg Study Type:    TRANSTHORACIC ECHO (TTE) COMPLETE Diagnosis/ICD: Paroxysmal atrial fibrillation-I48.0; Dyspnea, unspecified-R06.00 Indication:    Dyspnea CPT Codes:     Echo Complete w Full  [id number] Patient History: Pertinent History: HTN. Study Detail: The following Echo studies were performed: 2D, M-Mode, Doppler and               color flow.  PHYSICIAN INTERPRETATION: Left Ventricle: Left ventricular ejection fraction is low normal calculated by Valente's biplane at 55%. There are no regional wall motion abnormalities. The left ventricular cavity size is normal. There is normal septal and normal posterior left ventricular wall thickness. Spectral Doppler shows a normal pattern of left ventricular diastolic filling. Left Atrium: The left atrial size is normal. Right Ventricle: The right ventricle is normal in size. There is normal right ventricular global systolic function. Right Atrium: The right atrial size is normal. Aortic Valve: The aortic valve is trileaflet. The aortic valve area by VTI is 2.31 cmï¿½ with a peak velocity of 1.61 m/s. The peak and mean gradients are 10 mmHg and 5 mmHg, respectively, with a dimensionless index of 0.75. There is no evidence of aortic valve regurgitation. 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 Mitral Valve: The mitral valve is normal in structure. There is trace mitral valve regurgitation. The E Vmax is 1.00 m/s. Tricuspid Valve: The tricuspid valve is structurally normal. There is trace tricuspid regurgitation. The Doppler estimated right ventricular systolic pressure (RVSP) is within normal limits at 28 mmHg. Pulmonic Valve: The pulmonic valve is structurally normal. There is trace pulmonic valve regurgitation. Pericardium: No pericardial effusion noted. Aorta: The aortic root is normal. Systemic Veins: The inferior vena cava appears normal in size, IVC inspiratory collapse was not assessed.  CONCLUSIONS:  1. Left ventricular ejection fraction is low normal calculated by Valente's biplane at 55%.  2. There is normal right ventricular global systolic function.  3. The Doppler estimated RVSP is within normal limits at 28 mmHg.  4. 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 QUANTITATIVE DATA SUMMARY:  2D MEASUREMENTS:             Normal Ranges: IVSd:            0.72 cm     (0.6-1.1cm) LVPWd:           0.71 cm     (0.6-1.1cm) LVIDd:           4.41 cm     (3.9-5.9cm) LVIDs:           2.79 cm LV Mass Index:   59.5 g/m2 LVEDV Index:     22.62 ml/m2 LV % FS          36.8 %  LEFT ATRIUM:                  Normal Ranges: LA Vol A4C:        31.5 ml    (22+/-6mL/m2) LA Vol A2C:        28.9 ml LA Vol BP:         35.2 ml LA Vol Index A4C:  19.7ml/m2 LA Vol Index A2C:  18.1 ml/m2 LA Vol Index BP:   22.0 ml/m2 LA Area A4C:       14.4 cm2 LA Area A2C:       11.8 cm2 LA Major Axis A4C: 5.6 cm LA Major Axis A2C: 4.1 cm LA Volume Index:   21.8 ml/m2 LA Vol A4C:        29.0 ml LA Vol A2C:        27.9 ml LA Vol Index BSA:  17.8 ml/m2  RIGHT ATRIUM:          Normal Ranges: RA Area A4C:  10.0 cm2  AORTA MEASUREMENTS:         Normal Ranges: Ao Sinus, d:        3.20 cm (2.1-3.5cm) Ao STJ, d:          2.40 cm (1.7-3.4cm) Asc Ao, d:          2.50 cm (2.1-3.4cm)  LV SYSTOLIC FUNCTION:                      Normal Ranges: EF-A4C View:    60 % (>=55%) EF-A2C View:    52 % EF-Biplane:     55 % LV EF Reported: 55 %  LV DIASTOLIC FUNCTION:           Normal Ranges: MV Peak E:             1.00 m/s  (0.7-1.2 m/s) MV Peak A:             0.80 m/s  (0.42-0.7 m/s) E/A Ratio:             1.26      (1.0-2.2) MV e'                  0.111 m/s (>8.0) MV lateral e'          0.11 m/s MV medial e'           0.11 m/s E/e' Ratio:            9.04      (<8.0)  MITRAL VALVE:          Normal Ranges: MV DT:        122 msec (150-240msec)  AORTIC VALVE:                      Normal Ranges: AoV Vmax:                1.61 m/s  (<=1.7m/s) AoV Peak PG:             10.4 mmHg (<20mmHg) AoV Mean P.3 mmHg  (1.7-11.5mmHg) LVOT Max Kael:            1.04 m/s  (<=1.1m/s) AoV VTI:                 32.01 cm  (18-25cm) LVOT VTI:                24.08 cm LVOT Diameter:           1.98 cm   (1.8-2.4cm) AoV  Area, VTI:           2.31 cm2  (2.5-5.5cm2) AoV Area,Vmax:           1.98 cm2  (2.5-4.5cm2) AoV Dimensionless Index: 0.75  RIGHT VENTRICLE: RV Basal 2.42 cm RV Mid   1.90 cm RV Major 6.8 cm TAPSE:   30.3 mm RV s'    0.15 m/s  TRICUSPID VALVE/RVSP:          Normal Ranges: Peak TR Velocity:     2.50 m/s Est. RA Pressure:     3 mmHg RV Syst Pressure:     28 mmHg  (< 30mmHg) IVC Diam:             1.04 cm TV e'                 0.1 m/s  AORTA: Asc Ao Diam 3.19 cm  [id number] Mona Quintanilla MD Electronically signed on 6/9/2025 at 5:35:08 PM  ** Final **            ASSESSMENT / PLAN     Assessment & Plan  Diverticulitis of large intestine with perforation without bleeding         PLAN  79-year-old white female s/p Hanna's procedure for perforated sigmoid diverticulitis 6/7/2025    Continue on regular diet.  Would like to see more colostomy output prior to discharge.  Routine colostomy care.  Encourage patient to get out of bed at least up to a chair today.    Discussed possible need for short-term rehab placement upon discharge pending PT/OT evaluations.  Patient in agreement with placement  Other supportive care per medicine/cardiology/infectious disease.  Antibiotics per infectious disease.  Per ID note continue antibiotics through 6/12/25 to finish 5-day course.  Pathology pending as of 6/11/25  PUD/DVT prophylaxis.      Patient will be discussed with Attending Physician Dr. Yobany Amaral PGY4  General Surgery Service

## 2025-06-11 NOTE — PROGRESS NOTES
Rosemary Motta is a 79 y.o. female on day 4 of admission presenting with Diverticulitis of large intestine with perforation without bleeding.      Subjective   Rosemary Motta is a 79F hx HTN, HLE, breast ca, diverticulosis, GERD, hypothyroidism, neuropathy, migraines, OA, anxiety/depression presented 6/7/25 for abdominal pain, left lower quadrant abd pain with radiation to back. now improved. up to 10/10, now 5/10. nausea with small vomiting. no diarrhea, constipation, blood in stool or melena. subacute dry cough. no acute abdomen on exam. labs/imaging w/ cr 1.29 (baseline 1.02), cbc, lactate wnl. ua wnl. ekg wnl. ct a/p w/ perforated diverticulitis. Seen by surgery and is now s/p partial colectomy and colostomy placement 6/7/25. Had hypotension and was monitored closely in ICU. UA without UTI. Patient noted to have tachycardia 6/9/25, Nocturnist evaluated ECG and diagnosed as new onset a fib with RVR, given Cardizem x1, resumed home metoprolol, cardiology consulted. Patient back in normal sinus rhythm.  I discussed with Dr. Quintanilla, cardiologist who at this time does not recommend any oral anticoagulation given this was a isolated incident.  Will need to monitor further if this recurs then will need to reconsider this. 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  Outpatient follow-up with cardiology after patient recovers for CHAPIS. NG-tube was removed 6/9/25 as was Gabriel.  Called to patient's room as patient heart rate noted to be in the upper 170s to 180s 6/11/25, EC confirmed AFRVR, given IV lopressor x1, started on hep gtt (cleared with surgeon if no initial bolus given) and cardizem gtt. Shortly after Cardizem drip was given patient converted back to normal sinus rhythm.  Patient felt a little lightheaded during this time but that completely resolved once heart rate was improved.  She was feeling pressure in her abdomen at this time but  then was able to urinate and this had completely resolved.    6/11/2025: No acute events overnight. Vitals stable, /65 this morning, HR 73. CBC/BMP/Mg reviewed, no leukocytosis, hgb stable at 10.7.  Remains on hep gtt.   Had recurrence of AFRVR this morning with pain in lower back radiating down her buttocks into posterior leg to level of the knee, suspect sciatic pain.  Given pain control which did help initially however once patient moved pain then returned.  Trial lidocaine patch, IV Dilaudid as needed, as needed muscle relaxers.  Can trial heat to the area as well.  Abdomen with minor pain around surgical incision but otherwise relatively nontender. Patient converted prior to IV diltiazem being given, heart rate better controlled.  Bl cx x2- ng x1 day- ID recommends to Continue antibiotics through 6/12/25 to finish 5 day course   Encourage OOB. Would like to see more colostomy output prior to discharge   DC planning to SNF    Rounded on patient on this date with Pharmacist, RN, and Patient's family            Review of Systems   Constitutional:  Negative for appetite change, chills, diaphoresis, fatigue and fever.   HENT:  Negative for congestion, ear pain, facial swelling, hearing loss, nosebleeds, sore throat, tinnitus and trouble swallowing.    Eyes:  Negative for pain.   Respiratory:  Negative for cough, chest tightness, shortness of breath and wheezing.    Cardiovascular:  Positive for palpitations (Resolved). Negative for chest pain and leg swelling.   Gastrointestinal:  Positive for abdominal pain. Negative for blood in stool, constipation, diarrhea, nausea and vomiting.   Genitourinary:  Positive for urgency. Negative for dysuria, flank pain, frequency and hematuria.   Musculoskeletal:  Positive for back pain. Negative for joint swelling.        With radiation down the right buttock into right posterior leg to the level of the knee   Skin:  Negative for rash and wound.   Neurological:  Negative for  dizziness, syncope, weakness, light-headedness, numbness and headaches.   Hematological:  Does not bruise/bleed easily.   Psychiatric/Behavioral:  Negative for behavioral problems, hallucinations and suicidal ideas.           Objective     Last Recorded Vitals  /65 (BP Location: Left arm, Patient Position: Lying)   Pulse 73   Temp 36.3 °C (97.4 °F) (Temporal)   Resp 16   Wt 69 kg (152 lb 1.9 oz)   SpO2 93%     Image Results  Imaging  XR chest 1 view  Result Date: 6/8/2025  A nasogastric tube projects into the stomach beyond the field of imaging.   Mild cardiomegaly.   Minimal linear bibasilar atelectasis. Otherwise no sign of acute cardiopulmonary disease.     MACRO: None   Signed by: Mello Fried 6/8/2025 12:33 PM Dictation workstation:   [id number]    CT abdomen pelvis w IV contrast  Result Date: 6/7/2025  1. Diverticulosis of the sigmoid colon. There is inflammatory change noted about the proximal to mid sigmoid colon most concerning for acute diverticulitis. Multiple free air locules are noted adjacent to this inflamed segment of sigmoid colon as well as small-to-moderate amount of free air within the upper abdomen consistent with perforation. No overt abscess identified.   MACRO: Donovan Ocampo discussed the significance and urgency of this critical finding by telephone with  KONG HILL on 6/7/2025 at 3:39 am. (**-RCF-**) Findings:  See findings.   Signed by: Donovan Ocampo 6/7/2025 3:40 AM Dictation workstation:   [id number]      Cardiology, Vascular, and Other Imaging  Electrocardiogram, 12-lead PRN ACS symptoms  Result Date: 6/10/2025  Atrial fibrillation with rapid ventricular response ST & T wave abnormality, consider inferior ischemia ST & T wave abnormality, consider anterolateral ischemia Abnormal ECG When compared with ECG of 07-JUN-2025 01:53, PREVIOUS ECG IS PRESENT Confirmed by Bob Hu ([id number]) on 6/10/2025 1:23:42 PM    Electrocardiogram, 12-lead PRN ACS  symptoms  Result Date: 6/10/2025  Atrial fibrillation with rapid ventricular response Nonspecific ST and T wave abnormality Abnormal ECG When compared with ECG of 10-CHAVA-2025 10:11, (unconfirmed) No significant change was found Confirmed by Bob Hu ([id number]) on 6/10/2025 1:23:23 PM    Electrocardiogram, 12-lead PRN ACS symptoms  Result Date: 6/10/2025  Normal sinus rhythm Normal ECG When compared with ECG of 10-CHAVA-2025 10:11, (unconfirmed) Sinus rhythm has replaced Atrial fibrillation Vent. rate has decreased BY  89 BPM ST no longer depressed in Anterolateral leads T wave inversion now evident in Inferior leads Nonspecific T wave abnormality no longer evident in Lateral leads Confirmed by Bob Hu ([id number]) on 6/10/2025 1:23:11 PM    Transthoracic Echo Complete  Result Date: 6/9/2025              [place] [id number]      Phone [phone] Fax [phone] TRANSTHORACIC ECHOCARDIOGRAM REPORT Patient Name:       PATTI ACOSTA       Reading Physician:    [id number] Mona Quintanilla MD Study Date:         6/9/2025             Ordering Provider:    [id number] NAEL WOLF MRN/PID:            [id number]             Fellow: Accession#:         [id number]         Nurse: Date of Birth/Age:  1945 / 79      Sonographer:          Richelle Grey RDCS                     years Gender Assigned at  F                    Additional Staff: Birth: Height:             147.32 cm            Admit Date:           6/8/2025 Weight:             66.68 kg             Admission Status:     Inpatient -                                                                Routine BSA / BMI:          1.60 m2 / 30.72      Department Location:  [facility]                     kg/m2 Blood Pressure: 158 /66 mmHg Study Type:    TRANSTHORACIC ECHO (TTE) COMPLETE Diagnosis/ICD:  Paroxysmal atrial fibrillation-I48.0; Dyspnea, unspecified-R06.00 Indication:    Dyspnea CPT Codes:     Echo Complete w Full [id number] Patient History: Pertinent History: HTN. Study Detail: The following Echo studies were performed: 2D, M-Mode, Doppler and               color flow.  PHYSICIAN INTERPRETATION: Left Ventricle: Left ventricular ejection fraction is low normal calculated by Valente's biplane at 55%. There are no regional wall motion abnormalities. The left ventricular cavity size is normal. There is normal septal and normal posterior left ventricular wall thickness. Spectral Doppler shows a normal pattern of left ventricular diastolic filling. Left Atrium: The left atrial size is normal. Right Ventricle: The right ventricle is normal in size. There is normal right ventricular global systolic function. Right Atrium: The right atrial size is normal. Aortic Valve: The aortic valve is trileaflet. The aortic valve area by VTI is 2.31 cmï¿½ with a peak velocity of 1.61 m/s. The peak and mean gradients are 10 mmHg and 5 mmHg, respectively, with a dimensionless index of 0.75. There is no evidence of aortic valve regurgitation. 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 Mitral Valve: The mitral valve is normal in structure. There is trace mitral valve regurgitation. The E Vmax is 1.00 m/s. Tricuspid Valve: The tricuspid valve is structurally normal. There is trace tricuspid regurgitation. The Doppler estimated right ventricular systolic pressure (RVSP) is within normal limits at 28 mmHg. Pulmonic Valve: The pulmonic valve is structurally normal. There is trace pulmonic valve regurgitation. Pericardium: No pericardial effusion noted. Aorta: The aortic root is normal. Systemic Veins: The inferior vena cava appears normal in size, IVC inspiratory collapse was not assessed.  CONCLUSIONS:  1. Left ventricular ejection fraction is low normal calculated by Valente's biplane at 55%.  2. There is normal  right ventricular global systolic function.  3. The Doppler estimated RVSP is within normal limits at 28 mmHg.  4. 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 QUANTITATIVE DATA SUMMARY:  2D MEASUREMENTS:             Normal Ranges: IVSd:            0.72 cm     (0.6-1.1cm) LVPWd:           0.71 cm     (0.6-1.1cm) LVIDd:           4.41 cm     (3.9-5.9cm) LVIDs:           2.79 cm LV Mass Index:   59.5 g/m2 LVEDV Index:     22.62 ml/m2 LV % FS          36.8 %  LEFT ATRIUM:                  Normal Ranges: LA Vol A4C:        31.5 ml    (22+/-6mL/m2) LA Vol A2C:        28.9 ml LA Vol BP:         35.2 ml LA Vol Index A4C:  19.7ml/m2 LA Vol Index A2C:  18.1 ml/m2 LA Vol Index BP:   22.0 ml/m2 LA Area A4C:       14.4 cm2 LA Area A2C:       11.8 cm2 LA Major Axis A4C: 5.6 cm LA Major Axis A2C: 4.1 cm LA Volume Index:   21.8 ml/m2 LA Vol A4C:        29.0 ml LA Vol A2C:        27.9 ml LA Vol Index BSA:  17.8 ml/m2  RIGHT ATRIUM:          Normal Ranges: RA Area A4C:  10.0 cm2  AORTA MEASUREMENTS:         Normal Ranges: Ao Sinus, d:        3.20 cm (2.1-3.5cm) Ao STJ, d:          2.40 cm (1.7-3.4cm) Asc Ao, d:          2.50 cm (2.1-3.4cm)  LV SYSTOLIC FUNCTION:                      Normal Ranges: EF-A4C View:    60 % (>=55%) EF-A2C View:    52 % EF-Biplane:     55 % LV EF Reported: 55 %  LV DIASTOLIC FUNCTION:           Normal Ranges: MV Peak E:             1.00 m/s  (0.7-1.2 m/s) MV Peak A:             0.80 m/s  (0.42-0.7 m/s) E/A Ratio:             1.26      (1.0-2.2) MV e'                  0.111 m/s (>8.0) MV lateral e'          0.11 m/s MV medial e'           0.11 m/s E/e' Ratio:            9.04      (<8.0)  MITRAL VALVE:          Normal Ranges: MV DT:        122 msec (150-240msec)  AORTIC VALVE:                      Normal Ranges: AoV Vmax:                1.61 m/s  (<=1.7m/s) AoV Peak PG:             10.4 mmHg (<20mmHg) AoV Mean P.3 mmHg  (1.7-11.5mmHg) LVOT Max Kael:            1.04 m/s  (<=1.1m/s) AoV  VTI:                 32.01 cm  (18-25cm) LVOT VTI:                24.08 cm LVOT Diameter:           1.98 cm   (1.8-2.4cm) AoV Area, VTI:           2.31 cm2  (2.5-5.5cm2) AoV Area,Vmax:           1.98 cm2  (2.5-4.5cm2) AoV Dimensionless Index: 0.75  RIGHT VENTRICLE: RV Basal 2.42 cm RV Mid   1.90 cm RV Major 6.8 cm TAPSE:   30.3 mm RV s'    0.15 m/s  TRICUSPID VALVE/RVSP:          Normal Ranges: Peak TR Velocity:     2.50 m/s Est. RA Pressure:     3 mmHg RV Syst Pressure:     28 mmHg  (< 30mmHg) IVC Diam:             1.04 cm TV e'                 0.1 m/s  AORTA: Asc Ao Diam 3.19 cm  [id number] Mona Quintanilla MD Electronically signed on 6/9/2025 at 5:35:08 PM  ** Final **     Electrocardiogram, 12-lead PRN ACS symptoms  Result Date: 6/9/2025  Sinus rhythm Atrial premature complexes RSR' in V1 or V2, right VCD or RVH         Lab Results  Results for orders placed or performed during the hospital encounter of 06/07/25 (from the past 24 hours)   Electrocardiogram, 12-lead PRN ACS symptoms   Result Value Ref Range    Ventricular Rate 174 BPM    Atrial Rate 220 BPM    QRS Duration 82 ms    QT Interval 268 ms    QTC Calculation(Bazett) 456 ms    R Axis 46 degrees    T Axis 98 degrees    QRS Count 28 beats    Q Onset 220 ms    T Offset 354 ms    QTC Fredericia 382 ms   aPTT - baseline   Result Value Ref Range    aPTT 22 (L) 26 - 36 seconds   Electrocardiogram, 12-lead PRN ACS symptoms   Result Value Ref Range    Ventricular Rate 85 BPM    Atrial Rate 85 BPM    NC Interval 174 ms    QRS Duration 84 ms    QT Interval 360 ms    QTC Calculation(Bazett) 428 ms    P Axis 55 degrees    R Axis 5 degrees    T Axis -3 degrees    QRS Count 14 beats    Q Onset 220 ms    P Onset 133 ms    P Offset 188 ms    T Offset 400 ms    QTC Fredericia 404 ms   Heparin Assay, UFH   Result Value Ref Range    Heparin Unfractionated 0.7 See Comment Below for Therapeutic Ranges IU/mL   Heparin Assay, UFH   Result Value Ref Range    Heparin Unfractionated  0.7 See Comment Below for Therapeutic Ranges IU/mL   Comprehensive metabolic panel   Result Value Ref Range    Glucose 104 (H) 74 - 99 mg/dL    Sodium 140 136 - 145 mmol/L    Potassium 3.7 3.5 - 5.3 mmol/L    Chloride 106 98 - 107 mmol/L    Bicarbonate 27 21 - 32 mmol/L    Anion Gap 11 10 - 20 mmol/L    Urea Nitrogen 12 6 - 23 mg/dL    Creatinine 0.80 0.50 - 1.05 mg/dL    eGFR 75 >60 mL/min/1.73m*2    Calcium 7.6 (L) 8.6 - 10.3 mg/dL    Albumin 2.8 (L) 3.4 - 5.0 g/dL    Alkaline Phosphatase 40 33 - 136 U/L    Total Protein 4.9 (L) 6.4 - 8.2 g/dL    AST 17 9 - 39 U/L    Bilirubin, Total 0.4 0.0 - 1.2 mg/dL    ALT 10 7 - 45 U/L   CBC and Auto Differential   Result Value Ref Range    WBC 5.3 4.4 - 11.3 x10*3/uL    nRBC 0.0 0.0 - 0.0 /100 WBCs    RBC 3.38 (L) 4.00 - 5.20 x10*6/uL    Hemoglobin 10.7 (L) 12.0 - 16.0 g/dL    Hematocrit 32.2 (L) 36.0 - 46.0 %    MCV 95 80 - 100 fL    MCH 31.7 26.0 - 34.0 pg    MCHC 33.2 32.0 - 36.0 g/dL    RDW 13.0 11.5 - 14.5 %    Platelets 163 150 - 450 x10*3/uL    Neutrophils % 60.8 40.0 - 80.0 %    Immature Granulocytes %, Automated 3.4 (H) 0.0 - 0.9 %    Lymphocytes % 25.0 13.0 - 44.0 %    Monocytes % 6.4 2.0 - 10.0 %    Eosinophils % 3.8 0.0 - 6.0 %    Basophils % 0.6 0.0 - 2.0 %    Neutrophils Absolute 3.23 1.60 - 5.50 x10*3/uL    Immature Granulocytes Absolute, Automated 0.18 0.00 - 0.50 x10*3/uL    Lymphocytes Absolute 1.33 0.80 - 3.00 x10*3/uL    Monocytes Absolute 0.34 0.05 - 0.80 x10*3/uL    Eosinophils Absolute 0.20 0.00 - 0.40 x10*3/uL    Basophils Absolute 0.03 0.00 - 0.10 x10*3/uL   Magnesium   Result Value Ref Range    Magnesium 1.75 1.60 - 2.40 mg/dL   Heparin Assay, UFH   Result Value Ref Range    Heparin Unfractionated 1.0 See Comment Below for Therapeutic Ranges IU/mL        Medications  Scheduled medications:  Scheduled Medications[1]  Continuous medications:  Continuous Medications[2]  PRN medications:  PRN Medications[3]     Physical Exam  Constitutional:        General: She is not in acute distress.     Appearance: Normal appearance.   HENT:      Head: Normocephalic and atraumatic.      Right Ear: External ear normal.      Left Ear: External ear normal.      Nose: Nose normal.      Mouth/Throat:      Mouth: Mucous membranes are moist.      Pharynx: Oropharynx is clear.   Eyes:      Extraocular Movements: Extraocular movements intact.      Conjunctiva/sclera: Conjunctivae normal.      Pupils: Pupils are equal, round, and reactive to light.   Cardiovascular:      Rate and Rhythm: Tachycardia present. Rhythm irregular.      Pulses: Normal pulses.      Heart sounds: Murmur heard.   Pulmonary:      Effort: Pulmonary effort is normal. No respiratory distress.      Breath sounds: Normal breath sounds. No wheezing, rhonchi or rales.   Abdominal:      General: Bowel sounds are normal.      Palpations: Abdomen is soft.      Tenderness: There is abdominal tenderness. There is no right CVA tenderness, left CVA tenderness, guarding or rebound.      Comments: LOLA drain with bloody output  Left-sided ostomy is pink with small amount of hard brown stool in it   Musculoskeletal:         General: No swelling. Normal range of motion.      Cervical back: Normal range of motion and neck supple.   Skin:     General: Skin is warm and dry.      Capillary Refill: Capillary refill takes less than 2 seconds.      Findings: No lesion or rash.   Neurological:      General: No focal deficit present.      Mental Status: She is alert and oriented to person, place, and time. Mental status is at baseline.   Psychiatric:         Mood and Affect: Mood normal.         Behavior: Behavior normal.                  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  Outpatient follow-up with cardiology after patient recovers for CHAPIS.  Monitor on tele  [id number] of 4-started on heparin drip 6/10/2025 with clearance from general surgery team    HTN  HLE  Monitor blood pressure, resume hypertensives as able    Breast CA on anastrozole  Status post mastectomy    GERD    Hypothyroidism  Continue Synthroid  Recent TSH reviewed    Neuropathy  Migraines  OA  Anxiety/Depression    Code Status: Full Code                 DVT ppx: SCDs, Heparin drip     Please see orders for more complete plan    Bouchra Rodriguez PA-C         [1] anastrozole, 1 mg, oral, Daily  aspirin, 81 mg, oral, Daily  bacitracin, , Topical, Daily  cefepime, 2 g, intravenous, q12h  pantoprazole, 40 mg, oral, Daily before breakfast   Or  esomeprazole, 40 mg, nasoduodenal tube, Daily before breakfast   Or  pantoprazole, 40 mg, intravenous, Daily before breakfast  HYDROmorphone, 0.4 mg, intravenous, Once  levothyroxine, 25 mcg, oral, Daily  metoprolol tartrate, 100 mg, oral, BID  metroNIDAZOLE, 500 mg, intravenous, q12h     [2] heparin, 0-4,500 Units/hr, Last Rate: 1,100 Units/hr (06/11/25 0649)     [3] PRN medications: acetaminophen, heparin, hydrALAZINE, HYDROcodone-acetaminophen, HYDROmorphone, labetaloL, melatonin, [DISCONTINUED] ondansetron **OR** ondansetron, tiZANidine

## 2025-06-11 NOTE — PROGRESS NOTES
6/11/25 1132   06/11/25 1132   Discharge Planning   Home or Post Acute Services Post acute facilities (Rehab/SNF/etc)   Type of Post Acute Facility Services Skilled nursing   Expected Discharge Disposition SNF   Patient Choice   Provider Choice list and CMS website (https://medicare.gov/care-compare#search) for post-acute Quality and Resource Measure Data were provided and reviewed with: Family   Patient / Family choosing to utilize agency / facility established prior to hospitalization No   Intensity of Service   Intensity of Service >30 min     Met with pt and family. Updated all that [facility] is out of network with pt insurance. They are requested additional SNF referrals to [facility] and [facility] [place]. Requested [facility] to send SNF referrals. ADOD 2 days per PA. Awaiting facility responses.   Rupal Bacon RN, BSN, Alejandro/ Oliva MAYORGA Supervisor     6/11/25 5507  Updated daughter, Vishnu, on facility responses. Vishnu will review the list further and requested to follow up with Vishnu tomorrow.   Rupal Bacon RN, BSN, Alejandro/ Oliva MAYORGA Supervisor

## 2025-06-11 NOTE — NURSING NOTE
Ostomy Care Progress Note     Visit Date: 6/11/2025      Patient Name: Rosemary Motta         MRN: [id number]                  Pertinent Labs:   Albumin   Date Value Ref Range Status   06/11/2025 2.8 (L) 3.4 - 5.0 g/dL Final         Assessment:  Wound 06/07/25 Surgical Abdomen Medial;Upper (Active)   Date First Assessed: 06/07/25   Primary Wound Type: Surgical  Secondary Wound Type - Surgical: Closed Surgical Incision  Location: Abdomen  Wound Location Orientation: Medial;Upper      Assessments 6/11/2025 11:54 AM   Wound Image         Active Orders   Date Order Priority Status Authorizing Provider   06/08/25 0224 Wound Care Surgical Closed Surgical Incision Medial;Upper Abdomen Routine Active James Pool MD     - Wound Complexity:    Simple        Colostomy LUQ (Active)   Placement Date: 06/07/25   Location: LUQ   Number of days: 4      Colostomy LUQ (Active)   Present on Admission to Healthcare Facility N 06/09/25 1133   Stomal Appliance 2 piece;Clean;Dry;Intact 06/09/25 1133   Site/Stoma Assessment Clean;Intact 06/10/25 2318   Stoma Size (cm) 41 cm 06/09/25 1133   Peristomal Assessment Clean;Intact 06/10/25 2318   Treatment Site care 06/09/25 1133   Drainage Characteristics Brown 06/09/25 1133   Output (mL) 5 mL 06/11/25 0600      79 yr old patient with hx of HTN, HLE, Breast CA, GERD, Hypothyroidism, who is POD #4 from partial colectomy and ostomy placement. Patient and family joined is ostomy bag change. Patient received care package and educated to continue to refer to her folder. Plan is for patient to discharge to SNF, which she can continue ostomy education and get help with daily ostomy care.          Assessment  Ostomy Type: End Colostomy  Size: 35mm        Color: red/pink              Protruding: federico in place   Functioning: 10ml of brown liquid  Mucocutaneous junction: sutures intact  Peristomal Skin: pink  Pouching supplies: Adhesive remover wipe/spray, mild soap and water, gauze, no sting barrier  wipe, barrier ring, convex one piece Andrew pouch.      Education: Patient,  of patient, son and daughter participated in education. Patient and family involved in daily ostomy care of emptying pouch and releasing gas. Bag changed.   Plan:   Bedside nursing assess pouch and stoma Q shift. Cleanse with mild soap and water, pat dry. Apply no sting barrier, barrier ring, and convex one piece pouch every 3 days/prn if leaking. Empty bag when 1/3 way full. [facility]      Please contact me with questions or changes in patient condition.  Wound/Ostomy Care    [phone]       Mae Ocampo RN  6/11/2025  12:08 PM

## 2025-06-11 NOTE — PROGRESS NOTES
Vascular Access Team Intervention Note (PICC)     Visit Date: 6/11/2025      Patient Name: Rosemary Motta         MRN: [id number]             Reason for Visit: Called to bedside for difficult IV access. Pt ordered multiple IV meds including two continuous non compatible gtts. Right arm limb alert - hx mastectomy with lymph node removal. Pt currently has one IV infusing in the SD.     Plan: Two US guided IVs placed in left forearm       06/11/25 1000   Peripheral IV 06/11/25 20 G 5.08 cm Anterior;Left Forearm   Placement Date/Time: 06/11/25 1000   Size (Gauge): 20 G  Catheter Length (cm): 5.08 cm  Orientation: Anterior;Left  Location: Forearm  Site Prep: Alcohol  Technique: Ultrasound guidance  Placed by: Myesha Vang  Insertion attempts: 1  Patient Toleran...   Site Assessment Clean;Dry;Intact   Dressing Type Transparent;Tape   Line Status Blood return noted;Flushed   Dressing Status Clean;Dry   $ Peripheral IV Charge Ultrasound guidance          Myesha Vang RN  6/11/2025  10:04 AM

## 2025-06-11 NOTE — TELEPHONE ENCOUNTER
PT is scheduled for [facility]IS on July 21st at 9:00am, arrival time of 8:00am.   Pt does need labs done before this procedure, will notify them.   Tasking Nicole to please call patient with instructions closer to date.

## 2025-06-11 NOTE — PROGRESS NOTES
Physical Therapy                 Therapy Communication Note    Patient Name: Rosemary Motta  MRN: [id number]  Department: [address]  Room: 2025/2025-A  Today's Date: 6/11/2025     Discipline: Physical Therapy    Missed Visit: PT Missed Visit: Yes     Missed Visit Reason: Patient placed on medical hold    Missed Time: Attempt    Comment: pt on hold per RN due to A-Fib RVR

## 2025-06-11 NOTE — PROGRESS NOTES
Vascular Access Team Intervention Note (PICC)     Visit Date: 6/11/2025      Patient Name: Rosemary Motta         MRN: [id number]                Reason for Visit: Called to bedside for difficult IV access. Pt ordered multiple IV meds including two continuous non compatible gtts. Right arm limb alert - hx mastectomy with lymph node removal. Pt currently has one IV infusing in the SD.     Plan: Two US guided IVs placed in left forearm       06/11/25 1001   Peripheral IV 06/11/25 20 G 5.08 cm Anterior;Left Forearm   Placement Date/Time: 06/11/25 1000   Hand Hygiene Completed: Yes  Size (Gauge): 20 G  Catheter Length (cm): 5.08 cm  Orientation: Anterior;Left  Location: Forearm  Site Prep: Alcohol  Technique: Ultrasound guidance  Placed by: Myesha Vang  Insertion...   Site Assessment Clean;Dry;Intact   Dressing Type Transparent;Tape   Line Status Blood return noted;Flushed   Dressing Status Clean;Dry   $ Peripheral IV Charge Ultrasound guidance          Myesha Vang RN  6/11/2025  10:01 AM

## 2025-06-11 NOTE — PROGRESS NOTES
Rosemary Motta is a 79 y.o. female on day 4 of admission presenting with Diverticulitis of large intestine with perforation without bleeding.      Assessment/Plan:     #Acute diverticulitis  #Bowel perforation s/p ostomy 6/7/2025  #Sepsis, POA, resolved  Patient started on ciprofloxacin and Flagyl due to penicillin allergy.  Patient was found to have short segment of mid sigmoid colon with inflammation and perforation with moderate amount of purulent fluid throughout the abdomen.  No cultures obtained.     #Penicillin allergy  Reports history of syncope when she was a child.  Discussed with the patient that it is safe to get cefepime as it does not have cross-reactivity.  Tolerated cefepime well without any issues     #CKD  Estimated Creatinine Clearance: 44.3 mL/min (by C-G formula based on SCr of 0.8 mg/dL).     HTN, HLD  Breast cancer s/p right mastectomy, on anastrozole  GERD  Hypothyroidism  Paroxysmal A-fib     Recommendations:     -Cont cefepime 2 g every 12hr  -Cont Flagyl 500 twice daily  -Continue antibiotics through 6/12/25 to finish 5 day course   -Renally dose antibiotic  -Monitor left groin pain  -If develop fever, obtain blood cultures x 2  -Will continue to follow         Mikal Ceballos MD  Date of service: 6/11/2025  Time of service: 12:26 PM      Subjective   Interval History: Patient states that she does not feel right.  Went into A-fib with RVR again today.  Complains of left groin pain        Review of Systems  Denies: fever, chills, nausea, vomiting, dysuria    Objective   Range of Vitals (last 24 hours)  Heart Rate:  []   Temp:  [36.3 °C (97.3 °F)-37.2 °C (99 °F)]   Resp:  [15-18]   BP: (114-176)/()   Weight:  [69 kg (152 lb 1.9 oz)]   SpO2:  [91 %-97 %]  Daily Weight  06/11/25 : 69 kg (152 lb 1.9 oz)   Body mass index is 31.79 kg/m².      General: alert, oriented, NAD  HEENT: No conjunctival pallor, no scleral icterus  Abdomen: soft, ostomy bag, midline surgical incision with  staples, LOLA drain  Neuro: AAO x 3  Extremities: no edema, no cyanosis  Skin: No rashes   MSK: No joint inflammation    Antibiotics  bacitracin - 500 unit/gram  cefepime - 2 gram/50 mL  metroNIDAZOLE - 500 mg/100 mL      Relevant Results  Labs  Results from last 72 hours   Lab Units 06/11/25  0925 06/11/25  0358 06/10/25  0458   WBC AUTO x10*3/uL 6.3 5.3 5.8   HEMOGLOBIN g/dL 12.3 10.7* 10.9*   HEMATOCRIT % 36.4 32.2* 32.6*   PLATELETS AUTO x10*3/uL 165 163 143*   NEUTROS PCT AUTO %  --  60.8 72.5   LYMPHS PCT AUTO %  --  25.0 18.8   MONOS PCT AUTO %  --  6.4 5.2   EOS PCT AUTO %  --  3.8 2.2     Results from last 72 hours   Lab Units 06/11/25  0358 06/10/25  0458 06/09/25  0531   SODIUM mmol/L 140 137 139   POTASSIUM mmol/L 3.7 3.8 4.1   CHLORIDE mmol/L 106 108* 106   CO2 mmol/L 27 24 23   BUN mg/dL 12 12 11   CREATININE mg/dL 0.80 0.84 0.88   GLUCOSE mg/dL 104* 95 94   CALCIUM mg/dL 7.6* 7.9* 7.8*   ANION GAP mmol/L 11 9* 14   EGFR mL/min/1.73m*2 75 70 67   PHOSPHORUS mg/dL  --   --  1.7*     Results from last 72 hours   Lab Units 06/11/25  0358 06/10/25  0458 06/09/25  0531   ALK PHOS U/L 40 41 43   BILIRUBIN TOTAL mg/dL 0.4 0.6 0.6   PROTEIN TOTAL g/dL 4.9* 5.3* 5.1*   ALT U/L 10 11 12   AST U/L 17 22 24   ALBUMIN g/dL 2.8* 3.0* 3.1*     Estimated Creatinine Clearance: 44.3 mL/min (by C-G formula based on SCr of 0.8 mg/dL).  C-Reactive Protein   Date Value Ref Range Status   03/02/2024 <0.10 <1.00 mg/dL Final       Microbiology  No results found for the last 14 days.      Imaging    XR chest 1 view  Result Date: 6/8/2025  A nasogastric tube projects into the stomach beyond the field of imaging.   Mild cardiomegaly.   Minimal linear bibasilar atelectasis. Otherwise no sign of acute cardiopulmonary disease.     MACRO: None   Signed by: Mello Fried 6/8/2025 12:33 PM Dictation workstation:   [id number]    CT abdomen pelvis w IV contrast  Result Date: 6/7/2025  1. Diverticulosis of the sigmoid colon. There  is inflammatory change noted about the proximal to mid sigmoid colon most concerning for acute diverticulitis. Multiple free air locules are noted adjacent to this inflamed segment of sigmoid colon as well as small-to-moderate amount of free air within the upper abdomen consistent with perforation. No overt abscess identified.   MACRO: Donovan Ocampo discussed the significance and urgency of this critical finding by telephone with  KONG HILL on 6/7/2025 at 3:39 am. (**-RCF-**) Findings:  See findings.   Signed by: Donovan Ocampo 6/7/2025 3:40 AM Dictation workstation:   [id number]

## 2025-06-11 NOTE — PROGRESS NOTES
[facility] V[facility] Card[facility]  Patient Name: Rosemary Motta  Patient : 1945  Room/Bed: -A    HPI from cardiology consult:  Rosemary Motta is a 79 y.o. female presenting with abdominal pain and found to have perforated diverticulitis.  She is status post emergency laparotomy on 2025.  Yesterday had a bout of paroxysmal rapid atrial fibrillation.  Reviewed EKG personally.  Currently in sinus rhythm.  Asymptomatic other than abdominal pain and having a cough.     PMH: HTN, HLE, breast ca, diverticulosis, GERD, hypothyroidism, neuropathy, migraines, OA, anxiety/depression  PSH: R mastectomy, ccy, , JOSE bilateral, carpal tunnel  FH: n/a  SH: lives in [place]. no smoking, etoh.     She has no prior cardiac history.    Allergies:  Penicillins, Morphine, Clarithromycin, Hydrochlorothiazide, Tetracycline, Cortisone, Methylprednisolone, Nifedipine, and Prednisone    Subjective Data:  6/10/25: Lying in bed in no acute distress.  Daughter, Vishnu, at bedside. Cardiology was asked to resee patient d/t AF with RVR.  AF on telemetry with -180's initially. S/p IV metoprolol x1 with improvement HR down to 110-120's.   25: Had recurrent AF this morning with 's.  No current awareness of AF and denies any palpatations.  Lying flat in bed with IV nurse obtaining IV access.  She is in no acute distress and no SOB.  Reports that her hip pain has improved some, but is ongoing. Also reports back pain.    Overnight Events:  AF with RVR this morning    Objective Data:  Vitals:    06/10/25 1903 06/10/25 2341 25 0331 25 0725   BP: 162/78 145/63 114/57 152/65   BP Location: Left arm Left arm Left arm Left arm   Patient Position: Lying Lying Lying Lying   Pulse: 80 69 63 73   Resp: 17 17 16 16   Temp: 36.6 °C (97.8 °F) 36.3 °C (97.3 °F) 36.7 °C (98.1 °F) 36.3 °C (97.4 °F)   TempSrc: Temporal Temporal Temporal Temporal   SpO2: 97% 95% 97% 93%   Weight:   69 kg (152  "lb 1.9 oz)    Height:           Reviewed the following Labs:  Results from last 7 days   Lab Units 06/11/25  0358 06/10/25  0458 06/09/25  0531   WBC AUTO x10*3/uL 5.3 5.8 7.0   HEMOGLOBIN g/dL 10.7* 10.9* 11.1*   HEMATOCRIT % 32.2* 32.6* 33.4*   PLATELETS AUTO x10*3/uL 163 143* 125*       Results from last 7 days   Lab Units 06/11/25  0358 06/10/25  0458 06/09/25  0531   SODIUM mmol/L 140 137 139   POTASSIUM mmol/L 3.7 3.8 4.1   CHLORIDE mmol/L 106 108* 106   CO2 mmol/L 27 24 23   BUN mg/dL 12 12 11   CREATININE mg/dL 0.80 0.84 0.88   CALCIUM mg/dL 7.6* 7.9* 7.8*   PROTEIN TOTAL g/dL 4.9* 5.3* 5.1*   BILIRUBIN TOTAL mg/dL 0.4 0.6 0.6   ALK PHOS U/L 40 41 43   ALT U/L 10 11 12   AST U/L 17 22 24   GLUCOSE mg/dL 104* 95 94       Results from last 7 days   Lab Units 06/11/25  0358 06/10/25  0458 06/09/25  0531   MAGNESIUM mg/dL 1.75 2.05 2.43*       No results found for: \"LDLF\"     No results found for: \"BNP\"    No results found for: \"TROPHS\"     No results found for: \"TSH\", \"FT4\"        No results found for: \"HGBA1C\"    Intake/Output    Intake/Output Summary (Last 24 hours) at 2025 0828  Last data filed at 2025 0649  Gross per 24 hour   Intake 1076.71 ml   Output 2305 ml   Net -1228.29 ml      I/O last 2 completed shifts:  In: 1076.7 (15.6 mL/kg) [P.O.:236; I.V.:90.7 (1.3 mL/kg); IV Piggyback:750]  Out: 2305 (33.4 mL/kg) [Urine:2250 (1.4 mL/kg/hr); Drains:50; Stool:5]  Weight: 69 kg     Past Medical History:  She has a past medical history of Anxiety and depression, Breast cancer, CKD (chronic kidney disease), HLD (hyperlipidemia), HTN (hypertension), Hypothyroidism, and Pulmonary embolism.    Past Surgical History:  She has a past surgical history that includes Cholecystectomy; Carpal tunnel release (Bilateral);  section, classic; Total hip arthroplasty (Left); Mastectomy (Right, 2018); Breast biopsy; Foot surgery (Bilateral); and Colostomy (2025).      Social History:  She reports that she " has never smoked. She has never used smokeless tobacco. She reports that she does not currently use alcohol. She reports that she does not use drugs.    Family History:  Family History[1]    Outpatient Medications  Medications Ordered Prior to Encounter[2]    Scheduled medications  Scheduled Medications[3]  Continuous medications  Continuous Medications[4]  PRN medications  PRN Medications[5]   Prescriptions Prior to Admission[6]    Reviewed the following cardiology tests:    Echo 6/9/25:   1. Left ventricular ejection fraction is low normal calculated by Valente's biplane at 55%.   2. There is normal right ventricular global systolic function.   3. The Doppler estimated RVSP is within normal limits at 28 mmHg.   4. 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  EF   Date/Time Value Ref Range Status   06/09/2025 10:13 AM 55 %         Physical Exam:  Vitals reviewed.   Constitutional:       Appearance: Not in distress.   Pulmonary:      Effort: Pulmonary effort is normal.      Breath sounds: Normal breath sounds.   Cardiovascular:      PMI at left midclavicular line. Tachycardia present. Irregularly irregular rhythm. S1 with normal intensity. S2 with normal intensity.       Murmurs: There is no murmur.   Edema:     Peripheral edema absent.   Abdominal:      General: Bowel sounds are normal.      Comments: +ostomy   Neurological:      Mental Status: Alert and oriented to person, place and time.         Assessment/Plan:   1-paroxysmal atrial fibrillation-in setting of sepsis, perforated diverticulitis and postop state.  Transient and has resolved now.  No change in management.  Continue treating underlying medical problems.  Cardiology will sign off.  She was advised to follow-up with her PCP and with us as needed if there is any future recurrence of atrial fibrillation which seems unlikely at this point.  6/10/25: Cardiology asked to resee patient for recurrent AF with RVR.  AF on telemetry with -180's  initially. S/p IV metoprolol 5 mg x1 with improvement HR down to 110-120's. Started on diltiazem infusion. She only required diltiazem infusion briefly and then self converted to SR.  Metoprolol tartrate was increased yesterday evening to 100 mg BID which should be continued.   Hypertension - Yes, 1 point, Female - Yes, 1 point, and Age over 75 years - 2 points  [id number] score is at least 4.   Discussed with Dr. RONAK Santana via [employer] secure chat regarding anticoagulation.  Surgery team okay with heparin infusion with no bolus.  Heparin infusion initiated.  Transition to oral anticoagulation with apixaban when okay with surgery team/per hospitalist when able.   6/11/25: Recurrent AF with RVR this morning with 's.  Ordered diltiazem 5 mg IV x1.  Will add diltiazem 30 mg q6 hr PO.  Continue heparin infusion and metoprolol tartrate.     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  Outpatient follow-up with cardiology after patient recovers for CHAPIS.  6/10/25: Blood cultures x2 from 6/8/25 are negative x1 day.  ID following.  Plan is for outpatient CHAPIS in 6 weeks.     Code Status  Full Code      Yasmine Chicas, APRN-CNP          [1]   Family History  Problem Relation Name Age of Onset    Breast cancer Mother  65   [2]   No current facility-administered medications on file prior to encounter.     Current Outpatient Medications on File Prior to Encounter   Medication Sig Dispense Refill    anastrozole (Arimidex) 1 mg tablet Take 1 tablet (1 mg total) by mouth once daily.      artificial tears, dextran-hypomel-glycerin, 0.1-0.3-0.2 % ophthalmic solution Administer 1 drop into affected eye(s) 4 times a day as needed.      aspirin 81 mg EC tablet Take 1 tablet (81 mg) by mouth once daily.      calcium carbonate-vitamin D3 500 mg-5 mcg (200 unit) tablet Take 2 tablets by mouth once daily.      levothyroxine (Synthroid, Levoxyl) 25 mcg tablet  Take 1 tablet (25 mcg) by mouth once daily.      metoprolol tartrate (Lopressor) 50 mg tablet Take 3 tablets by mouth twice a day.      olmesartan (BENIcar) 40 mg tablet Take 1 tablet (40 mg) by mouth once daily.      polyethylene glycol (Glycolax, Miralax) 17 gram packet Take 8.5 g by mouth once daily.      [DISCONTINUED] calcium citrate 250 mg calcium tablet Calcium Citrate 1040 MG TABS   Refills: 0        Start : 20-Sep-2021   Active      [DISCONTINUED] rosuvastatin (Crestor) 5 mg tablet Take 1 tablet (5 mg) by mouth once daily.     [3] anastrozole, 1 mg, oral, Daily  aspirin, 81 mg, oral, Daily  bacitracin, , Topical, Daily  cefepime, 2 g, intravenous, q12h  pantoprazole, 40 mg, oral, Daily before breakfast   Or  esomeprazole, 40 mg, nasoduodenal tube, Daily before breakfast   Or  pantoprazole, 40 mg, intravenous, Daily before breakfast  levothyroxine, 25 mcg, oral, Daily  metoprolol tartrate, 100 mg, oral, BID  metroNIDAZOLE, 500 mg, intravenous, q12h     [4] heparin, 0-4,500 Units/hr, Last Rate: 1,100 Units/hr (06/11/25 0649)     [5] PRN medications: acetaminophen, heparin, hydrALAZINE, HYDROcodone-acetaminophen, HYDROmorphone, labetaloL, LORazepam, melatonin, [DISCONTINUED] ondansetron **OR** ondansetron  [6]   Medications Prior to Admission   Medication Sig Dispense Refill Last Dose/Taking    anastrozole (Arimidex) 1 mg tablet Take 1 tablet (1 mg total) by mouth once daily.       artificial tears, dextran-hypomel-glycerin, 0.1-0.3-0.2 % ophthalmic solution Administer 1 drop into affected eye(s) 4 times a day as needed.       aspirin 81 mg EC tablet Take 1 tablet (81 mg) by mouth once daily.       calcium carbonate-vitamin D3 500 mg-5 mcg (200 unit) tablet Take 2 tablets by mouth once daily.       levothyroxine (Synthroid, Levoxyl) 25 mcg tablet Take 1 tablet (25 mcg) by mouth once daily.       metoprolol tartrate (Lopressor) 50 mg tablet Take 3 tablets by mouth twice a day.       olmesartan (BENIcar) 40 mg  tablet Take 1 tablet (40 mg) by mouth once daily.       polyethylene glycol (Glycolax, Miralax) 17 gram packet Take 8.5 g by mouth once daily.

## 2025-06-11 NOTE — CARE PLAN
The patient's goals for the shift include  pt will rest comfortably through night    The clinical goals for the shift include pt will tolerate ambulation    Over the shift, the patient did make progress toward the following goals. Barriers to progression include education. Recommendations to address these barriers include education reinforcement.     No

## 2025-06-12 ENCOUNTER — APPOINTMENT (OUTPATIENT)
Dept: RADIOLOGY | Facility: HOSPITAL | Age: 80
DRG: 853 | End: 2025-06-12
Payer: MEDICARE

## 2025-06-12 LAB
ANION GAP SERPL CALC-SCNC: 9 MMOL/L (ref 10–20)
ATRIAL RATE: 274 BPM
BASOPHILS # BLD AUTO: 0.06 X10*3/UL (ref 0–0.1)
BASOPHILS NFR BLD AUTO: 0.9 %
BUN SERPL-MCNC: 15 MG/DL (ref 6–23)
CALCIUM SERPL-MCNC: 8 MG/DL (ref 8.6–10.3)
CHLORIDE SERPL-SCNC: 104 MMOL/L (ref 98–107)
CO2 SERPL-SCNC: 25 MMOL/L (ref 21–32)
CREAT SERPL-MCNC: 0.77 MG/DL (ref 0.5–1.05)
EGFRCR SERPLBLD CKD-EPI 2021: 78 ML/MIN/1.73M*2
EOSINOPHIL # BLD AUTO: 0.14 X10*3/UL (ref 0–0.4)
EOSINOPHIL NFR BLD AUTO: 2 %
ERYTHROCYTE [DISTWIDTH] IN BLOOD BY AUTOMATED COUNT: 12.8 % (ref 11.5–14.5)
ERYTHROCYTE [DISTWIDTH] IN BLOOD BY AUTOMATED COUNT: 12.9 % (ref 11.5–14.5)
GLUCOSE SERPL-MCNC: 126 MG/DL (ref 74–99)
HCT VFR BLD AUTO: 29.3 % (ref 36–46)
HCT VFR BLD AUTO: 29.5 % (ref 36–46)
HCT VFR BLD AUTO: 29.7 % (ref 36–46)
HCT VFR BLD AUTO: 29.8 % (ref 36–46)
HGB BLD-MCNC: 10 G/DL (ref 12–16)
HGB BLD-MCNC: 10.1 G/DL (ref 12–16)
HGB BLD-MCNC: 10.1 G/DL (ref 12–16)
HGB BLD-MCNC: 10.2 G/DL (ref 12–16)
IMM GRANULOCYTES # BLD AUTO: 0.45 X10*3/UL (ref 0–0.5)
IMM GRANULOCYTES NFR BLD AUTO: 6.4 % (ref 0–0.9)
LYMPHOCYTES # BLD AUTO: 1 X10*3/UL (ref 0.8–3)
LYMPHOCYTES NFR BLD AUTO: 14.2 %
MAGNESIUM SERPL-MCNC: 1.77 MG/DL (ref 1.6–2.4)
MCH RBC QN AUTO: 32.1 PG (ref 26–34)
MCH RBC QN AUTO: 32.1 PG (ref 26–34)
MCHC RBC AUTO-ENTMCNC: 34.2 G/DL (ref 32–36)
MCHC RBC AUTO-ENTMCNC: 34.2 G/DL (ref 32–36)
MCV RBC AUTO: 94 FL (ref 80–100)
MCV RBC AUTO: 94 FL (ref 80–100)
MONOCYTES # BLD AUTO: 0.46 X10*3/UL (ref 0.05–0.8)
MONOCYTES NFR BLD AUTO: 6.5 %
NEUTROPHILS # BLD AUTO: 4.93 X10*3/UL (ref 1.6–5.5)
NEUTROPHILS NFR BLD AUTO: 70 %
NRBC BLD-RTO: 0 /100 WBCS (ref 0–0)
NRBC BLD-RTO: 0 /100 WBCS (ref 0–0)
PLATELET # BLD AUTO: 193 X10*3/UL (ref 150–450)
PLATELET # BLD AUTO: 194 X10*3/UL (ref 150–450)
POTASSIUM SERPL-SCNC: 3.6 MMOL/L (ref 3.5–5.3)
Q ONSET: 222 MS
QRS COUNT: 22 BEATS
QRS DURATION: 74 MS
QT INTERVAL: 280 MS
QTC CALCULATION(BAZETT): 422 MS
QTC FREDERICIA: 368 MS
R AXIS: 4 DEGREES
RBC # BLD AUTO: 3.15 X10*6/UL (ref 4–5.2)
RBC # BLD AUTO: 3.18 X10*6/UL (ref 4–5.2)
RBC MORPH BLD: NORMAL
SODIUM SERPL-SCNC: 134 MMOL/L (ref 136–145)
T AXIS: -40 DEGREES
T OFFSET: 362 MS
VENTRICULAR RATE: 137 BPM
WBC # BLD AUTO: 7 X10*3/UL (ref 4.4–11.3)
WBC # BLD AUTO: 7.9 X10*3/UL (ref 4.4–11.3)

## 2025-06-12 PROCEDURE — 2500000002 HC RX 250 W HCPCS SELF ADMINISTERED DRUGS (ALT 637 FOR MEDICARE OP, ALT 636 FOR OP/ED): Performed by: PHYSICIAN ASSISTANT

## 2025-06-12 PROCEDURE — 72148 MRI LUMBAR SPINE W/O DYE: CPT | Performed by: RADIOLOGY

## 2025-06-12 PROCEDURE — 85014 HEMATOCRIT: CPT | Performed by: PHYSICIAN ASSISTANT

## 2025-06-12 PROCEDURE — 97530 THERAPEUTIC ACTIVITIES: CPT | Mod: GP,CQ | Performed by: PHYSICAL THERAPY ASSISTANT

## 2025-06-12 PROCEDURE — 72197 MRI PELVIS W/O & W/DYE: CPT | Performed by: RADIOLOGY

## 2025-06-12 PROCEDURE — 36415 COLL VENOUS BLD VENIPUNCTURE: CPT | Performed by: NURSE PRACTITIONER

## 2025-06-12 PROCEDURE — 2550000001 HC RX 255 CONTRASTS: Performed by: INTERNAL MEDICINE

## 2025-06-12 PROCEDURE — 99232 SBSQ HOSP IP/OBS MODERATE 35: CPT | Performed by: NURSE PRACTITIONER

## 2025-06-12 PROCEDURE — 85025 COMPLETE CBC W/AUTO DIFF WBC: CPT | Performed by: PHYSICIAN ASSISTANT

## 2025-06-12 PROCEDURE — 2500000004 HC RX 250 GENERAL PHARMACY W/ HCPCS (ALT 636 FOR OP/ED): Performed by: NURSE PRACTITIONER

## 2025-06-12 PROCEDURE — 99233 SBSQ HOSP IP/OBS HIGH 50: CPT | Performed by: PHYSICIAN ASSISTANT

## 2025-06-12 PROCEDURE — 99024 POSTOP FOLLOW-UP VISIT: CPT | Performed by: SURGERY

## 2025-06-12 PROCEDURE — 97112 NEUROMUSCULAR REEDUCATION: CPT | Mod: GP,CQ | Performed by: PHYSICAL THERAPY ASSISTANT

## 2025-06-12 PROCEDURE — 72197 MRI PELVIS W/O & W/DYE: CPT

## 2025-06-12 PROCEDURE — 2060000001 HC INTERMEDIATE ICU ROOM DAILY

## 2025-06-12 PROCEDURE — 2500000001 HC RX 250 WO HCPCS SELF ADMINISTERED DRUGS (ALT 637 FOR MEDICARE OP): Performed by: INTERNAL MEDICINE

## 2025-06-12 PROCEDURE — 85027 COMPLETE CBC AUTOMATED: CPT | Performed by: NURSE PRACTITIONER

## 2025-06-12 PROCEDURE — 2500000004 HC RX 250 GENERAL PHARMACY W/ HCPCS (ALT 636 FOR OP/ED): Performed by: PHYSICIAN ASSISTANT

## 2025-06-12 PROCEDURE — 80048 BASIC METABOLIC PNL TOTAL CA: CPT | Performed by: PHYSICIAN ASSISTANT

## 2025-06-12 PROCEDURE — 2500000001 HC RX 250 WO HCPCS SELF ADMINISTERED DRUGS (ALT 637 FOR MEDICARE OP): Performed by: STUDENT IN AN ORGANIZED HEALTH CARE EDUCATION/TRAINING PROGRAM

## 2025-06-12 PROCEDURE — 83735 ASSAY OF MAGNESIUM: CPT | Performed by: PHYSICIAN ASSISTANT

## 2025-06-12 PROCEDURE — 36415 COLL VENOUS BLD VENIPUNCTURE: CPT | Performed by: PHYSICIAN ASSISTANT

## 2025-06-12 PROCEDURE — 2500000001 HC RX 250 WO HCPCS SELF ADMINISTERED DRUGS (ALT 637 FOR MEDICARE OP): Performed by: NURSE PRACTITIONER

## 2025-06-12 PROCEDURE — 2500000004 HC RX 250 GENERAL PHARMACY W/ HCPCS (ALT 636 FOR OP/ED): Performed by: INTERNAL MEDICINE

## 2025-06-12 PROCEDURE — 2500000002 HC RX 250 W HCPCS SELF ADMINISTERED DRUGS (ALT 637 FOR MEDICARE OP, ALT 636 FOR OP/ED): Performed by: NURSE PRACTITIONER

## 2025-06-12 PROCEDURE — 2500000004 HC RX 250 GENERAL PHARMACY W/ HCPCS (ALT 636 FOR OP/ED): Performed by: STUDENT IN AN ORGANIZED HEALTH CARE EDUCATION/TRAINING PROGRAM

## 2025-06-12 PROCEDURE — 72148 MRI LUMBAR SPINE W/O DYE: CPT

## 2025-06-12 PROCEDURE — 2500000005 HC RX 250 GENERAL PHARMACY W/O HCPCS: Performed by: PHYSICIAN ASSISTANT

## 2025-06-12 PROCEDURE — A9575 INJ GADOTERATE MEGLUMI 0.1ML: HCPCS | Performed by: INTERNAL MEDICINE

## 2025-06-12 RX ORDER — POTASSIUM CHLORIDE 20 MEQ/1
40 TABLET, EXTENDED RELEASE ORAL ONCE
Status: COMPLETED | OUTPATIENT
Start: 2025-06-12 | End: 2025-06-12

## 2025-06-12 RX ORDER — ADHESIVE BANDAGE
30 BANDAGE TOPICAL DAILY
Status: DISCONTINUED | OUTPATIENT
Start: 2025-06-12 | End: 2025-06-19 | Stop reason: HOSPADM

## 2025-06-12 RX ORDER — LORAZEPAM 2 MG/ML
0.5 INJECTION INTRAMUSCULAR ONCE AS NEEDED
Status: COMPLETED | OUTPATIENT
Start: 2025-06-12 | End: 2025-06-12

## 2025-06-12 RX ORDER — GADOTERATE MEGLUMINE 376.9 MG/ML
12 INJECTION INTRAVENOUS
Status: COMPLETED | OUTPATIENT
Start: 2025-06-12 | End: 2025-06-12

## 2025-06-12 RX ORDER — MAGNESIUM SULFATE HEPTAHYDRATE 40 MG/ML
2 INJECTION, SOLUTION INTRAVENOUS ONCE
Status: COMPLETED | OUTPATIENT
Start: 2025-06-12 | End: 2025-06-12

## 2025-06-12 RX ORDER — HYDROMORPHONE HYDROCHLORIDE 1 MG/ML
0.6 INJECTION, SOLUTION INTRAMUSCULAR; INTRAVENOUS; SUBCUTANEOUS
Status: DISCONTINUED | OUTPATIENT
Start: 2025-06-12 | End: 2025-06-19 | Stop reason: HOSPADM

## 2025-06-12 RX ADMIN — CEFEPIME HYDROCHLORIDE 2 G: 2 INJECTION, SOLUTION INTRAVENOUS at 21:55

## 2025-06-12 RX ADMIN — HYDROMORPHONE HYDROCHLORIDE 0.5 MG: 0.5 INJECTION, SOLUTION INTRAMUSCULAR; INTRAVENOUS; SUBCUTANEOUS at 00:27

## 2025-06-12 RX ADMIN — HYDROCODONE BITARTRATE AND ACETAMINOPHEN 1 TABLET: 7.5; 325 TABLET ORAL at 14:09

## 2025-06-12 RX ADMIN — CEFEPIME HYDROCHLORIDE 2 G: 2 INJECTION, SOLUTION INTRAVENOUS at 09:47

## 2025-06-12 RX ADMIN — GADOTERATE MEGLUMINE 12 ML: 376.9 INJECTION INTRAVENOUS at 15:41

## 2025-06-12 RX ADMIN — MAGNESIUM HYDROXIDE 30 ML: 400 SUSPENSION ORAL at 10:07

## 2025-06-12 RX ADMIN — DILTIAZEM HYDROCHLORIDE 30 MG: 30 TABLET, FILM COATED ORAL at 18:06

## 2025-06-12 RX ADMIN — DILTIAZEM HYDROCHLORIDE 30 MG: 30 TABLET, FILM COATED ORAL at 05:52

## 2025-06-12 RX ADMIN — ASPIRIN 81 MG: 81 TABLET, COATED ORAL at 09:48

## 2025-06-12 RX ADMIN — METOPROLOL TARTRATE 100 MG: 50 TABLET, FILM COATED ORAL at 20:26

## 2025-06-12 RX ADMIN — METRONIDAZOLE 500 MG: 500 INJECTION, SOLUTION INTRAVENOUS at 03:31

## 2025-06-12 RX ADMIN — ANASTROZOLE 1 MG: 1 TABLET, COATED ORAL at 09:48

## 2025-06-12 RX ADMIN — LEVOTHYROXINE SODIUM 25 MCG: 0.03 TABLET ORAL at 09:48

## 2025-06-12 RX ADMIN — PANTOPRAZOLE SODIUM 40 MG: 40 TABLET, DELAYED RELEASE ORAL at 05:52

## 2025-06-12 RX ADMIN — ONDANSETRON 4 MG: 2 INJECTION, SOLUTION INTRAMUSCULAR; INTRAVENOUS at 02:26

## 2025-06-12 RX ADMIN — LORAZEPAM 0.5 MG: 2 INJECTION INTRAMUSCULAR; INTRAVENOUS at 14:09

## 2025-06-12 RX ADMIN — DILTIAZEM HYDROCHLORIDE 30 MG: 30 TABLET, FILM COATED ORAL at 00:27

## 2025-06-12 RX ADMIN — HYDROMORPHONE HYDROCHLORIDE 0.6 MG: 1 INJECTION, SOLUTION INTRAMUSCULAR; INTRAVENOUS; SUBCUTANEOUS at 02:26

## 2025-06-12 RX ADMIN — POTASSIUM CHLORIDE 40 MEQ: 1500 TABLET, EXTENDED RELEASE ORAL at 09:48

## 2025-06-12 RX ADMIN — BACITRACIN ZINC: 500 OINTMENT TOPICAL at 16:43

## 2025-06-12 RX ADMIN — TIZANIDINE 2 MG: 2 TABLET ORAL at 03:31

## 2025-06-12 RX ADMIN — DILTIAZEM HYDROCHLORIDE 30 MG: 30 TABLET, FILM COATED ORAL at 11:12

## 2025-06-12 RX ADMIN — METOPROLOL TARTRATE 100 MG: 50 TABLET, FILM COATED ORAL at 09:48

## 2025-06-12 RX ADMIN — MAGNESIUM SULFATE HEPTAHYDRATE 2 G: 40 INJECTION, SOLUTION INTRAVENOUS at 09:47

## 2025-06-12 RX ADMIN — METRONIDAZOLE 500 MG: 500 INJECTION, SOLUTION INTRAVENOUS at 16:43

## 2025-06-12 ASSESSMENT — ENCOUNTER SYMPTOMS
AGITATION: 0
SORE THROAT: 0
CONFUSION: 0
SPEECH DIFFICULTY: 0
CHILLS: 0
ABDOMINAL PAIN: 1
FEVER: 0
CHEST TIGHTNESS: 0
DIARRHEA: 0
JOINT SWELLING: 0
HALLUCINATIONS: 0
TROUBLE SWALLOWING: 0
NUMBNESS: 1
DYSURIA: 0
APPETITE CHANGE: 0
FREQUENCY: 0
ARTHRALGIAS: 1
VOMITING: 0
BRUISES/BLEEDS EASILY: 1
NAUSEA: 0
SHORTNESS OF BREATH: 0
MYALGIAS: 1
WOUND: 1
EYE PAIN: 0
EYE REDNESS: 0
CONSTIPATION: 0
WHEEZING: 0
PALPITATIONS: 1
FLANK PAIN: 0
HEADACHES: 0
WEAKNESS: 1
HEMATURIA: 0
DIAPHORESIS: 0
FACIAL SWELLING: 0
COUGH: 0
LIGHT-HEADEDNESS: 0
BRUISES/BLEEDS EASILY: 0
DIZZINESS: 0
FATIGUE: 0
BLOOD IN STOOL: 0
WOUND: 0
FATIGUE: 1

## 2025-06-12 ASSESSMENT — PAIN - FUNCTIONAL ASSESSMENT
PAIN_FUNCTIONAL_ASSESSMENT: 0-10

## 2025-06-12 ASSESSMENT — PAIN SCALES - GENERAL
PAINLEVEL_OUTOF10: 0 - NO PAIN
PAINLEVEL_OUTOF10: 5 - MODERATE PAIN
PAINLEVEL_OUTOF10: 10 - WORST POSSIBLE PAIN
PAINLEVEL_OUTOF10: 0 - NO PAIN
PAINLEVEL_OUTOF10: 3

## 2025-06-12 ASSESSMENT — PAIN DESCRIPTION - ORIENTATION
ORIENTATION: RIGHT
ORIENTATION: RIGHT

## 2025-06-12 ASSESSMENT — PAIN DESCRIPTION - LOCATION
LOCATION: LEG
LOCATION: LEG

## 2025-06-12 ASSESSMENT — COGNITIVE AND FUNCTIONAL STATUS - GENERAL
MOBILITY SCORE: 10
STANDING UP FROM CHAIR USING ARMS: A LOT
TURNING FROM BACK TO SIDE WHILE IN FLAT BAD: A LOT
CLIMB 3 TO 5 STEPS WITH RAILING: TOTAL
MOVING FROM LYING ON BACK TO SITTING ON SIDE OF FLAT BED WITH BEDRAILS: A LOT
MOVING TO AND FROM BED TO CHAIR: A LOT
WALKING IN HOSPITAL ROOM: TOTAL

## 2025-06-12 ASSESSMENT — PAIN DESCRIPTION - DESCRIPTORS: DESCRIPTORS: BURNING

## 2025-06-12 NOTE — PROGRESS NOTES
"    GENERAL SURGERY / TRAUMA PROGRESS NOTE    Patient: Rosemary Motta  Room: 2025/2025-A    Age: 79 y.o.   Gender: female  Attending: Joseph Mcbride MD    MRN: [id number]  Admission Date: 6/7/2025    PCP: Prema Romero DO       Rosemary Motta is a 79 y.o. female on day 5  of admission presenting with Diverticulitis of large intestine with perforation without bleeding.    SUBJECTIVE   Interval History:  No nausea this morning.  Did have worsening pain got CT scan which showed ilacus hematoma. Heparin drip held. Patient endorsing some numbness in leg but motor intact. Abdominal pain improving minimal output from stoma.    ROS  Review of Systems   Constitutional:  Positive for fatigue. Negative for chills and fever.   HENT:  Positive for hearing loss. Negative for congestion and ear pain.    Eyes:  Negative for pain and redness.   Respiratory:  Negative for cough and shortness of breath.    Cardiovascular:  Positive for leg swelling. Negative for chest pain.   Gastrointestinal:  Positive for abdominal pain. Negative for nausea and vomiting.   Genitourinary:  Negative for flank pain and hematuria.   Musculoskeletal:  Positive for arthralgias and myalgias.   Skin:  Positive for wound. Negative for rash.   Allergic/Immunologic: Negative for immunocompromised state.   Neurological:  Positive for weakness. Negative for speech difficulty and headaches.   Hematological:  Bruises/bleeds easily.   Psychiatric/Behavioral:  Negative for agitation and confusion.           OBJECTIVE   Last Recorded Vitals  Blood pressure 148/72, pulse 97, temperature 36.3 °C (97.4 °F), temperature source Temporal, resp. rate 18, height 1.473 m (4' 10\"), weight 62.4 kg (137 lb 9.1 oz), SpO2 94%.    Intake/Output last 3 Shifts:  I/O last 3 completed shifts:  In: 1666.1 (26.7 mL/kg) [P.O.:591; I.V.:25.1 (0.4 mL/kg); IV Piggyback:1050]  Out: 1875 (30 mL/kg) [Urine:1800 (0.8 mL/kg/hr); Drains:70; Stool:5]  Weight: 62.4 kg     PHYSICAL EXAM  Physical " Exam   General: Well-developed, well-nourished and in no acute distress.  Head: Normocephalic. Atraumatic.  Neck/thyroid: Neck is supple.   Eyes: Pupils equal round and reactive to light. Conjunctiva normal.  ENMT: No masses or deformity of external nose. External ears without masses.  Respiratory/Chest:  Normal respiratory effort.  Breast: s/p right mastectomy  Cardiovascular: Regular rate and rhythm.   Abdomen: Soft and nondistended.  Midline incision is clean, dry and intact.  Colostomy in the left lower quadrant is pink and viable with small amounts of stool and moderate amount of gas output.  Abdominal drain with serosanguineous fluid.  Musculoskeletal: Joints and limbs are grossly normal.   Neuro: Oriented to person, place and time. No obvious neurological deficit.   Psych: Normal mood and affect.  Awake and talkative.    RESULTS   Labs  Results for orders placed or performed during the hospital encounter of 06/07/25 (from the past 24 hours)   Heparin Assay, UFH   Result Value Ref Range    Heparin Unfractionated 0.9 See Comment Below for Therapeutic Ranges IU/mL   CBC   Result Value Ref Range    WBC 6.3 4.4 - 11.3 x10*3/uL    nRBC 0.0 0.0 - 0.0 /100 WBCs    RBC 3.86 (L) 4.00 - 5.20 x10*6/uL    Hemoglobin 12.3 12.0 - 16.0 g/dL    Hematocrit 36.4 36.0 - 46.0 %    MCV 94 80 - 100 fL    MCH 31.9 26.0 - 34.0 pg    MCHC 33.8 32.0 - 36.0 g/dL    RDW 13.0 11.5 - 14.5 %    Platelets 165 150 - 450 x10*3/uL   Heparin Assay, UFH   Result Value Ref Range    Heparin Unfractionated 0.4 See Comment Below for Therapeutic Ranges IU/mL   Heparin Assay, UFH   Result Value Ref Range    Heparin Unfractionated 0.5 See Comment Below for Therapeutic Ranges IU/mL   CBC   Result Value Ref Range    WBC 7.9 4.4 - 11.3 x10*3/uL    nRBC 0.0 0.0 - 0.0 /100 WBCs    RBC 3.18 (L) 4.00 - 5.20 x10*6/uL    Hemoglobin 10.2 (L) 12.0 - 16.0 g/dL    Hematocrit 29.8 (L) 36.0 - 46.0 %    MCV 94 80 - 100 fL    MCH 32.1 26.0 - 34.0 pg    MCHC 34.2 32.0 -  36.0 g/dL    RDW 12.8 11.5 - 14.5 %    Platelets 193 150 - 450 x10*3/uL   CBC and Auto Differential   Result Value Ref Range    WBC 7.0 4.4 - 11.3 x10*3/uL    nRBC 0.0 0.0 - 0.0 /100 WBCs    RBC 3.15 (L) 4.00 - 5.20 x10*6/uL    Hemoglobin 10.1 (L) 12.0 - 16.0 g/dL    Hematocrit 29.5 (L) 36.0 - 46.0 %    MCV 94 80 - 100 fL    MCH 32.1 26.0 - 34.0 pg    MCHC 34.2 32.0 - 36.0 g/dL    RDW 12.9 11.5 - 14.5 %    Platelets 194 150 - 450 x10*3/uL    Neutrophils % 70.0 40.0 - 80.0 %    Immature Granulocytes %, Automated 6.4 (H) 0.0 - 0.9 %    Lymphocytes % 14.2 13.0 - 44.0 %    Monocytes % 6.5 2.0 - 10.0 %    Eosinophils % 2.0 0.0 - 6.0 %    Basophils % 0.9 0.0 - 2.0 %    Neutrophils Absolute 4.93 1.60 - 5.50 x10*3/uL    Immature Granulocytes Absolute, Automated 0.45 0.00 - 0.50 x10*3/uL    Lymphocytes Absolute 1.00 0.80 - 3.00 x10*3/uL    Monocytes Absolute 0.46 0.05 - 0.80 x10*3/uL    Eosinophils Absolute 0.14 0.00 - 0.40 x10*3/uL    Basophils Absolute 0.06 0.00 - 0.10 x10*3/uL   Basic Metabolic Panel   Result Value Ref Range    Glucose 126 (H) 74 - 99 mg/dL    Sodium 134 (L) 136 - 145 mmol/L    Potassium 3.6 3.5 - 5.3 mmol/L    Chloride 104 98 - 107 mmol/L    Bicarbonate 25 21 - 32 mmol/L    Anion Gap 9 (L) 10 - 20 mmol/L    Urea Nitrogen 15 6 - 23 mg/dL    Creatinine 0.77 0.50 - 1.05 mg/dL    eGFR 78 >60 mL/min/1.73m*2    Calcium 8.0 (L) 8.6 - 10.3 mg/dL   Magnesium   Result Value Ref Range    Magnesium 1.77 1.60 - 2.40 mg/dL   Morphology   Result Value Ref Range    RBC Morphology No significant RBC morphology present        Radiology Resutls  Imaging  CT abdomen pelvis w IV contrast  Result Date: 6/11/2025  1. Ill-defined right iliacus hematoma with central foci of contrast raising suspicion for pseudoaneurysms. Contrast extravasation is not entirely excluded on single phase imaging. Correlate with labs and symptoms and if there is concern for acute blood loss, multiphase examination can be considered. 2. Postsurgical  changes of partial sigmoid colectomy with end colostomy and Nory's pouch creation. No acute postsurgical complications identified. 3. Pelvic drain is in place with the tip in the left lower quadrant. No residual drainable collections identified. 4. Diffuse hepatic steatosis. Other chronic findings as described above.   MACRO: Aaron Mccall discussed the significance and urgency of this critical finding by EPIC secure chat with  PARTH TEIXEIRA on 6/11/2025 at 10:40 pm.  (**-RCF-**) Findings:  See findings.   Signed by: Aaron Mccall 6/11/2025 10:41 PM Dictation workstation:   [id number]        Cardiology, Vascular, and Other Imaging  Electrocardiogram, 12-lead PRN ACS symptoms  Result Date: 6/10/2025  Atrial fibrillation with rapid ventricular response Nonspecific ST and T wave abnormality Abnormal ECG When compared with ECG of 10-CHAVA-2025 10:11, (unconfirmed) No significant change was found Confirmed by Bob Hu ([id number]) on 6/10/2025 1:23:23 PM    Electrocardiogram, 12-lead PRN ACS symptoms  Result Date: 6/10/2025  Normal sinus rhythm Normal ECG When compared with ECG of 10-CHAVA-2025 10:11, (unconfirmed) Sinus rhythm has replaced Atrial fibrillation Vent. rate has decreased BY  89 BPM ST no longer depressed in Anterolateral leads T wave inversion now evident in Inferior leads Nonspecific T wave abnormality no longer evident in Lateral leads Confirmed by Bob Hu ([id number]) on 6/10/2025 1:23:11 PM           ASSESSMENT / PLAN     Assessment & Plan  Diverticulitis of large intestine with perforation without bleeding         PLAN  79-year-old white female s/p Hanna's procedure for perforated sigmoid diverticulitis 6/7/2025    Continue on regular diet.  Would like to see more colostomy output prior to discharge.  Routine colostomy care. May digitize stoma to help stimulate output today in addition to initiating milk of magnesia. Drain with 40cc of SS output to remain for today  Encourage patient to get out of bed at  least up to a chair today.    Discussed possible need for short-term rehab placement upon discharge pending PT/OT evaluations.  Patient in agreement with placement.   Other supportive care per medicine/cardiology/infectious disease.  Antibiotics per infectious disease.  Per ID note continue antibiotics through 6/12/25 to finish 5-day course.  Pathology pending as of 6/11/25  PUD/DVT prophylaxis. Holding heparin drip 2/2 Iliacus hematoma.      Patient will be discussed with Attending Physician Dr. Yobany Amaral PGY4  General Surgery Service

## 2025-06-12 NOTE — PROGRESS NOTES
Social work consult placed for positive medical risk screen. SW reviewed pt's chart and communicated with TCC. No SW needs foreseen at this time. SW signing off; available upon request.    MAMIE Foster, LSW ([id number])   Care Transitions

## 2025-06-12 NOTE — PROGRESS NOTES
Physical Therapy    Physical Therapy Treatment    Patient Name: Rosemary Motta  MRN: [id number]  Department: [address]  Room: 2025/2025-A  Today's Date: 6/12/2025  Time Calculation  Start Time: 0907  Stop Time: 0930  Time Calculation (min): 23 min         Assessment/Plan   PT Assessment  Barriers to Discharge Home: Cognition needs, Physical needs  End of Session Communication: Bedside nurse, Physician, PCT/NA/CTA  Assessment Comment: pt required increased assist this date secondary to hematoma in RLR causing numbness pain and inability to move extremely. pt is motivated to  perform PT but frustrated with new diagnosis She would benefit from further skilled PT services.  End of Session Patient Position: Up in chair, Alarm on (RN and PA agreeable to pt up in chair.)     PT Plan  Treatment/Interventions: Bed mobility, Transfer training, Gait training, Neuromuscular re-education, Therapeutic exercise, Therapeutic activity  PT Plan: Ongoing PT  PT Frequency: 6 times per week  PT Discharge Recommendations: Moderate intensity level of continued care  PT - OK to Discharge: Yes    PT Visit Info:  PT Received On: 06/12/25  Response to Previous Treatment: Patient with no complaints from previous session.     General Visit Information:   General  Reason for Referral: sudden onset and pain: perf diverticulitis, 6/7 ABD SURG ex lap with partial colectomy  Referred By: PT FOR IMPAIRED MOBILITY/GAIT TRAINING  Past Medical History Relevant to Rehab: HTN, HLE, breast ca, diverticulosis, GERD, hypothyroidism, neuropathy, migraines, OA, anxiety/depression presented 6/7/25 for abdominal pain, left lower quadrant abd pain with radiation to back  Prior to Session Communication: Bedside nurse  Patient Position Received: Bed, 3 rail up, Alarm on  Preferred Learning Style: verbal  General Comment: Pt agreeable and cooperative to therapy. pt c/o pain in R thigh and numbness from thigh to knee. pt reporting she can;t keep knee from buckling or  feel her R leg in standing.      Precautions:  Precautions  Medical Precautions: Fall precautions  Post-Surgical Precautions: Abdominal surgery precautions  Precautions Comment: new L colectomy pouch, bulb drain RLQ abd              Pain:  Pain Assessment  Pain Assessment: 0-10  0-10 (Numeric) Pain Score: 0 - No pain  Cognition:  Cognition  Overall Cognitive Status: Within Functional Limits            Treatments:  Balance/Neuromuscular Re-Education  Balance/Neuromuscular Re-Education Activity Performed: Yes  Balance/Neuromuscular Re-Education Activity 1: pt sat EOB x 8 min with no overt LOB but difficulty feeling RLE. pt stood x 2 attempts. First attempt pt knee buckled and required MAX Ax1 to recover. Second attempt MOD Ax1 to maintain.pt required R knee to be blocked to prevent buckling.    Bed Mobility  Bed Mobility: Yes  Bed Mobility 1  Bed Mobility 1: Supine to sitting  Level of Assistance 1: Maximum assistance, Maximum verbal cues  Bed Mobility Comments 1: pt unable to advance R LE or transition trunk into sitting.    Transfers  Transfer: Yes  Transfer 1  Technique 1: Sit to stand, Stand to sit  Transfer Device 1: Walker  Transfer Level of Assistance 1: Maximum assistance, Minimal verbal cues, +2  Trials/Comments 1: cues for hand placement and safety. Assist to manage RLE.  Transfers 2  Technique 2: Stand pivot  Transfer Device 2: Walker  Transfer Level of Assistance 2: Maximum assistance, Moderate verbal cues, +2  Trials/Comments 2: Assist to advance RLE and keep knee from buckling.    Outcome Measures:  [facility] Basic Mobility  Turning from your back to your side while in a flat bed without using bedrails: A lot  Moving from lying on your back to sitting on the side of a flat bed without using bedrails: A lot  Moving to and from bed to chair (including a wheelchair): A lot  Standing up from a chair using your arms (e.g. wheelchair or bedside chair): A lot  To walk in hospital room: Total  Climbing 3-5 steps  with railing: Total  Basic Mobility - Total Score: 10    Education Documentation  Body Mechanics, taught by Richelle Trotter PTA at 6/12/2025 10:14 AM.  Learner: Patient  Readiness: Acceptance  Method: Explanation  Response: Verbalizes Understanding, Needs Reinforcement    Mobility Training, taught by Richelle Trottre PTA at 6/12/2025 10:14 AM.  Learner: Patient  Readiness: Acceptance  Method: Explanation  Response: Verbalizes Understanding, Needs Reinforcement    Education Comments  No comments found.               Encounter Problems       Encounter Problems (Active)       Mobility       STG - Patient will ambulate household distance using FWW initially, no more than SBA x1 (Progressing)       Start:  06/09/25    Expected End:  06/23/25               PT Transfers       STG - Transfer from bed to chair using FWW initially, no more than SBA x1 (Progressing)       Start:  06/09/25    Expected End:  06/23/25            STG - Patient to transfer to and from sit to supine DEMO LOG ROLL TECH (for ABD precautions) with no more than min Ax1 (Progressing)       Start:  06/09/25    Expected End:  06/23/25               Pain - Adult

## 2025-06-12 NOTE — NURSING NOTE
0700:  Report received from Micaela COLLINS    Patient had complaints of right back, hip, leg pain overnight. PRN norco, dilaudid, and Zanaflex given throughout the night with some relief this morning saying pain is now 3 out of 10. Patient did receive zofran once for nausea now also resolved. Colostomy still having little to no output. CT abd was performed last night and IMS notified. After CT resulted, heparin drip was stopped around 0130. Hgb 10.1 this am. Will possibly need to consult IR. LOLA draining sanguineous fluid 20cc emptied at 0800. Patient remains in SR, Bps slightly improved from yesterday     0800:  Patient ate 90% of breakfast. No nausea    0900:  New orders:  +2g IV mag once  +40meq PO K once  +milk of mag daily    0930:  Patient up to chair with PT. R knee weakness, numbness, buckling with transfer hospitalist in room.    New order:  +MR pelvis & lumbar    1000:  Family updated, new order for one time ativan 0.5mg for MRI (scheduled around 2-3pm per tech)    1330:  New orders:  +q6 H/H draw    0210:  Ativan given for MRI Norco given for pain. Patient off the floor for MRI    1600:  Patient back from MRI. Abd dressing changed, H/H drawn and sent

## 2025-06-12 NOTE — PROGRESS NOTES
Occupational Therapy                 Therapy Communication Note    Patient Name: Rosemary Motta  MRN: [id number]  Department: [address]  Room: 2025/2025-A  Today's Date: 6/12/2025     Discipline: Occupational Therapy    Missed Visit: OT Missed Visit: Yes (Attempted OT session. Patient recently completed grooming tasks. Patient sitting up in the chair upon arrival. Patient c/o dizziness and feeling woozy. Patient remained with nursing staff to assist with chair to bed transfer and vitals.)     Missed Visit Reason: Missed Visit Reason: Patient placed on medical hold    Missed Time: Attempt at 1035.

## 2025-06-12 NOTE — DOCUMENTATION CLARIFICATION NOTE
"    PATIENT:               PATTI ACOSTA  ACCT #:                  [id number]  MRN:                       [id number]  :                       1945  ADMIT DATE:       2025 1:49 AM  DISCH DATE:  RESPONDING PROVIDER #:        [id number]          PROVIDER RESPONSE TEXT:    Sepsis with multi-system organ dysfunction of MENG, Afib, ARF hypoxic and hypotension    CDI QUERY TEXT:    Clarification        Instruction:    Based on your assessment of the patient and the clinical information, please provide the requested documentation by clicking on the appropriate radio button and enter any additional information if prompted.    Question: Please further clarify if a relationship exists between the Sepsis and acute organ dysfunction    When answering this query, please exercise your independent professional judgment. The fact that a question is being asked, does not imply that any particular answer is desired or expected.    The patient's clinical indicators include:  Clinical Information:  - 78yo female admitted with MENG and diverticulitis with perforation. Taken to OR and developed Afib, ARF hypoxic and hypotension.    Clinical Indicators:  -  Labs :   wbc 8.1, hgb 13.2, plt 169, anc 6.52, bun 26, cr 1.29, la 1.0, trop 6 - 7  -  Labs :   wbc 8.6, hgb 12.4, plt 144,                 bun 25, cr 1.17, la 1.1  - 6/10 Labs : wbc 5.8, hgb 10.9, plt 143, anc 4.20, bun 12, cr 0.84    -  VS on arrival 0149: 98.3,  78, 20,  191/71 , 98% ra  0751:            98, 17,  142/56,  99% ra  1133:            73, 16,  117/48,  99% ra  1731: 100.6, 86, 17,  130/71,  95% ra  1919: 100.0, 98, 16,  130/73,  97% ra  -   VS :             0000:            97, 22,  117/55,  96% on 6l via mask  0700 :  98.5,  87, 14, 107/38  1000:             80, 13,  98/34  1036:             81, 14,  94/33    -  ID Dr Ceballos: \"Sepsis, Acute diverticulitis\"  -  Cardiology NP Aviva: \" paroxysmal atrial fibrillation in setting of sepsis, perforated " "diverticulitis and postop state\"    Treatment: Surgery, Arterial line, 1250 ml IVF boluses, Flagyl, Cipro, Maxipime, Neosynephrine gtt for 2 hours, Supplemental O2 at   3-6 liters for approx 24 hours, Albumin, and Levophed for 4 hours.    Risk Factors: Diverticulitis with perforation, peritonitis.  Options provided:  -- Sepsis with renal organ dysfunction of MENG  -- Sepsis with cardiac organ dysfunction of new onset  Afib  -- Sepsis with hematological organ dysfunction of hypotension  -- Sepsis with respiratory organ dysfunction of postop acute respiratory failure  -- Sepsis with multi-system organ dysfunction of MENG, Afib, ARF hypoxic and hypotension  -- Sepsis with other organ dysfunction, Please specify sepsis associated organ dysfunction below  -- Sepsis with Septic shock  -- Other - I will add my own diagnosis  -- Refer to Clinical Documentation Reviewer    Query created by: Desi Love on 6/10/2025 11:54 AM      Electronically signed by:  SYDNIE MACIAS PA-C 6/12/2025 3:17 PM          "

## 2025-06-12 NOTE — PROGRESS NOTES
6/12/25 0934   06/12/25 0934   Discharge Planning   Expected Discharge Disposition SNF   Patient Choice   Provider Choice list and CMS website (https://medicare.gov/care-compare#search) for post-acute Quality and Resource Measure Data were provided and reviewed with: Patient   Patient / Family choosing to utilize agency / facility established prior to hospitalization Yes   Intensity of Service   Intensity of Service >30 min     Received call from Vishun smyth. Vishnu requested additional SNF referrals be sent to [facility]. [facility] send referrals.   Rupal Bacon RN, PAN, Alejandro/ [facility] CT Supervisor     6/12/25 1148  Attempted to call [facility] and no answer and no ability to leave a vm.   HAWK Harris RN, Alejandro/ Oliva CT Supervisor     6/12/25 1423  Called [facility] and was sent to Marietta Guzman the admission director. No answer. Left vm with call back number.   HAWK Harris RN, Alejandro/ Oliva CT Supervisor

## 2025-06-12 NOTE — CARE PLAN
The patient's goals for the shift include  staying informed    The clinical goals for the shift include patient will remain hemodynamically stable throughout this shift    Over the shift, the patient did not make progress toward the following goals. Barriers to progression include understanding. Recommendations to address these barriers include education.

## 2025-06-12 NOTE — PROGRESS NOTES
Rosemary Motta is a 79 y.o. female on day 5 of admission presenting with Diverticulitis of large intestine with perforation without bleeding.      Subjective   Rosemary Motta is a 79F hx HTN, HLE, breast ca, diverticulosis, GERD, hypothyroidism, neuropathy, migraines, OA, anxiety/depression presented 6/7/25 for abdominal pain, left lower quadrant abd pain with radiation to back. now improved. up to 10/10, now 5/10. nausea with small vomiting. no diarrhea, constipation, blood in stool or melena. subacute dry cough. no acute abdomen on exam. labs/imaging w/ cr 1.29 (baseline 1.02), cbc, lactate wnl. ua wnl. ekg wnl. ct a/p w/ perforated diverticulitis. Seen by surgery and is now s/p partial colectomy and colostomy placement 6/7/25. Had hypotension and was monitored closely in ICU. UA without UTI. Patient noted to have tachycardia 6/9/25, Nocturnist evaluated ECG and diagnosed as new onset a fib with RVR, given Cardizem x1, resumed home metoprolol, cardiology consulted. Patient back in normal sinus rhythm.  I discussed with Dr. Quintanilla, cardiologist who at this time does not recommend any oral anticoagulation given this was a isolated incident.  Will need to monitor further if this recurs then will need to reconsider this. 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  Outpatient follow-up with cardiology after patient recovers for CHAPIS. NG-tube was removed 6/9/25 as was Gabriel.  Called to patient's room as patient heart rate noted to be in the upper 170s to 180s 6/11/25, EC confirmed AFRVR, given IV lopressor x1, started on hep gtt (cleared with surgeon if no initial bolus given) and cardizem gtt. Shortly after Cardizem drip was given patient converted back to normal sinus rhythm.  Patient felt a little lightheaded during this time but that completely resolved once heart rate was improved.  She was feeling pressure in her abdomen at this time but  then was able to urinate and this had completely resolved. Patient again had recurrence of AFRVR 6/11/25 with pain in lower back radiating down her buttocks into posterior leg to level of the knee  Given pain control which did help initially however once patient moved pain then returned.  Trial lidocaine patch, IV Dilaudid as needed, as needed muscle relaxers.  Can trial heat to the area as well.  Patient converted prior to IV diltiazem being given, heart rate better controlled. Please see significant event note by our night doc. Worsening RLE abdominal and groin pain, CT showing R iliacus hematoma with central foci of contrast raising suspicion for pseudoaneurysms. Repeat hg 10.2, baseline roughly around 11-13. [facility] vascular recommends holding hep drip, monitor H/H and repeat CT in 48 hours. IR consulted here     6/12/2025: Please see significant event note by our night doc. In short: patient complained of worsening RLE abdominal and groin pain, CT showing R iliacus hematoma with central foci of contrast raising suspicion for pseudoaneurysms. Repeat hg 10.2, baseline roughly around 11-13. [facility] vascular recommends holding hep drip, monitor H/H and repeat CT in 48 hours. IR consulted here  Vitals remain stable this morning, HR 97 with /72. Hemoglobin is stable 10.1 compared to last night 10.2.  Evaluated in the presence of Dr. Brunner as patient is having new numbness on her right anterior thigh and weakness of her right hip flexion, she is sitting upright in bedside chair but discussed with PT that she was unable to put any weight on this leg as the knee continued to buckle and she only was pivoted over to the chair.  While sitting up in the chair pain is only approximately a 2 out of 10 in the right groin.  We reviewed the CT images with Dr. Mcbride, we will obtain MRI L-spine and pelvis, likely some compression of the nerve plexus from the hematoma  Recheck H&H this afternoon.  Discussed all of the above  with her daughter Vishnu with patient's permission, all questions answered.    Rounded on patient on this date with Pharmacist, RN, attending Dr. Brunner           Review of Systems   Constitutional:  Negative for appetite change, chills, diaphoresis, fatigue and fever.   HENT:  Negative for congestion, ear pain, facial swelling, hearing loss, nosebleeds, sore throat, tinnitus and trouble swallowing.    Eyes:  Negative for pain.   Respiratory:  Negative for cough, chest tightness, shortness of breath and wheezing.    Cardiovascular:  Positive for palpitations (Resolved). Negative for chest pain and leg swelling.   Gastrointestinal:  Positive for abdominal pain. Negative for blood in stool, constipation, diarrhea, nausea and vomiting.   Genitourinary:  Positive for urgency. Negative for dysuria, flank pain, frequency and hematuria.   Musculoskeletal:  Negative for back pain and joint swelling.   Skin:  Negative for rash and wound.   Neurological:  Positive for weakness (Right hip flexion) and numbness (Right anterior thigh). Negative for dizziness, syncope, light-headedness and headaches.   Hematological:  Does not bruise/bleed easily.   Psychiatric/Behavioral:  Negative for behavioral problems, hallucinations and suicidal ideas.           Objective     Last Recorded Vitals  /72 (BP Location: Left arm, Patient Position: Lying)   Pulse 97   Temp 36.3 °C (97.4 °F) (Temporal)   Resp 18   Wt 62.4 kg (137 lb 9.1 oz)   SpO2 94%     Image Results  Imaging  CT abdomen pelvis w IV contrast  Result Date: 6/11/2025  1. Ill-defined right iliacus hematoma with central foci of contrast raising suspicion for pseudoaneurysms. Contrast extravasation is not entirely excluded on single phase imaging. Correlate with labs and symptoms and if there is concern for acute blood loss, multiphase examination can be considered. 2. Postsurgical changes of partial sigmoid colectomy with end colostomy and Nory's pouch creation. No  acute postsurgical complications identified. 3. Pelvic drain is in place with the tip in the left lower quadrant. No residual drainable collections identified. 4. Diffuse hepatic steatosis. Other chronic findings as described above.   MACRO: Aaron Mccall discussed the significance and urgency of this critical finding by EPIC secure chat with  PARTH TEIXEIRA on 6/11/2025 at 10:40 pm.  (**-RCF-**) Findings:  See findings.   Signed by: Aaron Mccall 6/11/2025 10:41 PM Dictation workstation:   [id number]    XR chest 1 view  Result Date: 6/8/2025  A nasogastric tube projects into the stomach beyond the field of imaging.   Mild cardiomegaly.   Minimal linear bibasilar atelectasis. Otherwise no sign of acute cardiopulmonary disease.     MACRO: None   Signed by: Mello Fried 6/8/2025 12:33 PM Dictation workstation:   [id number]    CT abdomen pelvis w IV contrast  Result Date: 6/7/2025  1. Diverticulosis of the sigmoid colon. There is inflammatory change noted about the proximal to mid sigmoid colon most concerning for acute diverticulitis. Multiple free air locules are noted adjacent to this inflamed segment of sigmoid colon as well as small-to-moderate amount of free air within the upper abdomen consistent with perforation. No overt abscess identified.   MACRO: Donovan Ocampo discussed the significance and urgency of this critical finding by telephone with  KONG HILL on 6/7/2025 at 3:39 am. (**-RCF-**) Findings:  See findings.   Signed by: Donovan Ocampo 6/7/2025 3:40 AM Dictation workstation:   [id number]      Cardiology, Vascular, and Other Imaging  Electrocardiogram, 12-lead PRN ACS symptoms  Result Date: 6/10/2025  Atrial fibrillation with rapid ventricular response ST & T wave abnormality, consider inferior ischemia ST & T wave abnormality, consider anterolateral ischemia Abnormal ECG When compared with ECG of 07-JUN-2025 01:53, PREVIOUS ECG IS PRESENT Confirmed by Bob Hu ([id number]) on 6/10/2025  1:23:42 PM    Electrocardiogram, 12-lead PRN ACS symptoms  Result Date: 6/10/2025  Atrial fibrillation with rapid ventricular response Nonspecific ST and T wave abnormality Abnormal ECG When compared with ECG of 10-CHAVA-2025 10:11, (unconfirmed) No significant change was found Confirmed by Bob Hu ([id number]) on 6/10/2025 1:23:23 PM    Electrocardiogram, 12-lead PRN ACS symptoms  Result Date: 6/10/2025  Normal sinus rhythm Normal ECG When compared with ECG of 10-CHAVA-2025 10:11, (unconfirmed) Sinus rhythm has replaced Atrial fibrillation Vent. rate has decreased BY  89 BPM ST no longer depressed in Anterolateral leads T wave inversion now evident in Inferior leads Nonspecific T wave abnormality no longer evident in Lateral leads Confirmed by Bob Hu ([id number]) on 6/10/2025 1:23:11 PM    Transthoracic Echo Complete  Result Date: 6/9/2025              [place] [id number]      Phone [phone] Fax [phone] TRANSTHORACIC ECHOCARDIOGRAM REPORT Patient Name:       PATTI ACOSTA       [place] Physician:    [id number] Mona Quintanilla MD Study Date:         6/9/2025             Ordering Provider:    [id number] NAEL WOLF MRN/PID:            [id number]             Fellow: Accession#:         [id number]         Nurse: Date of Birth/Age:  1945 / 79      Sonographer:          Richelle Grey RDCS                     years Gender Assigned at  F                    Additional Staff: Birth: Height:             147.32 cm            Admit Date:           6/8/2025 Weight:             66.68 kg             Admission Status:     Inpatient -                                                                Routine BSA / BMI:          1.60 m2 / 30.72      Department Location:  [facility]                     kg/m2 Blood Pressure: 158 /66 mmHg Study Type:     TRANSTHORACIC ECHO (TTE) COMPLETE Diagnosis/ICD: Paroxysmal atrial fibrillation-I48.0; Dyspnea, unspecified-R06.00 Indication:    Dyspnea CPT Codes:     Echo Complete w Full [id number] Patient History: Pertinent History: HTN. Study Detail: The following Echo studies were performed: 2D, M-Mode, Doppler and               color flow.  PHYSICIAN INTERPRETATION: Left Ventricle: Left ventricular ejection fraction is low normal calculated by Valente's biplane at 55%. There are no regional wall motion abnormalities. The left ventricular cavity size is normal. There is normal septal and normal posterior left ventricular wall thickness. Spectral Doppler shows a normal pattern of left ventricular diastolic filling. Left Atrium: The left atrial size is normal. Right Ventricle: The right ventricle is normal in size. There is normal right ventricular global systolic function. Right Atrium: The right atrial size is normal. Aortic Valve: The aortic valve is trileaflet. The aortic valve area by VTI is 2.31 cmï¿½ with a peak velocity of 1.61 m/s. The peak and mean gradients are 10 mmHg and 5 mmHg, respectively, with a dimensionless index of 0.75. There is no evidence of aortic valve regurgitation. 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 Mitral Valve: The mitral valve is normal in structure. There is trace mitral valve regurgitation. The E Vmax is 1.00 m/s. Tricuspid Valve: The tricuspid valve is structurally normal. There is trace tricuspid regurgitation. The Doppler estimated right ventricular systolic pressure (RVSP) is within normal limits at 28 mmHg. Pulmonic Valve: The pulmonic valve is structurally normal. There is trace pulmonic valve regurgitation. Pericardium: No pericardial effusion noted. Aorta: The aortic root is normal. Systemic Veins: The inferior vena cava appears normal in size, IVC inspiratory collapse was not assessed.  CONCLUSIONS:  1. Left ventricular ejection fraction is low normal calculated by  Valente's biplane at 55%.  2. There is normal right ventricular global systolic function.  3. The Doppler estimated RVSP is within normal limits at 28 mmHg.  4. 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 QUANTITATIVE DATA SUMMARY:  2D MEASUREMENTS:             Normal Ranges: IVSd:            0.72 cm     (0.6-1.1cm) LVPWd:           0.71 cm     (0.6-1.1cm) LVIDd:           4.41 cm     (3.9-5.9cm) LVIDs:           2.79 cm LV Mass Index:   59.5 g/m2 LVEDV Index:     22.62 ml/m2 LV % FS          36.8 %  LEFT ATRIUM:                  Normal Ranges: LA Vol A4C:        31.5 ml    (22+/-6mL/m2) LA Vol A2C:        28.9 ml LA Vol BP:         35.2 ml LA Vol Index A4C:  19.7ml/m2 LA Vol Index A2C:  18.1 ml/m2 LA Vol Index BP:   22.0 ml/m2 LA Area A4C:       14.4 cm2 LA Area A2C:       11.8 cm2 LA Major Axis A4C: 5.6 cm LA Major Axis A2C: 4.1 cm LA Volume Index:   21.8 ml/m2 LA Vol A4C:        29.0 ml LA Vol A2C:        27.9 ml LA Vol Index BSA:  17.8 ml/m2  RIGHT ATRIUM:          Normal Ranges: RA Area A4C:  10.0 cm2  AORTA MEASUREMENTS:         Normal Ranges: Ao Sinus, d:        3.20 cm (2.1-3.5cm) Ao STJ, d:          2.40 cm (1.7-3.4cm) Asc Ao, d:          2.50 cm (2.1-3.4cm)  LV SYSTOLIC FUNCTION:                      Normal Ranges: EF-A4C View:    60 % (>=55%) EF-A2C View:    52 % EF-Biplane:     55 % LV EF Reported: 55 %  LV DIASTOLIC FUNCTION:           Normal Ranges: MV Peak E:             1.00 m/s  (0.7-1.2 m/s) MV Peak A:             0.80 m/s  (0.42-0.7 m/s) E/A Ratio:             1.26      (1.0-2.2) MV e'                  0.111 m/s (>8.0) MV lateral e'          0.11 m/s MV medial e'           0.11 m/s E/e' Ratio:            9.04      (<8.0)  MITRAL VALVE:          Normal Ranges: MV DT:        122 msec (150-240msec)  AORTIC VALVE:                      Normal Ranges: AoV Vmax:                1.61 m/s  (<=1.7m/s) AoV Peak PG:             10.4 mmHg (<20mmHg) AoV Mean P.3 mmHg  (1.7-11.5mmHg) LVOT  Max Kael:            1.04 m/s  (<=1.1m/s) AoV VTI:                 32.01 cm  (18-25cm) LVOT VTI:                24.08 cm LVOT Diameter:           1.98 cm   (1.8-2.4cm) AoV Area, VTI:           2.31 cm2  (2.5-5.5cm2) AoV Area,Vmax:           1.98 cm2  (2.5-4.5cm2) AoV Dimensionless Index: 0.75  RIGHT VENTRICLE: RV Basal 2.42 cm RV Mid   1.90 cm RV Major 6.8 cm TAPSE:   30.3 mm RV s'    0.15 m/s  TRICUSPID VALVE/RVSP:          Normal Ranges: Peak TR Velocity:     2.50 m/s Est. RA Pressure:     3 mmHg RV Syst Pressure:     28 mmHg  (< 30mmHg) IVC Diam:             1.04 cm TV e'                 0.1 m/s  AORTA: Asc Ao Diam 3.19 cm  [id number] Mona Quintanilla MD Electronically signed on 6/9/2025 at 5:35:08 PM  ** Final **     Electrocardiogram, 12-lead PRN ACS symptoms  Result Date: 6/9/2025  Sinus rhythm Atrial premature complexes RSR' in V1 or V2, right VCD or RVH         Lab Results  Results for orders placed or performed during the hospital encounter of 06/07/25 (from the past 24 hours)   Heparin Assay, UFH   Result Value Ref Range    Heparin Unfractionated 0.9 See Comment Below for Therapeutic Ranges IU/mL   CBC   Result Value Ref Range    WBC 6.3 4.4 - 11.3 x10*3/uL    nRBC 0.0 0.0 - 0.0 /100 WBCs    RBC 3.86 (L) 4.00 - 5.20 x10*6/uL    Hemoglobin 12.3 12.0 - 16.0 g/dL    Hematocrit 36.4 36.0 - 46.0 %    MCV 94 80 - 100 fL    MCH 31.9 26.0 - 34.0 pg    MCHC 33.8 32.0 - 36.0 g/dL    RDW 13.0 11.5 - 14.5 %    Platelets 165 150 - 450 x10*3/uL   Heparin Assay, UFH   Result Value Ref Range    Heparin Unfractionated 0.4 See Comment Below for Therapeutic Ranges IU/mL   Heparin Assay, UFH   Result Value Ref Range    Heparin Unfractionated 0.5 See Comment Below for Therapeutic Ranges IU/mL   CBC   Result Value Ref Range    WBC 7.9 4.4 - 11.3 x10*3/uL    nRBC 0.0 0.0 - 0.0 /100 WBCs    RBC 3.18 (L) 4.00 - 5.20 x10*6/uL    Hemoglobin 10.2 (L) 12.0 - 16.0 g/dL    Hematocrit 29.8 (L) 36.0 - 46.0 %    MCV 94 80 - 100 fL    MCH 32.1  26.0 - 34.0 pg    MCHC 34.2 32.0 - 36.0 g/dL    RDW 12.8 11.5 - 14.5 %    Platelets 193 150 - 450 x10*3/uL   CBC and Auto Differential   Result Value Ref Range    WBC 7.0 4.4 - 11.3 x10*3/uL    nRBC 0.0 0.0 - 0.0 /100 WBCs    RBC 3.15 (L) 4.00 - 5.20 x10*6/uL    Hemoglobin 10.1 (L) 12.0 - 16.0 g/dL    Hematocrit 29.5 (L) 36.0 - 46.0 %    MCV 94 80 - 100 fL    MCH 32.1 26.0 - 34.0 pg    MCHC 34.2 32.0 - 36.0 g/dL    RDW 12.9 11.5 - 14.5 %    Platelets 194 150 - 450 x10*3/uL    Neutrophils % 70.0 40.0 - 80.0 %    Immature Granulocytes %, Automated 6.4 (H) 0.0 - 0.9 %    Lymphocytes % 14.2 13.0 - 44.0 %    Monocytes % 6.5 2.0 - 10.0 %    Eosinophils % 2.0 0.0 - 6.0 %    Basophils % 0.9 0.0 - 2.0 %    Neutrophils Absolute 4.93 1.60 - 5.50 x10*3/uL    Immature Granulocytes Absolute, Automated 0.45 0.00 - 0.50 x10*3/uL    Lymphocytes Absolute 1.00 0.80 - 3.00 x10*3/uL    Monocytes Absolute 0.46 0.05 - 0.80 x10*3/uL    Eosinophils Absolute 0.14 0.00 - 0.40 x10*3/uL    Basophils Absolute 0.06 0.00 - 0.10 x10*3/uL   Basic Metabolic Panel   Result Value Ref Range    Glucose 126 (H) 74 - 99 mg/dL    Sodium 134 (L) 136 - 145 mmol/L    Potassium 3.6 3.5 - 5.3 mmol/L    Chloride 104 98 - 107 mmol/L    Bicarbonate 25 21 - 32 mmol/L    Anion Gap 9 (L) 10 - 20 mmol/L    Urea Nitrogen 15 6 - 23 mg/dL    Creatinine 0.77 0.50 - 1.05 mg/dL    eGFR 78 >60 mL/min/1.73m*2    Calcium 8.0 (L) 8.6 - 10.3 mg/dL   Magnesium   Result Value Ref Range    Magnesium 1.77 1.60 - 2.40 mg/dL   Morphology   Result Value Ref Range    RBC Morphology No significant RBC morphology present         Medications  Scheduled medications:  Scheduled Medications[1]  Continuous medications:  Continuous Medications[2]  PRN medications:  PRN Medications[3]     Physical Exam  Constitutional:       General: She is not in acute distress.     Appearance: Normal appearance.   HENT:      Head: Normocephalic and atraumatic.      Right Ear: External ear normal.      Left Ear:  External ear normal.      Nose: Nose normal.      Mouth/Throat:      Mouth: Mucous membranes are moist.      Pharynx: Oropharynx is clear.   Eyes:      Extraocular Movements: Extraocular movements intact.      Conjunctiva/sclera: Conjunctivae normal.      Pupils: Pupils are equal, round, and reactive to light.   Cardiovascular:      Rate and Rhythm: Tachycardia present. Rhythm irregular.      Pulses: Normal pulses.      Heart sounds: Murmur heard.   Pulmonary:      Effort: Pulmonary effort is normal. No respiratory distress.      Breath sounds: Normal breath sounds. No wheezing, rhonchi or rales.   Abdominal:      General: Bowel sounds are normal.      Palpations: Abdomen is soft.      Tenderness: There is abdominal tenderness. There is no right CVA tenderness, left CVA tenderness, guarding or rebound.      Comments: LOLA drain with bloody output  Left-sided ostomy is pink with small amount of hard brown stool in it   Musculoskeletal:         General: No swelling. Normal range of motion.      Cervical back: Normal range of motion and neck supple.   Skin:     General: Skin is warm and dry.      Capillary Refill: Capillary refill takes less than 2 seconds.      Findings: No lesion or rash.   Neurological:      Mental Status: She is alert and oriented to person, place, and time. Mental status is at baseline.      Gait: Gait abnormal.      Comments: RLE motor 1-2/5  Loss of sensation R thigh  Diminished sensation majority of RLE  Pulses intact   Psychiatric:         Mood and Affect: Mood normal.         Behavior: Behavior normal.                  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  Outpatient follow-up with cardiology after patient recovers for CHAPIS.  Monitor on tele  [id number] of 4-started on heparin drip 6/10/2025 with clearance from general surgery team. Heparin drip stopped 6/12/25 AM    R iliacus hematoma with central foci of contrast raising suspicion for pseudoaneurysms  Stop heparin drip  Monitor H/H closely  [facility] holding hep drip, monitor H/H and repeat CT in 48 hours  IR consulted   Appreciate general surgery recommendations  MRI lumbar and pelvis    Acute hypoxic respiratory failure  Resolved    MENG on CKD  Resolved    Hypokalemia, mild  Resolved    HTN  HLE  Monitor blood pressure, resume hypertensives as able    Breast CA on anastrozole  Status post mastectomy    GERD    Hypothyroidism  Continue Synthroid  Recent TSH reviewed    Neuropathy  Migraines  OA  Anxiety/Depression    Code Status: Full Code                 DVT ppx: SCDs only given hematoma     Please see orders for more complete plan    Bouchra Rodriguez PA-C         [1] anastrozole, 1 mg, oral, Daily  aspirin, 81 mg, oral, Daily  bacitracin, , Topical, Daily  cefepime, 2 g, intravenous, q12h  dilTIAZem, 30 mg, oral, q6h  pantoprazole, 40 mg, oral, Daily before breakfast   Or  esomeprazole, 40 mg, nasoduodenal tube, Daily before breakfast   Or  pantoprazole, 40 mg, intravenous, Daily before breakfast  levothyroxine, 25 mcg, oral, Daily  lidocaine, 1 patch, transdermal, Daily  metoprolol tartrate, 100 mg, oral, BID  metroNIDAZOLE, 500 mg, intravenous, q12h     [2] [Held by provider] heparin, 0-4,500 Units/hr, Last Rate: Stopped (06/12/25 0121)     [3] PRN medications: acetaminophen, dilTIAZem, [Held by provider] heparin, hydrALAZINE, HYDROcodone-acetaminophen, HYDROmorphone,  labetaloL, melatonin, [DISCONTINUED] ondansetron **OR** ondansetron, tiZANidine

## 2025-06-12 NOTE — SIGNIFICANT EVENT
Rosemary Motta is a 79 y.o. female on day 4 of admission presenting with Diverticulitis of large intestine with perforation without bleeding. Pt s/p partial colectomy and colostomy placement 6/7/25.  Pt also on heparin drip for new-onset afib with CHADS-Vasc of 4. There are also concerns for a fibroelastoma on echo and there are plans for CHAPIS.  Pt reporting worsening RLE abdominal and groin pain this evening. Pulses in lower extremities intact and vital signs stable.  CT a/p w contrast done showing R iliacus hematoma with central foci of contrast raising suspicion for pseudoaneurysms.  Repeat hg 10.2, baseline roughly around 11-13.  Discussed with vascular surgery at [facility] as [place] does not currently have IR coverage. Dr. Aguirre recommends holding heparin drip and monitoring hg. Repeat CT in 48 hrs to assess stability or if hemoglobin drops. IR consult in morning for possible intervention, although most of the time this type of bleeding resolves spontaneously with supportive care.

## 2025-06-12 NOTE — PROGRESS NOTES
[facility]  Patient Name: Rosemary Motta  Patient : 1945  Room/Bed: -A    HPI from cardiology consult:  Rosemary Motta is a 79 y.o. female presenting with abdominal pain and found to have perforated diverticulitis.  She is status post emergency laparotomy on 2025.  Yesterday had a bout of paroxysmal rapid atrial fibrillation.  Reviewed EKG personally.  Currently in sinus rhythm.  Asymptomatic other than abdominal pain and having a cough.     PMH: HTN, HLE, breast ca, diverticulosis, GERD, hypothyroidism, neuropathy, migraines, OA, anxiety/depression  PSH: R mastectomy, ccy, , JOSE bilateral, carpal tunnel  FH: n/a  SH: lives in [place]. no smoking, etoh.     She has no prior cardiac history.    Allergies:  Penicillins, Morphine, Clarithromycin, Hydrochlorothiazide, Tetracycline, Cortisone, Methylprednisolone, Nifedipine, and Prednisone    Subjective Data:  6/10/25: Lying in bed in no acute distress.  Daughter, Vishnu, at bedside. Cardiology was asked to resee patient d/t AF with RVR.  AF on telemetry with -180's initially. S/p IV metoprolol x1 with improvement HR down to 110-120's.   25: Had recurrent AF this morning with 's.  No current awareness of AF and denies any palpatations.  Lying flat in bed with IV nurse obtaining IV access.  She is in no acute distress and no SOB.  Reports that her hip pain has improved some, but is ongoing. Also reports back pain.  25: Heparin infusion has been held d/t right iliacus hematoma/suspected pseudoaneurysms. Hgb 10.1. Denies any chest pain/pressure, SOB or palpitations.  Main c/o right groin area pain, right thigh to knee numbness. SR on telemetry with HR 77 bpm.    Overnight Events:  CT abd/pelvis - Ill-defined right iliacus hematoma with central foci of contrast raising suspicion for pseudoaneurysms.    Objective Data:  Vitals:    25 1906 25 2307 25 0325 25  "0718   BP: 166/80 143/73 153/68 148/72   BP Location: Left arm Left arm Left arm Left arm   Patient Position: Lying Lying Lying Lying   Pulse: 73 67 72 97   Resp: 18 18 18 18   Temp: 36.1 °C (97 °F) 36.6 °C (97.8 °F) 36.2 °C (97.2 °F) 36.3 °C (97.4 °F)   TempSrc: Temporal Temporal Temporal Temporal   SpO2: 96% 96% 95% 94%   Weight:   62.4 kg (137 lb 9.1 oz)    Height:           Reviewed the following Labs:  Results from last 7 days   Lab Units 06/12/25  0338 06/12/25  0024 06/11/25  0925   WBC AUTO x10*3/uL 7.0 7.9 6.3   HEMOGLOBIN g/dL 10.1* 10.2* 12.3   HEMATOCRIT % 29.5* 29.8* 36.4   PLATELETS AUTO x10*3/uL 194 193 165       Results from last 7 days   Lab Units 06/12/25  0338 06/11/25  0358 06/10/25  0458 06/09/25  0531   SODIUM mmol/L 134* 140 137 139   POTASSIUM mmol/L 3.6 3.7 3.8 4.1   CHLORIDE mmol/L 104 106 108* 106   CO2 mmol/L 25 27 24 23   BUN mg/dL 15 12 12 11   CREATININE mg/dL 0.77 0.80 0.84 0.88   CALCIUM mg/dL 8.0* 7.6* 7.9* 7.8*   PROTEIN TOTAL g/dL  --  4.9* 5.3* 5.1*   BILIRUBIN TOTAL mg/dL  --  0.4 0.6 0.6   ALK PHOS U/L  --  40 41 43   ALT U/L  --  10 11 12   AST U/L  --  17 22 24   GLUCOSE mg/dL 126* 104* 95 94       Results from last 7 days   Lab Units 06/12/25  0338 06/11/25  0358 06/10/25  0458   MAGNESIUM mg/dL 1.77 1.75 2.05       No results found for: \"LDLF\"     No results found for: \"BNP\"    No results found for: \"TROPHS\"     No results found for: \"TSH\", \"FT4\"        No results found for: \"HGBA1C\"    Intake/Output    Intake/Output Summary (Last 24 hours) at 6/12/2025 0905  Last data filed at 6/12/2025 0718  Gross per 24 hour   Intake 891 ml   Output 560 ml   Net 331 ml      I/O last 2 completed shifts:  In: 891 (14.3 mL/kg) [P.O.:591; IV Piggyback:300]  Out: 540 (8.7 mL/kg) [Urine:500 (0.3 mL/kg/hr); Drains:40]  Weight: 62.4 kg     Past Medical History:  She has a past medical history of Anxiety and depression, Breast cancer, CKD (chronic kidney disease), HLD (hyperlipidemia), HTN " (hypertension), Hypothyroidism, and Pulmonary embolism.    Past Surgical History:  She has a past surgical history that includes Cholecystectomy; Carpal tunnel release (Bilateral);  section, classic; Total hip arthroplasty (Left); Mastectomy (Right, 2018); Breast biopsy; Foot surgery (Bilateral); and Colostomy (2025).      Social History:  She reports that she has never smoked. She has never used smokeless tobacco. She reports that she does not currently use alcohol. She reports that she does not use drugs.    Family History:  Family History[1]    Outpatient Medications  Medications Ordered Prior to Encounter[2]    Scheduled medications  Scheduled Medications[3]  Continuous medications  Continuous Medications[4]  PRN medications  PRN Medications[5]   Prescriptions Prior to Admission[6]    Reviewed the following cardiology tests:    Echo 25:   1. Left ventricular ejection fraction is low normal calculated by Valente's biplane at 55%.   2. There is normal right ventricular global systolic function.   3. The Doppler estimated RVSP is within normal limits at 28 mmHg.   4. 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  EF   Date/Time Value Ref Range Status   2025 10:13 AM 55 %         Physical Exam:  Vitals reviewed.   Constitutional:       Appearance: Not in distress.   Pulmonary:      Effort: Pulmonary effort is normal.      Breath sounds: Normal breath sounds.   Cardiovascular:      PMI at left midclavicular line. Normal rate. Regular rhythm. S1 with normal intensity. S2 with normal intensity.       Murmurs: There is no murmur.      Comments: +right dorsalis pedis pulse heard easily with doppler  Pulses:     Intact distal pulses.   Edema:     Peripheral edema absent.   Abdominal:      General: Bowel sounds are normal.      Comments: +ostomy   Neurological:      Mental Status: Alert and oriented to person, place and time.         Assessment/Plan:   1-paroxysmal atrial fibrillation-in  setting of sepsis, perforated diverticulitis and postop state.  Transient and has resolved now.  No change in management.  Continue treating underlying medical problems.  Cardiology will sign off.  She was advised to follow-up with her PCP and with us as needed if there is any future recurrence of atrial fibrillation which seems unlikely at this point.  6/10/25: Cardiology asked to resee patient for recurrent AF with RVR.  AF on telemetry with -180's initially. S/p IV metoprolol 5 mg x1 with improvement HR down to 110-120's. Started on diltiazem infusion. She only required diltiazem infusion briefly and then self converted to SR.  Metoprolol tartrate was increased yesterday evening to 100 mg BID which should be continued.   Hypertension - Yes, 1 point, Female - Yes, 1 point, and Age over 75 years - 2 points  [id number] score is at least 4.   Discussed with Dr. RONAK Santana via [employer] secure chat regarding anticoagulation.  Surgery team okay with heparin infusion with no bolus.  Heparin infusion initiated.  Transition to oral anticoagulation with apixaban when okay with surgery team/per hospitalist when able.   6/11/25: Recurrent AF with RVR this morning with 's.  Ordered diltiazem 5 mg IV x1.  Will add diltiazem 30 mg q6 hr PO.  Continue heparin infusion and metoprolol tartrate.   6/12/25: Heparin infusion has been held d/t right iliacus hematoma.  Currently SR on telemetry. Continue metoprolol tartrate and diltiazem with PVCs. Magnesium 1.77 and IV replacement ordered. TSH normal 6/7/25.    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  Outpatient follow-up with cardiology after patient recovers for CHAPIS.  6/10/25: Blood cultures x2 from 6/8/25 are negative x1 day.  ID following.  Plan is for outpatient CHAPIS in 6 weeks.  6/12/25: Blood cultures x2 negative x2 days.    3. Right iliacus hematoma  CT abd/pelvis - Ill-defined right  iliacus hematoma with central foci of contrast raising suspicion for pseudoaneurysms. Heparin infusion was been stopped.     4. Hypomagnesemia/hypokalemia  6/12/25: Magnesium 1.77 and IV replacement ordered.  Potassium 3.6 and PO replacement ordered.  Recommend keeping potassium >4 and magnesium >2    Code Status  Full Code      Yasmine KIMBERLY Chicas, APRN-CNP          [1]   Family History  Problem Relation Name Age of Onset    Breast cancer Mother  65   [2]   No current facility-administered medications on file prior to encounter.     Current Outpatient Medications on File Prior to Encounter   Medication Sig Dispense Refill    anastrozole (Arimidex) 1 mg tablet Take 1 tablet (1 mg total) by mouth once daily.      artificial tears, dextran-hypomel-glycerin, 0.1-0.3-0.2 % ophthalmic solution Administer 1 drop into affected eye(s) 4 times a day as needed.      aspirin 81 mg EC tablet Take 1 tablet (81 mg) by mouth once daily.      calcium carbonate-vitamin D3 500 mg-5 mcg (200 unit) tablet Take 2 tablets by mouth once daily.      levothyroxine (Synthroid, Levoxyl) 25 mcg tablet Take 1 tablet (25 mcg) by mouth once daily.      metoprolol tartrate (Lopressor) 50 mg tablet Take 3 tablets by mouth twice a day.      olmesartan (BENIcar) 40 mg tablet Take 1 tablet (40 mg) by mouth once daily.      polyethylene glycol (Glycolax, Miralax) 17 gram packet Take 8.5 g by mouth once daily.      [DISCONTINUED] calcium citrate 250 mg calcium tablet Calcium Citrate 1040 MG TABS   Refills: 0        Start : 20-Sep-2021   Active      [DISCONTINUED] rosuvastatin (Crestor) 5 mg tablet Take 1 tablet (5 mg) by mouth once daily.     [3] anastrozole, 1 mg, oral, Daily  aspirin, 81 mg, oral, Daily  bacitracin, , Topical, Daily  cefepime, 2 g, intravenous, q12h  dilTIAZem, 30 mg, oral, q6h  pantoprazole, 40 mg, oral, Daily before breakfast   Or  esomeprazole, 40 mg, nasoduodenal tube, Daily before breakfast   Or  pantoprazole, 40 mg, intravenous,  Daily before breakfast  levothyroxine, 25 mcg, oral, Daily  lidocaine, 1 patch, transdermal, Daily  magnesium hydroxide, 30 mL, oral, Daily  metoprolol tartrate, 100 mg, oral, BID  metroNIDAZOLE, 500 mg, intravenous, q12h     [4] [Held by provider] heparin, 0-4,500 Units/hr, Last Rate: Stopped (06/12/25 0121)     [5] PRN medications: acetaminophen, dilTIAZem, [Held by provider] heparin, hydrALAZINE, HYDROcodone-acetaminophen, HYDROmorphone, labetaloL, melatonin, [DISCONTINUED] ondansetron **OR** ondansetron, tiZANidine  [6]   Medications Prior to Admission   Medication Sig Dispense Refill Last Dose/Taking    anastrozole (Arimidex) 1 mg tablet Take 1 tablet (1 mg total) by mouth once daily.       artificial tears, dextran-hypomel-glycerin, 0.1-0.3-0.2 % ophthalmic solution Administer 1 drop into affected eye(s) 4 times a day as needed.       aspirin 81 mg EC tablet Take 1 tablet (81 mg) by mouth once daily.       calcium carbonate-vitamin D3 500 mg-5 mcg (200 unit) tablet Take 2 tablets by mouth once daily.       levothyroxine (Synthroid, Levoxyl) 25 mcg tablet Take 1 tablet (25 mcg) by mouth once daily.       metoprolol tartrate (Lopressor) 50 mg tablet Take 3 tablets by mouth twice a day.       olmesartan (BENIcar) 40 mg tablet Take 1 tablet (40 mg) by mouth once daily.       polyethylene glycol (Glycolax, Miralax) 17 gram packet Take 8.5 g by mouth once daily.

## 2025-06-12 NOTE — PROGRESS NOTES
Rosemary Motta is a 79 y.o. female on day 5 of admission presenting with Diverticulitis of large intestine with perforation without bleeding.      Assessment/Plan:     #Acute diverticulitis  #Bowel perforation s/p ostomy 6/7/2025  #Sepsis, POA, resolved  Patient started on ciprofloxacin and Flagyl due to penicillin allergy.  Patient was found to have short segment of mid sigmoid colon with inflammation and perforation with moderate amount of purulent fluid throughout the abdomen.  No cultures obtained.     #Penicillin allergy  Reports history of syncope when she was a child.  Discussed with the patient that it is safe to get cefepime as it does not have cross-reactivity.  Tolerated cefepime well without any issues     #CKD  Estimated Creatinine Clearance: 43.5 mL/min (by C-G formula based on SCr of 0.77 mg/dL).    #R iliacus hematoma  Patient had left groin pain yesterday for which CT abdomen/pelvis was done which showed hematoma with findings concerning for pseudoaneurysm.     HTN, HLD  Breast cancer s/p right mastectomy, on anastrozole  GERD  Hypothyroidism  Paroxysmal A-fib     Recommendations:     -Cont cefepime 2 g every 12hr  -Cont Flagyl 500 twice daily  -Continue antibiotics through 6/12/25 to finish 5 day course   -Renally dose antibiotic  -Will continue to follow        Mikal Ceballos MD  Date of service: 6/12/2025  Time of service: 11:18 AM      Subjective   Interval History: No acute events overnight.  Patient denies any new complaint       Review of Systems  Denies: fever, chills, nausea, vomiting, dysuria    Objective   Range of Vitals (last 24 hours)  Heart Rate:  [67-97]   Temp:  [36.1 °C (97 °F)-37.7 °C (99.8 °F)]   Resp:  [16-18]   BP: (134-182)/(68-81)   Weight:  [62.4 kg (137 lb 9.1 oz)]   SpO2:  [94 %-97 %]  Daily Weight  06/12/25 : 62.4 kg (137 lb 9.1 oz)   Body mass index is 28.75 kg/m².      General: alert, oriented, NAD  HEENT: No conjunctival pallor, no scleral icterus  Abdomen: soft, ostomy  Anxiety and depression     2.  Thrombocytopenia ([facility] [address]           Plan:      Orders Placed This Encounter   Medications    escitalopram (LEXAPRO) 5 MG tablet     Sig: Take 1 tablet by mouth daily     Dispense:  90 tablet     Refill:  1     Orders Placed This Encounter   Procedures    CBC Auto Differential     Standing Status:   Future     Standing Expiration Date:   11/2/2019   f/u in 6 months after non fasting blood work bag, midline surgical incision with staples, LOLA drain  Neuro: AAO x 3  Extremities: no edema, no cyanosis  Skin: No rashes   MSK: No joint inflammation    Antibiotics  bacitracin - 500 unit/gram  cefepime - 2 gram/50 mL  metroNIDAZOLE - 500 mg/100 mL      Relevant Results  Labs  Results from last 72 hours   Lab Units 06/12/25  0338 06/12/25  0024 06/11/25  0925 06/11/25 0358 06/10/25  0458   WBC AUTO x10*3/uL 7.0 7.9 6.3 5.3 5.8   HEMOGLOBIN g/dL 10.1* 10.2* 12.3 10.7* 10.9*   HEMATOCRIT % 29.5* 29.8* 36.4 32.2* 32.6*   PLATELETS AUTO x10*3/uL 194 193 165 163 143*   NEUTROS PCT AUTO % 70.0  --   --  60.8 72.5   LYMPHS PCT AUTO % 14.2  --   --  25.0 18.8   MONOS PCT AUTO % 6.5  --   --  6.4 5.2   EOS PCT AUTO % 2.0  --   --  3.8 2.2     Results from last 72 hours   Lab Units 06/12/25 0338 06/11/25 0358 06/10/25  0458   SODIUM mmol/L 134* 140 137   POTASSIUM mmol/L 3.6 3.7 3.8   CHLORIDE mmol/L 104 106 108*   CO2 mmol/L 25 27 24   BUN mg/dL 15 12 12   CREATININE mg/dL 0.77 0.80 0.84   GLUCOSE mg/dL 126* 104* 95   CALCIUM mg/dL 8.0* 7.6* 7.9*   ANION GAP mmol/L 9* 11 9*   EGFR mL/min/1.73m*2 78 75 70     Results from last 72 hours   Lab Units 06/11/25  0358 06/10/25  0458   ALK PHOS U/L 40 41   BILIRUBIN TOTAL mg/dL 0.4 0.6   PROTEIN TOTAL g/dL 4.9* 5.3*   ALT U/L 10 11   AST U/L 17 22   ALBUMIN g/dL 2.8* 3.0*     Estimated Creatinine Clearance: 43.5 mL/min (by C-G formula based on SCr of 0.77 mg/dL).  C-Reactive Protein   Date Value Ref Range Status   03/02/2024 <0.10 <1.00 mg/dL Final       Microbiology  No results found for the last 14 days.      Imaging    XR chest 1 view  Result Date: 6/8/2025  A nasogastric tube projects into the stomach beyond the field of imaging.   Mild cardiomegaly.   Minimal linear bibasilar atelectasis. Otherwise no sign of acute cardiopulmonary disease.     MACRO: None   Signed by: Mello Fried 6/8/2025 12:33 PM Dictation workstation:   [id number]    CT abdomen pelvis w IV  contrast  Result Date: 6/7/2025  1. Diverticulosis of the sigmoid colon. There is inflammatory change noted about the proximal to mid sigmoid colon most concerning for acute diverticulitis. Multiple free air locules are noted adjacent to this inflamed segment of sigmoid colon as well as small-to-moderate amount of free air within the upper abdomen consistent with perforation. No overt abscess identified.   MACRO: Donovan Ocampo discussed the significance and urgency of this critical finding by telephone with  KONG HILL on 6/7/2025 at 3:39 am. (**-RCF-**) Findings:  See findings.   Signed by: Donovan Ocampo 6/7/2025 3:40 AM Dictation workstation:   [id number]

## 2025-06-12 NOTE — PROGRESS NOTES
Occupational Therapy                 Therapy Communication Note    Patient Name: Rosemary Motta  MRN: [id number]  Department: [address]  Room: 2025/2025-A  Today's Date: 6/12/2025     Discipline: Occupational Therapy    Missed Visit: OT Missed Visit: Yes     Missed Visit Reason: Missed Visit Reason: Patient in a medical procedure    Missed Time: Attempt at 1545.

## 2025-06-13 LAB
ANION GAP SERPL CALC-SCNC: 12 MMOL/L (ref 10–20)
BACTERIA BLD CULT: NORMAL
BACTERIA BLD CULT: NORMAL
BASOPHILS # BLD MANUAL: 0.1 X10*3/UL (ref 0–0.1)
BASOPHILS NFR BLD MANUAL: 1 %
BUN SERPL-MCNC: 13 MG/DL (ref 6–23)
CALCIUM SERPL-MCNC: 8.2 MG/DL (ref 8.6–10.3)
CHLORIDE SERPL-SCNC: 105 MMOL/L (ref 98–107)
CO2 SERPL-SCNC: 24 MMOL/L (ref 21–32)
CREAT SERPL-MCNC: 0.77 MG/DL (ref 0.5–1.05)
EGFRCR SERPLBLD CKD-EPI 2021: 78 ML/MIN/1.73M*2
EOSINOPHIL # BLD MANUAL: 0.1 X10*3/UL (ref 0–0.4)
EOSINOPHIL NFR BLD MANUAL: 1 %
ERYTHROCYTE [DISTWIDTH] IN BLOOD BY AUTOMATED COUNT: 12.9 % (ref 11.5–14.5)
GLUCOSE SERPL-MCNC: 112 MG/DL (ref 74–99)
HCT VFR BLD AUTO: 29.3 % (ref 36–46)
HCT VFR BLD AUTO: 29.8 % (ref 36–46)
HGB BLD-MCNC: 10.1 G/DL (ref 12–16)
HGB BLD-MCNC: 10.3 G/DL (ref 12–16)
IMM GRANULOCYTES # BLD AUTO: 0.82 X10*3/UL (ref 0–0.5)
IMM GRANULOCYTES NFR BLD AUTO: 8.5 % (ref 0–0.9)
LYMPHOCYTES # BLD MANUAL: 1.65 X10*3/UL (ref 0.8–3)
LYMPHOCYTES NFR BLD MANUAL: 17 %
MAGNESIUM SERPL-MCNC: 2.14 MG/DL (ref 1.6–2.4)
MCH RBC QN AUTO: 32.3 PG (ref 26–34)
MCHC RBC AUTO-ENTMCNC: 34.6 G/DL (ref 32–36)
MCV RBC AUTO: 93 FL (ref 80–100)
METAMYELOCYTES # BLD MANUAL: 0.19 X10*3/UL
METAMYELOCYTES NFR BLD MANUAL: 2 %
MONOCYTES # BLD MANUAL: 0 X10*3/UL (ref 0.05–0.8)
MONOCYTES NFR BLD MANUAL: 0 %
MYELOCYTES # BLD MANUAL: 0.1 X10*3/UL
MYELOCYTES NFR BLD MANUAL: 1 %
NEUTROPHILS # BLD MANUAL: 7.57 X10*3/UL (ref 1.6–5.5)
NEUTS BAND # BLD MANUAL: 0.39 X10*3/UL (ref 0–0.5)
NEUTS BAND NFR BLD MANUAL: 4 %
NEUTS SEG # BLD MANUAL: 7.18 X10*3/UL (ref 1.6–5)
NEUTS SEG NFR BLD MANUAL: 74 %
NRBC BLD-RTO: 0 /100 WBCS (ref 0–0)
PLATELET # BLD AUTO: 228 X10*3/UL (ref 150–450)
POLYCHROMASIA BLD QL SMEAR: ABNORMAL
POTASSIUM SERPL-SCNC: 4.3 MMOL/L (ref 3.5–5.3)
RBC # BLD AUTO: 3.19 X10*6/UL (ref 4–5.2)
RBC MORPH BLD: ABNORMAL
SODIUM SERPL-SCNC: 137 MMOL/L (ref 136–145)
TOTAL CELLS COUNTED BLD: 100
WBC # BLD AUTO: 9.7 X10*3/UL (ref 4.4–11.3)

## 2025-06-13 PROCEDURE — 36415 COLL VENOUS BLD VENIPUNCTURE: CPT | Performed by: PHYSICIAN ASSISTANT

## 2025-06-13 PROCEDURE — 85027 COMPLETE CBC AUTOMATED: CPT | Performed by: PHYSICIAN ASSISTANT

## 2025-06-13 PROCEDURE — 2500000001 HC RX 250 WO HCPCS SELF ADMINISTERED DRUGS (ALT 637 FOR MEDICARE OP): Performed by: INTERNAL MEDICINE

## 2025-06-13 PROCEDURE — 97112 NEUROMUSCULAR REEDUCATION: CPT | Mod: GP,CQ | Performed by: PHYSICAL THERAPY ASSISTANT

## 2025-06-13 PROCEDURE — 2500000004 HC RX 250 GENERAL PHARMACY W/ HCPCS (ALT 636 FOR OP/ED): Performed by: INTERNAL MEDICINE

## 2025-06-13 PROCEDURE — 2060000001 HC INTERMEDIATE ICU ROOM DAILY

## 2025-06-13 PROCEDURE — 80048 BASIC METABOLIC PNL TOTAL CA: CPT | Performed by: PHYSICIAN ASSISTANT

## 2025-06-13 PROCEDURE — 2500000001 HC RX 250 WO HCPCS SELF ADMINISTERED DRUGS (ALT 637 FOR MEDICARE OP): Performed by: STUDENT IN AN ORGANIZED HEALTH CARE EDUCATION/TRAINING PROGRAM

## 2025-06-13 PROCEDURE — 97530 THERAPEUTIC ACTIVITIES: CPT | Mod: GP,CQ | Performed by: PHYSICAL THERAPY ASSISTANT

## 2025-06-13 PROCEDURE — 85014 HEMATOCRIT: CPT | Performed by: PHYSICIAN ASSISTANT

## 2025-06-13 PROCEDURE — 99024 POSTOP FOLLOW-UP VISIT: CPT | Performed by: SURGERY

## 2025-06-13 PROCEDURE — 99232 SBSQ HOSP IP/OBS MODERATE 35: CPT | Performed by: NURSE PRACTITIONER

## 2025-06-13 PROCEDURE — 83735 ASSAY OF MAGNESIUM: CPT | Performed by: PHYSICIAN ASSISTANT

## 2025-06-13 PROCEDURE — 2500000002 HC RX 250 W HCPCS SELF ADMINISTERED DRUGS (ALT 637 FOR MEDICARE OP, ALT 636 FOR OP/ED): Performed by: PHYSICIAN ASSISTANT

## 2025-06-13 PROCEDURE — 97530 THERAPEUTIC ACTIVITIES: CPT | Mod: GO,CO

## 2025-06-13 PROCEDURE — 2500000005 HC RX 250 GENERAL PHARMACY W/O HCPCS: Performed by: INTERNAL MEDICINE

## 2025-06-13 PROCEDURE — 87040 BLOOD CULTURE FOR BACTERIA: CPT | Mod: PORLAB | Performed by: STUDENT IN AN ORGANIZED HEALTH CARE EDUCATION/TRAINING PROGRAM

## 2025-06-13 PROCEDURE — 2500000001 HC RX 250 WO HCPCS SELF ADMINISTERED DRUGS (ALT 637 FOR MEDICARE OP): Performed by: NURSE PRACTITIONER

## 2025-06-13 PROCEDURE — 99233 SBSQ HOSP IP/OBS HIGH 50: CPT | Performed by: PHYSICIAN ASSISTANT

## 2025-06-13 PROCEDURE — 85007 BL SMEAR W/DIFF WBC COUNT: CPT | Performed by: PHYSICIAN ASSISTANT

## 2025-06-13 PROCEDURE — 2500000005 HC RX 250 GENERAL PHARMACY W/O HCPCS: Performed by: PHYSICIAN ASSISTANT

## 2025-06-13 RX ORDER — DILTIAZEM HCL/D5W 125 MG/125
5-15 PLASTIC BAG, INJECTION (ML) INTRAVENOUS CONTINUOUS
Status: DISCONTINUED | OUTPATIENT
Start: 2025-06-13 | End: 2025-06-19 | Stop reason: HOSPADM

## 2025-06-13 RX ADMIN — METRONIDAZOLE 500 MG: 500 INJECTION, SOLUTION INTRAVENOUS at 04:03

## 2025-06-13 RX ADMIN — ASPIRIN 81 MG: 81 TABLET, COATED ORAL at 08:29

## 2025-06-13 RX ADMIN — TIZANIDINE 2 MG: 2 TABLET ORAL at 12:34

## 2025-06-13 RX ADMIN — DILTIAZEM HYDROCHLORIDE 30 MG: 30 TABLET, FILM COATED ORAL at 00:07

## 2025-06-13 RX ADMIN — BACITRACIN ZINC: 500 OINTMENT TOPICAL at 08:30

## 2025-06-13 RX ADMIN — METOPROLOL TARTRATE 100 MG: 50 TABLET, FILM COATED ORAL at 08:29

## 2025-06-13 RX ADMIN — LIDOCAINE 4% 1 PATCH: 40 PATCH TOPICAL at 08:29

## 2025-06-13 RX ADMIN — ANASTROZOLE 1 MG: 1 TABLET, COATED ORAL at 08:29

## 2025-06-13 RX ADMIN — ONDANSETRON 4 MG: 2 INJECTION, SOLUTION INTRAMUSCULAR; INTRAVENOUS at 11:58

## 2025-06-13 RX ADMIN — CEFEPIME HYDROCHLORIDE 2 G: 2 INJECTION, SOLUTION INTRAVENOUS at 08:29

## 2025-06-13 RX ADMIN — DILTIAZEM HYDROCHLORIDE 30 MG: 30 TABLET, FILM COATED ORAL at 11:58

## 2025-06-13 RX ADMIN — DILTIAZEM HYDROCHLORIDE 30 MG: 30 TABLET, FILM COATED ORAL at 23:13

## 2025-06-13 RX ADMIN — Medication 3 MG: at 23:13

## 2025-06-13 RX ADMIN — Medication 5 MG/HR: at 19:24

## 2025-06-13 RX ADMIN — CEFEPIME HYDROCHLORIDE 2 G: 2 INJECTION, SOLUTION INTRAVENOUS at 23:13

## 2025-06-13 RX ADMIN — PANTOPRAZOLE SODIUM 40 MG: 40 TABLET, DELAYED RELEASE ORAL at 06:12

## 2025-06-13 RX ADMIN — DILTIAZEM HYDROCHLORIDE 30 MG: 30 TABLET, FILM COATED ORAL at 17:42

## 2025-06-13 RX ADMIN — MAGNESIUM HYDROXIDE 30 ML: 400 SUSPENSION ORAL at 08:29

## 2025-06-13 RX ADMIN — METRONIDAZOLE 500 MG: 500 INJECTION, SOLUTION INTRAVENOUS at 14:59

## 2025-06-13 RX ADMIN — DILTIAZEM HYDROCHLORIDE 30 MG: 30 TABLET, FILM COATED ORAL at 06:12

## 2025-06-13 RX ADMIN — METOPROLOL TARTRATE 100 MG: 50 TABLET, FILM COATED ORAL at 20:11

## 2025-06-13 RX ADMIN — LEVOTHYROXINE SODIUM 25 MCG: 0.03 TABLET ORAL at 08:29

## 2025-06-13 ASSESSMENT — COGNITIVE AND FUNCTIONAL STATUS - GENERAL
WALKING IN HOSPITAL ROOM: TOTAL
PERSONAL GROOMING: A LITTLE
MOVING TO AND FROM BED TO CHAIR: A LOT
DRESSING REGULAR LOWER BODY CLOTHING: A LOT
MOVING FROM LYING ON BACK TO SITTING ON SIDE OF FLAT BED WITH BEDRAILS: A LITTLE
MOVING TO AND FROM BED TO CHAIR: A LOT
MOBILITY SCORE: 11
DRESSING REGULAR UPPER BODY CLOTHING: A LITTLE
DAILY ACTIVITIY SCORE: 14
EATING MEALS: A LITTLE
PERSONAL GROOMING: A LITTLE
TURNING FROM BACK TO SIDE WHILE IN FLAT BAD: A LOT
STANDING UP FROM CHAIR USING ARMS: A LOT
DRESSING REGULAR UPPER BODY CLOTHING: A LOT
CLIMB 3 TO 5 STEPS WITH RAILING: TOTAL
DRESSING REGULAR LOWER BODY CLOTHING: A LITTLE
DAILY ACTIVITIY SCORE: 19
CLIMB 3 TO 5 STEPS WITH RAILING: A LOT
STANDING UP FROM CHAIR USING ARMS: A LOT
TOILETING: A LOT
HELP NEEDED FOR BATHING: A LITTLE
WALKING IN HOSPITAL ROOM: A LOT
HELP NEEDED FOR BATHING: A LOT
TOILETING: A LITTLE
MOBILITY SCORE: 16

## 2025-06-13 ASSESSMENT — ENCOUNTER SYMPTOMS
DYSURIA: 0
HALLUCINATIONS: 0
MYALGIAS: 1
HEMATURIA: 0
CONSTIPATION: 0
APPETITE CHANGE: 0
BACK PAIN: 0
SORE THROAT: 0
TROUBLE SWALLOWING: 0
NUMBNESS: 1
WOUND: 1
ABDOMINAL PAIN: 1
FREQUENCY: 0
ARTHRALGIAS: 1
DIAPHORESIS: 0
BRUISES/BLEEDS EASILY: 0
JOINT SWELLING: 0
CONFUSION: 0
AGITATION: 0
FACIAL SWELLING: 0
PALPITATIONS: 1
FLANK PAIN: 0
FATIGUE: 1
SHORTNESS OF BREATH: 0
VOMITING: 0
EYE REDNESS: 0
COUGH: 0
DIZZINESS: 0
FATIGUE: 0
WOUND: 0
BACK PAIN: 1
DIARRHEA: 0
LIGHT-HEADEDNESS: 0
HEADACHES: 0
WEAKNESS: 1
NAUSEA: 0
FEVER: 0
BLOOD IN STOOL: 0
SPEECH DIFFICULTY: 0
CHILLS: 0
WHEEZING: 0
EYE PAIN: 0
BRUISES/BLEEDS EASILY: 1
CHEST TIGHTNESS: 0

## 2025-06-13 ASSESSMENT — PAIN - FUNCTIONAL ASSESSMENT
PAIN_FUNCTIONAL_ASSESSMENT: 0-10
PAIN_FUNCTIONAL_ASSESSMENT: 0-10

## 2025-06-13 ASSESSMENT — PAIN SCALES - GENERAL
PAINLEVEL_OUTOF10: 5 - MODERATE PAIN
PAINLEVEL_OUTOF10: 5 - MODERATE PAIN
PAINLEVEL_OUTOF10: 3

## 2025-06-13 NOTE — PROGRESS NOTES
"    GENERAL SURGERY / TRAUMA PROGRESS NOTE    Patient: Rosemary Motta  Room: 2025/2025-A    Age: 79 y.o.   Gender: female  Attending: Joseph Mcbride MD    MRN: [id number]  Admission Date: 6/7/2025    PCP: Prema Romero DO       Rosemary Motta is a 79 y.o. female on day 6  of admission presenting with Diverticulitis of large intestine with perforation without bleeding.    SUBJECTIVE   Interval History:  No nausea this morning.  Abdominal pain improved.  Ostomy was digitized at bedside.  Patient did take milk of magnesia yesterday but reports minimal output from stoma    ROS  Review of Systems   Constitutional:  Positive for fatigue. Negative for chills and fever.   HENT:  Positive for hearing loss. Negative for congestion and ear pain.    Eyes:  Negative for pain and redness.   Respiratory:  Negative for cough and shortness of breath.    Cardiovascular:  Positive for leg swelling. Negative for chest pain.   Gastrointestinal:  Positive for abdominal pain. Negative for nausea and vomiting.   Genitourinary:  Negative for flank pain and hematuria.   Musculoskeletal:  Positive for arthralgias and myalgias.   Skin:  Positive for wound. Negative for rash.   Allergic/Immunologic: Negative for immunocompromised state.   Neurological:  Positive for weakness. Negative for speech difficulty and headaches.   Hematological:  Bruises/bleeds easily.   Psychiatric/Behavioral:  Negative for agitation and confusion.           OBJECTIVE   Last Recorded Vitals  Blood pressure 165/71, pulse 89, temperature 36.9 °C (98.4 °F), temperature source Temporal, resp. rate 16, height 1.473 m (4' 10\"), weight 63.8 kg (140 lb 10.5 oz), SpO2 94%.    Intake/Output last 3 Shifts:  I/O last 3 completed shifts:  In: 891 (14.3 mL/kg) [P.O.:591; IV Piggyback:300]  Out: 580 (9.3 mL/kg) [Urine:500 (0.2 mL/kg/hr); Drains:80]  Weight: 62.4 kg     PHYSICAL EXAM  Physical Exam   General: Well-developed, well-nourished and in no acute distress.  Head: " Normocephalic. Atraumatic.  Neck/thyroid: Neck is supple.   Eyes: Pupils equal round and reactive to light. Conjunctiva normal.  ENMT: No masses or deformity of external nose. External ears without masses.  Respiratory/Chest:  Normal respiratory effort.  Breast: s/p right mastectomy  Cardiovascular: Regular rate and rhythm.   Abdomen: Soft and nondistended.  Midline incision is clean, dry and intact.  Colostomy in the left lower quadrant is pink and viable with moderate amounts of gas and minimal stool output.  Musculoskeletal: Joints and limbs are grossly normal.   Neuro: Oriented to person, place and time. No obvious neurological deficit.   Psych: Normal mood and affect.  Awake and talkative.    RESULTS   Labs  Results for orders placed or performed during the hospital encounter of 06/07/25 (from the past 24 hours)   Hemoglobin and hematocrit, blood   Result Value Ref Range    Hemoglobin 10.1 (L) 12.0 - 16.0 g/dL    Hematocrit 29.7 (L) 36.0 - 46.0 %   Hemoglobin and hematocrit, blood   Result Value Ref Range    Hemoglobin 10.0 (L) 12.0 - 16.0 g/dL    Hematocrit 29.3 (L) 36.0 - 46.0 %   Hemoglobin and hematocrit, blood   Result Value Ref Range    Hemoglobin 10.1 (L) 12.0 - 16.0 g/dL    Hematocrit 29.3 (L) 36.0 - 46.0 %   CBC and Auto Differential   Result Value Ref Range    WBC 9.7 4.4 - 11.3 x10*3/uL    nRBC 0.0 0.0 - 0.0 /100 WBCs    RBC 3.19 (L) 4.00 - 5.20 x10*6/uL    Hemoglobin 10.3 (L) 12.0 - 16.0 g/dL    Hematocrit 29.8 (L) 36.0 - 46.0 %    MCV 93 80 - 100 fL    MCH 32.3 26.0 - 34.0 pg    MCHC 34.6 32.0 - 36.0 g/dL    RDW 12.9 11.5 - 14.5 %    Platelets 228 150 - 450 x10*3/uL    Immature Granulocytes %, Automated 8.5 (H) 0.0 - 0.9 %    Immature Granulocytes Absolute, Automated 0.82 (H) 0.00 - 0.50 x10*3/uL   Basic Metabolic Panel   Result Value Ref Range    Glucose 112 (H) 74 - 99 mg/dL    Sodium 137 136 - 145 mmol/L    Potassium 4.3 3.5 - 5.3 mmol/L    Chloride 105 98 - 107 mmol/L    Bicarbonate 24 21 - 32  mmol/L    Anion Gap 12 10 - 20 mmol/L    Urea Nitrogen 13 6 - 23 mg/dL    Creatinine 0.77 0.50 - 1.05 mg/dL    eGFR 78 >60 mL/min/1.73m*2    Calcium 8.2 (L) 8.6 - 10.3 mg/dL   Magnesium   Result Value Ref Range    Magnesium 2.14 1.60 - 2.40 mg/dL   Manual Differential   Result Value Ref Range    Neutrophils %, Manual 74.0 40.0 - 80.0 %    Bands %, Manual 4.0 0.0 - 5.0 %    Lymphocytes %, Manual 17.0 13.0 - 44.0 %    Monocytes %, Manual 0.0 2.0 - 10.0 %    Eosinophils %, Manual 1.0 0.0 - 6.0 %    Basophils %, Manual 1.0 0.0 - 2.0 %    Metamyelocytes %, Manual 2.0 0.0 - 0.0 %    Myelocytes %, Manual 1.0 0.0 - 0.0 %    Seg Neutrophils Absolute, Manual 7.18 (H) 1.60 - 5.00 x10*3/uL    Bands Absolute, Manual 0.39 0.00 - 0.50 x10*3/uL    Lymphocytes Absolute, Manual 1.65 0.80 - 3.00 x10*3/uL    Monocytes Absolute, Manual 0.00 (L) 0.05 - 0.80 x10*3/uL    Eosinophils Absolute, Manual 0.10 0.00 - 0.40 x10*3/uL    Basophils Absolute, Manual 0.10 0.00 - 0.10 x10*3/uL    Metamyelocytes Absolute, Manual 0.19 0.00 - 0.00 x10*3/uL    Myelocytes Absolute, Manual 0.10 0.00 - 0.00 x10*3/uL    Total Cells Counted 100     Neutrophils Absolute, Manual 7.57 (H) 1.60 - 5.50 x10*3/uL    RBC Morphology No significant RBC morphology present     Polychromasia Mild        Radiology Resutls  Imaging  MR MSK pelvis w and wo IV contrast  Result Date: 6/12/2025  1.  Severe muscle edema in the iliopsoas muscle predominantly in the iliacus with several intramuscular fluid collections. Findings most compatible with underlying infectious pyomyositis with intramuscular abscess formation. Underlying hematoma some may be present as well as per recent CT. Evaluation for extravasation reported on previous CT is limited in this study which is tailored for evaluation of the lumbar plexus. There is moderate muscle edema in the piriformis and the abductor musculature as well. This is also concerning for underlying myositis. 2. No evidence of abnormal marrow  signal about the total hip arthroplasties. No large effusion seen. 3. No abnormality seen along the lumbosacral plexus. The sciatic nerves are symmetric.     I personally reviewed the images/study and I agree with the findings as stated. This study was interpreted at [facility], [place], [place].   MACRO: Critical Finding:  See findings. Notification was initiated on 6/12/2025 at 8:08 pm by  Duke Pierre.  (**-OCF-**)   Signed by: Duke Pierre 6/12/2025 8:08 PM Dictation workstation:   [id number]    CT abdomen pelvis w IV contrast  Result Date: 6/11/2025  1. Ill-defined right iliacus hematoma with central foci of contrast raising suspicion for pseudoaneurysms. Contrast extravasation is not entirely excluded on single phase imaging. Correlate with labs and symptoms and if there is concern for acute blood loss, multiphase examination can be considered. 2. Postsurgical changes of partial sigmoid colectomy with end colostomy and Nory's pouch creation. No acute postsurgical complications identified. 3. Pelvic drain is in place with the tip in the left lower quadrant. No residual drainable collections identified. 4. Diffuse hepatic steatosis. Other chronic findings as described above.   MACRO: Aaron Mccall discussed the significance and urgency of this critical finding by EPIC secure chat with  PARTH TEIXEIRA on 6/11/2025 at 10:40 pm.  (**-RCF-**) Findings:  See findings.   Signed by: Aaron Mccall 6/11/2025 10:41 PM Dictation workstation:   [id number]        Cardiology, Vascular, and Other Imaging  Electrocardiogram, 12-lead PRN ACS symptoms  Result Date: 6/12/2025  Atrial flutter with variable AV block Nonspecific ST and T wave abnormality Abnormal ECG When compared with ECG of 11-JUN-2025 09:11, (unconfirmed) Nonspecific T wave abnormality, improved in Lateral leads Confirmed by Bob Hu ([id number]) on 6/12/2025 11:34:04 AM           ASSESSMENT / PLAN     Assessment &  Plan  Diverticulitis of large intestine with perforation without bleeding         PLAN  79-year-old white female s/p Hanna's procedure for perforated sigmoid diverticulitis 6/7/2025    Continue on regular diet.  Would like to see more colostomy output prior to discharge.  Routine colostomy care.  Continue with milk of magnesia. Drain with 70cc SS output to remain for today  Encourage patient to ambulate this morning patient was in chair yesterday. .    Discussed possible need for short-term rehab placement upon discharge pending PT/OT evaluations.  Patient in agreement with placement.   Other supportive care per medicine/cardiology/infectious disease.  Antibiotics per infectious disease.  Documentation should be complete with antibiotics after today yesterday. per ID note continue antibiotics through 6/12/25 to finish 5-day course. Pelvic MRI concerning for intramuscular abscess will extend Abx course.   Pathology pending as of 6/11/25  PUD/DVT prophylaxis. Holding heparin drip 2/2 Iliacus hematoma.      Patient will be discussed with Attending Physician Dr. Yobany Amaral PGY4  General Surgery Service

## 2025-06-13 NOTE — PROGRESS NOTES
Rosemary Motta is a 79 y.o. female on day 6 of admission presenting with Diverticulitis of large intestine with perforation without bleeding.      Assessment/Plan:     #Acute diverticulitis  #Bowel perforation s/p ostomy 6/7/2025  #Sepsis, POA, resolved  Patient started on ciprofloxacin and Flagyl due to penicillin allergy.  Patient was found to have short segment of mid sigmoid colon with inflammation and perforation with moderate amount of purulent fluid throughout the abdomen.  No cultures obtained.    #Pyomyositis  #R iliacus Intramuscular abscess?  As per IR, not well-formed abscess at this time.  As per Ortho, no intervention and to be followed up with Dr. Haywood     #Penicillin allergy  Reports history of syncope when she was a child.  Discussed with the patient that it is safe to get cefepime as it does not have cross-reactivity.  Tolerated cefepime well without any issues     #CKD  Estimated Creatinine Clearance: 44.1 mL/min (by C-G formula based on SCr of 0.77 mg/dL).     HTN, HLD  Breast cancer s/p right mastectomy, on anastrozole  GERD  Hypothyroidism  Paroxysmal A-fib     Recommendations:     -Cont cefepime 2 g every 12hr  -Cont Flagyl 500 twice daily  -Continue antibiotics. No tentative plan to stop for now  -Renally dose antibiotic  -Will continue to follow        Mikal Ceballos MD  Date of service: 6/13/2025  Time of service: 11:42 AM      Subjective   Interval History: No acute events overnight.  Patient reports RLE pain, numbness and weakness       Review of Systems  Denies: fever, chills, nausea, vomiting, dysuria    Objective   Range of Vitals (last 24 hours)  Heart Rate:  [67-91]   Temp:  [35.5 °C (95.9 °F)-37 °C (98.6 °F)]   Resp:  [14-18]   BP: (138-165)/(71-84)   Weight:  [63.8 kg (140 lb 10.5 oz)]   SpO2:  [93 %-98 %]  Daily Weight  06/13/25 : 63.8 kg (140 lb 10.5 oz)   Body mass index is 29.4 kg/m².      General: alert, oriented, NAD  HEENT: No conjunctival pallor, no scleral  icterus  Abdomen: soft, ostomy bag, midline surgical incision with staples, LOLA drain  Neuro: AAO x 3, RLE motor 1/5  Extremities: no cyanosis  Skin: No rashes   MSK: No joint inflammation    Antibiotics  bacitracin - 500 unit/gram  cefepime - 2 gram/50 mL  metroNIDAZOLE - 500 mg/100 mL      Relevant Results  Labs  Results from last 72 hours   Lab Units 06/13/25 0403 06/13/25  0007 06/12/25 1957 06/12/25  1648 06/12/25 0338 06/12/25  0024 06/11/25  0925 06/11/25  0358   WBC AUTO x10*3/uL 9.7  --   --   --  7.0 7.9   < > 5.3   HEMOGLOBIN g/dL 10.3* 10.1* 10.0*   < > 10.1* 10.2*   < > 10.7*   HEMATOCRIT % 29.8* 29.3* 29.3*   < > 29.5* 29.8*   < > 32.2*   PLATELETS AUTO x10*3/uL 228  --   --   --  194 193   < > 163   NEUTROS PCT AUTO %  --   --   --   --  70.0  --   --  60.8   LYMPHO PCT MAN % 17.0  --   --   --   --   --   --   --    LYMPHS PCT AUTO %  --   --   --   --  14.2  --   --  25.0   MONO PCT MAN % 0.0  --   --   --   --   --   --   --    MONOS PCT AUTO %  --   --   --   --  6.5  --   --  6.4   EOSINO PCT MAN % 1.0  --   --   --   --   --   --   --    EOS PCT AUTO %  --   --   --   --  2.0  --   --  3.8    < > = values in this interval not displayed.     Results from last 72 hours   Lab Units 06/13/25 0403 06/12/25 0338 06/11/25  0358   SODIUM mmol/L 137 134* 140   POTASSIUM mmol/L 4.3 3.6 3.7   CHLORIDE mmol/L 105 104 106   CO2 mmol/L 24 25 27   BUN mg/dL 13 15 12   CREATININE mg/dL 0.77 0.77 0.80   GLUCOSE mg/dL 112* 126* 104*   CALCIUM mg/dL 8.2* 8.0* 7.6*   ANION GAP mmol/L 12 9* 11   EGFR mL/min/1.73m*2 78 78 75     Results from last 72 hours   Lab Units 06/11/25  0358   ALK PHOS U/L 40   BILIRUBIN TOTAL mg/dL 0.4   PROTEIN TOTAL g/dL 4.9*   ALT U/L 10   AST U/L 17   ALBUMIN g/dL 2.8*     Estimated Creatinine Clearance: 44.1 mL/min (by C-G formula based on SCr of 0.77 mg/dL).  C-Reactive Protein   Date Value Ref Range Status   03/02/2024 <0.10 <1.00 mg/dL Final       Microbiology  No results found  for the last 14 days.      Imaging    XR chest 1 view  Result Date: 6/8/2025  A nasogastric tube projects into the stomach beyond the field of imaging.   Mild cardiomegaly.   Minimal linear bibasilar atelectasis. Otherwise no sign of acute cardiopulmonary disease.     MACRO: None   Signed by: Mello Fried 6/8/2025 12:33 PM Dictation workstation:   [id number]    CT abdomen pelvis w IV contrast  Result Date: 6/7/2025  1. Diverticulosis of the sigmoid colon. There is inflammatory change noted about the proximal to mid sigmoid colon most concerning for acute diverticulitis. Multiple free air locules are noted adjacent to this inflamed segment of sigmoid colon as well as small-to-moderate amount of free air within the upper abdomen consistent with perforation. No overt abscess identified.   MACRO: Donovan Ocampo discussed the significance and urgency of this critical finding by telephone with  KONG HILL on 6/7/2025 at 3:39 am. (**-RCF-**) Findings:  See findings.   Signed by: Donovan Ocampo 6/7/2025 3:40 AM Dictation workstation:   [id number]

## 2025-06-13 NOTE — PROGRESS NOTES
6/13/25 1122   06/13/25 1122   Discharge Planning   Expected Discharge Disposition SNF   Intensity of Service   Intensity of Service >30 min     Followed up with pt and pt daughter with updates on facility responses. Updated only accepting SNF at this time is [facility]. Daughter requesting additional SNF referral be sent to [facility]. Requested [facility] to send SNF referral.   Rupal Bacon RN, BSN, [facility]/ [facility] CT Supervisor     6/13/25 [id number]  Called and updated daughter of Facility responses and that both [facility] and [facility] can accept. Daughter requesting to provide FOC tomorrow after going to see facilities. Reviewed Im with daughter. Daughter verbalized understanding and requested copy to be left in pt room.   Rupal Bacon RN, BSN, [facility]/ [facility] CT Supervisor

## 2025-06-13 NOTE — PROGRESS NOTES
Physical Therapy    Physical Therapy Treatment    Patient Name: Rosemary Motta  MRN: [id number]  Department: [address]  Room: 2025/2025-A  Today's Date: 6/13/2025  Time Calculation  Start Time: 1131  Stop Time: 1158  Time Calculation (min): 27 min         Assessment/Plan   PT Assessment  Barriers to Discharge Home: Cognition needs, Physical needs  End of Session Communication: Bedside nurse  Assessment Comment: pt with imrpvoed mobility from previous session. pt RLE is moving better and has some sensation.  End of Session Patient Position: Up in chair, Alarm on     PT Plan  Treatment/Interventions: Bed mobility, Transfer training, Gait training, Neuromuscular re-education, Therapeutic exercise, Therapeutic activity  PT Plan: Ongoing PT  PT Frequency: 6 times per week  PT Discharge Recommendations: Moderate intensity level of continued care  PT - OK to Discharge: Yes    PT Visit Info:  PT Received On: 06/13/25  Response to Previous Treatment: Patient with no complaints from previous session.     General Visit Information:   General  Reason for Referral: sudden onset and pain: perf diverticulitis, 6/7 ABD SURG ex lap with partial colectomy  Referred By: PT FOR IMPAIRED MOBILITY/GAIT TRAINING  Past Medical History Relevant to Rehab: HTN, HLE, breast ca, diverticulosis, GERD, hypothyroidism, neuropathy, migraines, OA, anxiety/depression presented 6/7/25 for abdominal pain, left lower quadrant abd pain with radiation to back  Co-Treatment: OT  Co-Treatment Reason: optimize pt safety while focus on discipline specific goals  Prior to Session Communication: Bedside nurse  Patient Position Received: Bed, 3 rail up, Alarm on  Preferred Learning Style: verbal  General Comment: pt agreeable to pT and cleared by RN. pt reporting LE feeling some better and feeling in RLE.      Precautions:  Precautions  Medical Precautions: Fall precautions  Post-Surgical Precautions: Abdominal surgery precautions  Precautions Comment: new L  colectomy pouch, bulb drain RLQ abd      Pain:  Pain Assessment  Pain Assessment: 0-10  0-10 (Numeric) Pain Score: 5 - Moderate pain  Cognition:  Cognition  Overall Cognitive Status: Within Functional Limits            Treatments:  Balance/Neuromuscular Re-Education  Balance/Neuromuscular Re-Education Activity Performed: Yes  Balance/Neuromuscular Re-Education Activity 1: pt stood at W/W with  MIN >MOD Ax2 with cues for safety. pt was able to perform weight shift in standing. pt R knee threatens to buckle. pt sat EOB with GA and no overt LOB.    Bed Mobility  Bed Mobility: Yes  Bed Mobility 1  Bed Mobility 1: Supine to sitting  Level of Assistance 1: Moderate assistance, +2, Moderate verbal cues  Bed Mobility Comments 1: cues for hand placmentand safety.    Transfers  Transfer: Yes  Transfer 1  Technique 1: Sit to stand, Stand to sit  Transfer Device 1: Walker  Transfer Level of Assistance 1: Minimum assistance, +2, Minimal verbal cues  Trials/Comments 1: cues for hand placement and safety. pt educated to control descent into chair.  Transfers 2  Technique 2: Stand pivot  Transfer Device 2: Walker  Transfer Level of Assistance 2: Minimum assistance, +2, Moderate verbal cues  Trials/Comments 2: education on walker placement and technique to assist with knee buckling on RLE    Outcome Measures:  [facility] Basic Mobility  Turning from your back to your side while in a flat bed without using bedrails: A little  Moving from lying on your back to sitting on the side of a flat bed without using bedrails: A lot  Moving to and from bed to chair (including a wheelchair): A lot  Standing up from a chair using your arms (e.g. wheelchair or bedside chair): A lot  To walk in hospital room: Total  Climbing 3-5 steps with railing: Total  Basic Mobility - Total Score: 11    Education Documentation  Body Mechanics, taught by Richelle Trotter PTA at 6/13/2025 12:34 PM.  Learner: Patient  Readiness: Acceptance  Method: Explanation  Response:  Verbalizes Understanding, Needs Reinforcement    Mobility Training, taught by Richelle Trotter PTA at 6/13/2025 12:34 PM.  Learner: Patient  Readiness: Acceptance  Method: Explanation  Response: Verbalizes Understanding, Needs Reinforcement    Education Comments  No comments found.             Encounter Problems       Encounter Problems (Active)       Mobility       STG - Patient will ambulate household distance using FWW initially, no more than SBA x1 (Progressing)       Start:  06/09/25    Expected End:  06/23/25               PT Transfers       STG - Transfer from bed to chair using FWW initially, no more than SBA x1 (Progressing)       Start:  06/09/25    Expected End:  06/23/25            STG - Patient to transfer to and from sit to supine DEMO LOG ROLL TECH (for ABD precautions) with no more than min Ax1 (Progressing)       Start:  06/09/25    Expected End:  06/23/25               Pain - Adult

## 2025-06-13 NOTE — NURSING NOTE
0700:  Report received from Qing COLLINS    Patient not reporting and issues or pain overnight, MRI resulted and IMS was notified. Hgb remained stable 10.3 this AM. Still no colostomy output. LOLA drain still having small amount of sanguinous output. Remains SR. Ostomy digitized per surgery this am     1030:  -colostomy output starting. 200cc emptied from bag     1200:  New orders:  -blood cultures- sent  -zofran given for nausea    1245:  Patient having muscle spasms in right leg PRN zanaflex given     1800:  Patient flipped into Afib -130s IMS notified. Mostly asymptomatic     1845:  IV cardizem drip reordered    1900:  Report given to Qing COLLINS

## 2025-06-13 NOTE — NURSING NOTE
End of Shift Report  6/13/25     Admission  Rosemary Motta was admitted on 6/7/2025 for the following diagnoses:   Diverticulitis [K57.92]  Free intraperitoneal air [K66.8]    Significant Events  There were no significant events    Interventions      Response to Interventions       Recent Vital Signs  Patient Vitals for the past 12 hrs:   BP Temp Temp src Pulse Resp SpO2 Weight   06/12/25 2326 146/84 36.5 °C (97.7 °F) Temporal 71 18 93 % --   06/13/25 0408 165/71 36.9 °C (98.4 °F) Temporal 89 16 94 % 63.8 kg (140 lb 10.5 oz)   06/13/25 0600 -- -- -- -- -- -- 63.8 kg (140 lb 10.5 oz)      0-10 (Numeric) Pain Score: 0 - No pain     Latest labs  Lab Results   Component Value Date    WBC 9.7 06/13/2025    HGB 10.3 (L) 06/13/2025    HCT 29.8 (L) 06/13/2025     06/13/2025    CHOL 280 (H) 02/28/2020    TRIG 145 02/28/2020    HDL 41.1 02/28/2020    ALT 10 06/11/2025    AST 17 06/11/2025     06/13/2025    K 4.3 06/13/2025     06/13/2025    CREATININE 0.77 06/13/2025    BUN 13 06/13/2025    CO2 24 06/13/2025    TSH 2.89 06/07/2025    INR 1.0 05/01/2025       Other Results    Scheduled Medications:  Scheduled Medications[1]   Continuous Medications[2]         [1] anastrozole, 1 mg, oral, Daily  aspirin, 81 mg, oral, Daily  bacitracin, , Topical, Daily  cefepime, 2 g, intravenous, q12h  dilTIAZem, 30 mg, oral, q6h  pantoprazole, 40 mg, oral, Daily before breakfast   Or  esomeprazole, 40 mg, nasoduodenal tube, Daily before breakfast   Or  pantoprazole, 40 mg, intravenous, Daily before breakfast  levothyroxine, 25 mcg, oral, Daily  lidocaine, 1 patch, transdermal, Daily  magnesium hydroxide, 30 mL, oral, Daily  metoprolol tartrate, 100 mg, oral, BID  metroNIDAZOLE, 500 mg, intravenous, q12h  [2] [Held by provider] heparin, 0-4,500 Units/hr, Last Rate: Stopped (06/12/25 0121)

## 2025-06-13 NOTE — PROGRESS NOTES
Rosemary Motta is a 79 y.o. female on day 6 of admission presenting with Diverticulitis of large intestine with perforation without bleeding.      Subjective   Rosemary Motta is a 79F hx HTN, HLE, breast ca, diverticulosis, GERD, hypothyroidism, neuropathy, migraines, OA, anxiety/depression presented 6/7/25 for abdominal pain, left lower quadrant abd pain with radiation to back. now improved. up to 10/10, now 5/10. nausea with small vomiting. no diarrhea, constipation, blood in stool or melena. subacute dry cough. no acute abdomen on exam. labs/imaging w/ cr 1.29 (baseline 1.02), cbc, lactate wnl. ua wnl. ekg wnl. ct a/p w/ perforated diverticulitis. Seen by surgery and is now s/p partial colectomy and colostomy placement 6/7/25. Had hypotension and was monitored closely in ICU. UA without UTI. Patient noted to have tachycardia 6/9/25, Nocturnist evaluated ECG and diagnosed as new onset a fib with RVR, given Cardizem x1, resumed home metoprolol, cardiology consulted. Patient back in normal sinus rhythm.  I discussed with Dr. Quintanilla, cardiologist who at this time does not recommend any oral anticoagulation given this was a isolated incident.  Will need to monitor further if this recurs then will need to reconsider this. 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  Outpatient follow-up with cardiology after patient recovers for CHAPIS. NG-tube was removed 6/9/25 as was Gabriel.  Called to patient's room as patient heart rate noted to be in the upper 170s to 180s 6/11/25, EC confirmed AFRVR, given IV lopressor x1, started on hep gtt (cleared with surgeon if no initial bolus given) and cardizem gtt. Shortly after Cardizem drip was given patient converted back to normal sinus rhythm.  Patient felt a little lightheaded during this time but that completely resolved once heart rate was improved.  She was feeling pressure in her abdomen at this time but  then was able to urinate and this had completely resolved. Patient again had recurrence of AFRVR 6/11/25 with pain in lower back radiating down her buttocks into posterior leg to level of the knee  Given pain control which did help initially however once patient moved pain then returned.  Trial lidocaine patch, IV Dilaudid as needed, as needed muscle relaxers.  Can trial heat to the area as well.  Patient converted prior to IV diltiazem being given, heart rate better controlled. Please see significant event note by our night doc. Worsening RLE abdominal and groin pain, CT showing R iliacus hematoma with central foci of contrast raising suspicion for pseudoaneurysms. Repeat hg 10.2, baseline roughly around 11-13. [facility] vascular recommends holding hep drip, monitor H/H and repeat CT in 48 hours. IR consulted here Evaluated in the presence of Dr. Brunner as patient is having new numbness on her right anterior thigh and weakness of her right hip flexion, she is sitting upright in bedside chair but discussed with PT that she was unable to put any weight on this leg as the knee continued to buckle and she only was pivoted over to the chair.  While sitting up in the chair pain is only approximately a 2 out of 10 in the right groin.        6/13/2025: No acute events overnight. Vitals remain stable this morning, afebrile, HR 91 with /74. Hemoglobin is stable 10.3 over the last 24 hours. MRI pelvis concerning for infectious pyomyositis with intramuscular abscess formation. I discussed the case with IR, ID and general surgery, will monitor clinically for now as not a good option to drain given initial concern for extra luminal contrast on CT. Repeat CT scan tomorrow vs Sunday pending clinical course  Patient and PT note less sensation loss RLE and better movement when getting up to bedside chair.    Re-evaluated patient with her daughter and  at bedside, all of the above relayed to her family per patients  request  Patient asking that her daughter Vishnu be called with daily updates if she is not present at time of evaluation by hospitalist service.     Rounded on patient on this date with Pharmacist, RN, and Pharmacy student(s)            Review of Systems   Constitutional:  Negative for appetite change, chills, diaphoresis, fatigue and fever.   HENT:  Negative for congestion, ear pain, facial swelling, hearing loss, nosebleeds, sore throat, tinnitus and trouble swallowing.    Eyes:  Negative for pain.   Respiratory:  Negative for cough, chest tightness, shortness of breath and wheezing.    Cardiovascular:  Positive for palpitations (Resolved). Negative for chest pain and leg swelling.   Gastrointestinal:  Positive for abdominal pain. Negative for blood in stool, constipation, diarrhea, nausea and vomiting.   Genitourinary:  Positive for urgency. Negative for dysuria, flank pain, frequency and hematuria.   Musculoskeletal:  Positive for back pain. Negative for joint swelling.        With radiation down the right buttock into right posterior leg to the level of the knee   Skin:  Negative for rash and wound.   Neurological:  Positive for weakness (Right hip flexion) and numbness (Right anterior thigh). Negative for dizziness, syncope, light-headedness and headaches.   Hematological:  Does not bruise/bleed easily.   Psychiatric/Behavioral:  Negative for behavioral problems, hallucinations and suicidal ideas.           Objective     Last Recorded Vitals  /74 (BP Location: Left arm, Patient Position: Lying)   Pulse 91   Temp 37 °C (98.6 °F) (Temporal)   Resp 14   Wt 63.8 kg (140 lb 10.5 oz)   SpO2 95%     Image Results  Imaging  MR lumbar spine wo IV contrast  Result Date: 6/13/2025  1. Motion degraded study. Combination of congenital spinal stenosis, epidural lipomatosis and diffuse degenerative changes seen throughout the lumbar spine. 2. Moderate spinal canal stenosis at L3-L4 and mild spinal canal stenosis  at L2-L3 and L4-L5. 3. Severe narrowing of the left lateral recess at L4-L5 with probable compromise of the descending left L5 nerve root. Severe left foraminal stenosis at this level. 4. Levocurvature of the lumbar spine. Right lateral listhesis of L1-L2. Slight left lateral listhesis of L3-L4 and L4-L5.   I personally reviewed the images/study and I agree with the findings as stated.   MACRO: None   Signed by: Jennifer Blake 6/13/2025 8:43 AM Dictation workstation:   [id number]    MR MSK pelvis w and wo IV contrast  Result Date: 6/12/2025  1.  Severe muscle edema in the iliopsoas muscle predominantly in the iliacus with several intramuscular fluid collections. Findings most compatible with underlying infectious pyomyositis with intramuscular abscess formation. Underlying hematoma some may be present as well as per recent CT. Evaluation for extravasation reported on previous CT is limited in this study which is tailored for evaluation of the lumbar plexus. There is moderate muscle edema in the piriformis and the abductor musculature as well. This is also concerning for underlying myositis. 2. No evidence of abnormal marrow signal about the total hip arthroplasties. No large effusion seen. 3. No abnormality seen along the lumbosacral plexus. The sciatic nerves are symmetric.     I personally reviewed the images/study and I agree with the findings as stated. This study was interpreted at [place], [place].   MACRO: Critical Finding:  See findings. Notification was initiated on 6/12/2025 at 8:08 pm by  Duke Pierre.  (**-OCF-**)   Signed by: Duke Pierre 6/12/2025 8:08 PM Dictation workstation:   [id number]    CT abdomen pelvis w IV contrast  Result Date: 6/11/2025  1. Ill-defined right iliacus hematoma with central foci of contrast raising suspicion for pseudoaneurysms. Contrast extravasation is not entirely excluded on single phase imaging. Correlate with labs and  symptoms and if there is concern for acute blood loss, multiphase examination can be considered. 2. Postsurgical changes of partial sigmoid colectomy with end colostomy and Nory's pouch creation. No acute postsurgical complications identified. 3. Pelvic drain is in place with the tip in the left lower quadrant. No residual drainable collections identified. 4. Diffuse hepatic steatosis. Other chronic findings as described above.   MACRO: Aaron Mccall discussed the significance and urgency of this critical finding by EPIC secure chat with  PARTH TEIXEIRA on 6/11/2025 at 10:40 pm.  (**-RCF-**) Findings:  See findings.   Signed by: Aaron Mccall 6/11/2025 10:41 PM Dictation workstation:   [id number]    XR chest 1 view  Result Date: 6/8/2025  A nasogastric tube projects into the stomach beyond the field of imaging.   Mild cardiomegaly.   Minimal linear bibasilar atelectasis. Otherwise no sign of acute cardiopulmonary disease.     MACRO: None   Signed by: Mello Fried 6/8/2025 12:33 PM Dictation workstation:   [id number]    CT abdomen pelvis w IV contrast  Result Date: 6/7/2025  1. Diverticulosis of the sigmoid colon. There is inflammatory change noted about the proximal to mid sigmoid colon most concerning for acute diverticulitis. Multiple free air locules are noted adjacent to this inflamed segment of sigmoid colon as well as small-to-moderate amount of free air within the upper abdomen consistent with perforation. No overt abscess identified.   MACRO: Donovan Ocampo discussed the significance and urgency of this critical finding by telephone with  KONG HILL on 6/7/2025 at 3:39 am. (**-RCF-**) Findings:  See findings.   Signed by: Donovan Ocampo 6/7/2025 3:40 AM Dictation workstation:   [id number]      Cardiology, Vascular, and Other Imaging  Electrocardiogram, 12-lead PRN ACS symptoms  Result Date: 6/12/2025  Atrial flutter with variable AV block Nonspecific ST and T wave abnormality Abnormal ECG  When compared with ECG of 11-JUN-2025 09:11, (unconfirmed) Nonspecific T wave abnormality, improved in Lateral leads Confirmed by Bob Hu ([id number]) on 6/12/2025 11:34:04 AM    Electrocardiogram, 12-lead PRN ACS symptoms  Result Date: 6/10/2025  Atrial fibrillation with rapid ventricular response ST & T wave abnormality, consider inferior ischemia ST & T wave abnormality, consider anterolateral ischemia Abnormal ECG When compared with ECG of 07-JUN-2025 01:53, PREVIOUS ECG IS PRESENT Confirmed by Bob Hu ([id number]) on 6/10/2025 1:23:42 PM    Electrocardiogram, 12-lead PRN ACS symptoms  Result Date: 6/10/2025  Atrial fibrillation with rapid ventricular response Nonspecific ST and T wave abnormality Abnormal ECG When compared with ECG of 10-CHAVA-2025 10:11, (unconfirmed) No significant change was found Confirmed by Bob Hu ([id number]) on 6/10/2025 1:23:23 PM    Electrocardiogram, 12-lead PRN ACS symptoms  Result Date: 6/10/2025  Normal sinus rhythm Normal ECG When compared with ECG of 10-CHAVA-2025 10:11, (unconfirmed) Sinus rhythm has replaced Atrial fibrillation Vent. rate has decreased BY  89 BPM ST no longer depressed in Anterolateral leads T wave inversion now evident in Inferior leads Nonspecific T wave abnormality no longer evident in Lateral leads Confirmed by Bob Hu ([id number]) on 6/10/2025 1:23:11 PM    Transthoracic Echo Complete  Result Date: 6/9/2025              [place] [address]      Phone [phone] Fax [phone] TRANSTHORACIC ECHOCARDIOGRAM REPORT Patient Name:       PATTI Haynes Physician:    [id number] Mona Quintanilla MD Study Date:         6/9/2025             Ordering Provider:    [id number] NAEL WOLF MRN/PID:            [id number]             Fellow: Accession#:         [id number]         Nurse: Date of  Birth/Age:  1945 / 79      Sonographer:          Richelle Grey RDCS                     years Gender Assigned at  F                    Additional Staff: Birth: Height:             147.32 cm            Admit Date:           6/8/2025 Weight:             66.68 kg             Admission Status:     Inpatient -                                                                Routine BSA / BMI:          1.60 m2 / 30.72      Department Location:  [facility]                     kg/m2 Blood Pressure: 158 /66 mmHg Study Type:    TRANSTHORACIC ECHO (TTE) COMPLETE Diagnosis/ICD: Paroxysmal atrial fibrillation-I48.0; Dyspnea, unspecified-R06.00 Indication:    Dyspnea CPT Codes:     Echo Complete w Full [id number] Patient History: Pertinent History: HTN. Study Detail: The following Echo studies were performed: 2D, M-Mode, Doppler and               color flow.  PHYSICIAN INTERPRETATION: Left Ventricle: Left ventricular ejection fraction is low normal calculated by Valente's biplane at 55%. There are no regional wall motion abnormalities. The left ventricular cavity size is normal. There is normal septal and normal posterior left ventricular wall thickness. Spectral Doppler shows a normal pattern of left ventricular diastolic filling. Left Atrium: The left atrial size is normal. Right Ventricle: The right ventricle is normal in size. There is normal right ventricular global systolic function. Right Atrium: The right atrial size is normal. Aortic Valve: The aortic valve is trileaflet. The aortic valve area by VTI is 2.31 cmï¿½ with a peak velocity of 1.61 m/s. The peak and mean gradients are 10 mmHg and 5 mmHg, respectively, with a dimensionless index of 0.75. There is no evidence of aortic valve regurgitation. 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 Mitral Valve: The mitral valve is normal in structure. There is trace mitral valve regurgitation. The E Vmax is 1.00 m/s. Tricuspid Valve: The tricuspid valve is  structurally normal. There is trace tricuspid regurgitation. The Doppler estimated right ventricular systolic pressure (RVSP) is within normal limits at 28 mmHg. Pulmonic Valve: The pulmonic valve is structurally normal. There is trace pulmonic valve regurgitation. Pericardium: No pericardial effusion noted. Aorta: The aortic root is normal. Systemic Veins: The inferior vena cava appears normal in size, IVC inspiratory collapse was not assessed.  CONCLUSIONS:  1. Left ventricular ejection fraction is low normal calculated by Valente's biplane at 55%.  2. There is normal right ventricular global systolic function.  3. The Doppler estimated RVSP is within normal limits at 28 mmHg.  4. 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 QUANTITATIVE DATA SUMMARY:  2D MEASUREMENTS:             Normal Ranges: IVSd:            0.72 cm     (0.6-1.1cm) LVPWd:           0.71 cm     (0.6-1.1cm) LVIDd:           4.41 cm     (3.9-5.9cm) LVIDs:           2.79 cm LV Mass Index:   59.5 g/m2 LVEDV Index:     22.62 ml/m2 LV % FS          36.8 %  LEFT ATRIUM:                  Normal Ranges: LA Vol A4C:        31.5 ml    (22+/-6mL/m2) LA Vol A2C:        28.9 ml LA Vol BP:         35.2 ml LA Vol Index A4C:  19.7ml/m2 LA Vol Index A2C:  18.1 ml/m2 LA Vol Index BP:   22.0 ml/m2 LA Area A4C:       14.4 cm2 LA Area A2C:       11.8 cm2 LA Major Axis A4C: 5.6 cm LA Major Axis A2C: 4.1 cm LA Volume Index:   21.8 ml/m2 LA Vol A4C:        29.0 ml LA Vol A2C:        27.9 ml LA Vol Index BSA:  17.8 ml/m2  RIGHT ATRIUM:          Normal Ranges: RA Area A4C:  10.0 cm2  AORTA MEASUREMENTS:         Normal Ranges: Ao Sinus, d:        3.20 cm (2.1-3.5cm) Ao STJ, d:          2.40 cm (1.7-3.4cm) Asc Ao, d:          2.50 cm (2.1-3.4cm)  LV SYSTOLIC FUNCTION:                      Normal Ranges: EF-A4C View:    60 % (>=55%) EF-A2C View:    52 % EF-Biplane:     55 % LV EF Reported: 55 %  LV DIASTOLIC FUNCTION:           Normal Ranges: MV Peak E:              1.00 m/s  (0.7-1.2 m/s) MV Peak A:             0.80 m/s  (0.42-0.7 m/s) E/A Ratio:             1.26      (1.0-2.2) MV e'                  0.111 m/s (>8.0) MV lateral e'          0.11 m/s MV medial e'           0.11 m/s E/e' Ratio:            9.04      (<8.0)  MITRAL VALVE:          Normal Ranges: MV DT:        122 msec (150-240msec)  AORTIC VALVE:                      Normal Ranges: AoV Vmax:                1.61 m/s  (<=1.7m/s) AoV Peak PG:             10.4 mmHg (<20mmHg) AoV Mean P.3 mmHg  (1.7-11.5mmHg) LVOT Max Kael:            1.04 m/s  (<=1.1m/s) AoV VTI:                 32.01 cm  (18-25cm) LVOT VTI:                24.08 cm LVOT Diameter:           1.98 cm   (1.8-2.4cm) AoV Area, VTI:           2.31 cm2  (2.5-5.5cm2) AoV Area,Vmax:           1.98 cm2  (2.5-4.5cm2) AoV Dimensionless Index: 0.75  RIGHT VENTRICLE: RV Basal 2.42 cm RV Mid   1.90 cm RV Major 6.8 cm TAPSE:   30.3 mm RV s'    0.15 m/s  TRICUSPID VALVE/RVSP:          Normal Ranges: Peak TR Velocity:     2.50 m/s Est. RA Pressure:     3 mmHg RV Syst Pressure:     28 mmHg  (< 30mmHg) IVC Diam:             1.04 cm TV e'                 0.1 m/s  AORTA: Asc Ao Diam 3.19 cm  [id number] Mona Quintanilla MD Electronically signed on 2025 at 5:35:08 PM  ** Final **     Electrocardiogram, 12-lead PRN ACS symptoms  Result Date: 2025  Sinus rhythm Atrial premature complexes RSR' in V1 or V2, right VCD or RVH         Lab Results  Results for orders placed or performed during the hospital encounter of 25 (from the past 24 hours)   Hemoglobin and hematocrit, blood   Result Value Ref Range    Hemoglobin 10.1 (L) 12.0 - 16.0 g/dL    Hematocrit 29.7 (L) 36.0 - 46.0 %   Hemoglobin and hematocrit, blood   Result Value Ref Range    Hemoglobin 10.0 (L) 12.0 - 16.0 g/dL    Hematocrit 29.3 (L) 36.0 - 46.0 %   Hemoglobin and hematocrit, blood   Result Value Ref Range    Hemoglobin 10.1 (L) 12.0 - 16.0 g/dL    Hematocrit 29.3 (L) 36.0 - 46.0 %   CBC and  Auto Differential   Result Value Ref Range    WBC 9.7 4.4 - 11.3 x10*3/uL    nRBC 0.0 0.0 - 0.0 /100 WBCs    RBC 3.19 (L) 4.00 - 5.20 x10*6/uL    Hemoglobin 10.3 (L) 12.0 - 16.0 g/dL    Hematocrit 29.8 (L) 36.0 - 46.0 %    MCV 93 80 - 100 fL    MCH 32.3 26.0 - 34.0 pg    MCHC 34.6 32.0 - 36.0 g/dL    RDW 12.9 11.5 - 14.5 %    Platelets 228 150 - 450 x10*3/uL    Immature Granulocytes %, Automated 8.5 (H) 0.0 - 0.9 %    Immature Granulocytes Absolute, Automated 0.82 (H) 0.00 - 0.50 x10*3/uL   Basic Metabolic Panel   Result Value Ref Range    Glucose 112 (H) 74 - 99 mg/dL    Sodium 137 136 - 145 mmol/L    Potassium 4.3 3.5 - 5.3 mmol/L    Chloride 105 98 - 107 mmol/L    Bicarbonate 24 21 - 32 mmol/L    Anion Gap 12 10 - 20 mmol/L    Urea Nitrogen 13 6 - 23 mg/dL    Creatinine 0.77 0.50 - 1.05 mg/dL    eGFR 78 >60 mL/min/1.73m*2    Calcium 8.2 (L) 8.6 - 10.3 mg/dL   Magnesium   Result Value Ref Range    Magnesium 2.14 1.60 - 2.40 mg/dL   Manual Differential   Result Value Ref Range    Neutrophils %, Manual 74.0 40.0 - 80.0 %    Bands %, Manual 4.0 0.0 - 5.0 %    Lymphocytes %, Manual 17.0 13.0 - 44.0 %    Monocytes %, Manual 0.0 2.0 - 10.0 %    Eosinophils %, Manual 1.0 0.0 - 6.0 %    Basophils %, Manual 1.0 0.0 - 2.0 %    Metamyelocytes %, Manual 2.0 0.0 - 0.0 %    Myelocytes %, Manual 1.0 0.0 - 0.0 %    Seg Neutrophils Absolute, Manual 7.18 (H) 1.60 - 5.00 x10*3/uL    Bands Absolute, Manual 0.39 0.00 - 0.50 x10*3/uL    Lymphocytes Absolute, Manual 1.65 0.80 - 3.00 x10*3/uL    Monocytes Absolute, Manual 0.00 (L) 0.05 - 0.80 x10*3/uL    Eosinophils Absolute, Manual 0.10 0.00 - 0.40 x10*3/uL    Basophils Absolute, Manual 0.10 0.00 - 0.10 x10*3/uL    Metamyelocytes Absolute, Manual 0.19 0.00 - 0.00 x10*3/uL    Myelocytes Absolute, Manual 0.10 0.00 - 0.00 x10*3/uL    Total Cells Counted 100     Neutrophils Absolute, Manual 7.57 (H) 1.60 - 5.50 x10*3/uL    RBC Morphology No significant RBC morphology present      Polychromasia Mild         Medications  Scheduled medications:  Scheduled Medications[1]  Continuous medications:  Continuous Medications[2]  PRN medications:  PRN Medications[3]     Physical Exam  Constitutional:       General: She is not in acute distress.     Appearance: Normal appearance.   HENT:      Head: Normocephalic and atraumatic.      Right Ear: External ear normal.      Left Ear: External ear normal.      Nose: Nose normal.      Mouth/Throat:      Mouth: Mucous membranes are moist.      Pharynx: Oropharynx is clear.   Eyes:      Extraocular Movements: Extraocular movements intact.      Conjunctiva/sclera: Conjunctivae normal.      Pupils: Pupils are equal, round, and reactive to light.   Cardiovascular:      Rate and Rhythm: Tachycardia present. Rhythm irregular.      Pulses: Normal pulses.      Heart sounds: Murmur heard.   Pulmonary:      Effort: Pulmonary effort is normal. No respiratory distress.      Breath sounds: Normal breath sounds. No wheezing, rhonchi or rales.   Abdominal:      General: Bowel sounds are normal.      Palpations: Abdomen is soft.      Tenderness: There is abdominal tenderness. There is no right CVA tenderness, left CVA tenderness, guarding or rebound.      Comments: LOLA drain with bloody output  Left-sided ostomy is pink with small amount of sanguinous liquid   Musculoskeletal:         General: No swelling. Normal range of motion.      Cervical back: Normal range of motion and neck supple.   Skin:     General: Skin is warm and dry.      Capillary Refill: Capillary refill takes less than 2 seconds.      Findings: No lesion or rash.   Neurological:      Mental Status: She is alert and oriented to person, place, and time.      Gait: Gait abnormal.      Comments: RLE motor 2/5  Loss of sensation R thigh  Diminished sensation majority of RLE  Pulses intact      Psychiatric:         Mood and Affect: Mood normal.         Behavior: Behavior normal.                  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  Outpatient follow-up with cardiology after patient recovers for CHAPIS.  Monitor on tele  [id number] of 4-started on heparin drip 6/10/2025 with clearance from general surgery team. Heparin drip stopped 6/12/25 AM    R iliacus hematoma with central foci of contrast raising suspicion for pseudoaneurysms vs intramuscular abscess formation  Stop heparin drip  Monitor H/H closely  [facility] vascular recommends holding hep drip, monitor H/H and repeat CT in 48 hours  IR consulted- no acute intervention planned  Appreciate general surgery recommendations  MRI lumbar and pelvis as above  IV antibiotics per Infectious Diease  Repeat bl cx x2 6/13/25  Repeat CT scan Saturday vs Sunday    Acute hypoxic respiratory failure  Resolved    MENG on CKD  Resolved    Hypokalemia, mild  Resolved    HTN  HLE  Monitor blood pressure, resume hypertensives as able    Breast CA on anastrozole  Status post mastectomy    GERD    Hypothyroidism  Continue Synthroid  Recent TSH reviewed    Neuropathy  Migraines  OA  Anxiety/Depression    Code Status: Full Code                 DVT ppx: SCDs only given hematoma     Please see orders for more complete plan    Bouchra Rodriguez PA-C         [1] anastrozole, 1 mg, oral, Daily  aspirin, 81 mg, oral, Daily  bacitracin, , Topical, Daily  cefepime, 2 g, intravenous, q12h  dilTIAZem, 30 mg, oral, q6h  pantoprazole, 40 mg, oral,  Daily before breakfast   Or  esomeprazole, 40 mg, nasoduodenal tube, Daily before breakfast   Or  pantoprazole, 40 mg, intravenous, Daily before breakfast  levothyroxine, 25 mcg, oral, Daily  lidocaine, 1 patch, transdermal, Daily  magnesium hydroxide, 30 mL, oral, Daily  metoprolol tartrate, 100 mg, oral, BID  metroNIDAZOLE, 500 mg, intravenous, q12h     [2] [Held by provider] heparin, 0-4,500 Units/hr, Last Rate: Stopped (06/12/25 0121)     [3] PRN medications: acetaminophen, dilTIAZem, [Held by provider] heparin, hydrALAZINE, HYDROcodone-acetaminophen, HYDROmorphone, labetaloL, melatonin, [DISCONTINUED] ondansetron **OR** ondansetron, tiZANidine

## 2025-06-13 NOTE — PROGRESS NOTES
Occupational Therapy    Occupational Therapy Treatment    Name: Rosemary Motta  MRN: [id number]  Department: [address]  Room: 2025/2025-A  Date: 06/13/25  Time Calculation  Start Time: 1132  Stop Time: 1158  Time Calculation (min): 26 min    Assessment:  OT Assessment: Pt motivated for therapy. Pt progressed to requiring decreased assist of Dwain x2 to stand once upright requires mod A x2 to safely complete functional transfers. Pt right knees threatened to buckle and overtly buckled multiple times throughout tx session.  Barriers to Discharge Home: Cognition needs, Physical needs, Caregiver assistance  End of Session Communication: Bedside nurse  End of Session Patient Position: Up in chair, Alarm on  Plan:  Treatment Interventions: ADL retraining, Functional transfer training, Endurance training, Cognitive reorientation, Patient/family training, Equipment evaluation/education, Compensatory technique education  OT Frequency: 4 times per week  OT Discharge Recommendations: Moderate intensity level of continued care  OT Recommended Transfer Status: Moderate assist, Assist of 2  OT - OK to Discharge: Yes    Subjective     OT Visit Info:  OT Received On: 06/13/25  General:  General  Co-Treatment: PT  Co-Treatment Reason: optimize pt safety while focus on discipline specific goals  Prior to Session Communication: Bedside nurse  Patient Position Received: Bed, 3 rail up, Alarm on  General Comment: Pt agreeable and cooperative to therapy.  Precautions:  Medical Precautions: Fall precautions  Post-Surgical Precautions: Abdominal surgery precautions  Precautions Comment: new L colectomy pouch, bulb drain RLQ abd      Pain Assessment:  Pain Assessment  Pain Assessment: 0-10  0-10 (Numeric) Pain Score: 5 - Moderate pain  Pain Location:  (RLE)  Pain Interventions: Repositioned, Distraction    Objective   Cognition:  Orientation Level: Oriented X4         Functional Standing Tolerance:  Functional Standing Tolerance  Activity: Pt  completed x3 static stands tolerating for 45 seconds to 1 minute with min/mod A x2.  Bed Mobility/Transfers: Bed Mobility 1  Bed Mobility 1: Supine to sitting  Level of Assistance 1: Moderate assistance, Moderate verbal cues, +2    Transfer 1  Technique 1: Sit to stand, Stand to sit  Transfer Device 1: Walker  Transfer Level of Assistance 1: Minimum assistance, +2, Moderate verbal cues  Transfers 2  Transfer From 2: Bed to  Transfer to 2: Chair with arms  Technique 2: Sit to stand, Stand to sit  Transfer Device 2: Walker  Transfer Level of Assistance 2: Moderate assistance, +2, Moderate verbal cues             Therapy/Activity:      Therapeutic Activity  Therapeutic Activity 1: Pt tolerated sitting EOB for 5 minutes with CGA.      Outcome Measures:  [facility] Daily Activity  Putting on and taking off regular lower body clothing: A lot  Bathing (including washing, rinsing, drying): A lot  Putting on and taking off regular upper body clothing: A lot  Toileting, which includes using toilet, bedpan or urinal: A lot  Taking care of personal grooming such as brushing teeth: A little  Eating Meals: A little  Daily Activity - Total Score: 14        Education Documentation  Body Mechanics, taught by MELANIE Cruz at 6/13/2025 12:18 PM.  Learner: Patient  Readiness: Acceptance  Method: Explanation  Response: Needs Reinforcement  Comment: functional transfer training    Education Comments  No comments found.      Goals:  Encounter Problems       Encounter Problems (Active)       ADLs       Patient will complete toileting including hygiene clothing management/hygiene with stand by assist level of assistance. (Progressing)       Start:  06/09/25    Expected End:  06/23/25               COGNITION/SAFETY       Patient will demonstrated orientation x 4 with verbal cues. (Progressing)       Start:  06/09/25    Expected End:  06/23/25       ORIENTATION            MOBILITY       Patient will perform Functional mobility Household  distances/Community Distances with stand by assist level of assistance and least restrictive device in order to improve safety and functional mobility. (Progressing)       Start:  06/09/25    Expected End:  06/23/25               TRANSFERS       Patient will perform bed mobility stand by assist level of assistance and log roll technique in order to improve safety and independence with mobility (Progressing)       Start:  06/09/25    Expected End:  06/23/25            Patient will complete functional transfers with least restrictive device with stand by assist level of assistance. (Progressing)       Start:  06/09/25    Expected End:  06/23/25

## 2025-06-13 NOTE — CARE PLAN
The patient's goals for the shift include stay informed     The clinical goals for the shift include patient will have a controlled pain level throughout the shift    Over the shift, the patient did make progress toward the following goals. Barriers to progression include understand. Recommendations to address these barriers include education.

## 2025-06-13 NOTE — PROGRESS NOTES
[facility] Heart and Vascular Cardiology  Patient Name: Rosemary Motta  Patient : 1945  Room/Bed: -A    HPI from cardiology consult:  Rosemary Motta is a 79 y.o. female presenting with abdominal pain and found to have perforated diverticulitis.  She is status post emergency laparotomy on 2025.  Yesterday had a bout of paroxysmal rapid atrial fibrillation.  Reviewed EKG personally.  Currently in sinus rhythm.  Asymptomatic other than abdominal pain and having a cough.     PMH: HTN, HLE, breast ca, diverticulosis, GERD, hypothyroidism, neuropathy, migraines, OA, anxiety/depression  PSH: R mastectomy, ccy, , JOSE bilateral, carpal tunnel  FH: n/a  SH: lives in [place]. no smoking, etoh.     She has no prior cardiac history.    Allergies:  Penicillins, Morphine, Clarithromycin, Hydrochlorothiazide, Tetracycline, Cortisone, Methylprednisolone, Nifedipine, and Prednisone    Subjective Data:  6/10/25: Lying in bed in no acute distress.  Daughter, Vishnu, at bedside. Cardiology was asked to resee patient d/t AF with RVR.  AF on telemetry with -180's initially. S/p IV metoprolol x1 with improvement HR down to 110-120's.   25: Had recurrent AF this morning with 's.  No current awareness of AF and denies any palpatations.  Lying flat in bed with IV nurse obtaining IV access.  She is in no acute distress and no SOB.  Reports that her hip pain has improved some, but is ongoing. Also reports back pain.  25: Heparin infusion has been held d/t right iliacus hematoma/suspected pseudoaneurysms. Hgb 10.1. Denies any chest pain/pressure, SOB or palpitations.  Main c/o right groin area pain, right thigh to knee numbness. SR on telemetry with HR 77 bpm.  25: Denies any chest pain/pressure or SOB.  Continues to have right groin area pain, right thigh to knee numbness. She states she can not lift her right leg/+right leg weakness.  SR on telemetry with HR 76  bpm    Overnight Events:  CT abd/pelvis 6/11/25- Ill-defined right iliacus hematoma with central foci of contrast raising suspicion for pseudoaneurysms.  MR MSK pelvis 6/12/25 - 1.  Severe muscle edema in the iliopsoas muscle predominantly in the  iliacus with several intramuscular fluid collections. Findings most  compatible with underlying infectious pyomyositis with intramuscular  abscess formation. Underlying hematoma some may be present as well as  per recent CT. Evaluation for extravasation reported on previous CT  is limited in this study which is tailored for evaluation of the  lumbar plexus. There is moderate muscle edema in the piriformis and  the abductor musculature as well. This is also concerning for  underlying myositis.    Objective Data:  Vitals:    06/12/25 2326 06/13/25 0408 06/13/25 0600 06/13/25 0810   BP: 146/84 165/71  153/74   BP Location: Left arm Left arm  Left arm   Patient Position: Lying Lying  Lying   Pulse: 71 89  91   Resp: 18 16  14   Temp: 36.5 °C (97.7 °F) 36.9 °C (98.4 °F)  37 °C (98.6 °F)   TempSrc: Temporal Temporal  Temporal   SpO2: 93% 94%  95%   Weight:  63.8 kg (140 lb 10.5 oz) 63.8 kg (140 lb 10.5 oz)    Height:           Reviewed the following Labs:  Results from last 7 days   Lab Units 06/13/25  0403 06/13/25  0007 06/12/25  1957 06/12/25  1648 06/12/25  0338 06/12/25  0024   WBC AUTO x10*3/uL 9.7  --   --   --  7.0 7.9   HEMOGLOBIN g/dL 10.3* 10.1* 10.0*   < > 10.1* 10.2*   HEMATOCRIT % 29.8* 29.3* 29.3*   < > 29.5* 29.8*   PLATELETS AUTO x10*3/uL 228  --   --   --  194 193    < > = values in this interval not displayed.       Results from last 7 days   Lab Units 06/13/25  0403 06/12/25  0338 06/11/25  0358 06/10/25  0458 06/09/25  0531   SODIUM mmol/L 137 134* 140 137 139   POTASSIUM mmol/L 4.3 3.6 3.7 3.8 4.1   CHLORIDE mmol/L 105 104 106 108* 106   CO2 mmol/L 24 25 27 24 23   BUN mg/dL 13 15 12 12 11   CREATININE mg/dL 0.77 0.77 0.80 0.84 0.88   CALCIUM mg/dL 8.2*  "8.0* 7.6* 7.9* 7.8*   PROTEIN TOTAL g/dL  --   --  4.9* 5.3* 5.1*   BILIRUBIN TOTAL mg/dL  --   --  0.4 0.6 0.6   ALK PHOS U/L  --   --  40 41 43   ALT U/L  --   --  10 11 12   AST U/L  --   --  17 22 24   GLUCOSE mg/dL 112* 126* 104* 95 94       Results from last 7 days   Lab Units 25  0403 25  0338 25  0358   MAGNESIUM mg/dL 2.14 1.77 1.75       No results found for: \"LDLF\"     No results found for: \"BNP\"    No results found for: \"TROPHS\"     No results found for: \"TSH\", \"FT4\"        No results found for: \"HGBA1C\"    Intake/Output    Intake/Output Summary (Last 24 hours) at 2025 0836  Last data filed at 2025 0629  Gross per 24 hour   Intake 50 ml   Output 1000 ml   Net -950 ml      I/O last 2 completed shifts:  In: 50 (0.8 mL/kg) [IV Piggyback:50]  Out: 1020 (16 mL/kg) [Urine:950 (0.6 mL/kg/hr); Drains:70]  Weight: 63.8 kg     Past Medical History:  She has a past medical history of Anxiety and depression, Breast cancer, CKD (chronic kidney disease), HLD (hyperlipidemia), HTN (hypertension), Hypothyroidism, and Pulmonary embolism.    Past Surgical History:  She has a past surgical history that includes Cholecystectomy; Carpal tunnel release (Bilateral);  section, classic; Total hip arthroplasty (Left); Mastectomy (Right, 2018); Breast biopsy; Foot surgery (Bilateral); and Colostomy (2025).      Social History:  She reports that she has never smoked. She has never used smokeless tobacco. She reports that she does not currently use alcohol. She reports that she does not use drugs.    Family History:  Family History[1]    Outpatient Medications  Medications Ordered Prior to Encounter[2]    Scheduled medications  Scheduled Medications[3]  Continuous medications  Continuous Medications[4]  PRN medications  PRN Medications[5]   Prescriptions Prior to Admission[6]    Reviewed the following cardiology tests:    Echo 25:   1. Left ventricular ejection fraction is low normal " calculated by Valente's biplane at 55%.   2. There is normal right ventricular global systolic function.   3. The Doppler estimated RVSP is within normal limits at 28 mmHg.   4. 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  EF   Date/Time Value Ref Range Status   06/09/2025 10:13 AM 55 %         Physical Exam:  Vitals reviewed.   Constitutional:       Appearance: Not in distress.   Pulmonary:      Effort: Pulmonary effort is normal.      Breath sounds: Normal breath sounds.   Cardiovascular:      PMI at left midclavicular line. Normal rate. Regular rhythm. S1 with normal intensity. S2 with normal intensity.       Murmurs: There is no murmur.   Pulses:     Intact distal pulses.   Edema:     Peripheral edema absent.   Abdominal:      General: Bowel sounds are normal.      Comments: +ostomy   Neurological:      Mental Status: Alert and oriented to person, place and time.      Motor: Weakness present.      Comments: +numbness right hip to thigh  She is able to move right foot/toes  +right hip flexion weakness         Assessment/Plan:   1-paroxysmal atrial fibrillation-in setting of sepsis, perforated diverticulitis and postop state.  Transient and has resolved now.  No change in management.  Continue treating underlying medical problems.  Cardiology will sign off.  She was advised to follow-up with her PCP and with us as needed if there is any future recurrence of atrial fibrillation which seems unlikely at this point.  6/10/25: Cardiology asked to resee patient for recurrent AF with RVR.  AF on telemetry with -180's initially. S/p IV metoprolol 5 mg x1 with improvement HR down to 110-120's. Started on diltiazem infusion. She only required diltiazem infusion briefly and then self converted to SR.  Metoprolol tartrate was increased yesterday evening to 100 mg BID which should be continued.   Hypertension - Yes, 1 point, Female - Yes, 1 point, and Age over 75 years - 2 points  [id number] score is at least  4.   Discussed with Dr. RONAK Santana via [employer] secure chat regarding anticoagulation.  Surgery team okay with heparin infusion with no bolus.  Heparin infusion initiated.  Transition to oral anticoagulation with apixaban when okay with surgery team/per hospitalist when able.   6/11/25: Recurrent AF with RVR this morning with 's.  Ordered diltiazem 5 mg IV x1.  Will add diltiazem 30 mg q6 hr PO.  Continue heparin infusion and metoprolol tartrate.   6/12/25: Heparin infusion has been held d/t right iliacus hematoma.  Currently SR on telemetry. Continue metoprolol tartrate and diltiazem. Magnesium 1.77 and IV replacement ordered. TSH normal 6/7/25.  6/13/25:  SR on telemetry with HR 76 bpm   Continue metoprolol tartrate and diltiazem.  No PVC's noted today. Restart anticoagulation when okay with primary service/surgery team.    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  Outpatient follow-up with cardiology after patient recovers for CHAPIS.  6/10/25: Blood cultures x2 from 6/8/25 are negative x1 day.  ID following.  Plan is for outpatient CHAPIS in 6 weeks.  6/12/25: Blood cultures x2 negative x2 days.  6/13/25: Blood cultures x2 from 9/8/25 negative x2 days.    3. Right iliacus hematoma/abscess  CT abd/pelvis - Ill-defined right iliacus hematoma with central foci of contrast raising suspicion for pseudoaneurysms. Heparin infusion was been stopped.   MRI pelvis - Severe muscle edema in the iliopsoas muscle predominantly in the iliacus with several intramuscular fluid collections. Findings most compatible with underlying infectious pyomyositis with intramuscular abscess formation.  Further management per ID    4. Hypomagnesemia/hypokalemia  6/12/25: Magnesium 1.77 and IV replacement ordered.  Potassium 3.6 and PO replacement ordered.  Recommend keeping potassium >4 and magnesium >2  6/13/25: Potassium 4.3 and magnesium  2.14    Recommendations  Continue metoprolol tartrate and diltiazem.  Restart anticoagulation when okay with primary service/surgery team.  Recommend keeping potassium >4 and magnesium >2  Right iliacus hematoma/abscess -> management per ID/primary service  Cardiology will plan to resee Monday if she remains inpatient    Code Status  Full Code      Yasmine Phillipszell, APRN-CNP          [1]   Family History  Problem Relation Name Age of Onset    Breast cancer Mother  65   [2]   No current facility-administered medications on file prior to encounter.     Current Outpatient Medications on File Prior to Encounter   Medication Sig Dispense Refill    anastrozole (Arimidex) 1 mg tablet Take 1 tablet (1 mg total) by mouth once daily.      artificial tears, dextran-hypomel-glycerin, 0.1-0.3-0.2 % ophthalmic solution Administer 1 drop into affected eye(s) 4 times a day as needed.      aspirin 81 mg EC tablet Take 1 tablet (81 mg) by mouth once daily.      calcium carbonate-vitamin D3 500 mg-5 mcg (200 unit) tablet Take 2 tablets by mouth once daily.      levothyroxine (Synthroid, Levoxyl) 25 mcg tablet Take 1 tablet (25 mcg) by mouth once daily.      metoprolol tartrate (Lopressor) 50 mg tablet Take 3 tablets by mouth twice a day.      olmesartan (BENIcar) 40 mg tablet Take 1 tablet (40 mg) by mouth once daily.      polyethylene glycol (Glycolax, Miralax) 17 gram packet Take 8.5 g by mouth once daily.      [DISCONTINUED] calcium citrate 250 mg calcium tablet Calcium Citrate 1040 MG TABS   Refills: 0        Start : 20-Sep-2021   Active      [DISCONTINUED] rosuvastatin (Crestor) 5 mg tablet Take 1 tablet (5 mg) by mouth once daily.     [3] anastrozole, 1 mg, oral, Daily  aspirin, 81 mg, oral, Daily  bacitracin, , Topical, Daily  cefepime, 2 g, intravenous, q12h  dilTIAZem, 30 mg, oral, q6h  pantoprazole, 40 mg, oral, Daily before breakfast   Or  esomeprazole, 40 mg, nasoduodenal tube, Daily before breakfast   Or  pantoprazole, 40  mg, intravenous, Daily before breakfast  levothyroxine, 25 mcg, oral, Daily  lidocaine, 1 patch, transdermal, Daily  magnesium hydroxide, 30 mL, oral, Daily  metoprolol tartrate, 100 mg, oral, BID  metroNIDAZOLE, 500 mg, intravenous, q12h     [4] [Held by provider] heparin, 0-4,500 Units/hr, Last Rate: Stopped (06/12/25 0121)     [5] PRN medications: acetaminophen, dilTIAZem, [Held by provider] heparin, hydrALAZINE, HYDROcodone-acetaminophen, HYDROmorphone, labetaloL, melatonin, [DISCONTINUED] ondansetron **OR** ondansetron, tiZANidine  [6]   Medications Prior to Admission   Medication Sig Dispense Refill Last Dose/Taking    anastrozole (Arimidex) 1 mg tablet Take 1 tablet (1 mg total) by mouth once daily.       artificial tears, dextran-hypomel-glycerin, 0.1-0.3-0.2 % ophthalmic solution Administer 1 drop into affected eye(s) 4 times a day as needed.       aspirin 81 mg EC tablet Take 1 tablet (81 mg) by mouth once daily.       calcium carbonate-vitamin D3 500 mg-5 mcg (200 unit) tablet Take 2 tablets by mouth once daily.       levothyroxine (Synthroid, Levoxyl) 25 mcg tablet Take 1 tablet (25 mcg) by mouth once daily.       metoprolol tartrate (Lopressor) 50 mg tablet Take 3 tablets by mouth twice a day.       olmesartan (BENIcar) 40 mg tablet Take 1 tablet (40 mg) by mouth once daily.       polyethylene glycol (Glycolax, Miralax) 17 gram packet Take 8.5 g by mouth once daily.

## 2025-06-13 NOTE — CARE PLAN
The patient's goals for the shift include  stay informed    The clinical goals for the shift include patient will have a controlled pain level throughout the shift    Over the shift, the patient did make progress toward the following goals. Barriers to progression include understanding. Recommendations to address these barriers include education.

## 2025-06-14 LAB
ANION GAP SERPL CALC-SCNC: 9 MMOL/L (ref 10–20)
ATRIAL RATE: 75 BPM
BUN SERPL-MCNC: 14 MG/DL (ref 6–23)
CALCIUM SERPL-MCNC: 8.3 MG/DL (ref 8.6–10.3)
CHLORIDE SERPL-SCNC: 106 MMOL/L (ref 98–107)
CO2 SERPL-SCNC: 26 MMOL/L (ref 21–32)
CREAT SERPL-MCNC: 0.83 MG/DL (ref 0.5–1.05)
EGFRCR SERPLBLD CKD-EPI 2021: 71 ML/MIN/1.73M*2
ERYTHROCYTE [DISTWIDTH] IN BLOOD BY AUTOMATED COUNT: 13.3 % (ref 11.5–14.5)
GLUCOSE SERPL-MCNC: 106 MG/DL (ref 74–99)
HCT VFR BLD AUTO: 28.8 % (ref 36–46)
HGB BLD-MCNC: 9.3 G/DL (ref 12–16)
MAGNESIUM SERPL-MCNC: 1.88 MG/DL (ref 1.6–2.4)
MCH RBC QN AUTO: 31 PG (ref 26–34)
MCHC RBC AUTO-ENTMCNC: 32.3 G/DL (ref 32–36)
MCV RBC AUTO: 96 FL (ref 80–100)
NRBC BLD-RTO: 0 /100 WBCS (ref 0–0)
P AXIS: 51 DEGREES
PLATELET # BLD AUTO: 246 X10*3/UL (ref 150–450)
POTASSIUM SERPL-SCNC: 4.1 MMOL/L (ref 3.5–5.3)
PR INTERVAL: 174 MS
Q ONSET: 251 MS
QRS COUNT: 13 BEATS
QRS DURATION: 109 MS
QT INTERVAL: 360 MS
QTC CALCULATION(BAZETT): 408 MS
QTC FREDERICIA: 391 MS
R AXIS: -2 DEGREES
RBC # BLD AUTO: 3 X10*6/UL (ref 4–5.2)
SODIUM SERPL-SCNC: 137 MMOL/L (ref 136–145)
T AXIS: 11 DEGREES
T OFFSET: 431 MS
VENTRICULAR RATE: 77 BPM
WBC # BLD AUTO: 8.6 X10*3/UL (ref 4.4–11.3)

## 2025-06-14 PROCEDURE — 36415 COLL VENOUS BLD VENIPUNCTURE: CPT | Performed by: PHYSICIAN ASSISTANT

## 2025-06-14 PROCEDURE — 2500000002 HC RX 250 W HCPCS SELF ADMINISTERED DRUGS (ALT 637 FOR MEDICARE OP, ALT 636 FOR OP/ED): Performed by: PHYSICIAN ASSISTANT

## 2025-06-14 PROCEDURE — 2500000001 HC RX 250 WO HCPCS SELF ADMINISTERED DRUGS (ALT 637 FOR MEDICARE OP): Performed by: NURSE PRACTITIONER

## 2025-06-14 PROCEDURE — 80048 BASIC METABOLIC PNL TOTAL CA: CPT | Performed by: PHYSICIAN ASSISTANT

## 2025-06-14 PROCEDURE — 2060000001 HC INTERMEDIATE ICU ROOM DAILY

## 2025-06-14 PROCEDURE — 99233 SBSQ HOSP IP/OBS HIGH 50: CPT | Performed by: INTERNAL MEDICINE

## 2025-06-14 PROCEDURE — 2500000001 HC RX 250 WO HCPCS SELF ADMINISTERED DRUGS (ALT 637 FOR MEDICARE OP): Performed by: STUDENT IN AN ORGANIZED HEALTH CARE EDUCATION/TRAINING PROGRAM

## 2025-06-14 PROCEDURE — 85027 COMPLETE CBC AUTOMATED: CPT | Performed by: PHYSICIAN ASSISTANT

## 2025-06-14 PROCEDURE — 2500000005 HC RX 250 GENERAL PHARMACY W/O HCPCS: Performed by: PHYSICIAN ASSISTANT

## 2025-06-14 PROCEDURE — 2500000001 HC RX 250 WO HCPCS SELF ADMINISTERED DRUGS (ALT 637 FOR MEDICARE OP): Performed by: INTERNAL MEDICINE

## 2025-06-14 PROCEDURE — 2500000004 HC RX 250 GENERAL PHARMACY W/ HCPCS (ALT 636 FOR OP/ED): Performed by: INTERNAL MEDICINE

## 2025-06-14 PROCEDURE — 83735 ASSAY OF MAGNESIUM: CPT | Performed by: PHYSICIAN ASSISTANT

## 2025-06-14 RX ADMIN — CEFEPIME HYDROCHLORIDE 2 G: 2 INJECTION, SOLUTION INTRAVENOUS at 23:27

## 2025-06-14 RX ADMIN — LEVOTHYROXINE SODIUM 25 MCG: 0.03 TABLET ORAL at 09:57

## 2025-06-14 RX ADMIN — DILTIAZEM HYDROCHLORIDE 30 MG: 30 TABLET, FILM COATED ORAL at 13:06

## 2025-06-14 RX ADMIN — CEFEPIME HYDROCHLORIDE 2 G: 2 INJECTION, SOLUTION INTRAVENOUS at 09:57

## 2025-06-14 RX ADMIN — TIZANIDINE 2 MG: 2 TABLET ORAL at 23:27

## 2025-06-14 RX ADMIN — DILTIAZEM HYDROCHLORIDE 30 MG: 30 TABLET, FILM COATED ORAL at 18:42

## 2025-06-14 RX ADMIN — DILTIAZEM HYDROCHLORIDE 30 MG: 30 TABLET, FILM COATED ORAL at 06:05

## 2025-06-14 RX ADMIN — METOPROLOL TARTRATE 100 MG: 50 TABLET, FILM COATED ORAL at 21:27

## 2025-06-14 RX ADMIN — DILTIAZEM HYDROCHLORIDE 30 MG: 30 TABLET, FILM COATED ORAL at 23:27

## 2025-06-14 RX ADMIN — MAGNESIUM HYDROXIDE 30 ML: 400 SUSPENSION ORAL at 09:57

## 2025-06-14 RX ADMIN — PANTOPRAZOLE SODIUM 40 MG: 40 TABLET, DELAYED RELEASE ORAL at 06:05

## 2025-06-14 RX ADMIN — LIDOCAINE 4% 1 PATCH: 40 PATCH TOPICAL at 09:57

## 2025-06-14 RX ADMIN — METRONIDAZOLE 500 MG: 500 INJECTION, SOLUTION INTRAVENOUS at 15:56

## 2025-06-14 RX ADMIN — ANASTROZOLE 1 MG: 1 TABLET, COATED ORAL at 09:57

## 2025-06-14 RX ADMIN — METRONIDAZOLE 500 MG: 500 INJECTION, SOLUTION INTRAVENOUS at 04:50

## 2025-06-14 RX ADMIN — METOPROLOL TARTRATE 100 MG: 50 TABLET, FILM COATED ORAL at 09:57

## 2025-06-14 RX ADMIN — BACITRACIN ZINC: 500 OINTMENT TOPICAL at 19:20

## 2025-06-14 RX ADMIN — ASPIRIN 81 MG: 81 TABLET, COATED ORAL at 09:57

## 2025-06-14 ASSESSMENT — ENCOUNTER SYMPTOMS
SHORTNESS OF BREATH: 0
DYSURIA: 0
WEAKNESS: 1
NUMBNESS: 1
JOINT SWELLING: 0
APPETITE CHANGE: 0
HEMATURIA: 0
CONSTIPATION: 0
PALPITATIONS: 1
LIGHT-HEADEDNESS: 0
HALLUCINATIONS: 0
NAUSEA: 0
WHEEZING: 0
TROUBLE SWALLOWING: 0
FLANK PAIN: 0
FEVER: 0
DIZZINESS: 0
HEADACHES: 0
BRUISES/BLEEDS EASILY: 0
BACK PAIN: 1
PALPITATIONS: 0
FATIGUE: 0
DIARRHEA: 0
WOUND: 0
CHEST TIGHTNESS: 0
VOMITING: 0
CHILLS: 0
BLOOD IN STOOL: 0
COUGH: 0
ABDOMINAL PAIN: 1
FREQUENCY: 0
EYE PAIN: 0
FACIAL SWELLING: 0
DIAPHORESIS: 0
SORE THROAT: 0

## 2025-06-14 ASSESSMENT — PAIN SCALES - GENERAL
PAINLEVEL_OUTOF10: 0 - NO PAIN

## 2025-06-14 ASSESSMENT — PAIN - FUNCTIONAL ASSESSMENT: PAIN_FUNCTIONAL_ASSESSMENT: 0-10

## 2025-06-14 NOTE — PROGRESS NOTES
Rosemary Motta is a 79 y.o. female on day 7 of admission presenting with Diverticulitis of large intestine with perforation without bleeding.      Subjective   Rosemary Motta is a 79F hx HTN, HLE, breast ca, diverticulosis, GERD, hypothyroidism, neuropathy, migraines, OA, anxiety/depression presented 6/7/25 for abdominal pain, left lower quadrant abd pain with radiation to back. now improved. up to 10/10, now 5/10. nausea with small vomiting. no diarrhea, constipation, blood in stool or melena. subacute dry cough. no acute abdomen on exam. labs/imaging w/ cr 1.29 (baseline 1.02), cbc, lactate wnl. ua wnl. ekg wnl. ct a/p w/ perforated diverticulitis. Seen by surgery and is now s/p partial colectomy and colostomy placement 6/7/25. Had hypotension and was monitored closely in ICU. UA without UTI. Patient noted to have tachycardia 6/9/25, Nocturnist evaluated ECG and diagnosed as new onset a fib with RVR, given Cardizem x1, resumed home metoprolol, cardiology consulted. Patient back in normal sinus rhythm.  I discussed with Dr. Quintanilla, cardiologist who at this time does not recommend any oral anticoagulation given this was a isolated incident.  Will need to monitor further if this recurs then will need to reconsider this. 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  Outpatient follow-up with cardiology after patient recovers for CHAPIS. NG-tube was removed 6/9/25 as was Gabriel.  Called to patient's room as patient heart rate noted to be in the upper 170s to 180s 6/11/25, EC confirmed AFRVR, given IV lopressor x1, started on hep gtt (cleared with surgeon if no initial bolus given) and cardizem gtt. Shortly after Cardizem drip was given patient converted back to normal sinus rhythm.  Patient felt a little lightheaded during this time but that completely resolved once heart rate was improved.  She was feeling pressure in her abdomen at this time but  then was able to urinate and this had completely resolved. Patient again had recurrence of AFRVR 6/11/25 with pain in lower back radiating down her buttocks into posterior leg to level of the knee  Given pain control which did help initially however once patient moved pain then returned.  Trial lidocaine patch, IV Dilaudid as needed, as needed muscle relaxers.  Can trial heat to the area as well.  Patient converted prior to IV diltiazem being given, heart rate better controlled. Please see significant event note by our night doc. Worsening RLE abdominal and groin pain, CT showing R iliacus hematoma with central foci of contrast raising suspicion for pseudoaneurysms. Repeat hg 10.2, baseline roughly around 11-13. [facility] vascular recommends holding hep drip, monitor H/H and repeat CT in 48 hours. IR consulted here Evaluated in the presence of Dr. Brunner as patient is having new numbness on her right anterior thigh and weakness of her right hip flexion, she is sitting upright in bedside chair but discussed with PT that she was unable to put any weight on this leg as the knee continued to buckle and she only was pivoted over to the chair.  While sitting up in the chair pain is only approximately a 2 out of 10 in the right groin.        6/13/2025: No acute events overnight. Vitals remain stable this morning, afebrile, HR 91 with /74. Hemoglobin is stable 10.3 over the last 24 hours. MRI pelvis concerning for infectious pyomyositis with intramuscular abscess formation. I discussed the case with IR, ID and general surgery, will monitor clinically for now as not a good option to drain given initial concern for extra luminal contrast on CT. Repeat CT scan tomorrow vs Sunday pending clinical course  Patient and PT note less sensation loss RLE and better movement when getting up to bedside chair.    Re-evaluated patient with her daughter and  at bedside, all of the above relayed to her family per patients  request  Patient asking that her daughter Vishnu be called with daily updates if she is not present at time of evaluation by hospitalist service.     Rounded on patient on this date with Pharmacist, RN, and Pharmacy student(s)     6/14: Patient seen.  Concerned that numbness appears to be progressing down her leg.  Reviewed previous findings including MRI of her spine and back.  Remains on antibiotic therapy for diverticulitis as well as possible psoas abscess.  Will continue to monitor, consider reimaging if symptoms persist.  MRI suggest patient has some degenerative disc disease as well.           Review of Systems   Constitutional:  Negative for appetite change, chills, diaphoresis, fatigue and fever.   HENT:  Negative for congestion, ear pain, facial swelling, hearing loss, nosebleeds, sore throat, tinnitus and trouble swallowing.    Eyes:  Negative for pain.   Respiratory:  Negative for cough, chest tightness, shortness of breath and wheezing.    Cardiovascular:  Positive for palpitations (Resolved). Negative for chest pain and leg swelling.   Gastrointestinal:  Positive for abdominal pain. Negative for blood in stool, constipation, diarrhea, nausea and vomiting.   Genitourinary:  Positive for urgency. Negative for dysuria, flank pain, frequency and hematuria.   Musculoskeletal:  Positive for back pain. Negative for joint swelling.        With radiation down the right buttock into right posterior leg to the level of the knee   Skin:  Negative for rash and wound.   Neurological:  Positive for weakness (Right hip flexion) and numbness (Right anterior thigh). Negative for dizziness, syncope, light-headedness and headaches.   Hematological:  Does not bruise/bleed easily.   Psychiatric/Behavioral:  Negative for behavioral problems, hallucinations and suicidal ideas.           Objective     Last Recorded Vitals  /65 (BP Location: Left arm, Patient Position: Lying)   Pulse 78   Temp 36.7 °C (98.1 °F)  (Temporal)   Resp 18   Wt 61.7 kg (136 lb 0.4 oz)   SpO2 97%     Image Results  Imaging  MR lumbar spine wo IV contrast  Result Date: 6/13/2025  1. Motion degraded study. Combination of congenital spinal stenosis, epidural lipomatosis and diffuse degenerative changes seen throughout the lumbar spine. 2. Moderate spinal canal stenosis at L3-L4 and mild spinal canal stenosis at L2-L3 and L4-L5. 3. Severe narrowing of the left lateral recess at L4-L5 with probable compromise of the descending left L5 nerve root. Severe left foraminal stenosis at this level. 4. Levocurvature of the lumbar spine. Right lateral listhesis of L1-L2. Slight left lateral listhesis of L3-L4 and L4-L5.   I personally reviewed the images/study and I agree with the findings as stated.   MACRO: None   Signed by: Jennifer Blake 6/13/2025 8:43 AM Dictation workstation:   [id number]    MR MSK pelvis w and wo IV contrast  Result Date: 6/12/2025  1.  Severe muscle edema in the iliopsoas muscle predominantly in the iliacus with several intramuscular fluid collections. Findings most compatible with underlying infectious pyomyositis with intramuscular abscess formation. Underlying hematoma some may be present as well as per recent CT. Evaluation for extravasation reported on previous CT is limited in this study which is tailored for evaluation of the lumbar plexus. There is moderate muscle edema in the piriformis and the abductor musculature as well. This is also concerning for underlying myositis. 2. No evidence of abnormal marrow signal about the total hip arthroplasties. No large effusion seen. 3. No abnormality seen along the lumbosacral plexus. The sciatic nerves are symmetric.     I personally reviewed the images/study and I agree with the findings as stated. This study was interpreted at [place], [place].   MACRO: Critical Finding:  See findings. Notification was initiated on 6/12/2025 at 8:08 pm by   Duke Pierre.  (**-OCF-**)   Signed by: Duke Pierre 6/12/2025 8:08 PM Dictation workstation:   [id number]    CT abdomen pelvis w IV contrast  Result Date: 6/11/2025  1. Ill-defined right iliacus hematoma with central foci of contrast raising suspicion for pseudoaneurysms. Contrast extravasation is not entirely excluded on single phase imaging. Correlate with labs and symptoms and if there is concern for acute blood loss, multiphase examination can be considered. 2. Postsurgical changes of partial sigmoid colectomy with end colostomy and Nory's pouch creation. No acute postsurgical complications identified. 3. Pelvic drain is in place with the tip in the left lower quadrant. No residual drainable collections identified. 4. Diffuse hepatic steatosis. Other chronic findings as described above.   MACRO: Aaron Mccall discussed the significance and urgency of this critical finding by EPIC secure chat with  PARTH TEIXEIRA on 6/11/2025 at 10:40 pm.  (**-RCF-**) Findings:  See findings.   Signed by: Aaron Mccall 6/11/2025 10:41 PM Dictation workstation:   [id number]    XR chest 1 view  Result Date: 6/8/2025  A nasogastric tube projects into the stomach beyond the field of imaging.   Mild cardiomegaly.   Minimal linear bibasilar atelectasis. Otherwise no sign of acute cardiopulmonary disease.     MACRO: None   Signed by: Mello Fried 6/8/2025 12:33 PM Dictation workstation:   [id number]      Cardiology, Vascular, and Other Imaging  Electrocardiogram, 12-lead PRN ACS symptoms  Result Date: 6/12/2025  Atrial flutter with variable AV block Nonspecific ST and T wave abnormality Abnormal ECG When compared with ECG of 11-JUN-2025 09:11, (unconfirmed) Nonspecific T wave abnormality, improved in Lateral leads Confirmed by Bob Hu ([id number]) on 6/12/2025 11:34:04 AM    Electrocardiogram, 12-lead PRN ACS symptoms  Result Date: 6/10/2025  Atrial fibrillation with rapid ventricular response ST & T wave abnormality,  consider inferior ischemia ST & T wave abnormality, consider anterolateral ischemia Abnormal ECG When compared with ECG of 07-JUN-2025 01:53, PREVIOUS ECG IS PRESENT Confirmed by Bob Hu ([id number]) on 6/10/2025 1:23:42 PM    Electrocardiogram, 12-lead PRN ACS symptoms  Result Date: 6/10/2025  Atrial fibrillation with rapid ventricular response Nonspecific ST and T wave abnormality Abnormal ECG When compared with ECG of 10-CHAVA-2025 10:11, (unconfirmed) No significant change was found Confirmed by Bob Hu ([id number]) on 6/10/2025 1:23:23 PM    Electrocardiogram, 12-lead PRN ACS symptoms  Result Date: 6/10/2025  Normal sinus rhythm Normal ECG When compared with ECG of 10-CHAVA-2025 10:11, (unconfirmed) Sinus rhythm has replaced Atrial fibrillation Vent. rate has decreased BY  89 BPM ST no longer depressed in Anterolateral leads T wave inversion now evident in Inferior leads Nonspecific T wave abnormality no longer evident in Lateral leads Confirmed by Bob Hu ([id number]) on 6/10/2025 1:23:11 PM    Transthoracic Echo Complete  Result Date: 6/9/2025              [place] [address]      Phone [phone] Fax [phone] TRANSTHORACIC ECHOCARDIOGRAM REPORT Patient Name:       PATTI Haynes Physician:    [id number] Mona Quintanilla MD Study Date:         6/9/2025             Ordering Provider:    [id number] NAEL WOLF MRN/PID:            [id number]             Fellow: Accession#:         [id number]         Nurse: Date of Birth/Age:  1945 / 79      Sonographer:          Richelle Grey RDCS                     years Gender Assigned at  F                    Additional Staff: Birth: Height:             147.32 cm            Admit Date:           6/8/2025 Weight:             66.68 kg             Admission Status:     Inpatient -                                                                 Routine BSA / BMI:          1.60 m2 / 30.72      Department Location:  [facility]                     kg/m2 Blood Pressure: 158 /66 mmHg Study Type:    TRANSTHORACIC ECHO (TTE) COMPLETE Diagnosis/ICD: Paroxysmal atrial fibrillation-I48.0; Dyspnea, unspecified-R06.00 Indication:    Dyspnea CPT Codes:     Echo Complete w Full [id number] Patient History: Pertinent History: HTN. Study Detail: The following Echo studies were performed: 2D, M-Mode, Doppler and               color flow.  PHYSICIAN INTERPRETATION: Left Ventricle: Left ventricular ejection fraction is low normal calculated by Valente's biplane at 55%. There are no regional wall motion abnormalities. The left ventricular cavity size is normal. There is normal septal and normal posterior left ventricular wall thickness. Spectral Doppler shows a normal pattern of left ventricular diastolic filling. Left Atrium: The left atrial size is normal. Right Ventricle: The right ventricle is normal in size. There is normal right ventricular global systolic function. Right Atrium: The right atrial size is normal. Aortic Valve: The aortic valve is trileaflet. The aortic valve area by VTI is 2.31 cmï¿½ with a peak velocity of 1.61 m/s. The peak and mean gradients are 10 mmHg and 5 mmHg, respectively, with a dimensionless index of 0.75. There is no evidence of aortic valve regurgitation. 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 Mitral Valve: The mitral valve is normal in structure. There is trace mitral valve regurgitation. The E Vmax is 1.00 m/s. Tricuspid Valve: The tricuspid valve is structurally normal. There is trace tricuspid regurgitation. The Doppler estimated right ventricular systolic pressure (RVSP) is within normal limits at 28 mmHg. Pulmonic Valve: The pulmonic valve is structurally normal. There is trace pulmonic valve regurgitation. Pericardium: No pericardial effusion noted. Aorta: The aortic  root is normal. Systemic Veins: The inferior vena cava appears normal in size, IVC inspiratory collapse was not assessed.  CONCLUSIONS:  1. Left ventricular ejection fraction is low normal calculated by Valente's biplane at 55%.  2. There is normal right ventricular global systolic function.  3. The Doppler estimated RVSP is within normal limits at 28 mmHg.  4. 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 QUANTITATIVE DATA SUMMARY:  2D MEASUREMENTS:             Normal Ranges: IVSd:            0.72 cm     (0.6-1.1cm) LVPWd:           0.71 cm     (0.6-1.1cm) LVIDd:           4.41 cm     (3.9-5.9cm) LVIDs:           2.79 cm LV Mass Index:   59.5 g/m2 LVEDV Index:     22.62 ml/m2 LV % FS          36.8 %  LEFT ATRIUM:                  Normal Ranges: LA Vol A4C:        31.5 ml    (22+/-6mL/m2) LA Vol A2C:        28.9 ml LA Vol BP:         35.2 ml LA Vol Index A4C:  19.7ml/m2 LA Vol Index A2C:  18.1 ml/m2 LA Vol Index BP:   22.0 ml/m2 LA Area A4C:       14.4 cm2 LA Area A2C:       11.8 cm2 LA Major Axis A4C: 5.6 cm LA Major Axis A2C: 4.1 cm LA Volume Index:   21.8 ml/m2 LA Vol A4C:        29.0 ml LA Vol A2C:        27.9 ml LA Vol Index BSA:  17.8 ml/m2  RIGHT ATRIUM:          Normal Ranges: RA Area A4C:  10.0 cm2  AORTA MEASUREMENTS:         Normal Ranges: Ao Sinus, d:        3.20 cm (2.1-3.5cm) Ao STJ, d:          2.40 cm (1.7-3.4cm) Asc Ao, d:          2.50 cm (2.1-3.4cm)  LV SYSTOLIC FUNCTION:                      Normal Ranges: EF-A4C View:    60 % (>=55%) EF-A2C View:    52 % EF-Biplane:     55 % LV EF Reported: 55 %  LV DIASTOLIC FUNCTION:           Normal Ranges: MV Peak E:             1.00 m/s  (0.7-1.2 m/s) MV Peak A:             0.80 m/s  (0.42-0.7 m/s) E/A Ratio:             1.26      (1.0-2.2) MV e'                  0.111 m/s (>8.0) MV lateral e'          0.11 m/s MV medial e'           0.11 m/s E/e' Ratio:            9.04      (<8.0)  MITRAL VALVE:          Normal Ranges: MV DT:        122 msec  (150-240msec)  AORTIC VALVE:                      Normal Ranges: AoV Vmax:                1.61 m/s  (<=1.7m/s) AoV Peak PG:             10.4 mmHg (<20mmHg) AoV Mean P.3 mmHg  (1.7-11.5mmHg) LVOT Max Kael:            1.04 m/s  (<=1.1m/s) AoV VTI:                 32.01 cm  (18-25cm) LVOT VTI:                24.08 cm LVOT Diameter:           1.98 cm   (1.8-2.4cm) AoV Area, VTI:           2.31 cm2  (2.5-5.5cm2) AoV Area,Vmax:           1.98 cm2  (2.5-4.5cm2) AoV Dimensionless Index: 0.75  RIGHT VENTRICLE: RV Basal 2.42 cm RV Mid   1.90 cm RV Major 6.8 cm TAPSE:   30.3 mm RV s'    0.15 m/s  TRICUSPID VALVE/RVSP:          Normal Ranges: Peak TR Velocity:     2.50 m/s Est. RA Pressure:     3 mmHg RV Syst Pressure:     28 mmHg  (< 30mmHg) IVC Diam:             1.04 cm TV e'                 0.1 m/s  AORTA: Asc Ao Diam 3.19 cm  [id number] Mona Quintanilla MD Electronically signed on 2025 at 5:35:08 PM  ** Final **          Lab Results  Results for orders placed or performed during the hospital encounter of 25 (from the past 24 hours)   CBC   Result Value Ref Range    WBC 8.6 4.4 - 11.3 x10*3/uL    nRBC 0.0 0.0 - 0.0 /100 WBCs    RBC 3.00 (L) 4.00 - 5.20 x10*6/uL    Hemoglobin 9.3 (L) 12.0 - 16.0 g/dL    Hematocrit 28.8 (L) 36.0 - 46.0 %    MCV 96 80 - 100 fL    MCH 31.0 26.0 - 34.0 pg    MCHC 32.3 32.0 - 36.0 g/dL    RDW 13.3 11.5 - 14.5 %    Platelets 246 150 - 450 x10*3/uL   Basic Metabolic Panel   Result Value Ref Range    Glucose 106 (H) 74 - 99 mg/dL    Sodium 137 136 - 145 mmol/L    Potassium 4.1 3.5 - 5.3 mmol/L    Chloride 106 98 - 107 mmol/L    Bicarbonate 26 21 - 32 mmol/L    Anion Gap 9 (L) 10 - 20 mmol/L    Urea Nitrogen 14 6 - 23 mg/dL    Creatinine 0.83 0.50 - 1.05 mg/dL    eGFR 71 >60 mL/min/1.73m*2    Calcium 8.3 (L) 8.6 - 10.3 mg/dL   Magnesium   Result Value Ref Range    Magnesium 1.88 1.60 - 2.40 mg/dL        Medications  Scheduled medications:  Scheduled Medications[1]  Continuous  medications:  Continuous Medications[2]  PRN medications:  PRN Medications[3]     Physical Exam  Constitutional:       General: She is not in acute distress.     Appearance: Normal appearance.   HENT:      Head: Normocephalic and atraumatic.      Right Ear: External ear normal.      Left Ear: External ear normal.      Nose: Nose normal.      Mouth/Throat:      Mouth: Mucous membranes are moist.      Pharynx: Oropharynx is clear.   Eyes:      Extraocular Movements: Extraocular movements intact.      Conjunctiva/sclera: Conjunctivae normal.      Pupils: Pupils are equal, round, and reactive to light.   Cardiovascular:      Rate and Rhythm: Tachycardia present. Rhythm irregular.      Pulses: Normal pulses.      Heart sounds: Murmur heard.   Pulmonary:      Effort: Pulmonary effort is normal. No respiratory distress.      Breath sounds: Normal breath sounds. No wheezing, rhonchi or rales.   Abdominal:      General: Bowel sounds are normal.      Palpations: Abdomen is soft.      Tenderness: There is abdominal tenderness. There is no right CVA tenderness, left CVA tenderness, guarding or rebound.      Comments: LOLA drain with bloody output  Left-sided ostomy is pink with small amount of sanguinous liquid   Musculoskeletal:         General: No swelling. Normal range of motion.      Cervical back: Normal range of motion and neck supple.   Skin:     General: Skin is warm and dry.      Capillary Refill: Capillary refill takes less than 2 seconds.      Findings: No lesion or rash.   Neurological:      Mental Status: She is alert and oriented to person, place, and time.      Gait: Gait abnormal.      Comments: RLE motor 2/5  Loss of sensation R thigh  Diminished sensation majority of RLE  Pulses intact      Psychiatric:         Mood and Affect: Mood normal.         Behavior: Behavior normal.                  Assessment/Plan      Recurrent acute Diverticulitis c/b perforation s/p partial colectomy and colostomy placement  6/7/25  PCN allergy of anaphylaxis   Cefepime/flagyl  NGT removed 6/9/25  Routine colostomy care  Monitor closely in ICU  Follow blood cultures x2  Appreciate General surgery management  ID consulted- recommends to Continue antibiotics through 6/12/25 to finish 5 day course   6/14: Remains on antibiotic therapy, see below.    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  Outpatient follow-up with cardiology after patient recovers for CHAPIS.  Monitor on tele  [id number] of 4-started on heparin drip 6/10/2025 with clearance from general surgery team. Heparin drip stopped 6/12/25 AM    R iliacus hematoma with central foci of contrast raising suspicion for pseudoaneurysms vs intramuscular abscess formation  Stop heparin drip  Monitor H/H closely  [facility] vascular recommends holding hep drip, monitor H/H and repeat CT in 48 hours  IR consulted- no acute intervention planned  Appreciate general surgery recommendations  MRI lumbar and pelvis as above  IV antibiotics per Infectious Diease  Repeat bl cx x2 6/13/25  Repeat CT scan Saturday vs Sunday 6/14: Continue current antibiotics.  Consider repeat CT scan tomorrow.    Acute hypoxic respiratory failure  Resolved    MENG on CKD  Resolved    Hypokalemia, mild  Resolved    HTN  HLE  Monitor blood pressure, resume hypertensives as able    Breast CA on anastrozole  Status post mastectomy    GERD    Hypothyroidism  Continue Synthroid  Recent TSH reviewed    Neuropathy  Migraines  OA  Anxiety/Depression    Code Status: Full Code                 DVT ppx: SCDs only given hematoma     Please see orders for more complete plan    Rei Ojeda MD         [1] anastrozole, 1 mg, oral, Daily  aspirin, 81 mg, oral, Daily  bacitracin, , Topical, Daily  cefepime, 2 g, intravenous, q12h  dilTIAZem, 30 mg, oral, q6h  pantoprazole,  40 mg, oral, Daily before breakfast   Or  esomeprazole, 40 mg, nasoduodenal tube, Daily before breakfast   Or  pantoprazole, 40 mg, intravenous, Daily before breakfast  levothyroxine, 25 mcg, oral, Daily  lidocaine, 1 patch, transdermal, Daily  magnesium hydroxide, 30 mL, oral, Daily  metoprolol tartrate, 100 mg, oral, BID  metroNIDAZOLE, 500 mg, intravenous, q12h     [2] dilTIAZem, 5-15 mg/hr, Last Rate: Stopped (06/13/25 2111)  [Held by provider] heparin, 0-4,500 Units/hr, Last Rate: Stopped (06/12/25 0121)     [3] PRN medications: acetaminophen, dilTIAZem, [Held by provider] heparin, hydrALAZINE, HYDROcodone-acetaminophen, HYDROmorphone, labetaloL, melatonin, [DISCONTINUED] ondansetron **OR** ondansetron, tiZANidine

## 2025-06-14 NOTE — PROGRESS NOTES
"GENERAL SURGERY PROGRESS NOTE    Rosemary Motta   1945   [id number]     Rosemary Motta is a 79 y.o. female on day 7 of admission presenting with Diverticulitis of large intestine with perforation without bleeding.      Subjective  No acute events. Patient reports adequate pain control. Her appetite is improving and she is having increasing stool via her colostomy. She continues to experience significant numbness of her right thigh    Review of Systems   Constitutional:  Negative for chills and fever.   Respiratory:  Negative for cough and shortness of breath.    Cardiovascular:  Negative for chest pain and palpitations.   Gastrointestinal:  Positive for abdominal pain. Negative for blood in stool, constipation, diarrhea, nausea and vomiting.   Neurological:  Positive for numbness. Negative for dizziness and headaches.   All other systems reviewed and are negative.      Objective    Last Recorded Vitals  Blood pressure 131/72, pulse 91, temperature 36.1 °C (97 °F), temperature source Temporal, resp. rate 18, height 1.473 m (4' 10\"), weight 61.7 kg (136 lb 0.4 oz), SpO2 97%.    Intake/Output last 3 Shifts:  I/O last 3 completed shifts:  In: 851 (13.8 mL/kg) [P.O.:801; IV Piggyback:50]  Out: 4370 (70.8 mL/kg) [Urine:3825 (1.7 mL/kg/hr); Drains:70; Stool:475]  Weight: 61.7 kg     Physical Exam  Constitutional:       General: She is not in acute distress.     Appearance: Normal appearance. She is not ill-appearing.   HENT:      Head: Normocephalic and atraumatic.   Cardiovascular:      Rate and Rhythm: Normal rate and regular rhythm.   Pulmonary:      Effort: Pulmonary effort is normal. No respiratory distress.      Breath sounds: Normal breath sounds.   Abdominal:      General: There is no distension.      Palpations: Abdomen is soft.      Tenderness: There is no abdominal tenderness. There is no guarding.      Comments: Incision clean/dry/intact with staples in place. Colostomy viable with soft brown stool. Drain " with minimal serosanguinous output   Musculoskeletal:         General: No swelling.   Skin:     General: Skin is warm and dry.   Neurological:      Mental Status: She is alert and oriented to person, place, and time. Mental status is at baseline.   Psychiatric:         Mood and Affect: Mood normal.         Behavior: Behavior normal.       Labs  Results for orders placed or performed during the hospital encounter of 06/07/25 (from the past 24 hours)   Blood Culture    Specimen: Peripheral Venipuncture; Blood culture   Result Value Ref Range    Blood Culture Loaded on Instrument - Culture in progress    Blood Culture    Specimen: Peripheral Venipuncture; Blood culture   Result Value Ref Range    Blood Culture Loaded on Instrument - Culture in progress    CBC   Result Value Ref Range    WBC 8.6 4.4 - 11.3 x10*3/uL    nRBC 0.0 0.0 - 0.0 /100 WBCs    RBC 3.00 (L) 4.00 - 5.20 x10*6/uL    Hemoglobin 9.3 (L) 12.0 - 16.0 g/dL    Hematocrit 28.8 (L) 36.0 - 46.0 %    MCV 96 80 - 100 fL    MCH 31.0 26.0 - 34.0 pg    MCHC 32.3 32.0 - 36.0 g/dL    RDW 13.3 11.5 - 14.5 %    Platelets 246 150 - 450 x10*3/uL   Basic Metabolic Panel   Result Value Ref Range    Glucose 106 (H) 74 - 99 mg/dL    Sodium 137 136 - 145 mmol/L    Potassium 4.1 3.5 - 5.3 mmol/L    Chloride 106 98 - 107 mmol/L    Bicarbonate 26 21 - 32 mmol/L    Anion Gap 9 (L) 10 - 20 mmol/L    Urea Nitrogen 14 6 - 23 mg/dL    Creatinine 0.83 0.50 - 1.05 mg/dL    eGFR 71 >60 mL/min/1.73m*2    Calcium 8.3 (L) 8.6 - 10.3 mg/dL   Magnesium   Result Value Ref Range    Magnesium 1.88 1.60 - 2.40 mg/dL       Radiology  Imaging  MR lumbar spine wo IV contrast  Result Date: 6/13/2025  1. Motion degraded study. Combination of congenital spinal stenosis, epidural lipomatosis and diffuse degenerative changes seen throughout the lumbar spine. 2. Moderate spinal canal stenosis at L3-L4 and mild spinal canal stenosis at L2-L3 and L4-L5. 3. Severe narrowing of the left lateral recess at  L4-L5 with probable compromise of the descending left L5 nerve root. Severe left foraminal stenosis at this level. 4. Levocurvature of the lumbar spine. Right lateral listhesis of L1-L2. Slight left lateral listhesis of L3-L4 and L4-L5.   I personally reviewed the images/study and I agree with the findings as stated.   MACRO: None   Signed by: Jennifer Blake 6/13/2025 8:43 AM Dictation workstation:   [id number]    MR MSK pelvis w and wo IV contrast  Result Date: 6/12/2025  1.  Severe muscle edema in the iliopsoas muscle predominantly in the iliacus with several intramuscular fluid collections. Findings most compatible with underlying infectious pyomyositis with intramuscular abscess formation. Underlying hematoma some may be present as well as per recent CT. Evaluation for extravasation reported on previous CT is limited in this study which is tailored for evaluation of the lumbar plexus. There is moderate muscle edema in the piriformis and the abductor musculature as well. This is also concerning for underlying myositis. 2. No evidence of abnormal marrow signal about the total hip arthroplasties. No large effusion seen. 3. No abnormality seen along the lumbosacral plexus. The sciatic nerves are symmetric.     I personally reviewed the images/study and I agree with the findings as stated. This study was interpreted at [facility], [place], [place].   MACRO: Critical Finding:  See findings. Notification was initiated on 6/12/2025 at 8:08 pm by  Duke Pierre.  (**-OCF-**)   Signed by: Duke Pierre 6/12/2025 8:08 PM Dictation workstation:   [id number]    CT abdomen pelvis w IV contrast  Result Date: 6/11/2025  1. Ill-defined right iliacus hematoma with central foci of contrast raising suspicion for pseudoaneurysms. Contrast extravasation is not entirely excluded on single phase imaging. Correlate with labs and symptoms and if there is concern for acute blood loss, multiphase  examination can be considered. 2. Postsurgical changes of partial sigmoid colectomy with end colostomy and Nory's pouch creation. No acute postsurgical complications identified. 3. Pelvic drain is in place with the tip in the left lower quadrant. No residual drainable collections identified. 4. Diffuse hepatic steatosis. Other chronic findings as described above.   MACRO: Aaron Mccall discussed the significance and urgency of this critical finding by EPIC secure chat with  PARTH LLUVIA on 6/11/2025 at 10:40 pm.  (**-RCF-**) Findings:  See findings.   Signed by: Aaron Mccall 6/11/2025 10:41 PM Dictation workstation:   [id number]    XR chest 1 view  Result Date: 6/8/2025  A nasogastric tube projects into the stomach beyond the field of imaging.   Mild cardiomegaly.   Minimal linear bibasilar atelectasis. Otherwise no sign of acute cardiopulmonary disease.     MACRO: None   Signed by: Mello Fried 6/8/2025 12:33 PM Dictation workstation:   [id number]      Assessment and Plan  Assessment & Plan  Diverticulitis of large intestine with perforation without bleeding    79 y.o. female with perforated diverticulitis status post Nory procedure 6/7/25. Her postoperative course was complicated by AFRVR and a significant right psoas hematoma from anticoagulation. She has motor function of her RLE but her right thigh is numb. Her bowel function has improved over the last few days. Continue diet and bowel regimen as scheduled. Continue holding anticoagulation. The drain can likely be removed prior to discharge - anticipate discharge after the weekend.    Reena Meng MD, [facility]  General Surgery

## 2025-06-14 NOTE — PROGRESS NOTES
Received call from patients daughter whom toured [facility] and states APRC  is FOC. [facility] is interested in patient and updated them on FOC. Will need to await full acceptance from [facility].

## 2025-06-14 NOTE — PROGRESS NOTES
Rosemary Motta is a 79 y.o. female on day 7 of admission presenting with Diverticulitis of large intestine with perforation without bleeding.      Assessment/Plan:     #Acute diverticulitis  #Bowel perforation s/p ostomy 6/7/2025  #Sepsis, POA, resolved  Patient started on ciprofloxacin and Flagyl due to penicillin allergy.  Patient was found to have short segment of mid sigmoid colon with inflammation and perforation with moderate amount of purulent fluid throughout the abdomen.  No cultures obtained.    Tmax 99.9. Hemodynamically stable. No Leukocytosis    #Pyomyositis  #R iliacus Intramuscular abscess? ( 3 x 2.8 cm )  As per IR, not well-formed abscess at this time.  As per Ortho, no intervention and to be followed up with Dr. Haywood     #Penicillin allergy  Reports history of syncope when she was a child.  Discussed with the patient that it is safe to get cefepime as it does not have cross-reactivity.  Tolerated cefepime well without any issues     #CKD  Estimated Creatinine Clearance: 40.2 mL/min (by C-G formula based on SCr of 0.83 mg/dL).     Serum creatinine: 0.83 mg/dL 06/14/25 0432  Estimated Glomerular Filtration Rate: 71.4 mL/min/1.73m2      HTN, HLD  Breast cancer s/p right mastectomy, on anastrozole  GERD  Hypothyroidism  Paroxysmal A-fib     Recommendations:     -Cont cefepime 2 g every 12hr  -Cont Flagyl 500 twice daily  -Continue antibiotics. No tentative plan to stop for now  -Renally dose antibiotic  -Will continue to follow        Subjective   Interval History: No acute events overnight.    Post surgical pain is well controlled. Denies fever.chills. Continues to have numbness on her R thigh down to her knee. Tolerating Abx.    Review of Systems  Denies: fever, chills, nausea, vomiting, dysuria    Objective   Range of Vitals (last 24 hours)  Heart Rate:  []   Temp:  [36.1 °C (97 °F)-37.7 °C (99.9 °F)]   Resp:  [16-19]   BP: (120-158)/(69-78)   Weight:  [61.7 kg (136 lb 0.4 oz)]   SpO2:  [94  %-97 %]  Daily Weight  06/14/25 : 61.7 kg (136 lb 0.4 oz)   Body mass index is 28.43 kg/m².      General: alert, oriented, NAD  HEENT: No conjunctival pallor, no scleral icterus  Abdomen: soft, ostomy bag, midline surgical incision with staples, LOLA drain w/t serous drainage  - Ostomy with stool in appliance,  Neuro: AAO x 3, RLE motor 1/5  Extremities: no cyanosis or erythema. R thigh minimal swelling / non tender. 2+ pedal pulse  Skin: No rashes   MSK: No joint inflammation    Antibiotics  bacitracin - 500 unit/gram  cefepime - 2 gram/50 mL  metroNIDAZOLE - 500 mg/100 mL      Relevant Results  Labs  Results from last 72 hours   Lab Units 06/14/25  0432 06/13/25  0403 06/13/25  0007 06/12/25  1648 06/12/25  0338   WBC AUTO x10*3/uL 8.6 9.7  --   --  7.0   HEMOGLOBIN g/dL 9.3* 10.3* 10.1*   < > 10.1*   HEMATOCRIT % 28.8* 29.8* 29.3*   < > 29.5*   PLATELETS AUTO x10*3/uL 246 228  --   --  194   NEUTROS PCT AUTO %  --   --   --   --  70.0   LYMPHO PCT MAN %  --  17.0  --   --   --    LYMPHS PCT AUTO %  --   --   --   --  14.2   MONO PCT MAN %  --  0.0  --   --   --    MONOS PCT AUTO %  --   --   --   --  6.5   EOSINO PCT MAN %  --  1.0  --   --   --    EOS PCT AUTO %  --   --   --   --  2.0    < > = values in this interval not displayed.     Results from last 72 hours   Lab Units 06/14/25  0432 06/13/25  0403 06/12/25  0338   SODIUM mmol/L 137 137 134*   POTASSIUM mmol/L 4.1 4.3 3.6   CHLORIDE mmol/L 106 105 104   CO2 mmol/L 26 24 25   BUN mg/dL 14 13 15   CREATININE mg/dL 0.83 0.77 0.77   GLUCOSE mg/dL 106* 112* 126*   CALCIUM mg/dL 8.3* 8.2* 8.0*   ANION GAP mmol/L 9* 12 9*   EGFR mL/min/1.73m*2 71 78 78           Estimated Creatinine Clearance: 40.2 mL/min (by C-G formula based on SCr of 0.83 mg/dL).  C-Reactive Protein   Date Value Ref Range Status   03/02/2024 <0.10 <1.00 mg/dL Final       Microbiology  No results found for the last 14 days.      Imaging    XR chest 1 view  Result Date: 6/8/2025  A nasogastric  tube projects into the stomach beyond the field of imaging.   Mild cardiomegaly.   Minimal linear bibasilar atelectasis. Otherwise no sign of acute cardiopulmonary disease.     MACRO: None   Signed by: Mello Fried 6/8/2025 12:33 PM Dictation workstation:   [id number]    CT abdomen pelvis w IV contrast  Result Date: 6/7/2025  1. Diverticulosis of the sigmoid colon. There is inflammatory change noted about the proximal to mid sigmoid colon most concerning for acute diverticulitis. Multiple free air locules are noted adjacent to this inflamed segment of sigmoid colon as well as small-to-moderate amount of free air within the upper abdomen consistent with perforation. No overt abscess identified.   MACRO: Donovan Ocampo discussed the significance and urgency of this critical finding by telephone with  KONG HILL on 6/7/2025 at 3:39 am. (**-RCF-**) Findings:  See findings.   Signed by: Donovan Ocampo 6/7/2025 3:40 AM Dictation workstation:   [id number]    Yoko ROSS CNP  Infectious Disease      I personally saw and evaluated the patient. I reviewed the NP Hx, exam and MDM and I agree with the NP assessment and plan unless otherwise addended above.     Ted Dutton MD  [phone]  Date of service: 6/14/2025  Time of service: 1:46 PM

## 2025-06-14 NOTE — DISCHARGE INSTRUCTIONS
Diet as tolerated.  Recommend a high-fiber diet.  You may use any over-the-counter medications to help with constipation.  Routine colostomy care.  You do not need to cover the staple line on your abdomen unless there is bleeding or drainage.  No lifting/pulling/pushing more than 10 pounds for 4 weeks following your surgery.

## 2025-06-14 NOTE — NURSING NOTE
End of Shift Report  6/14/25     Admission  Rosemary Motta was admitted on 6/7/2025 for the following diagnoses:   Diverticulitis [K57.92]  Free intraperitoneal air [K66.8]    Significant Events  Pt converted to sinus rhythm    Interventions  Cardizem gtt stopped    Response to Interventions  Pt remains in sinus rhythm      Recent Vital Signs  Patient Vitals for the past 12 hrs:   BP Temp Temp src Pulse Resp SpO2 Weight   06/13/25 1837 158/78 36.7 °C (98.1 °F) Temporal (!) 126 16 94 % --   06/13/25 1922 130/78 -- -- 110 -- -- --   06/13/25 2011 -- -- -- 105 -- -- --   06/13/25 2033 120/75 36.4 °C (97.6 °F) Temporal (!) 156 17 94 % --   06/13/25 2111 -- -- -- 80 -- -- --   06/13/25 2307 130/69 -- -- 73 -- -- --   06/14/25 0314 134/74 36.2 °C (97.2 °F) Temporal 73 19 95 % 61.7 kg (136 lb 0.4 oz)      0-10 (Numeric) Pain Score: 0 - No pain     Latest labs  Lab Results   Component Value Date    WBC 8.6 06/14/2025    HGB 9.3 (L) 06/14/2025    HCT 28.8 (L) 06/14/2025     06/14/2025    CHOL 280 (H) 02/28/2020    TRIG 145 02/28/2020    HDL 41.1 02/28/2020    ALT 10 06/11/2025    AST 17 06/11/2025     06/14/2025    K 4.1 06/14/2025     06/14/2025    CREATININE 0.83 06/14/2025    BUN 14 06/14/2025    CO2 26 06/14/2025    TSH 2.89 06/07/2025    INR 1.0 05/01/2025       Other Results      Scheduled Medications:  Scheduled Medications[1]   Continuous Medications[2]         [1] anastrozole, 1 mg, oral, Daily  aspirin, 81 mg, oral, Daily  bacitracin, , Topical, Daily  cefepime, 2 g, intravenous, q12h  dilTIAZem, 30 mg, oral, q6h  pantoprazole, 40 mg, oral, Daily before breakfast   Or  esomeprazole, 40 mg, nasoduodenal tube, Daily before breakfast   Or  pantoprazole, 40 mg, intravenous, Daily before breakfast  levothyroxine, 25 mcg, oral, Daily  lidocaine, 1 patch, transdermal, Daily  magnesium hydroxide, 30 mL, oral, Daily  metoprolol tartrate, 100 mg, oral, BID  metroNIDAZOLE, 500 mg, intravenous, q12h  [2]  dilTIAZem, 5-15 mg/hr, Last Rate: Stopped (06/13/25 2111)  [Held by provider] heparin, 0-4,500 Units/hr, Last Rate: Stopped (06/12/25 0121)

## 2025-06-15 ENCOUNTER — APPOINTMENT (OUTPATIENT)
Dept: RADIOLOGY | Facility: HOSPITAL | Age: 80
DRG: 853 | End: 2025-06-15
Payer: MEDICARE

## 2025-06-15 VITALS
RESPIRATION RATE: 16 BRPM | HEART RATE: 61 BPM | OXYGEN SATURATION: 98 % | BODY MASS INDEX: 29.94 KG/M2 | TEMPERATURE: 97.9 F | SYSTOLIC BLOOD PRESSURE: 105 MMHG | HEIGHT: 58 IN | WEIGHT: 142.64 LBS | DIASTOLIC BLOOD PRESSURE: 64 MMHG

## 2025-06-15 LAB
ANION GAP SERPL CALC-SCNC: 12 MMOL/L (ref 10–20)
BACTERIA BLD CULT: NORMAL
BACTERIA BLD CULT: NORMAL
BUN SERPL-MCNC: 14 MG/DL (ref 6–23)
CALCIUM SERPL-MCNC: 8.4 MG/DL (ref 8.6–10.3)
CHLORIDE SERPL-SCNC: 105 MMOL/L (ref 98–107)
CO2 SERPL-SCNC: 27 MMOL/L (ref 21–32)
CREAT SERPL-MCNC: 0.83 MG/DL (ref 0.5–1.05)
EGFRCR SERPLBLD CKD-EPI 2021: 71 ML/MIN/1.73M*2
ERYTHROCYTE [DISTWIDTH] IN BLOOD BY AUTOMATED COUNT: 13.5 % (ref 11.5–14.5)
GLUCOSE SERPL-MCNC: 96 MG/DL (ref 74–99)
HCT VFR BLD AUTO: 29.1 % (ref 36–46)
HGB BLD-MCNC: 9.4 G/DL (ref 12–16)
MAGNESIUM SERPL-MCNC: 1.84 MG/DL (ref 1.6–2.4)
MCH RBC QN AUTO: 31.3 PG (ref 26–34)
MCHC RBC AUTO-ENTMCNC: 32.3 G/DL (ref 32–36)
MCV RBC AUTO: 97 FL (ref 80–100)
NRBC BLD-RTO: 0.2 /100 WBCS (ref 0–0)
PLATELET # BLD AUTO: 290 X10*3/UL (ref 150–450)
POTASSIUM SERPL-SCNC: 4.1 MMOL/L (ref 3.5–5.3)
RBC # BLD AUTO: 3 X10*6/UL (ref 4–5.2)
SODIUM SERPL-SCNC: 140 MMOL/L (ref 136–145)
WBC # BLD AUTO: 9.4 X10*3/UL (ref 4.4–11.3)

## 2025-06-15 PROCEDURE — 2500000004 HC RX 250 GENERAL PHARMACY W/ HCPCS (ALT 636 FOR OP/ED): Performed by: INTERNAL MEDICINE

## 2025-06-15 PROCEDURE — 36415 COLL VENOUS BLD VENIPUNCTURE: CPT | Performed by: PHYSICIAN ASSISTANT

## 2025-06-15 PROCEDURE — 74177 CT ABD & PELVIS W/CONTRAST: CPT

## 2025-06-15 PROCEDURE — 2550000001 HC RX 255 CONTRASTS: Performed by: INTERNAL MEDICINE

## 2025-06-15 PROCEDURE — 85027 COMPLETE CBC AUTOMATED: CPT | Performed by: PHYSICIAN ASSISTANT

## 2025-06-15 PROCEDURE — 36415 COLL VENOUS BLD VENIPUNCTURE: CPT | Performed by: NURSE PRACTITIONER

## 2025-06-15 PROCEDURE — 2060000001 HC INTERMEDIATE ICU ROOM DAILY

## 2025-06-15 PROCEDURE — 2500000001 HC RX 250 WO HCPCS SELF ADMINISTERED DRUGS (ALT 637 FOR MEDICARE OP): Performed by: NURSE PRACTITIONER

## 2025-06-15 PROCEDURE — 99232 SBSQ HOSP IP/OBS MODERATE 35: CPT | Performed by: INTERNAL MEDICINE

## 2025-06-15 PROCEDURE — 2500000001 HC RX 250 WO HCPCS SELF ADMINISTERED DRUGS (ALT 637 FOR MEDICARE OP): Performed by: INTERNAL MEDICINE

## 2025-06-15 PROCEDURE — 2500000001 HC RX 250 WO HCPCS SELF ADMINISTERED DRUGS (ALT 637 FOR MEDICARE OP): Performed by: STUDENT IN AN ORGANIZED HEALTH CARE EDUCATION/TRAINING PROGRAM

## 2025-06-15 PROCEDURE — 80048 BASIC METABOLIC PNL TOTAL CA: CPT | Performed by: PHYSICIAN ASSISTANT

## 2025-06-15 PROCEDURE — 2500000005 HC RX 250 GENERAL PHARMACY W/O HCPCS: Performed by: INTERNAL MEDICINE

## 2025-06-15 PROCEDURE — 74177 CT ABD & PELVIS W/CONTRAST: CPT | Performed by: RADIOLOGY

## 2025-06-15 PROCEDURE — 83735 ASSAY OF MAGNESIUM: CPT | Performed by: PHYSICIAN ASSISTANT

## 2025-06-15 PROCEDURE — 2500000005 HC RX 250 GENERAL PHARMACY W/O HCPCS: Performed by: PHYSICIAN ASSISTANT

## 2025-06-15 PROCEDURE — 2500000002 HC RX 250 W HCPCS SELF ADMINISTERED DRUGS (ALT 637 FOR MEDICARE OP, ALT 636 FOR OP/ED): Performed by: PHYSICIAN ASSISTANT

## 2025-06-15 RX ADMIN — HYDROCODONE BITARTRATE AND ACETAMINOPHEN 1 TABLET: 7.5; 325 TABLET ORAL at 20:36

## 2025-06-15 RX ADMIN — ASPIRIN 81 MG: 81 TABLET, COATED ORAL at 09:08

## 2025-06-15 RX ADMIN — HYDROCODONE BITARTRATE AND ACETAMINOPHEN 1 TABLET: 7.5; 325 TABLET ORAL at 01:36

## 2025-06-15 RX ADMIN — CEFEPIME HYDROCHLORIDE 2 G: 2 INJECTION, SOLUTION INTRAVENOUS at 22:37

## 2025-06-15 RX ADMIN — DILTIAZEM HYDROCHLORIDE 30 MG: 30 TABLET, FILM COATED ORAL at 17:45

## 2025-06-15 RX ADMIN — Medication 3 MG: at 20:36

## 2025-06-15 RX ADMIN — METOPROLOL TARTRATE 100 MG: 50 TABLET, FILM COATED ORAL at 09:08

## 2025-06-15 RX ADMIN — DILTIAZEM HYDROCHLORIDE 30 MG: 30 TABLET, FILM COATED ORAL at 13:00

## 2025-06-15 RX ADMIN — TIZANIDINE 2 MG: 2 TABLET ORAL at 09:12

## 2025-06-15 RX ADMIN — IOHEXOL 75 ML: 350 INJECTION, SOLUTION INTRAVENOUS at 16:42

## 2025-06-15 RX ADMIN — METRONIDAZOLE 500 MG: 500 INJECTION, SOLUTION INTRAVENOUS at 16:59

## 2025-06-15 RX ADMIN — LEVOTHYROXINE SODIUM 25 MCG: 0.03 TABLET ORAL at 09:07

## 2025-06-15 RX ADMIN — TIZANIDINE 2 MG: 2 TABLET ORAL at 20:36

## 2025-06-15 RX ADMIN — PANTOPRAZOLE SODIUM 40 MG: 40 TABLET, DELAYED RELEASE ORAL at 06:00

## 2025-06-15 RX ADMIN — METRONIDAZOLE 500 MG: 500 INJECTION, SOLUTION INTRAVENOUS at 04:04

## 2025-06-15 RX ADMIN — DILTIAZEM HYDROCHLORIDE 30 MG: 30 TABLET, FILM COATED ORAL at 06:00

## 2025-06-15 RX ADMIN — CEFEPIME HYDROCHLORIDE 2 G: 2 INJECTION, SOLUTION INTRAVENOUS at 10:24

## 2025-06-15 RX ADMIN — METOPROLOL TARTRATE 100 MG: 50 TABLET, FILM COATED ORAL at 20:36

## 2025-06-15 RX ADMIN — LIDOCAINE 4% 1 PATCH: 40 PATCH TOPICAL at 09:08

## 2025-06-15 RX ADMIN — ANASTROZOLE 1 MG: 1 TABLET, COATED ORAL at 09:07

## 2025-06-15 ASSESSMENT — ENCOUNTER SYMPTOMS
DIZZINESS: 0
DYSURIA: 0
PALPITATIONS: 0
FACIAL SWELLING: 0
EYE PAIN: 0
CONSTIPATION: 0
FREQUENCY: 0
WEAKNESS: 1
BLOOD IN STOOL: 0
CHILLS: 0
DIARRHEA: 0
HALLUCINATIONS: 0
HEMATURIA: 0
BACK PAIN: 1
DIAPHORESIS: 0
WOUND: 0
JOINT SWELLING: 0
TROUBLE SWALLOWING: 0
PALPITATIONS: 1
HEADACHES: 0
VOMITING: 0
FEVER: 0
SORE THROAT: 0
LIGHT-HEADEDNESS: 0
NUMBNESS: 1
NAUSEA: 0
FLANK PAIN: 0
APPETITE CHANGE: 0
SHORTNESS OF BREATH: 0
WHEEZING: 0
COUGH: 0
ABDOMINAL PAIN: 1
FATIGUE: 0
BRUISES/BLEEDS EASILY: 0
CHEST TIGHTNESS: 0

## 2025-06-15 ASSESSMENT — PAIN DESCRIPTION - LOCATION: LOCATION: LEG

## 2025-06-15 ASSESSMENT — COGNITIVE AND FUNCTIONAL STATUS - GENERAL
TURNING FROM BACK TO SIDE WHILE IN FLAT BAD: A LITTLE
HELP NEEDED FOR BATHING: A LITTLE
PERSONAL GROOMING: A LITTLE
DRESSING REGULAR UPPER BODY CLOTHING: A LITTLE
MOBILITY SCORE: 13
DRESSING REGULAR LOWER BODY CLOTHING: A LITTLE
MOVING FROM LYING ON BACK TO SITTING ON SIDE OF FLAT BED WITH BEDRAILS: A LITTLE
MOVING TO AND FROM BED TO CHAIR: A LOT
CLIMB 3 TO 5 STEPS WITH RAILING: A LOT
TOILETING: A LITTLE
STANDING UP FROM CHAIR USING ARMS: A LOT
WALKING IN HOSPITAL ROOM: TOTAL
DAILY ACTIVITIY SCORE: 19

## 2025-06-15 ASSESSMENT — PAIN DESCRIPTION - ORIENTATION: ORIENTATION: RIGHT

## 2025-06-15 ASSESSMENT — PAIN SCALES - GENERAL
PAINLEVEL_OUTOF10: 5 - MODERATE PAIN
PAINLEVEL_OUTOF10: 0 - NO PAIN
PAINLEVEL_OUTOF10: 0 - NO PAIN
PAINLEVEL_OUTOF10: 5 - MODERATE PAIN
PAINLEVEL_OUTOF10: 0 - NO PAIN

## 2025-06-15 ASSESSMENT — PAIN - FUNCTIONAL ASSESSMENT
PAIN_FUNCTIONAL_ASSESSMENT: 0-10

## 2025-06-15 NOTE — PROGRESS NOTES
Physical Therapy                 Therapy Communication Note    Patient Name: Rosemary Motta  MRN: [id number]  Department: [facility] CT  Room: 2025/2025-A  Today's Date: 6/15/2025     Discipline: Physical Therapy    Missed Visit: PT Missed Visit: Yes     Missed Visit Reason: Missed Visit Reason: Patient in a medical procedure (Pt. in CT)    Missed Time: Attempt    Comment:

## 2025-06-15 NOTE — PROGRESS NOTES
"GENERAL SURGERY PROGRESS NOTE    Rosemary Motta   1945   [id number]     Rosemary Motta is a 79 y.o. female on day 8 of admission presenting with Diverticulitis of large intestine with perforation without bleeding.      Subjective  No acute events. Patient reports adequate pain control. Her appetite is improving and she is having a moderate amount stool via her colostomy. She continues to experience significant numbness of her right thigh    Review of Systems   Constitutional:  Negative for chills and fever.   Respiratory:  Negative for cough and shortness of breath.    Cardiovascular:  Negative for chest pain and palpitations.   Gastrointestinal:  Positive for abdominal pain. Negative for blood in stool, constipation, diarrhea, nausea and vomiting.   Neurological:  Positive for numbness. Negative for dizziness and headaches.   All other systems reviewed and are negative.      Objective    Last Recorded Vitals  Blood pressure 130/77, pulse 95, temperature 37 °C (98.6 °F), temperature source Temporal, resp. rate 16, height 1.473 m (4' 10\"), weight 64.7 kg (142 lb 10.2 oz), SpO2 95%.    Intake/Output last 3 Shifts:  I/O last 3 completed shifts:  In: 591 (9.1 mL/kg) [P.O.:591]  Out: 3970 (61.4 mL/kg) [Urine:3800 (1.6 mL/kg/hr); Drains:20; Stool:150]  Weight: 64.7 kg     Physical Exam  Constitutional:       General: She is not in acute distress.     Appearance: Normal appearance. She is not ill-appearing.   HENT:      Head: Normocephalic and atraumatic.   Cardiovascular:      Rate and Rhythm: Normal rate and regular rhythm.   Pulmonary:      Effort: Pulmonary effort is normal. No respiratory distress.      Breath sounds: Normal breath sounds.   Abdominal:      General: There is no distension.      Palpations: Abdomen is soft.      Tenderness: There is no abdominal tenderness. There is no guarding.      Comments: Incision clean/dry/intact with staples in place. Colostomy viable with soft brown stool. Drain with " minimal serosanguinous output   Musculoskeletal:         General: No swelling.   Skin:     General: Skin is warm and dry.   Neurological:      Mental Status: She is alert and oriented to person, place, and time. Mental status is at baseline.   Psychiatric:         Mood and Affect: Mood normal.         Behavior: Behavior normal.       Labs  Results for orders placed or performed during the hospital encounter of 06/07/25 (from the past 24 hours)   CBC   Result Value Ref Range    WBC 9.4 4.4 - 11.3 x10*3/uL    nRBC 0.2 (H) 0.0 - 0.0 /100 WBCs    RBC 3.00 (L) 4.00 - 5.20 x10*6/uL    Hemoglobin 9.4 (L) 12.0 - 16.0 g/dL    Hematocrit 29.1 (L) 36.0 - 46.0 %    MCV 97 80 - 100 fL    MCH 31.3 26.0 - 34.0 pg    MCHC 32.3 32.0 - 36.0 g/dL    RDW 13.5 11.5 - 14.5 %    Platelets 290 150 - 450 x10*3/uL   Basic Metabolic Panel   Result Value Ref Range    Glucose 96 74 - 99 mg/dL    Sodium 140 136 - 145 mmol/L    Potassium 4.1 3.5 - 5.3 mmol/L    Chloride 105 98 - 107 mmol/L    Bicarbonate 27 21 - 32 mmol/L    Anion Gap 12 10 - 20 mmol/L    Urea Nitrogen 14 6 - 23 mg/dL    Creatinine 0.83 0.50 - 1.05 mg/dL    eGFR 71 >60 mL/min/1.73m*2    Calcium 8.4 (L) 8.6 - 10.3 mg/dL   Magnesium   Result Value Ref Range    Magnesium 1.84 1.60 - 2.40 mg/dL       Radiology  Imaging  MR lumbar spine wo IV contrast  Result Date: 6/13/2025  1. Motion degraded study. Combination of congenital spinal stenosis, epidural lipomatosis and diffuse degenerative changes seen throughout the lumbar spine. 2. Moderate spinal canal stenosis at L3-L4 and mild spinal canal stenosis at L2-L3 and L4-L5. 3. Severe narrowing of the left lateral recess at L4-L5 with probable compromise of the descending left L5 nerve root. Severe left foraminal stenosis at this level. 4. Levocurvature of the lumbar spine. Right lateral listhesis of L1-L2. Slight left lateral listhesis of L3-L4 and L4-L5.   I personally reviewed the images/study and I agree with the findings as stated.    MACRO: None   Signed by: Jennifer Blake 6/13/2025 8:43 AM Dictation workstation:   [id number]    MR MSK pelvis w and wo IV contrast  Result Date: 6/12/2025  1.  Severe muscle edema in the iliopsoas muscle predominantly in the iliacus with several intramuscular fluid collections. Findings most compatible with underlying infectious pyomyositis with intramuscular abscess formation. Underlying hematoma some may be present as well as per recent CT. Evaluation for extravasation reported on previous CT is limited in this study which is tailored for evaluation of the lumbar plexus. There is moderate muscle edema in the piriformis and the abductor musculature as well. This is also concerning for underlying myositis. 2. No evidence of abnormal marrow signal about the total hip arthroplasties. No large effusion seen. 3. No abnormality seen along the lumbosacral plexus. The sciatic nerves are symmetric.     I personally reviewed the images/study and I agree with the findings as stated. This study was interpreted at [place], [place].   MACRO: Critical Finding:  See findings. Notification was initiated on 6/12/2025 at 8:08 pm by  Duke Pierre.  (**-OCF-**)   Signed by: Duke Pierre 6/12/2025 8:08 PM Dictation workstation:   [id number]    CT abdomen pelvis w IV contrast  Result Date: 6/11/2025  1. Ill-defined right iliacus hematoma with central foci of contrast raising suspicion for pseudoaneurysms. Contrast extravasation is not entirely excluded on single phase imaging. Correlate with labs and symptoms and if there is concern for acute blood loss, multiphase examination can be considered. 2. Postsurgical changes of partial sigmoid colectomy with end colostomy and Nory's pouch creation. No acute postsurgical complications identified. 3. Pelvic drain is in place with the tip in the left lower quadrant. No residual drainable collections identified. 4. Diffuse hepatic steatosis.  Other chronic findings as described above.   MACRO: Aaron Mccall discussed the significance and urgency of this critical finding by EPIC secure chat with  PARTH TEIXEIRA on 6/11/2025 at 10:40 pm.  (**-RCF-**) Findings:  See findings.   Signed by: Aaron Mccall 6/11/2025 10:41 PM Dictation workstation:   [id number]    XR chest 1 view  Result Date: 6/8/2025  A nasogastric tube projects into the stomach beyond the field of imaging.   Mild cardiomegaly.   Minimal linear bibasilar atelectasis. Otherwise no sign of acute cardiopulmonary disease.     MACRO: None   Signed by: Mello Fried 6/8/2025 12:33 PM Dictation workstation:   [id number]      Assessment and Plan  Assessment & Plan  Diverticulitis of large intestine with perforation without bleeding    79 y.o. female with perforated diverticulitis status post Nory procedure 6/7/25. Her postoperative course was complicated by AFRVR and a significant right psoas hematoma from anticoagulation. She has motor function of her RLE but her right thigh is numb. Her bowel function has improved over the last few days. Continue diet and bowel regimen as scheduled. Continue holding anticoagulation. The drain was removed today. Clear for discharge from a surgical standpoint.    Reena Meng MD, [facility]  General Surgery

## 2025-06-15 NOTE — PROGRESS NOTES
Rosemary Motta is a 79 y.o. female on day 8 of admission presenting with Diverticulitis of large intestine with perforation without bleeding.      Subjective   Rosemayr Motta is a 79F hx HTN, HLE, breast ca, diverticulosis, GERD, hypothyroidism, neuropathy, migraines, OA, anxiety/depression presented 6/7/25 for abdominal pain, left lower quadrant abd pain with radiation to back. now improved. up to 10/10, now 5/10. nausea with small vomiting. no diarrhea, constipation, blood in stool or melena. subacute dry cough. no acute abdomen on exam. labs/imaging w/ cr 1.29 (baseline 1.02), cbc, lactate wnl. ua wnl. ekg wnl. ct a/p w/ perforated diverticulitis. Seen by surgery and is now s/p partial colectomy and colostomy placement 6/7/25. Had hypotension and was monitored closely in ICU. UA without UTI. Patient noted to have tachycardia 6/9/25, Nocturnist evaluated ECG and diagnosed as new onset a fib with RVR, given Cardizem x1, resumed home metoprolol, cardiology consulted. Patient back in normal sinus rhythm.  I discussed with Dr. Quintanilla, cardiologist who at this time does not recommend any oral anticoagulation given this was a isolated incident.  Will need to monitor further if this recurs then will need to reconsider this. 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  Outpatient follow-up with cardiology after patient recovers for CHAPIS. NG-tube was removed 6/9/25 as was Gabriel.  Called to patient's room as patient heart rate noted to be in the upper 170s to 180s 6/11/25, EC confirmed AFRVR, given IV lopressor x1, started on hep gtt (cleared with surgeon if no initial bolus given) and cardizem gtt. Shortly after Cardizem drip was given patient converted back to normal sinus rhythm.  Patient felt a little lightheaded during this time but that completely resolved once heart rate was improved.  She was feeling pressure in her abdomen at this time but  then was able to urinate and this had completely resolved. Patient again had recurrence of AFRVR 6/11/25 with pain in lower back radiating down her buttocks into posterior leg to level of the knee  Given pain control which did help initially however once patient moved pain then returned.  Trial lidocaine patch, IV Dilaudid as needed, as needed muscle relaxers.  Can trial heat to the area as well.  Patient converted prior to IV diltiazem being given, heart rate better controlled. Please see significant event note by our night doc. Worsening RLE abdominal and groin pain, CT showing R iliacus hematoma with central foci of contrast raising suspicion for pseudoaneurysms. Repeat hg 10.2, baseline roughly around 11-13. [facility] vascular recommends holding hep drip, monitor H/H and repeat CT in 48 hours. IR consulted here Evaluated in the presence of Dr. Brunner as patient is having new numbness on her right anterior thigh and weakness of her right hip flexion, she is sitting upright in bedside chair but discussed with PT that she was unable to put any weight on this leg as the knee continued to buckle and she only was pivoted over to the chair.  While sitting up in the chair pain is only approximately a 2 out of 10 in the right groin.        6/13/2025: No acute events overnight. Vitals remain stable this morning, afebrile, HR 91 with /74. Hemoglobin is stable 10.3 over the last 24 hours. MRI pelvis concerning for infectious pyomyositis with intramuscular abscess formation. I discussed the case with IR, ID and general surgery, will monitor clinically for now as not a good option to drain given initial concern for extra luminal contrast on CT. Repeat CT scan tomorrow vs Sunday pending clinical course  Patient and PT note less sensation loss RLE and better movement when getting up to bedside chair.    Re-evaluated patient with her daughter and  at bedside, all of the above relayed to her family per patients  request  Patient asking that her daughter Vishnu be called with daily updates if she is not present at time of evaluation by hospitalist service.     Rounded on patient on this date with Pharmacist, RN, and Pharmacy student(s)     6/14: Patient seen.  Concerned that numbness appears to be progressing down her leg.  Reviewed previous findings including MRI of her spine and back.  Remains on antibiotic therapy for diverticulitis as well as possible psoas abscess.  Will continue to monitor, consider reimaging if symptoms persist.  MRI suggest patient has some degenerative disc disease as well.    6/15: Patient seen.  Still has numbness in the lateral aspect of her right leg, thinks it is a little bit more than normal.  Will get follow-up CT scan with contrast to reassess the fluid collection in her right iliac us.  Appreciate input from general surgery.  Drain was pulled today.  If CT scan appears to be benign or resolving can consider discharge.  Reassess in AM.         Review of Systems   Constitutional:  Negative for appetite change, chills, diaphoresis, fatigue and fever.   HENT:  Negative for congestion, ear pain, facial swelling, hearing loss, nosebleeds, sore throat, tinnitus and trouble swallowing.    Eyes:  Negative for pain.   Respiratory:  Negative for cough, chest tightness, shortness of breath and wheezing.    Cardiovascular:  Positive for palpitations (Resolved). Negative for chest pain and leg swelling.   Gastrointestinal:  Positive for abdominal pain. Negative for blood in stool, constipation, diarrhea, nausea and vomiting.   Genitourinary:  Positive for urgency. Negative for dysuria, flank pain, frequency and hematuria.   Musculoskeletal:  Positive for back pain. Negative for joint swelling.        With radiation down the right buttock into right posterior leg to the level of the knee   Skin:  Negative for rash and wound.   Neurological:  Positive for weakness (Right hip flexion) and numbness (Right  anterior thigh). Negative for dizziness, syncope, light-headedness and headaches.   Hematological:  Does not bruise/bleed easily.   Psychiatric/Behavioral:  Negative for behavioral problems, hallucinations and suicidal ideas.           Objective     Last Recorded Vitals  /78 (BP Location: Left arm, Patient Position: Lying)   Pulse 83   Temp 37.4 °C (99.4 °F) (Temporal)   Resp 16   Wt 64.7 kg (142 lb 10.2 oz)   SpO2 96%     Image Results  Imaging  MR lumbar spine wo IV contrast  Result Date: 6/13/2025  1. Motion degraded study. Combination of congenital spinal stenosis, epidural lipomatosis and diffuse degenerative changes seen throughout the lumbar spine. 2. Moderate spinal canal stenosis at L3-L4 and mild spinal canal stenosis at L2-L3 and L4-L5. 3. Severe narrowing of the left lateral recess at L4-L5 with probable compromise of the descending left L5 nerve root. Severe left foraminal stenosis at this level. 4. Levocurvature of the lumbar spine. Right lateral listhesis of L1-L2. Slight left lateral listhesis of L3-L4 and L4-L5.   I personally reviewed the images/study and I agree with the findings as stated.   MACRO: None   Signed by: Jennifer Blake 6/13/2025 8:43 AM Dictation workstation:   [id number]    MR MSK pelvis w and wo IV contrast  Result Date: 6/12/2025  1.  Severe muscle edema in the iliopsoas muscle predominantly in the iliacus with several intramuscular fluid collections. Findings most compatible with underlying infectious pyomyositis with intramuscular abscess formation. Underlying hematoma some may be present as well as per recent CT. Evaluation for extravasation reported on previous CT is limited in this study which is tailored for evaluation of the lumbar plexus. There is moderate muscle edema in the piriformis and the abductor musculature as well. This is also concerning for underlying myositis. 2. No evidence of abnormal marrow signal about the total hip arthroplasties. No large effusion  seen. 3. No abnormality seen along the lumbosacral plexus. The sciatic nerves are symmetric.     I personally reviewed the images/study and I agree with the findings as stated. This study was interpreted at [facility], [place], [place].   MACRO: Critical Finding:  See findings. Notification was initiated on 6/12/2025 at 8:08 pm by  Duke Pierre.  (**-OCF-**)   Signed by: Duke Pierre 6/12/2025 8:08 PM Dictation workstation:   [id number]    CT abdomen pelvis w IV contrast  Result Date: 6/11/2025  1. Ill-defined right iliacus hematoma with central foci of contrast raising suspicion for pseudoaneurysms. Contrast extravasation is not entirely excluded on single phase imaging. Correlate with labs and symptoms and if there is concern for acute blood loss, multiphase examination can be considered. 2. Postsurgical changes of partial sigmoid colectomy with end colostomy and Nory's pouch creation. No acute postsurgical complications identified. 3. Pelvic drain is in place with the tip in the left lower quadrant. No residual drainable collections identified. 4. Diffuse hepatic steatosis. Other chronic findings as described above.   MACRO: Aaron Mccall discussed the significance and urgency of this critical finding by EPIC secure chat with  PARTH TEIXEIRA on 6/11/2025 at 10:40 pm.  (**-RCF-**) Findings:  See findings.   Signed by: Aaron Mccall 6/11/2025 10:41 PM Dictation workstation:   [id number]      Cardiology, Vascular, and Other Imaging  Electrocardiogram, 12-lead PRN ACS symptoms  Result Date: 6/12/2025  Atrial flutter with variable AV block Nonspecific ST and T wave abnormality Abnormal ECG When compared with ECG of 11-JUN-2025 09:11, (unconfirmed) Nonspecific T wave abnormality, improved in Lateral leads Confirmed by Bob Hu ([id number]) on 6/12/2025 11:34:04 AM    Electrocardiogram, 12-lead PRN ACS symptoms  Result Date: 6/10/2025  Atrial fibrillation with rapid ventricular  response ST & T wave abnormality, consider inferior ischemia ST & T wave abnormality, consider anterolateral ischemia Abnormal ECG When compared with ECG of 07-JUN-2025 01:53, PREVIOUS ECG IS PRESENT Confirmed by Bob Hu ([id number]) on 6/10/2025 1:23:42 PM    Electrocardiogram, 12-lead PRN ACS symptoms  Result Date: 6/10/2025  Atrial fibrillation with rapid ventricular response Nonspecific ST and T wave abnormality Abnormal ECG When compared with ECG of 10-CHAVA-2025 10:11, (unconfirmed) No significant change was found Confirmed by Bob Hu ([id number]) on 6/10/2025 1:23:23 PM    Electrocardiogram, 12-lead PRN ACS symptoms  Result Date: 6/10/2025  Normal sinus rhythm Normal ECG When compared with ECG of 10-CHAVA-2025 10:11, (unconfirmed) Sinus rhythm has replaced Atrial fibrillation Vent. rate has decreased BY  89 BPM ST no longer depressed in Anterolateral leads T wave inversion now evident in Inferior leads Nonspecific T wave abnormality no longer evident in Lateral leads Confirmed by Bob Hu ([id number]) on 6/10/2025 1:23:11 PM    Transthoracic Echo Complete  Result Date: 6/9/2025              [place] [address]      Phone [phone] Fax [phone] TRANSTHORACIC ECHOCARDIOGRAM REPORT Patient Name:       PATTI Haynes Physician:    [id number] Mona Quintanilla MD Study Date:         6/9/2025             Ordering Provider:    [id number] NAEL WOLF MRN/PID:            [id number]             Fellow: Accession#:         [id number]         Nurse: Date of Birth/Age:  1945 / 79      Sonographer:          Richelle Grey RDCS                     years Gender Assigned at  F                    Additional Staff: Birth: Height:             147.32 cm            Admit Date:           6/8/2025 Weight:             66.68 kg             Admission  Status:     Inpatient -                                                                Routine BSA / BMI:          1.60 m2 / 30.72      Department Location:  [facility]                     kg/m2 Blood Pressure: 158 /66 mmHg Study Type:    TRANSTHORACIC ECHO (TTE) COMPLETE Diagnosis/ICD: Paroxysmal atrial fibrillation-I48.0; Dyspnea, unspecified-R06.00 Indication:    Dyspnea CPT Codes:     Echo Complete w Full [id number] Patient History: Pertinent History: HTN. Study Detail: The following Echo studies were performed: 2D, M-Mode, Doppler and               color flow.  PHYSICIAN INTERPRETATION: Left Ventricle: Left ventricular ejection fraction is low normal calculated by Valente's biplane at 55%. There are no regional wall motion abnormalities. The left ventricular cavity size is normal. There is normal septal and normal posterior left ventricular wall thickness. Spectral Doppler shows a normal pattern of left ventricular diastolic filling. Left Atrium: The left atrial size is normal. Right Ventricle: The right ventricle is normal in size. There is normal right ventricular global systolic function. Right Atrium: The right atrial size is normal. Aortic Valve: The aortic valve is trileaflet. The aortic valve area by VTI is 2.31 cmï¿½ with a peak velocity of 1.61 m/s. The peak and mean gradients are 10 mmHg and 5 mmHg, respectively, with a dimensionless index of 0.75. There is no evidence of aortic valve regurgitation. 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 Mitral Valve: The mitral valve is normal in structure. There is trace mitral valve regurgitation. The E Vmax is 1.00 m/s. Tricuspid Valve: The tricuspid valve is structurally normal. There is trace tricuspid regurgitation. The Doppler estimated right ventricular systolic pressure (RVSP) is within normal limits at 28 mmHg. Pulmonic Valve: The pulmonic valve is structurally normal. There is trace pulmonic valve regurgitation. Pericardium: No  pericardial effusion noted. Aorta: The aortic root is normal. Systemic Veins: The inferior vena cava appears normal in size, IVC inspiratory collapse was not assessed.  CONCLUSIONS:  1. Left ventricular ejection fraction is low normal calculated by Valente's biplane at 55%.  2. There is normal right ventricular global systolic function.  3. The Doppler estimated RVSP is within normal limits at 28 mmHg.  4. 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 QUANTITATIVE DATA SUMMARY:  2D MEASUREMENTS:             Normal Ranges: IVSd:            0.72 cm     (0.6-1.1cm) LVPWd:           0.71 cm     (0.6-1.1cm) LVIDd:           4.41 cm     (3.9-5.9cm) LVIDs:           2.79 cm LV Mass Index:   59.5 g/m2 LVEDV Index:     22.62 ml/m2 LV % FS          36.8 %  LEFT ATRIUM:                  Normal Ranges: LA Vol A4C:        31.5 ml    (22+/-6mL/m2) LA Vol A2C:        28.9 ml LA Vol BP:         35.2 ml LA Vol Index A4C:  19.7ml/m2 LA Vol Index A2C:  18.1 ml/m2 LA Vol Index BP:   22.0 ml/m2 LA Area A4C:       14.4 cm2 LA Area A2C:       11.8 cm2 LA Major Axis A4C: 5.6 cm LA Major Axis A2C: 4.1 cm LA Volume Index:   21.8 ml/m2 LA Vol A4C:        29.0 ml LA Vol A2C:        27.9 ml LA Vol Index BSA:  17.8 ml/m2  RIGHT ATRIUM:          Normal Ranges: RA Area A4C:  10.0 cm2  AORTA MEASUREMENTS:         Normal Ranges: Ao Sinus, d:        3.20 cm (2.1-3.5cm) Ao STJ, d:          2.40 cm (1.7-3.4cm) Asc Ao, d:          2.50 cm (2.1-3.4cm)  LV SYSTOLIC FUNCTION:                      Normal Ranges: EF-A4C View:    60 % (>=55%) EF-A2C View:    52 % EF-Biplane:     55 % LV EF Reported: 55 %  LV DIASTOLIC FUNCTION:           Normal Ranges: MV Peak E:             1.00 m/s  (0.7-1.2 m/s) MV Peak A:             0.80 m/s  (0.42-0.7 m/s) E/A Ratio:             1.26      (1.0-2.2) MV e'                  0.111 m/s (>8.0) MV lateral e'          0.11 m/s MV medial e'           0.11 m/s E/e' Ratio:            9.04      (<8.0)  MITRAL VALVE:           Normal Ranges: MV DT:        122 msec (150-240msec)  AORTIC VALVE:                      Normal Ranges: AoV Vmax:                1.61 m/s  (<=1.7m/s) AoV Peak PG:             10.4 mmHg (<20mmHg) AoV Mean P.3 mmHg  (1.7-11.5mmHg) LVOT Max Kael:            1.04 m/s  (<=1.1m/s) AoV VTI:                 32.01 cm  (18-25cm) LVOT VTI:                24.08 cm LVOT Diameter:           1.98 cm   (1.8-2.4cm) AoV Area, VTI:           2.31 cm2  (2.5-5.5cm2) AoV Area,Vmax:           1.98 cm2  (2.5-4.5cm2) AoV Dimensionless Index: 0.75  RIGHT VENTRICLE: RV Basal 2.42 cm RV Mid   1.90 cm RV Major 6.8 cm TAPSE:   30.3 mm RV s'    0.15 m/s  TRICUSPID VALVE/RVSP:          Normal Ranges: Peak TR Velocity:     2.50 m/s Est. RA Pressure:     3 mmHg RV Syst Pressure:     28 mmHg  (< 30mmHg) IVC Diam:             1.04 cm TV e'                 0.1 m/s  AORTA: Asc Ao Diam 3.19 cm  [id number] Mona Quintanilla MD Electronically signed on 2025 at 5:35:08 PM  ** Final **          Lab Results  Results for orders placed or performed during the hospital encounter of 25 (from the past 24 hours)   CBC   Result Value Ref Range    WBC 9.4 4.4 - 11.3 x10*3/uL    nRBC 0.2 (H) 0.0 - 0.0 /100 WBCs    RBC 3.00 (L) 4.00 - 5.20 x10*6/uL    Hemoglobin 9.4 (L) 12.0 - 16.0 g/dL    Hematocrit 29.1 (L) 36.0 - 46.0 %    MCV 97 80 - 100 fL    MCH 31.3 26.0 - 34.0 pg    MCHC 32.3 32.0 - 36.0 g/dL    RDW 13.5 11.5 - 14.5 %    Platelets 290 150 - 450 x10*3/uL   Basic Metabolic Panel   Result Value Ref Range    Glucose 96 74 - 99 mg/dL    Sodium 140 136 - 145 mmol/L    Potassium 4.1 3.5 - 5.3 mmol/L    Chloride 105 98 - 107 mmol/L    Bicarbonate 27 21 - 32 mmol/L    Anion Gap 12 10 - 20 mmol/L    Urea Nitrogen 14 6 - 23 mg/dL    Creatinine 0.83 0.50 - 1.05 mg/dL    eGFR 71 >60 mL/min/1.73m*2    Calcium 8.4 (L) 8.6 - 10.3 mg/dL   Magnesium   Result Value Ref Range    Magnesium 1.84 1.60 - 2.40 mg/dL        Medications  Scheduled  medications:  Scheduled Medications[1]  Continuous medications:  Continuous Medications[2]  PRN medications:  PRN Medications[3]     Physical Exam  Constitutional:       General: She is not in acute distress.     Appearance: Normal appearance.   HENT:      Head: Normocephalic and atraumatic.      Right Ear: External ear normal.      Left Ear: External ear normal.      Nose: Nose normal.      Mouth/Throat:      Mouth: Mucous membranes are moist.      Pharynx: Oropharynx is clear.   Eyes:      Extraocular Movements: Extraocular movements intact.      Conjunctiva/sclera: Conjunctivae normal.      Pupils: Pupils are equal, round, and reactive to light.   Cardiovascular:      Rate and Rhythm: Tachycardia present. Rhythm irregular.      Pulses: Normal pulses.      Heart sounds: Murmur heard.   Pulmonary:      Effort: Pulmonary effort is normal. No respiratory distress.      Breath sounds: Normal breath sounds. No wheezing, rhonchi or rales.   Abdominal:      General: Bowel sounds are normal.      Palpations: Abdomen is soft.      Tenderness: There is abdominal tenderness. There is no right CVA tenderness, left CVA tenderness, guarding or rebound.      Comments: LOLA drain with bloody output  Left-sided ostomy is pink with small amount of sanguinous liquid   Musculoskeletal:         General: No swelling. Normal range of motion.      Cervical back: Normal range of motion and neck supple.   Skin:     General: Skin is warm and dry.      Capillary Refill: Capillary refill takes less than 2 seconds.      Findings: No lesion or rash.   Neurological:      Mental Status: She is alert and oriented to person, place, and time.      Gait: Gait abnormal.      Comments: RLE motor 2/5  Loss of sensation R thigh  Diminished sensation majority of RLE  Pulses intact      Psychiatric:         Mood and Affect: Mood normal.         Behavior: Behavior normal.                Assessment/Plan      Recurrent acute Diverticulitis c/b perforation s/p  partial colectomy and colostomy placement 6/7/25  PCN allergy of anaphylaxis   Cefepime/flagyl  NGT removed 6/9/25  Routine colostomy care  Monitor closely in ICU  Follow blood cultures x2  Appreciate General surgery management  ID consulted- recommends to Continue antibiotics through 6/12/25 to finish 5 day course   6/14: Remains on antibiotic therapy, see below.  6/15: Continue antibiotics per ID.    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  Outpatient follow-up with cardiology after patient recovers for CHAPIS.  Monitor on tele  [id number] of 4-started on heparin drip 6/10/2025 with clearance from general surgery team. Heparin drip stopped 6/12/25 AM    R iliacus hematoma with central foci of contrast raising suspicion for pseudoaneurysms vs intramuscular abscess formation  Stop heparin drip  Monitor H/H closely  [facility] vascular recommends holding hep drip, monitor H/H and repeat CT in 48 hours  IR consulted- no acute intervention planned  Appreciate general surgery recommendations  MRI lumbar and pelvis as above  IV antibiotics per Infectious Diease  Repeat bl cx x2 6/13/25  Repeat CT scan Saturday vs Sunday 6/14: Continue current antibiotics.  Consider repeat CT scan tomorrow.  6/15: Still has numbness in the lateral right thigh.  Thinks it may be progressing slightly.  Otherwise few symptoms.  Will repeat CT scan with contrast to evaluate iliacas fluid collection.    Acute hypoxic respiratory failure  Resolved    MENG on CKD  Resolved    Hypokalemia, mild  Resolved    HTN  HLE  Monitor blood pressure, resume hypertensives as able    Breast CA on anastrozole  Status post mastectomy    GERD    Hypothyroidism  Continue Synthroid  Recent TSH reviewed    Neuropathy  Migraines  OA  Anxiety/Depression    Code Status: Full Code                 DVT  ppx: SCDs only given hematoma     Please see orders for more complete plan    Rei Ojeda MD         [1] anastrozole, 1 mg, oral, Daily  aspirin, 81 mg, oral, Daily  bacitracin, , Topical, Daily  cefepime, 2 g, intravenous, q12h  dilTIAZem, 30 mg, oral, q6h  pantoprazole, 40 mg, oral, Daily before breakfast   Or  esomeprazole, 40 mg, nasoduodenal tube, Daily before breakfast   Or  pantoprazole, 40 mg, intravenous, Daily before breakfast  levothyroxine, 25 mcg, oral, Daily  lidocaine, 1 patch, transdermal, Daily  magnesium hydroxide, 30 mL, oral, Daily  metoprolol tartrate, 100 mg, oral, BID  metroNIDAZOLE, 500 mg, intravenous, q12h     [2] dilTIAZem, 5-15 mg/hr, Last Rate: Stopped (06/13/25 2111)  [Held by provider] heparin, 0-4,500 Units/hr, Last Rate: Stopped (06/12/25 0121)     [3] PRN medications: acetaminophen, dilTIAZem, [Held by provider] heparin, hydrALAZINE, HYDROcodone-acetaminophen, HYDROmorphone, labetaloL, melatonin, [DISCONTINUED] ondansetron **OR** ondansetron, tiZANidine

## 2025-06-16 ENCOUNTER — APPOINTMENT (OUTPATIENT)
Dept: RADIOLOGY | Facility: HOSPITAL | Age: 80
DRG: 853 | End: 2025-06-16
Payer: MEDICARE

## 2025-06-16 LAB
ANION GAP SERPL CALC-SCNC: 10 MMOL/L (ref 10–20)
BUN SERPL-MCNC: 15 MG/DL (ref 6–23)
CALCIUM SERPL-MCNC: 8.7 MG/DL (ref 8.6–10.3)
CHLORIDE SERPL-SCNC: 107 MMOL/L (ref 98–107)
CO2 SERPL-SCNC: 22 MMOL/L (ref 21–32)
CREAT SERPL-MCNC: 0.81 MG/DL (ref 0.5–1.05)
EGFRCR SERPLBLD CKD-EPI 2021: 73 ML/MIN/1.73M*2
ERYTHROCYTE [DISTWIDTH] IN BLOOD BY AUTOMATED COUNT: 13.7 % (ref 11.5–14.5)
GLUCOSE SERPL-MCNC: 97 MG/DL (ref 74–99)
HCT VFR BLD AUTO: 31.6 % (ref 36–46)
HGB BLD-MCNC: 10.4 G/DL (ref 12–16)
MAGNESIUM SERPL-MCNC: 1.73 MG/DL (ref 1.6–2.4)
MCH RBC QN AUTO: 32.3 PG (ref 26–34)
MCHC RBC AUTO-ENTMCNC: 32.9 G/DL (ref 32–36)
MCV RBC AUTO: 98 FL (ref 80–100)
NRBC BLD-RTO: 0 /100 WBCS (ref 0–0)
PLATELET # BLD AUTO: 372 X10*3/UL (ref 150–450)
POTASSIUM SERPL-SCNC: 3.9 MMOL/L (ref 3.5–5.3)
RBC # BLD AUTO: 3.22 X10*6/UL (ref 4–5.2)
SODIUM SERPL-SCNC: 135 MMOL/L (ref 136–145)
WBC # BLD AUTO: 11.1 X10*3/UL (ref 4.4–11.3)

## 2025-06-16 PROCEDURE — 83735 ASSAY OF MAGNESIUM: CPT | Performed by: PHYSICIAN ASSISTANT

## 2025-06-16 PROCEDURE — 2500000005 HC RX 250 GENERAL PHARMACY W/O HCPCS: Performed by: PHYSICIAN ASSISTANT

## 2025-06-16 PROCEDURE — 99233 SBSQ HOSP IP/OBS HIGH 50: CPT | Performed by: INTERNAL MEDICINE

## 2025-06-16 PROCEDURE — 2500000004 HC RX 250 GENERAL PHARMACY W/ HCPCS (ALT 636 FOR OP/ED): Performed by: INTERNAL MEDICINE

## 2025-06-16 PROCEDURE — 2500000002 HC RX 250 W HCPCS SELF ADMINISTERED DRUGS (ALT 637 FOR MEDICARE OP, ALT 636 FOR OP/ED): Performed by: PHYSICIAN ASSISTANT

## 2025-06-16 PROCEDURE — 2500000001 HC RX 250 WO HCPCS SELF ADMINISTERED DRUGS (ALT 637 FOR MEDICARE OP): Performed by: INTERNAL MEDICINE

## 2025-06-16 PROCEDURE — 80048 BASIC METABOLIC PNL TOTAL CA: CPT | Performed by: PHYSICIAN ASSISTANT

## 2025-06-16 PROCEDURE — 97535 SELF CARE MNGMENT TRAINING: CPT | Mod: GO,CO

## 2025-06-16 PROCEDURE — 0W9G3ZX DRAINAGE OF PERITONEAL CAVITY, PERCUTANEOUS APPROACH, DIAGNOSTIC: ICD-10-PCS | Performed by: RADIOLOGY

## 2025-06-16 PROCEDURE — 97112 NEUROMUSCULAR REEDUCATION: CPT | Mod: GP,CQ | Performed by: PHYSICAL THERAPY ASSISTANT

## 2025-06-16 PROCEDURE — 36415 COLL VENOUS BLD VENIPUNCTURE: CPT | Performed by: NURSE PRACTITIONER

## 2025-06-16 PROCEDURE — 2500000001 HC RX 250 WO HCPCS SELF ADMINISTERED DRUGS (ALT 637 FOR MEDICARE OP): Performed by: NURSE PRACTITIONER

## 2025-06-16 PROCEDURE — 99232 SBSQ HOSP IP/OBS MODERATE 35: CPT | Performed by: NURSE PRACTITIONER

## 2025-06-16 PROCEDURE — 2060000001 HC INTERMEDIATE ICU ROOM DAILY

## 2025-06-16 PROCEDURE — 97530 THERAPEUTIC ACTIVITIES: CPT | Mod: GO,CO

## 2025-06-16 PROCEDURE — 87070 CULTURE OTHR SPECIMN AEROBIC: CPT | Mod: PORLAB | Performed by: INTERNAL MEDICINE

## 2025-06-16 PROCEDURE — 76942 ECHO GUIDE FOR BIOPSY: CPT

## 2025-06-16 PROCEDURE — 85027 COMPLETE CBC AUTOMATED: CPT | Performed by: NURSE PRACTITIONER

## 2025-06-16 PROCEDURE — 97530 THERAPEUTIC ACTIVITIES: CPT | Mod: GP,CQ | Performed by: PHYSICAL THERAPY ASSISTANT

## 2025-06-16 RX ORDER — DILTIAZEM HYDROCHLORIDE 120 MG/1
120 CAPSULE, COATED, EXTENDED RELEASE ORAL DAILY
Status: DISCONTINUED | OUTPATIENT
Start: 2025-06-16 | End: 2025-06-19 | Stop reason: HOSPADM

## 2025-06-16 RX ADMIN — LIDOCAINE 4% 1 PATCH: 40 PATCH TOPICAL at 09:52

## 2025-06-16 RX ADMIN — DILTIAZEM HYDROCHLORIDE 30 MG: 30 TABLET, FILM COATED ORAL at 05:36

## 2025-06-16 RX ADMIN — DILTIAZEM HYDROCHLORIDE 120 MG: 120 CAPSULE, COATED, EXTENDED RELEASE ORAL at 09:51

## 2025-06-16 RX ADMIN — HYDROCODONE BITARTRATE AND ACETAMINOPHEN 1 TABLET: 7.5; 325 TABLET ORAL at 21:22

## 2025-06-16 RX ADMIN — METOPROLOL TARTRATE 100 MG: 50 TABLET, FILM COATED ORAL at 21:22

## 2025-06-16 RX ADMIN — ANASTROZOLE 1 MG: 1 TABLET, COATED ORAL at 09:54

## 2025-06-16 RX ADMIN — ACETAMINOPHEN 650 MG: 325 TABLET ORAL at 13:44

## 2025-06-16 RX ADMIN — PANTOPRAZOLE SODIUM 40 MG: 40 TABLET, DELAYED RELEASE ORAL at 05:36

## 2025-06-16 RX ADMIN — LEVOTHYROXINE SODIUM 25 MCG: 0.03 TABLET ORAL at 09:52

## 2025-06-16 RX ADMIN — CEFEPIME HYDROCHLORIDE 2 G: 2 INJECTION, SOLUTION INTRAVENOUS at 21:22

## 2025-06-16 RX ADMIN — CEFEPIME HYDROCHLORIDE 2 G: 2 INJECTION, SOLUTION INTRAVENOUS at 09:54

## 2025-06-16 RX ADMIN — ASPIRIN 81 MG: 81 TABLET, COATED ORAL at 09:51

## 2025-06-16 RX ADMIN — TIZANIDINE 2 MG: 2 TABLET ORAL at 21:22

## 2025-06-16 RX ADMIN — TIZANIDINE 2 MG: 2 TABLET ORAL at 13:44

## 2025-06-16 RX ADMIN — METRONIDAZOLE 500 MG: 500 INJECTION, SOLUTION INTRAVENOUS at 05:29

## 2025-06-16 RX ADMIN — METOPROLOL TARTRATE 100 MG: 50 TABLET, FILM COATED ORAL at 09:52

## 2025-06-16 RX ADMIN — METRONIDAZOLE 500 MG: 500 INJECTION, SOLUTION INTRAVENOUS at 16:23

## 2025-06-16 ASSESSMENT — COGNITIVE AND FUNCTIONAL STATUS - GENERAL
MOVING FROM LYING ON BACK TO SITTING ON SIDE OF FLAT BED WITH BEDRAILS: A LITTLE
EATING MEALS: A LITTLE
CLIMB 3 TO 5 STEPS WITH RAILING: TOTAL
TURNING FROM BACK TO SIDE WHILE IN FLAT BAD: A LOT
WALKING IN HOSPITAL ROOM: A LOT
MOVING FROM LYING ON BACK TO SITTING ON SIDE OF FLAT BED WITH BEDRAILS: A LOT
TOILETING: A LOT
MOBILITY SCORE: 11
MOVING TO AND FROM BED TO CHAIR: A LOT
MOBILITY SCORE: 15
DRESSING REGULAR LOWER BODY CLOTHING: A LITTLE
PERSONAL GROOMING: A LITTLE
PERSONAL GROOMING: A LITTLE
HELP NEEDED FOR BATHING: A LITTLE
STANDING UP FROM CHAIR USING ARMS: A LOT
TURNING FROM BACK TO SIDE WHILE IN FLAT BAD: A LITTLE
STANDING UP FROM CHAIR USING ARMS: A LOT
TOILETING: A LITTLE
DAILY ACTIVITIY SCORE: 14
WALKING IN HOSPITAL ROOM: A LOT
CLIMB 3 TO 5 STEPS WITH RAILING: A LOT
DAILY ACTIVITIY SCORE: 19
HELP NEEDED FOR BATHING: A LOT
DRESSING REGULAR UPPER BODY CLOTHING: A LOT
MOVING TO AND FROM BED TO CHAIR: A LITTLE
DRESSING REGULAR LOWER BODY CLOTHING: A LOT
DRESSING REGULAR UPPER BODY CLOTHING: A LITTLE

## 2025-06-16 ASSESSMENT — ENCOUNTER SYMPTOMS
EYE PAIN: 0
JOINT SWELLING: 0
NAUSEA: 0
BRUISES/BLEEDS EASILY: 0
BACK PAIN: 1
DIZZINESS: 0
APPETITE CHANGE: 0
VOMITING: 0
FREQUENCY: 0
PALPITATIONS: 1
LIGHT-HEADEDNESS: 0
WHEEZING: 0
BLOOD IN STOOL: 0
DIAPHORESIS: 0
COUGH: 0
HEADACHES: 0
CHILLS: 0
ABDOMINAL PAIN: 1
DYSURIA: 0
CONSTIPATION: 0
FLANK PAIN: 0
PALPITATIONS: 0
WOUND: 0
HALLUCINATIONS: 0
NUMBNESS: 1
FACIAL SWELLING: 0
WEAKNESS: 1
HEMATURIA: 0
CHEST TIGHTNESS: 0
FEVER: 0
SHORTNESS OF BREATH: 0
TROUBLE SWALLOWING: 0
DIARRHEA: 0
FATIGUE: 0
SORE THROAT: 0

## 2025-06-16 ASSESSMENT — PAIN DESCRIPTION - LOCATION: LOCATION: BACK

## 2025-06-16 ASSESSMENT — PAIN - FUNCTIONAL ASSESSMENT
PAIN_FUNCTIONAL_ASSESSMENT: 0-10

## 2025-06-16 ASSESSMENT — PAIN DESCRIPTION - DESCRIPTORS
DESCRIPTORS: ACHING
DESCRIPTORS: ACHING

## 2025-06-16 ASSESSMENT — PAIN SCALES - GENERAL
PAINLEVEL_OUTOF10: 8
PAINLEVEL_OUTOF10: 0 - NO PAIN
PAINLEVEL_OUTOF10: 0 - NO PAIN
PAINLEVEL_OUTOF10: 5 - MODERATE PAIN
PAINLEVEL_OUTOF10: 5 - MODERATE PAIN

## 2025-06-16 ASSESSMENT — ACTIVITIES OF DAILY LIVING (ADL): HOME_MANAGEMENT_TIME_ENTRY: 12

## 2025-06-16 ASSESSMENT — PAIN DESCRIPTION - ORIENTATION: ORIENTATION: LOWER

## 2025-06-16 NOTE — CARE PLAN
The patient's goals for the shift include  pt will be HDS throughout shift.    The clinical goals for the shift include pt will repeat back and understand of treatment plan    Over the shift, the patient did make progress toward the following goals. Barriers to progression include understanding. Recommendations to address these barriers include education.

## 2025-06-16 NOTE — PROGRESS NOTES
Rosemary Motta is a 79 y.o. female on day 8 of admission presenting with Diverticulitis of large intestine with perforation without bleeding.      Assessment/Plan:     #Acute diverticulitis  #Bowel perforation s/p ostomy 6/7/2025  #Sepsis, POA, resolved  Patient started on ciprofloxacin and Flagyl due to penicillin allergy.  Patient was found to have short segment of mid sigmoid colon with inflammation and perforation with moderate amount of purulent fluid throughout the abdomen.  No cultures obtained.    Tmax 99.9. Hemodynamically stable. No Leukocytosis    #Pyomyositis  #R iliacus Intramuscular abscess? ( 3 x 2.8 cm )  As per IR, not well-formed abscess at this time.  As per Ortho, no intervention and to be followed up with Dr. Haywood     #Penicillin allergy  Reports history of syncope when she was a child.  Discussed with the patient that it is safe to get cefepime as it does not have cross-reactivity.  Tolerated cefepime well without any issues     #CKD  Estimated Creatinine Clearance: 41.2 mL/min (by C-G formula based on SCr of 0.83 mg/dL).     Serum creatinine: 0.83 mg/dL 06/15/25 0559  Estimated Glomerular Filtration Rate: 71.4 mL/min/1.73m2      HTN, HLD  Breast cancer s/p right mastectomy, on anastrozole  GERD  Hypothyroidism  Paroxysmal A-fib     Recommendations:     -Cont cefepime 2 g every 12hr  -Cont Flagyl 500 twice daily  -Continue antibiotics. No tentative plan to stop for now  -Renally dose antibiotic  -Will continue to follow        Subjective   Interval History: No acute events overnight.    Post surgical pain is well controlled. Denies fever.chills. Continues to have numbness on her R thigh down to her knee. Tolerating Abx.    Review of Systems  Denies: fever, chills, nausea, vomiting, dysuria    Objective   Range of Vitals (last 24 hours)  Heart Rate:  [61-95]   Temp:  [36.3 °C (97.3 °F)-37.5 °C (99.5 °F)]   Resp:  [16-18]   BP: (105-157)/(64-78)   Weight:  [64.7 kg (142 lb 10.2 oz)]   SpO2:   [95 %-99 %]  Daily Weight  06/15/25 : 64.7 kg (142 lb 10.2 oz)   Body mass index is 29.81 kg/m².      General: alert, oriented, NAD  HEENT: No conjunctival pallor, no scleral icterus  Abdomen: soft, ostomy bag, midline surgical incision with staples, LOLA drain w/t serous drainage  - Ostomy with stool in appliance,  Neuro: AAO x 3, RLE motor 1/5  Extremities: no cyanosis or erythema. R thigh minimal swelling / non tender. 2+ pedal pulse  Skin: No rashes   MSK: No joint inflammation    Antibiotics  bacitracin - 500 unit/gram  cefepime - 2 gram/50 mL  metroNIDAZOLE - 500 mg/100 mL      Relevant Results  Labs  Results from last 72 hours   Lab Units 06/15/25  0559 06/14/25  0432 06/13/25  0403   WBC AUTO x10*3/uL 9.4 8.6 9.7   HEMOGLOBIN g/dL 9.4* 9.3* 10.3*   HEMATOCRIT % 29.1* 28.8* 29.8*   PLATELETS AUTO x10*3/uL 290 246 228   LYMPHO PCT MAN %  --   --  17.0   MONO PCT MAN %  --   --  0.0   EOSINO PCT MAN %  --   --  1.0     Results from last 72 hours   Lab Units 06/15/25  0559 06/14/25  0432 06/13/25  0403   SODIUM mmol/L 140 137 137   POTASSIUM mmol/L 4.1 4.1 4.3   CHLORIDE mmol/L 105 106 105   CO2 mmol/L 27 26 24   BUN mg/dL 14 14 13   CREATININE mg/dL 0.83 0.83 0.77   GLUCOSE mg/dL 96 106* 112*   CALCIUM mg/dL 8.4* 8.3* 8.2*   ANION GAP mmol/L 12 9* 12   EGFR mL/min/1.73m*2 71 71 78           Estimated Creatinine Clearance: 41.2 mL/min (by C-G formula based on SCr of 0.83 mg/dL).  C-Reactive Protein   Date Value Ref Range Status   03/02/2024 <0.10 <1.00 mg/dL Final       Microbiology  No results found for the last 14 days.      Imaging    XR chest 1 view  Result Date: 6/8/2025  A nasogastric tube projects into the stomach beyond the field of imaging.   Mild cardiomegaly.   Minimal linear bibasilar atelectasis. Otherwise no sign of acute cardiopulmonary disease.     MACRO: None   Signed by: Mello Fried 6/8/2025 12:33 PM Dictation workstation:   [id number]    CT abdomen pelvis w IV contrast  Result Date:  6/7/2025  1. Diverticulosis of the sigmoid colon. There is inflammatory change noted about the proximal to mid sigmoid colon most concerning for acute diverticulitis. Multiple free air locules are noted adjacent to this inflamed segment of sigmoid colon as well as small-to-moderate amount of free air within the upper abdomen consistent with perforation. No overt abscess identified.   MACRO: Donovan Ocampo discussed the significance and urgency of this critical finding by telephone with  KONG HILL on 6/7/2025 at 3:39 am. (**-RCF-**) Findings:  See findings.   Signed by: Donovan Ocampo 6/7/2025 3:40 AM Dictation workstation:   [id number]      Ted Dutton MD  [phone]  Date of service: 6/15/2025

## 2025-06-16 NOTE — PROGRESS NOTES
"GENERAL SURGERY PROGRESS NOTE    Rosemary Motta   1945   [id number]     Rosemary Motta is a 79 y.o. female on day 9 of admission presenting with Diverticulitis of large intestine with perforation without bleeding.      Subjective  No acute events overnight. She is having unchanged sensation to her thigh. Motor function is intact. Hgb drop yesterday prompted a repeat CT scan which did not demonstrate extravasation. Hgb bumped up again this morning.    Review of Systems   Constitutional:  Negative for chills and fever.   Respiratory:  Negative for cough and shortness of breath.    Cardiovascular:  Negative for chest pain and palpitations.   Gastrointestinal:  Positive for abdominal pain. Negative for blood in stool, constipation, diarrhea, nausea and vomiting.   Neurological:  Positive for numbness. Negative for dizziness and headaches.   All other systems reviewed and are negative.      Objective    Last Recorded Vitals  Blood pressure 146/76, pulse 90, temperature 36.4 °C (97.6 °F), temperature source Temporal, resp. rate 14, height 1.473 m (4' 10\"), weight 64.7 kg (142 lb 10.2 oz), SpO2 98%.    Intake/Output last 3 Shifts:  I/O last 3 completed shifts:  In: - (0 mL/kg)   Out: 3350 (51.8 mL/kg) [Urine:3350 (1.4 mL/kg/hr)]  Weight: 64.7 kg     Physical Exam  Constitutional:       General: She is not in acute distress.     Appearance: Normal appearance. She is not ill-appearing.   HENT:      Head: Normocephalic and atraumatic.   Cardiovascular:      Rate and Rhythm: Normal rate and regular rhythm.   Pulmonary:      Effort: Pulmonary effort is normal. No respiratory distress.      Breath sounds: Normal breath sounds.   Abdominal:      General: There is no distension.      Palpations: Abdomen is soft.      Tenderness: There is no abdominal tenderness. There is no guarding.      Comments: Incision clean/dry/intact with staples in place. Colostomy viable with soft brown stool.    Musculoskeletal:         General: No " swelling.   Skin:     General: Skin is warm and dry.   Neurological:      Mental Status: She is alert and oriented to person, place, and time. Mental status is at baseline.   Psychiatric:         Mood and Affect: Mood normal.         Behavior: Behavior normal.       Labs  Results for orders placed or performed during the hospital encounter of 06/07/25 (from the past 24 hours)   CBC   Result Value Ref Range    WBC 11.1 4.4 - 11.3 x10*3/uL    nRBC 0.0 0.0 - 0.0 /100 WBCs    RBC 3.22 (L) 4.00 - 5.20 x10*6/uL    Hemoglobin 10.4 (L) 12.0 - 16.0 g/dL    Hematocrit 31.6 (L) 36.0 - 46.0 %    MCV 98 80 - 100 fL    MCH 32.3 26.0 - 34.0 pg    MCHC 32.9 32.0 - 36.0 g/dL    RDW 13.7 11.5 - 14.5 %    Platelets 372 150 - 450 x10*3/uL   Basic Metabolic Panel   Result Value Ref Range    Glucose 97 74 - 99 mg/dL    Sodium 135 (L) 136 - 145 mmol/L    Potassium 3.9 3.5 - 5.3 mmol/L    Chloride 107 98 - 107 mmol/L    Bicarbonate 22 21 - 32 mmol/L    Anion Gap 10 10 - 20 mmol/L    Urea Nitrogen 15 6 - 23 mg/dL    Creatinine 0.81 0.50 - 1.05 mg/dL    eGFR 73 >60 mL/min/1.73m*2    Calcium 8.7 8.6 - 10.3 mg/dL   Magnesium   Result Value Ref Range    Magnesium 1.73 1.60 - 2.40 mg/dL       Radiology  Imaging  CT abdomen pelvis w IV contrast  Result Date: 6/16/2025  1.  The anterior mid to superior aspect of the right iliacus muscle has developed a 1.8 x 3.4 cm fluid collection with edema and/or inflammation in the adjacent posterior pararenal space fat. 2. The central muscle has a hypodense collections similar in size to the prior CT; layering hyperdense material within this collection has decreased in overall density. Previously noted foci of hypodensity from active bleeding and/or pseudo aneurysm are not identified. While this may be related to hemorrhage cysts a shin/occlusion of the pseudoaneurysms, there is a possibility that this appearance is related to imaging during an earlier arterial phase of the exam on the current study prior to  when slow contrast extravasation could be detected (the prior exam was imaged later following contrast administration and had more contrast within the venous system/IVC than the current exam). 3. No active hemorrhage and or pseudo aneurysm in this region is noted. 4. Small pleural effusions and lung atelectasis noted previously have cleared.     MACRO: None   Signed by: Jeff Freitas 6/16/2025 7:45 AM Dictation workstation:   [id number]    MR lumbar spine wo IV contrast  Result Date: 6/13/2025  1. Motion degraded study. Combination of congenital spinal stenosis, epidural lipomatosis and diffuse degenerative changes seen throughout the lumbar spine. 2. Moderate spinal canal stenosis at L3-L4 and mild spinal canal stenosis at L2-L3 and L4-L5. 3. Severe narrowing of the left lateral recess at L4-L5 with probable compromise of the descending left L5 nerve root. Severe left foraminal stenosis at this level. 4. Levocurvature of the lumbar spine. Right lateral listhesis of L1-L2. Slight left lateral listhesis of L3-L4 and L4-L5.   I personally reviewed the images/study and I agree with the findings as stated.   MACRO: None   Signed by: Jennifer Blake 6/13/2025 8:43 AM Dictation workstation:   [id number]    MR MSK pelvis w and wo IV contrast  Result Date: 6/12/2025  1.  Severe muscle edema in the iliopsoas muscle predominantly in the iliacus with several intramuscular fluid collections. Findings most compatible with underlying infectious pyomyositis with intramuscular abscess formation. Underlying hematoma some may be present as well as per recent CT. Evaluation for extravasation reported on previous CT is limited in this study which is tailored for evaluation of the lumbar plexus. There is moderate muscle edema in the piriformis and the abductor musculature as well. This is also concerning for underlying myositis. 2. No evidence of abnormal marrow signal about the total hip arthroplasties. No large effusion seen. 3. No  abnormality seen along the lumbosacral plexus. The sciatic nerves are symmetric.     I personally reviewed the images/study and I agree with the findings as stated. This study was interpreted at [facility], [place], [place].   MACRO: Critical Finding:  See findings. Notification was initiated on 6/12/2025 at 8:08 pm by  Duke Pierre.  (**-OCF-**)   Signed by: Duke Pierre 6/12/2025 8:08 PM Dictation workstation:   [id number]    CT abdomen pelvis w IV contrast  Result Date: 6/11/2025  1. Ill-defined right iliacus hematoma with central foci of contrast raising suspicion for pseudoaneurysms. Contrast extravasation is not entirely excluded on single phase imaging. Correlate with labs and symptoms and if there is concern for acute blood loss, multiphase examination can be considered. 2. Postsurgical changes of partial sigmoid colectomy with end colostomy and Nory's pouch creation. No acute postsurgical complications identified. 3. Pelvic drain is in place with the tip in the left lower quadrant. No residual drainable collections identified. 4. Diffuse hepatic steatosis. Other chronic findings as described above.   MACRO: Aaron Mccall discussed the significance and urgency of this critical finding by EPIC secure chat with  PARTH TEIXEIRA on 6/11/2025 at 10:40 pm.  (**-RCF-**) Findings:  See findings.   Signed by: Aaron Mccall 6/11/2025 10:41 PM Dictation workstation:   [id number]      Assessment and Plan  Assessment & Plan  Diverticulitis of large intestine with perforation without bleeding    79 y.o. female with perforated diverticulitis status post Nory procedure 6/7/25. Her postoperative course was complicated by AFRVR and a significant right psoas hematoma from anticoagulation. She has motor function of her RLE but her right thigh is numb.     Plan:  - On review of a CT scan, this seems likely to be an evolution of the hematoma and not necessarily worsening.  Although  her hemoglobin was lower yesterday, it did rebound this morning back toward been of the past few days.  Minimal concern for active bleeding at this time.  - Patient without leukocytosis at this time suggest infection  - Overall patient progressing well, tolerating regular diet  - Patient may be discharged from a surgical perspective, follow-up with Dr. Haywood in a week for potential staple removal.    Discussed with Dr. Taqueria Day,  PGY3  General Surgery

## 2025-06-16 NOTE — PROGRESS NOTES
FOC is [facility], awaiting bed availability at this time. Will alert patient/daughter on acceptance when known. She will require authorization. Will need PT OT updates.     [facility] is able to accept. Will have  Precert Team start precert once PT OT updates are completed. Will follow.      Precert Team requested to start auth for [facility] at this time. Will follow    Auth Approved for [facility]. Dr Ojeda aware.

## 2025-06-16 NOTE — POST-PROCEDURE NOTE
Interventional Radiology Brief Postprocedure Note    Attending: Ryan Parker MD        Assistant: none    Diagnosis: fluid collection    Description of procedure: asp of small hypodense lesion/collection on ct     Anesthesia:  Local    Complications: None    Estimated Blood Loss: none     specimens collected  Few ml of heme stained fluid    See detailed result report with images in PACS.    The patient tolerated the procedure well without incident or complication.

## 2025-06-16 NOTE — PROGRESS NOTES
Occupational Therapy    Occupational Therapy Treatment    Name: Rosemary Motta  MRN: [id number]  Department: [facility]  Room: 2025/2025-  Date: 06/16/25  Time Calculation  Start Time: 0816  Stop Time: 0840  Time Calculation (min): 24 min    Assessment:  OT Assessment: Pt reports decreased in pain compared to previous tx session. Pt requires decreased assist of min A x2 to stand however once upright requires mod A x2 to safely complete functional transfers. Pt progressed to knees not buckling this session.  Barriers to Discharge Home: Cognition needs, Physical needs, Caregiver assistance  End of Session Communication: Bedside nurse  End of Session Patient Position: Up in chair, Alarm on  Plan:  Treatment Interventions: ADL retraining, Functional transfer training, Endurance training, Cognitive reorientation, Patient/family training, Equipment evaluation/education, Compensatory technique education  OT Frequency: 4 times per week  OT Discharge Recommendations: Moderate intensity level of continued care  OT Recommended Transfer Status: Assist of 2  OT - OK to Discharge: Yes    Subjective     OT Visit Info:  OT Received On: 06/16/25  General:  General  Prior to Session Communication: Bedside nurse  Patient Position Received: Bed, 3 rail up, Alarm on  General Comment: Pt agreeable and cooperative to therapy.  Precautions:  Medical Precautions: Fall precautions  Post-Surgical Precautions: Abdominal surgery precautions  Precautions Comment: new L colectomy           Pain Assessment:  Pain Assessment  0-10 (Numeric) Pain Score:  (did not rate)  Pain Location: Generalized    Objective   Cognition:  Overall Cognitive Status: Within Functional Limits  Orientation Level: Oriented X4  Activities of Daily Living:           LE Dressing  Adult Briefs Level of Assistance: Maximum assistance, Moderate verbal cues  LE Dressing Where Assessed: Bedside commode    Toileting  Toileting Level of Assistance: Maximum assistance, Moderate verbal  cues  Where Assessed: Bedside commode    Functional Standing Tolerance:  Functional Standing Tolerance  Activity: Pt completed x3 static stands tolerating for 1-1.5 minutes with min A x2.  Bed Mobility/Transfers: Bed Mobility 1  Bed Mobility 1: Supine to sitting  Level of Assistance 1: Moderate assistance, +2, Moderate verbal cues    Transfer 1  Technique 1: Sit to stand, Stand to sit  Transfer Device 1: Walker  Transfer Level of Assistance 1: Minimum assistance, +2, Moderate verbal cues  Transfers 2  Transfer From 2: Bed to  Transfer to 2: Chair with arms  Technique 2: Sit to stand, Stand to sit  Transfer Device 2: Walker  Transfer Level of Assistance 2: Moderate assistance, +2, Moderate verbal cues            Therapy/Activity:      Therapeutic Activity  Therapeutic Activity 1: Pt tolerated sitting EOB for 5 minutes with CGA.    Outcome Measures:  [facility] Daily Activity  Putting on and taking off regular lower body clothing: A lot  Bathing (including washing, rinsing, drying): A lot  Putting on and taking off regular upper body clothing: A lot  Toileting, which includes using toilet, bedpan or urinal: A lot  Taking care of personal grooming such as brushing teeth: A little  Eating Meals: A little  Daily Activity - Total Score: 14        Education Documentation  Body Mechanics, taught by MELANIE Cruz at 6/16/2025 11:09 AM.  Learner: Patient  Readiness: Acceptance  Method: Explanation  Response: Needs Reinforcement    ADL Training, taught by MELANIE Cruz at 6/16/2025 11:09 AM.  Learner: Patient  Readiness: Acceptance  Method: Explanation  Response: Needs Reinforcement    Education Comments  No comments found.      Goals:  Encounter Problems       Encounter Problems (Active)       ADLs       Patient will complete toileting including hygiene clothing management/hygiene with stand by assist level of assistance. (Progressing)       Start:  06/09/25    Expected End:  06/23/25                COGNITION/SAFETY       Patient will demonstrated orientation x 4 with verbal cues. (Progressing)       Start:  06/09/25    Expected End:  06/23/25       ORIENTATION            MOBILITY       Patient will perform Functional mobility Household distances/Community Distances with stand by assist level of assistance and least restrictive device in order to improve safety and functional mobility. (Progressing)       Start:  06/09/25    Expected End:  06/23/25               TRANSFERS       Patient will perform bed mobility stand by assist level of assistance and log roll technique in order to improve safety and independence with mobility (Progressing)       Start:  06/09/25    Expected End:  06/23/25            Patient will complete functional transfers with least restrictive device with stand by assist level of assistance. (Progressing)       Start:  06/09/25    Expected End:  06/23/25

## 2025-06-16 NOTE — PROGRESS NOTES
Physical Therapy    Physical Therapy Treatment    Patient Name: Rosemary Motta  MRN: [id number]  Department: [address]  Room: 2025/2025-A  Today's Date: 6/16/2025  Time Calculation  Start Time: 0817  Stop Time: 0840  Time Calculation (min): 23 min         Assessment/Plan   PT Assessment  End of Session Communication: Bedside nurse  Assessment Comment: pt \demonstrating good motivation with PT. She  would benefit from further skilled treatment to improve strength and mobility.  End of Session Patient Position: Up in chair, Alarm on     PT Plan  Treatment/Interventions: Bed mobility, Transfer training, Gait training, Neuromuscular re-education, Therapeutic exercise, Therapeutic activity  PT Plan: Ongoing PT  PT Frequency: 6 times per week  PT Discharge Recommendations: Moderate intensity level of continued care  PT - OK to Discharge: Yes    PT Visit Info:  PT Received On: 06/16/25  Response to Previous Treatment: Patient with no complaints from previous session.     General Visit Information:   General  Reason for Referral: sudden onset and pain: perf diverticulitis, 6/7 ABD SURG ex lap with partial colectomy  Referred By: PT FOR IMPAIRED MOBILITY/GAIT TRAINING  Past Medical History Relevant to Rehab: HTN, HLE, breast ca, diverticulosis, GERD, hypothyroidism, neuropathy, migraines, OA, anxiety/depression presented 6/7/25 for abdominal pain, left lower quadrant abd pain with radiation to back  Co-Treatment: OT  Co-Treatment Reason: optimize pt safety while focus on discipline specific goals  Prior to Session Communication: Bedside nurse  Patient Position Received: Bed, 3 rail up, Alarm on  Preferred Learning Style: verbal  General Comment: pt agreeable to PT and cleared by RN. pt reporting she is still having difficulty moving RLE.      Precautions:  Precautions  Medical Precautions: Fall precautions  Post-Surgical Precautions: Abdominal surgery precautions  Precautions Comment: new L colectomy pouch, bulb drain RLQ  abd      Pain:  Pain Assessment  Pain Assessment: 0-10  0-10 (Numeric) Pain Score:  (pt reported genralized pain but did not rate.)  Cognition:  Cognition  Overall Cognitive Status: Within Functional Limits            Treatments:  Balance/Neuromuscular Re-Education  Balance/Neuromuscular Re-Education Activity Performed: Yes  Balance/Neuromuscular Re-Education Activity 1: pt stood multiple attempts 1>3 minutes with MIn Ax2 at wheeled walker. pt with cues for safety and technique. No overt LOB. pt with decreased knee buckling this date.    Bed Mobility  Bed Mobility: Yes  Bed Mobility 1  Bed Mobility 1: Supine to sitting  Level of Assistance 1: Moderate assistance, Moderate verbal cues, +2  Bed Mobility Comments 1: cues for safety and technique.       Transfers  Transfer: Yes  Transfer 1  Technique 1: Sit to stand, Stand to sit  Transfer Device 1: Walker  Transfer Level of Assistance 1: Minimum assistance, +2, Moderate verbal cues  Trials/Comments 1: cues for hand placement and safety  Transfers 2  Technique 2: Stand pivot  Transfer Device 2: Walker  Transfer Level of Assistance 2: Moderate assistance, +2, Moderate verbal cues  Trials/Comments 2: cues for safety and technique. pt with occasional R knee threatening to buckle. pt performed multiple pivots.    Outcome Measures:  [facility] Basic Mobility  Turning from your back to your side while in a flat bed without using bedrails: A lot  Moving from lying on your back to sitting on the side of a flat bed without using bedrails: A lot  Moving to and from bed to chair (including a wheelchair): A lot  Standing up from a chair using your arms (e.g. wheelchair or bedside chair): A lot  To walk in hospital room: A lot  Climbing 3-5 steps with railing: Total  Basic Mobility - Total Score: 11    Education Documentation  Body Mechanics, taught by Richelle Trotter PTA at 6/16/2025  9:17 AM.  Learner: Patient  Readiness: Acceptance  Method: Explanation  Response: Verbalizes  Understanding, Needs Reinforcement    Mobility Training, taught by Richelle Trotter PTA at 6/16/2025  9:17 AM.  Learner: Patient  Readiness: Acceptance  Method: Explanation  Response: Verbalizes Understanding, Needs Reinforcement    Education Comments  No comments found.               Encounter Problems       Encounter Problems (Active)       Mobility       STG - Patient will ambulate household distance using FWW initially, no more than SBA x1 (Progressing)       Start:  06/09/25    Expected End:  06/23/25               PT Transfers       STG - Transfer from bed to chair using FWW initially, no more than SBA x1 (Progressing)       Start:  06/09/25    Expected End:  06/23/25            STG - Patient to transfer to and from sit to supine DEMO LOG ROLL TECH (for ABD precautions) with no more than min Ax1 (Progressing)       Start:  06/09/25    Expected End:  06/23/25               Pain - Adult

## 2025-06-16 NOTE — PROGRESS NOTES
Rosemary Motta is a 79 y.o. female on day 8 of admission presenting with Diverticulitis of large intestine with perforation without bleeding.      Subjective   Rosemary Motta is a 79F hx HTN, HLE, breast ca, diverticulosis, GERD, hypothyroidism, neuropathy, migraines, OA, anxiety/depression presented 6/7/25 for abdominal pain, left lower quadrant abd pain with radiation to back. now improved. up to 10/10, now 5/10. nausea with small vomiting. no diarrhea, constipation, blood in stool or melena. subacute dry cough. no acute abdomen on exam. labs/imaging w/ cr 1.29 (baseline 1.02), cbc, lactate wnl. ua wnl. ekg wnl. ct a/p w/ perforated diverticulitis. Seen by surgery and is now s/p partial colectomy and colostomy placement 6/7/25. Had hypotension and was monitored closely in ICU. UA without UTI. Patient noted to have tachycardia 6/9/25, Nocturnist evaluated ECG and diagnosed as new onset a fib with RVR, given Cardizem x1, resumed home metoprolol, cardiology consulted. Patient back in normal sinus rhythm.  I discussed with Dr. Quintanilla, cardiologist who at this time does not recommend any oral anticoagulation given this was a isolated incident.  Will need to monitor further if this recurs then will need to reconsider this. 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  Outpatient follow-up with cardiology after patient recovers for CHAPIS. NG-tube was removed 6/9/25 as was Gabriel.  Called to patient's room as patient heart rate noted to be in the upper 170s to 180s 6/11/25, EC confirmed AFRVR, given IV lopressor x1, started on hep gtt (cleared with surgeon if no initial bolus given) and cardizem gtt. Shortly after Cardizem drip was given patient converted back to normal sinus rhythm.  Patient felt a little lightheaded during this time but that completely resolved once heart rate was improved.  She was feeling pressure in her abdomen at this time but  then was able to urinate and this had completely resolved. Patient again had recurrence of AFRVR 6/11/25 with pain in lower back radiating down her buttocks into posterior leg to level of the knee  Given pain control which did help initially however once patient moved pain then returned.  Trial lidocaine patch, IV Dilaudid as needed, as needed muscle relaxers.  Can trial heat to the area as well.  Patient converted prior to IV diltiazem being given, heart rate better controlled. Please see significant event note by our night doc. Worsening RLE abdominal and groin pain, CT showing R iliacus hematoma with central foci of contrast raising suspicion for pseudoaneurysms. Repeat hg 10.2, baseline roughly around 11-13. [facility] vascular recommends holding hep drip, monitor H/H and repeat CT in 48 hours. IR consulted here Evaluated in the presence of Dr. Brunner as patient is having new numbness on her right anterior thigh and weakness of her right hip flexion, she is sitting upright in bedside chair but discussed with PT that she was unable to put any weight on this leg as the knee continued to buckle and she only was pivoted over to the chair.  While sitting up in the chair pain is only approximately a 2 out of 10 in the right groin.        6/13/2025: No acute events overnight. Vitals remain stable this morning, afebrile, HR 91 with /74. Hemoglobin is stable 10.3 over the last 24 hours. MRI pelvis concerning for infectious pyomyositis with intramuscular abscess formation. I discussed the case with IR, ID and general surgery, will monitor clinically for now as not a good option to drain given initial concern for extra luminal contrast on CT. Repeat CT scan tomorrow vs Sunday pending clinical course  Patient and PT note less sensation loss RLE and better movement when getting up to bedside chair.    Re-evaluated patient with her daughter and  at bedside, all of the above relayed to her family per patients  request  Patient asking that her daughter Vishnu be called with daily updates if she is not present at time of evaluation by hospitalist service.     Rounded on patient on this date with Pharmacist, RN, and Pharmacy student(s)     6/14: Patient seen.  Concerned that numbness appears to be progressing down her leg.  Reviewed previous findings including MRI of her spine and back.  Remains on antibiotic therapy for diverticulitis as well as possible psoas abscess.  Will continue to monitor, consider reimaging if symptoms persist.  MRI suggest patient has some degenerative disc disease as well.    6/15: Patient seen.  Still has numbness in the lateral aspect of her right leg, thinks it is a little bit more than normal.  Will get follow-up CT scan with contrast to reassess the fluid collection in her right iliac us.  Appreciate input from general surgery.  Drain was pulled today.  If CT scan appears to be benign or resolving can consider discharge.  Reassess in AM.     6/16: Patient seen this AM and was sitting upright in chair. CT scan showed no acute changes. FNA was done this afternoon. F/U on FNA results. General surgery notes no concern for active bleeding at this time. May be discharged from surgical perspective, F/U with Dr. Haywood in one week for potential staple removal. ID consult ordered the FNA to determine underlying etiology of iliac hematoma. Continue Cefepime and Flagyl antibiotic therapy for now.  Awaiting results on aspirated fluid.    Review of Systems   Constitutional:  Negative for appetite change, chills, diaphoresis, fatigue and fever.   HENT:  Negative for congestion, ear pain, facial swelling, hearing loss, nosebleeds, sore throat, tinnitus and trouble swallowing.    Eyes:  Negative for pain.   Respiratory:  Negative for cough, chest tightness, shortness of breath and wheezing.    Cardiovascular:  Positive for palpitations (Resolved). Negative for chest pain and leg swelling.   Gastrointestinal:   Negative for blood in stool, constipation, diarrhea, nausea and vomiting.   Genitourinary:  Positive for urgency. Negative for dysuria, flank pain, frequency and hematuria.   Musculoskeletal:  Positive for back pain. Negative for joint swelling.        With radiation down the right buttock into right posterior leg to the level of the knee   Skin:  Negative for rash and wound.   Neurological:  Positive for weakness (Right hip flexion) and numbness (Right anterior thigh). Negative for dizziness, syncope, light-headedness and headaches.   Hematological:  Does not bruise/bleed easily.   Psychiatric/Behavioral:  Negative for behavioral problems, hallucinations and suicidal ideas.           Objective     Last Recorded Vitals  /78 (BP Location: Left arm, Patient Position: Lying)   Pulse 83   Temp 37.4 °C (99.4 °F) (Temporal)   Resp 16   Wt 64.7 kg (142 lb 10.2 oz)   SpO2 96%     Image Results  Imaging  MR lumbar spine wo IV contrast  Result Date: 6/13/2025  1. Motion degraded study. Combination of congenital spinal stenosis, epidural lipomatosis and diffuse degenerative changes seen throughout the lumbar spine. 2. Moderate spinal canal stenosis at L3-L4 and mild spinal canal stenosis at L2-L3 and L4-L5. 3. Severe narrowing of the left lateral recess at L4-L5 with probable compromise of the descending left L5 nerve root. Severe left foraminal stenosis at this level. 4. Levocurvature of the lumbar spine. Right lateral listhesis of L1-L2. Slight left lateral listhesis of L3-L4 and L4-L5.   I personally reviewed the images/study and I agree with the findings as stated.   MACRO: None   Signed by: Jennifer Blake 6/13/2025 8:43 AM Dictation workstation:   [id number]    MR MSK pelvis w and wo IV contrast  Result Date: 6/12/2025  1.  Severe muscle edema in the iliopsoas muscle predominantly in the iliacus with several intramuscular fluid collections. Findings most compatible with underlying infectious pyomyositis with  intramuscular abscess formation. Underlying hematoma some may be present as well as per recent CT. Evaluation for extravasation reported on previous CT is limited in this study which is tailored for evaluation of the lumbar plexus. There is moderate muscle edema in the piriformis and the abductor musculature as well. This is also concerning for underlying myositis. 2. No evidence of abnormal marrow signal about the total hip arthroplasties. No large effusion seen. 3. No abnormality seen along the lumbosacral plexus. The sciatic nerves are symmetric.     I personally reviewed the images/study and I agree with the findings as stated. This study was interpreted at [facility], [place].   MACRO: Critical Finding:  See findings. Notification was initiated on 6/12/2025 at 8:08 pm by  Duke Pierre.  (**-OCF-**)   Signed by: Duke Pierre 6/12/2025 8:08 PM Dictation workstation:   [id number]    CT abdomen pelvis w IV contrast  Result Date: 6/11/2025  1. Ill-defined right iliacus hematoma with central foci of contrast raising suspicion for pseudoaneurysms. Contrast extravasation is not entirely excluded on single phase imaging. Correlate with labs and symptoms and if there is concern for acute blood loss, multiphase examination can be considered. 2. Postsurgical changes of partial sigmoid colectomy with end colostomy and Nory's pouch creation. No acute postsurgical complications identified. 3. Pelvic drain is in place with the tip in the left lower quadrant. No residual drainable collections identified. 4. Diffuse hepatic steatosis. Other chronic findings as described above.   MACRO: Aaron Mccall discussed the significance and urgency of this critical finding by EPIC secure chat with  PARTH TEIXEIRA on 6/11/2025 at 10:40 pm.  (**-RCF-**) Findings:  See findings.   Signed by: Aaron Mccall 6/11/2025 10:41 PM Dictation workstation:   [id number]      Cardiology, Vascular, and  Other Imaging  Electrocardiogram, 12-lead PRN ACS symptoms  Result Date: 6/12/2025  Atrial flutter with variable AV block Nonspecific ST and T wave abnormality Abnormal ECG When compared with ECG of 11-JUN-2025 09:11, (unconfirmed) Nonspecific T wave abnormality, improved in Lateral leads Confirmed by Bob Hu ([id number]) on 6/12/2025 11:34:04 AM    Electrocardiogram, 12-lead PRN ACS symptoms  Result Date: 6/10/2025  Atrial fibrillation with rapid ventricular response ST & T wave abnormality, consider inferior ischemia ST & T wave abnormality, consider anterolateral ischemia Abnormal ECG When compared with ECG of 07-JUN-2025 01:53, PREVIOUS ECG IS PRESENT Confirmed by Bob Hu ([id number]) on 6/10/2025 1:23:42 PM    Electrocardiogram, 12-lead PRN ACS symptoms  Result Date: 6/10/2025  Atrial fibrillation with rapid ventricular response Nonspecific ST and T wave abnormality Abnormal ECG When compared with ECG of 10-CHAVA-2025 10:11, (unconfirmed) No significant change was found Confirmed by Bob Hu ([id number]) on 6/10/2025 1:23:23 PM    Electrocardiogram, 12-lead PRN ACS symptoms  Result Date: 6/10/2025  Normal sinus rhythm Normal ECG When compared with ECG of 10-CHAVA-2025 10:11, (unconfirmed) Sinus rhythm has replaced Atrial fibrillation Vent. rate has decreased BY  89 BPM ST no longer depressed in Anterolateral leads T wave inversion now evident in Inferior leads Nonspecific T wave abnormality no longer evident in Lateral leads Confirmed by Bob Hu ([id number]) on 6/10/2025 1:23:11 PM    Transthoracic Echo Complete  Result Date: 6/9/2025              [place] [address]      Phone [phone] Fax [phone] TRANSTHORACIC ECHOCARDIOGRAM REPORT Patient Name:       PATTI Haynes Physician:    [id number] Mona Quintanilla MD Study Date:         6/9/2025             Ordering Provider:    [id number] AVELAR                                                                 MARYANN MRN/PID:            [id number]             Fellow: Accession#:         [id number]         Nurse: Date of Birth/Age:  1945 / 79      Sonographer:          Richelle Grey RDCS                     years Gender Assigned at  F                    Additional Staff: Birth: Height:             147.32 cm            Admit Date:           6/8/2025 Weight:             66.68 kg             Admission Status:     Inpatient -                                                                Routine BSA / BMI:          1.60 m2 / 30.72      Department Location:  [facility]                     kg/m2 Blood Pressure: 158 /66 mmHg Study Type:    TRANSTHORACIC ECHO (TTE) COMPLETE Diagnosis/ICD: Paroxysmal atrial fibrillation-I48.0; Dyspnea, unspecified-R06.00 Indication:    Dyspnea CPT Codes:     Echo Complete w Full [id number] Patient History: Pertinent History: HTN. Study Detail: The following Echo studies were performed: 2D, M-Mode, Doppler and               color flow.  PHYSICIAN INTERPRETATION: Left Ventricle: Left ventricular ejection fraction is low normal calculated by Valente's biplane at 55%. There are no regional wall motion abnormalities. The left ventricular cavity size is normal. There is normal septal and normal posterior left ventricular wall thickness. Spectral Doppler shows a normal pattern of left ventricular diastolic filling. Left Atrium: The left atrial size is normal. Right Ventricle: The right ventricle is normal in size. There is normal right ventricular global systolic function. Right Atrium: The right atrial size is normal. Aortic Valve: The aortic valve is trileaflet. The aortic valve area by VTI is 2.31 cmï¿½ with a peak velocity of 1.61 m/s. The peak and mean gradients are 10 mmHg and 5 mmHg, respectively, with a dimensionless index of 0.75. There is no evidence of aortic valve regurgitation. 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 Mitral Valve: The mitral valve is normal in structure. There is trace mitral valve regurgitation. The E Vmax is 1.00 m/s. Tricuspid Valve: The tricuspid valve is structurally normal. There is trace tricuspid regurgitation. The Doppler estimated right ventricular systolic pressure (RVSP) is within normal limits at 28 mmHg. Pulmonic Valve: The pulmonic valve is structurally normal. There is trace pulmonic valve regurgitation. Pericardium: No pericardial effusion noted. Aorta: The aortic root is normal. Systemic Veins: The inferior vena cava appears normal in size, IVC inspiratory collapse was not assessed.  CONCLUSIONS:  1. Left ventricular ejection fraction is low normal calculated by Valente's biplane at 55%.  2. There is normal right ventricular global systolic function.  3. The Doppler estimated RVSP is within normal limits at 28 mmHg.  4. 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 QUANTITATIVE DATA SUMMARY:  2D MEASUREMENTS:             Normal Ranges: IVSd:            0.72 cm     (0.6-1.1cm) LVPWd:           0.71 cm     (0.6-1.1cm) LVIDd:           4.41 cm     (3.9-5.9cm) LVIDs:           2.79 cm LV Mass Index:   59.5 g/m2 LVEDV Index:     22.62 ml/m2 LV % FS          36.8 %  LEFT ATRIUM:                  Normal Ranges: LA Vol A4C:        31.5 ml    (22+/-6mL/m2) LA Vol A2C:        28.9 ml LA Vol BP:         35.2 ml LA Vol Index A4C:  19.7ml/m2 LA Vol Index A2C:  18.1 ml/m2 LA Vol Index BP:   22.0 ml/m2 LA Area A4C:       14.4 cm2 LA Area A2C:       11.8 cm2 LA Major Axis A4C: 5.6 cm LA Major Axis A2C: 4.1 cm LA Volume Index:   21.8 ml/m2 LA Vol A4C:        29.0 ml LA Vol A2C:        27.9 ml LA Vol Index BSA:  17.8 ml/m2  RIGHT ATRIUM:          Normal Ranges: RA Area A4C:  10.0 cm2  AORTA MEASUREMENTS:         Normal Ranges: Ao Sinus, d:        3.20 cm (2.1-3.5cm) Ao STJ, d:          2.40 cm (1.7-3.4cm) Asc Ao, d:          2.50 cm (2.1-3.4cm)  LV SYSTOLIC FUNCTION:                       Normal Ranges: EF-A4C View:    60 % (>=55%) EF-A2C View:    52 % EF-Biplane:     55 % LV EF Reported: 55 %  LV DIASTOLIC FUNCTION:           Normal Ranges: MV Peak E:             1.00 m/s  (0.7-1.2 m/s) MV Peak A:             0.80 m/s  (0.42-0.7 m/s) E/A Ratio:             1.26      (1.0-2.2) MV e'                  0.111 m/s (>8.0) MV lateral e'          0.11 m/s MV medial e'           0.11 m/s E/e' Ratio:            9.04      (<8.0)  MITRAL VALVE:          Normal Ranges: MV DT:        122 msec (150-240msec)  AORTIC VALVE:                      Normal Ranges: AoV Vmax:                1.61 m/s  (<=1.7m/s) AoV Peak PG:             10.4 mmHg (<20mmHg) AoV Mean P.3 mmHg  (1.7-11.5mmHg) LVOT Max Kael:            1.04 m/s  (<=1.1m/s) AoV VTI:                 32.01 cm  (18-25cm) LVOT VTI:                24.08 cm LVOT Diameter:           1.98 cm   (1.8-2.4cm) AoV Area, VTI:           2.31 cm2  (2.5-5.5cm2) AoV Area,Vmax:           1.98 cm2  (2.5-4.5cm2) AoV Dimensionless Index: 0.75  RIGHT VENTRICLE: RV Basal 2.42 cm RV Mid   1.90 cm RV Major 6.8 cm TAPSE:   30.3 mm RV s'    0.15 m/s  TRICUSPID VALVE/RVSP:          Normal Ranges: Peak TR Velocity:     2.50 m/s Est. RA Pressure:     3 mmHg RV Syst Pressure:     28 mmHg  (< 30mmHg) IVC Diam:             1.04 cm TV e'                 0.1 m/s  AORTA: Asc Ao Diam 3.19 cm  [id number] Mnoa Quintanilla MD Electronically signed on 2025 at 5:35:08 PM  ** Final **          Lab Results  Results for orders placed or performed during the hospital encounter of 25 (from the past 24 hours)   CBC   Result Value Ref Range    WBC 9.4 4.4 - 11.3 x10*3/uL    nRBC 0.2 (H) 0.0 - 0.0 /100 WBCs    RBC 3.00 (L) 4.00 - 5.20 x10*6/uL    Hemoglobin 9.4 (L) 12.0 - 16.0 g/dL    Hematocrit 29.1 (L) 36.0 - 46.0 %    MCV 97 80 - 100 fL    MCH 31.3 26.0 - 34.0 pg    MCHC 32.3 32.0 - 36.0 g/dL    RDW 13.5 11.5 - 14.5 %    Platelets 290 150 - 450 x10*3/uL   Basic Metabolic Panel   Result  Value Ref Range    Glucose 96 74 - 99 mg/dL    Sodium 140 136 - 145 mmol/L    Potassium 4.1 3.5 - 5.3 mmol/L    Chloride 105 98 - 107 mmol/L    Bicarbonate 27 21 - 32 mmol/L    Anion Gap 12 10 - 20 mmol/L    Urea Nitrogen 14 6 - 23 mg/dL    Creatinine 0.83 0.50 - 1.05 mg/dL    eGFR 71 >60 mL/min/1.73m*2    Calcium 8.4 (L) 8.6 - 10.3 mg/dL   Magnesium   Result Value Ref Range    Magnesium 1.84 1.60 - 2.40 mg/dL        Medications  Scheduled medications:  Scheduled Medications[1]  Continuous medications:  Continuous Medications[2]  PRN medications:  PRN Medications[3]     Physical Exam  Constitutional:       General: She is not in acute distress.     Appearance: Normal appearance.   HENT:      Head: Normocephalic and atraumatic.      Right Ear: External ear normal.      Left Ear: External ear normal.      Nose: Nose normal.      Mouth/Throat:      Mouth: Mucous membranes are moist.      Pharynx: Oropharynx is clear.   Eyes:      Extraocular Movements: Extraocular movements intact.      Conjunctiva/sclera: Conjunctivae normal.      Pupils: Pupils are equal, round, and reactive to light.   Cardiovascular:      Rate and Rhythm: Tachycardia present. Rhythm irregular.      Pulses: Normal pulses.      Heart sounds: Murmur heard.   Pulmonary:      Effort: Pulmonary effort is normal. No respiratory distress.      Breath sounds: Normal breath sounds. No wheezing, rhonchi or rales.   Abdominal:      General: Bowel sounds are normal.      Palpations: Abdomen is soft.      Tenderness: There is abdominal tenderness. There is no right CVA tenderness, left CVA tenderness, guarding or rebound.      Comments: LOLA removed   Musculoskeletal:         General: No swelling. Normal range of motion.      Cervical back: Normal range of motion and neck supple.   Skin:     General: Skin is warm and dry.      Capillary Refill: Capillary refill takes less than 2 seconds.      Findings: No lesion or rash.   Neurological:      Mental Status: She is  alert and oriented to person, place, and time.      Gait: Gait abnormal.      Comments: RLE motor 2/5  Loss of sensation R thigh  Diminished sensation majority of RLE  Pulses intact      Psychiatric:         Mood and Affect: Mood normal.         Behavior: Behavior normal.                  Assessment/Plan      Recurrent acute Diverticulitis c/b perforation s/p partial colectomy and colostomy placement 6/7/25  PCN allergy of anaphylaxis   Cefepime/flagyl  NGT removed 6/9/25  Routine colostomy care  Follow blood cultures x2  Appreciate General surgery management  ID consulted- recommends to Continue antibiotics through 6/12/25 to finish 5 day course   6/14: Remains on antibiotic therapy, see below.  6/15: Continue antibiotics per ID. Drain removed.   6/16: Continue antibiotics per ID. Okay to discharge per general surgery. Minimal concern for active bleeding at this time. F/U with Dr. Haywood in one week for potential staple removal. Oral nutritional supplements ordered.      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  Outpatient follow-up with cardiology after patient recovers for CHAPIS.  Monitor on tele  [id number] of 4-started on heparin drip 6/10/2025 with clearance from general surgery team. Heparin drip stopped 6/12/25 AM  Cardizem 120 mg daily     R iliacus hematoma with central foci of contrast raising suspicion for pseudoaneurysms vs intramuscular abscess formation  Stop heparin drip  Monitor H/H closely  [facility] vascular recommends holding hep drip, monitor H/H and repeat CT in 48 hours  IR consulted- no acute intervention planned  Appreciate general surgery recommendations  MRI lumbar and pelvis as above  IV antibiotics per Infectious Diease  Repeat bl cx x2 6/13/25  Repeat CT scan Saturday vs Sunday 6/14: Continue current antibiotics.   Consider repeat CT scan tomorrow.  6/15: Still has numbness in the lateral right thigh.  Thinks it may be progressing slightly.  Otherwise few symptoms.  Will repeat CT scan with contrast to evaluate iliacas fluid collection.  6/16: CT scan showed no acute changes. Follow FNA results. Continue antibiotic therapy. May need prolonged antibiotics if abscess.    Acute hypoxic respiratory failure  Resolved    MENG on CKD  Resolved    Hypokalemia, mild  Resolved    HTN  HLE  Monitor blood pressure, resume hypertensives as able    Breast CA on anastrozole  Status post mastectomy    GERD    Hypothyroidism  Continue Synthroid  Recent TSH reviewed    Neuropathy  Migraines  OA  Anxiety/Depression    Code Status: Full Code                 DVT ppx: SCDs only given hematoma     Please see orders for more complete plan    Rei Ojeda MD         [1] anastrozole, 1 mg, oral, Daily  aspirin, 81 mg, oral, Daily  bacitracin, , Topical, Daily  cefepime, 2 g, intravenous, q12h  dilTIAZem, 30 mg, oral, q6h  pantoprazole, 40 mg, oral, Daily before breakfast   Or  esomeprazole, 40 mg, nasoduodenal tube, Daily before breakfast   Or  pantoprazole, 40 mg, intravenous, Daily before breakfast  levothyroxine, 25 mcg, oral, Daily  lidocaine, 1 patch, transdermal, Daily  magnesium hydroxide, 30 mL, oral, Daily  metoprolol tartrate, 100 mg, oral, BID  metroNIDAZOLE, 500 mg, intravenous, q12h     [2] dilTIAZem, 5-15 mg/hr, Last Rate: Stopped (06/13/25 2111)  [Held by provider] heparin, 0-4,500 Units/hr, Last Rate: Stopped (06/12/25 0121)     [3] PRN medications: acetaminophen, dilTIAZem, [Held by provider] heparin, hydrALAZINE, HYDROcodone-acetaminophen, HYDROmorphone, labetaloL, melatonin, [DISCONTINUED] ondansetron **OR** ondansetron, tiZANidine

## 2025-06-16 NOTE — PROGRESS NOTES
"Nutrition Initial Assessment:   Nutrition Assessment    Reason for Assessment: Length of stay    Medical history per chart: perforated diverticulitis status post Nory procedure 6/7/25. Her postoperative course was complicated by AFRVR and a significant right psoas hematoma from anticoagulation. She has motor function of her RLE but her right thigh is numb.     Nutrition History:  Energy Intake: Poor < 50 %  Food and Nutrient History: Patient reports not eating well since her surgery, however denies issues with appetite PTA.  She complains of no appetite and is forcing herselt fo eat. She also complains of early satiety. Patient reports having coleman yogurt for lunch and is not going to eat dinner. She is only agreeable to Magic Cup. Written information was provided on colostomy however patient not appropriate as she is not feeling well, complains of spasms after medication consumption. Nursing made aware.       Dietary Orders (From admission, onward)       Start     Ordered    06/16/25 1523  Oral nutritional supplements  Until discontinued        Question Answer Comment   Deliver with Lunch    Select supplement: Magic Cup        06/16/25 1522    06/10/25 0819  Adult diet Regular  Diet effective now        Question:  Diet type  Answer:  Regular    06/10/25 0818    06/07/25 0857  May Participate in Room Service  ( ROOM SERVICE MAY PARTICIPATE)  Once        Question:  .  Answer:  Yes    06/07/25 0856                    Anthropometrics:  Height: 147.3 cm (4' 10\")   Weight: 64.7 kg (142 lb 10.2 oz)   BMI (Calculated): 29.82           Weight History:   Wt Readings from Last 10 Encounters:   06/15/25 64.7 kg (142 lb 10.2 oz)   05/09/25 64.4 kg (142 lb)   05/01/25 64.4 kg (142 lb)   10/16/24 62.2 kg (137 lb 3.8 oz)   09/27/24 62.7 kg (138 lb 3.2 oz)   09/23/24 62.1 kg (137 lb)   04/06/24 63.5 kg (140 lb)   03/27/24 63.9 kg (140 lb 14.4 oz)   03/02/24 65.8 kg (145 lb)   03/01/24 62.6 kg (138 lb)       Weight Change " "%:  Weight History / % Weight Change: CBW: 142 lbs    Nutrition Focused Physical Exam Findings:  Subcutaneous Fat Loss:   Orbital Fat Pads: Well nourished (slightly bulging fat pads)  Buccal Fat Pads: Well nourished (full, rounded cheeks)  Triceps: Well nourished (ample fat tissue)  Muscle Wasting:  Temporalis: Well nourished (well-defined muscle)  Pectoralis (Clavicular Region): Well nourished (clavicle not visible)  Deltoid/Trapezius: Well nourished (rounded appearance at arm, shoulder, neck)  Interosseous: Well nourished (muscle bulges)  Edema:  Edema Location: unable to assess  Physical Findings:  Skin: Positive  Positive Skin Findings: Impaired wound healing (surgical)  Digestive System Findings: Anorexia, Early satiety    Nutrition Significant Labs:  Results from last 7 days   Lab Units 06/16/25  0521 06/15/25  0559 06/14/25  0432   GLUCOSE mg/dL 97 96 106*   SODIUM mmol/L 135* 140 137   POTASSIUM mmol/L 3.9 4.1 4.1   CHLORIDE mmol/L 107 105 106   CO2 mmol/L 22 27 26   BUN mg/dL 15 14 14   CREATININE mg/dL 0.81 0.83 0.83   EGFR mL/min/1.73m*2 73 71 71   CALCIUM mg/dL 8.7 8.4* 8.3*   MAGNESIUM mg/dL 1.73 1.84 1.88     No results found for: \"HGBA1C\"        Nutrition Specific Medications:  Meds reviewed/noted.   Scheduled Medications[1]   Continuous Medications[2]     I/O:   Last BM Date: 06/13/25; Stool Appearance: Soft (06/15/25 2035)    Estimated Needs:   Total Energy Estimated Needs in 24 hours (kCal):  (6299-1257)  Method for Estimating Needs: 22-25 kcal/kg CBW  Total Protein Estimated Needs in 24 Hours (g): 64 g  Method for Estimating 24 Hour Protein Needs: 1.0 g/kg CBW     Method for Estimating 24 Hour Fluid Needs: 1 mL/kcal + ostomy losses        Nutrition Diagnosis   Malnutrition Diagnosis  Patient has Malnutrition Diagnosis: Yes  Diagnosis Status: New  Malnutrition Diagnosis: Mild malnutrition related to acute disease or injury  Related to: altered GI function, recent sugery  As Evidenced by: <50% of " "estimated needs > 5 days    Nutrition Diagnosis  Patient has Nutrition Diagnosis: Yes  Nutrition Diagnosis 1: Inadequate oral intake  Related to (1): decreased ability to consume sufficient intakes  As Evidenced by (1): pt reports poor oral intakes       Nutrition Interventions/Recommendations   Nutrition prescription for oral nutrition    Nutrition Recommendations:  Individualized Nutrition Prescription Provided for : Continue diet per MD, suggest low fiber diet due to recent GI surgery. Added magic cup 1x/day    Nutrition Interventions/Goals:   Interventions: Medical food supplement, Meals and snacks  Meals and Snacks: General healthful diet  Goal: consume as tolerated, transition to low fiber if needed  Medical Food Supplement: Commercial food medical food supplement therapy  Goal: Magic Cup (290kcal and 9g protein per 4 oz cup)      Education Documentation  Nutrition Care Manual, taught by Deanna M McGuire Lombardo, RDN, [facility] at 6/16/2025  3:22 PM.  Learner: Patient  Readiness: Refuses  Method: Handout  Response: Needs Reinforcement  Comment: Provided \"Colostomy Nutrition Therapy\"              Nutrition Monitoring and Evaluation   Food/Nutrient Related History Monitoring  Monitoring and Evaluation Plan: Estimated Energy Intake  Estimated Energy Intake: Energy intake greater or equal to 75% of estimated energy needs    Anthropometric Measurements  Monitoring and Evaluation Plan: Body weight  Body Weight: Body weight - Maintain stable weight    Biochemical Data, Medical Tests and Procedures  Monitoring and Evaluation Plan: Electrolyte/renal panel, Glucose/endocrine profile  Electrolyte and Renal Panel: Electrolytes within normal limits  Glucose/Endocrine Profile: Glucose within normal limits ( mg/dL)    Physical Exam Findings  Monitoring and Evaluation Plan: Skin  Skin Finding: Impaired wound healing - Improved wound healing    Goal Status: New goal(s) identified    Time Spent (min): 45 minutes          "     [1] anastrozole, 1 mg, oral, Daily  aspirin, 81 mg, oral, Daily  bacitracin, , Topical, Daily  cefepime, 2 g, intravenous, q12h  dilTIAZem CD, 120 mg, oral, Daily  pantoprazole, 40 mg, oral, Daily before breakfast   Or  esomeprazole, 40 mg, nasoduodenal tube, Daily before breakfast   Or  pantoprazole, 40 mg, intravenous, Daily before breakfast  levothyroxine, 25 mcg, oral, Daily  lidocaine, 1 patch, transdermal, Daily  magnesium hydroxide, 30 mL, oral, Daily  metoprolol tartrate, 100 mg, oral, BID  metroNIDAZOLE, 500 mg, intravenous, q12h     [2] dilTIAZem, 5-15 mg/hr, Last Rate: Stopped (06/13/25 2111)  [Held by provider] heparin, 0-4,500 Units/hr, Last Rate: Stopped (06/12/25 0121)

## 2025-06-16 NOTE — PROGRESS NOTES
[facility] Vascular Cardiology  Patient Name: Rosemary Motta  Patient : 1945  Room/Bed: -A    HPI from cardiology consult:  Rosemary Motta is a 79 y.o. female presenting with abdominal pain and found to have perforated diverticulitis.  She is status post emergency laparotomy on 2025.  Yesterday had a bout of paroxysmal rapid atrial fibrillation.  Reviewed EKG personally.  Currently in sinus rhythm.  Asymptomatic other than abdominal pain and having a cough.     PMH: HTN, HLE, breast ca, diverticulosis, GERD, hypothyroidism, neuropathy, migraines, OA, anxiety/depression  PSH: R mastectomy, ccy, , JOSE bilateral, carpal tunnel  FH: n/a  SH: lives in [place]. no smoking, etoh.     She has no prior cardiac history.    Allergies:  Penicillins, Morphine, Clarithromycin, Hydrochlorothiazide, Tetracycline, Cortisone, Methylprednisolone, Nifedipine, and Prednisone    Subjective Data:  6/10/25: Lying in bed in no acute distress.  Daughter, Vishnu, at bedside. Cardiology was asked to resee patient d/t AF with RVR.  AF on telemetry with -180's initially. S/p IV metoprolol x1 with improvement HR down to 110-120's.   25: Had recurrent AF this morning with 's.  No current awareness of AF and denies any palpatations.  Lying flat in bed with IV nurse obtaining IV access.  She is in no acute distress and no SOB.  Reports that her hip pain has improved some, but is ongoing. Also reports back pain.  25: Heparin infusion has been held d/t right iliacus hematoma/suspected pseudoaneurysms. Hgb 10.1. Denies any chest pain/pressure, SOB or palpitations.  Main c/o right groin area pain, right thigh to knee numbness. SR on telemetry with HR 77 bpm.  25: Denies any chest pain/pressure or SOB.  Continues to have right groin area pain, right thigh to knee numbness. She states she can not lift her right leg/+right leg weakness.  SR on telemetry with HR 76 bpm  25:   Denies any chest pain/pressure or SOB.  Continue to report right anterior thigh to knee numbness and difficulty lifting leg/right leg weakness.  She became tachycardic while transitioning from bed to chair today, but was 's and then came down to SR with PVC's with HR 80-90's after resting in chair. Reviewed telemetry over the weekend and she did have 3-4 hour episode of AF Sunday morning, but was mostly rate controlled with HR 90's.     Overnight Events:  None    Objective Data:  Vitals:    06/15/25 2323 06/16/25 0009 06/16/25 0317 06/16/25 0536   BP: 105/64  125/73    BP Location: Left arm  Left arm    Patient Position: Lying  Lying    Pulse: 61 57 69 70   Resp: 16  16    Temp: 36.6 °C (97.9 °F)  36.2 °C (97.1 °F)    TempSrc: Temporal  Temporal    SpO2: 98%  97%    Weight:       Height:           Reviewed the following Labs:  Results from last 7 days   Lab Units 06/16/25  0521 06/15/25  0559 06/14/25  0432   WBC AUTO x10*3/uL 11.1 9.4 8.6   HEMOGLOBIN g/dL 10.4* 9.4* 9.3*   HEMATOCRIT % 31.6* 29.1* 28.8*   PLATELETS AUTO x10*3/uL 372 290 246       Results from last 7 days   Lab Units 06/16/25  0521 06/15/25  0559 06/14/25  0432 06/12/25  0338 06/11/25  0358 06/10/25  0458   SODIUM mmol/L 135* 140 137   < > 140 137   POTASSIUM mmol/L 3.9 4.1 4.1   < > 3.7 3.8   CHLORIDE mmol/L 107 105 106   < > 106 108*   CO2 mmol/L 22 27 26   < > 27 24   BUN mg/dL 15 14 14   < > 12 12   CREATININE mg/dL 0.81 0.83 0.83   < > 0.80 0.84   CALCIUM mg/dL 8.7 8.4* 8.3*   < > 7.6* 7.9*   PROTEIN TOTAL g/dL  --   --   --   --  4.9* 5.3*   BILIRUBIN TOTAL mg/dL  --   --   --   --  0.4 0.6   ALK PHOS U/L  --   --   --   --  40 41   ALT U/L  --   --   --   --  10 11   AST U/L  --   --   --   --  17 22   GLUCOSE mg/dL 97 96 106*   < > 104* 95    < > = values in this interval not displayed.       Results from last 7 days   Lab Units 06/16/25  0521 06/15/25  0559 06/14/25  0432   MAGNESIUM mg/dL 1.73 1.84 1.88       No results found for:  "\"LDLF\"     No results found for: \"BNP\"    No results found for: \"TROPHS\"     No results found for: \"TSH\", \"FT4\"        No results found for: \"HGBA1C\"    Intake/Output    Intake/Output Summary (Last 24 hours) at 2025 0753  Last data filed at 2025 0543  Gross per 24 hour   Intake --   Output 2050 ml   Net -2050 ml      I/O last 2 completed shifts:  In: - (0 mL/kg)   Out:  (31.7 mL/kg) [Urine: (1.3 mL/kg/hr)]  Weight: 64.7 kg     Past Medical History:  She has a past medical history of Anxiety and depression, Breast cancer, CKD (chronic kidney disease), HLD (hyperlipidemia), HTN (hypertension), Hypothyroidism, and Pulmonary embolism.    Past Surgical History:  She has a past surgical history that includes Cholecystectomy; Carpal tunnel release (Bilateral);  section, classic; Total hip arthroplasty (Left); Mastectomy (Right, 2018); Breast biopsy; Foot surgery (Bilateral); and Colostomy (2025).      Social History:  She reports that she has never smoked. She has never used smokeless tobacco. She reports that she does not currently use alcohol. She reports that she does not use drugs.    Family History:  Family History[1]    Outpatient Medications  Medications Ordered Prior to Encounter[2]    Scheduled medications  Scheduled Medications[3]  Continuous medications  Continuous Medications[4]  PRN medications  PRN Medications[5]   Prescriptions Prior to Admission[6]    Reviewed the following cardiology tests:    Echo 25:   1. Left ventricular ejection fraction is low normal calculated by Valente's biplane at 55%.   2. There is normal right ventricular global systolic function.   3. The Doppler estimated RVSP is within normal limits at 28 mmHg.   4. 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  EF   Date/Time Value Ref Range Status   2025 10:13 AM 55 %         Physical Exam:  Vitals reviewed.   Constitutional:       Appearance: Not in distress.   Pulmonary:      Effort: " Pulmonary effort is normal.      Breath sounds: Normal breath sounds.   Cardiovascular:      PMI at left midclavicular line. Normal rate. Regular rhythm. S1 with normal intensity. S2 with normal intensity.       Murmurs: There is no murmur.   Pulses:     Intact distal pulses.   Edema:     Peripheral edema absent.   Abdominal:      General: Bowel sounds are normal.      Comments: +ostomy   Neurological:      Mental Status: Alert and oriented to person, place and time.      Motor: Weakness present.      Comments: +numbness right hip to thigh  She is able to move right foot/toes  +right hip flexion weakness         Assessment/Plan:   1-paroxysmal atrial fibrillation-in setting of sepsis, perforated diverticulitis and postop state.  Transient and has resolved now.  No change in management.  Continue treating underlying medical problems.  Cardiology will sign off.  She was advised to follow-up with her PCP and with us as needed if there is any future recurrence of atrial fibrillation which seems unlikely at this point.  6/10/25: Cardiology asked to resee patient for recurrent AF with RVR.  AF on telemetry with -180's initially. S/p IV metoprolol 5 mg x1 with improvement HR down to 110-120's. Started on diltiazem infusion. She only required diltiazem infusion briefly and then self converted to SR.  Metoprolol tartrate was increased yesterday evening to 100 mg BID which should be continued.   Hypertension - Yes, 1 point, Female - Yes, 1 point, and Age over 75 years - 2 points  [id number] score is at least 4.   Discussed with Dr. RONAK Santana via [employer] secure chat regarding anticoagulation.  Surgery team okay with heparin infusion with no bolus.  Heparin infusion initiated.  Transition to oral anticoagulation with apixaban when okay with surgery team/per hospitalist when able.   6/11/25: Recurrent AF with RVR this morning with 's.  Ordered diltiazem 5 mg IV x1.  Will add diltiazem 30 mg q6 hr PO.  Continue  heparin infusion and metoprolol tartrate.   6/12/25: Heparin infusion has been held d/t right iliacus hematoma.  Currently SR on telemetry. Continue metoprolol tartrate and diltiazem. Magnesium 1.77 and IV replacement ordered. TSH normal 6/7/25.  6/13/25:  SR on telemetry with HR 76 bpm   Continue metoprolol tartrate and diltiazem.  No PVC's noted today. Restart anticoagulation when okay with primary service/surgery team.  6/16/25: SR with PVC's on telemetry with HR 90 bpm.  Continue metoprolol tartrate. Stop diltiazem 30 mg q6 hr and start diltiazem  mg daily. Restart anticoagulation when okay with primary service/surgery team.    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  Outpatient follow-up with cardiology after patient recovers for CHAPIS.  6/10/25: Blood cultures x2 from 6/8/25 are negative x1 day.  ID following.  Plan is for outpatient CHAPIS in 6 weeks.  6/12/25: Blood cultures x2 negative x2 days.  6/13/25: Blood cultures x2 from 6/8/25 negative x2 days.  6/16/25: Blood cultures x2 from 6/8/25 negative x4 days.    3. Right iliacus hematoma/abscess  CT abd/pelvis - Ill-defined right iliacus hematoma with central foci of contrast raising suspicion for pseudoaneurysms. Heparin infusion was been stopped.   MRI pelvis - Severe muscle edema in the iliopsoas muscle predominantly in the iliacus with several intramuscular fluid collections. Findings most compatible with underlying infectious pyomyositis with intramuscular abscess formation.  6/16/25: Repeat CT abd 6/15/25 - The anterior mid to superior aspect of the right iliacus muscle  has developed a 1.8 x 3.4 cm fluid collection with edema and/or inflammation in the adjacent posterior pararenal space fat.  The central muscle has a hypodense collections similar in size to  the prior CT; layering hyperdense material within this collection has decreased in overall density. Previously  noted foci of hypodensity from active bleeding and/or pseudo aneurysm are not identified. While this may be related to hemorrhage cysts a shin/occlusion of the pseudoaneurysms, there is a possibility that this appearance is related to imaging during an earlier arterial phase of the exam on the current study prior to when slow contrast extravasation could be detected (the prior exam was imaged later following contrast administration and had more contrast within the venous system/IVC than the current exam). No active hemorrhage and or pseudo aneurysm in this region is  noted.  Further management per ID/hospitalist    4. Hypomagnesemia/hypokalemia  6/12/25: Magnesium 1.77 and IV replacement ordered.  Potassium 3.6 and PO replacement ordered.  Recommend keeping potassium >4 and magnesium >2  6/13/25: Potassium 4.3 and magnesium 2.14  6/16/25: potassium 3.9 and magnesium 1.73    Recommendations  Continue metoprolol tartrate   Transition diltiazem 30 mg q6 hr to diltiazem 120 mg daily  Restart anticoagulation when okay with primary service/surgery team.  Recommend keeping potassium >4 and magnesium >2  Right iliacus hematoma/abscess -> management per ID/primary service  Cardiology team is signing off.  Please call for questions or if further follow-up is needed.     Code Status  Full Code      Yasmine Chicas, APRN-CNP          [1]   Family History  Problem Relation Name Age of Onset    Breast cancer Mother  65   [2]   No current facility-administered medications on file prior to encounter.     Current Outpatient Medications on File Prior to Encounter   Medication Sig Dispense Refill    anastrozole (Arimidex) 1 mg tablet Take 1 tablet (1 mg total) by mouth once daily.      artificial tears, dextran-hypomel-glycerin, 0.1-0.3-0.2 % ophthalmic solution Administer 1 drop into affected eye(s) 4 times a day as needed.      aspirin 81 mg EC tablet Take 1 tablet (81 mg) by mouth once daily.      calcium carbonate-vitamin D3 500  mg-5 mcg (200 unit) tablet Take 2 tablets by mouth once daily.      levothyroxine (Synthroid, Levoxyl) 25 mcg tablet Take 1 tablet (25 mcg) by mouth once daily.      metoprolol tartrate (Lopressor) 50 mg tablet Take 3 tablets by mouth twice a day.      olmesartan (BENIcar) 40 mg tablet Take 1 tablet (40 mg) by mouth once daily.      polyethylene glycol (Glycolax, Miralax) 17 gram packet Take 8.5 g by mouth once daily.     [3] anastrozole, 1 mg, oral, Daily  aspirin, 81 mg, oral, Daily  bacitracin, , Topical, Daily  cefepime, 2 g, intravenous, q12h  dilTIAZem, 30 mg, oral, q6h  pantoprazole, 40 mg, oral, Daily before breakfast   Or  esomeprazole, 40 mg, nasoduodenal tube, Daily before breakfast   Or  pantoprazole, 40 mg, intravenous, Daily before breakfast  levothyroxine, 25 mcg, oral, Daily  lidocaine, 1 patch, transdermal, Daily  magnesium hydroxide, 30 mL, oral, Daily  metoprolol tartrate, 100 mg, oral, BID  metroNIDAZOLE, 500 mg, intravenous, q12h     [4] dilTIAZem, 5-15 mg/hr, Last Rate: Stopped (06/13/25 2111)  [Held by provider] heparin, 0-4,500 Units/hr, Last Rate: Stopped (06/12/25 0121)     [5] PRN medications: acetaminophen, dilTIAZem, [Held by provider] heparin, hydrALAZINE, HYDROcodone-acetaminophen, HYDROmorphone, labetaloL, melatonin, [DISCONTINUED] ondansetron **OR** ondansetron, tiZANidine  [6]   Medications Prior to Admission   Medication Sig Dispense Refill Last Dose/Taking    anastrozole (Arimidex) 1 mg tablet Take 1 tablet (1 mg total) by mouth once daily.       artificial tears, dextran-hypomel-glycerin, 0.1-0.3-0.2 % ophthalmic solution Administer 1 drop into affected eye(s) 4 times a day as needed.       aspirin 81 mg EC tablet Take 1 tablet (81 mg) by mouth once daily.       calcium carbonate-vitamin D3 500 mg-5 mcg (200 unit) tablet Take 2 tablets by mouth once daily.       levothyroxine (Synthroid, Levoxyl) 25 mcg tablet Take 1 tablet (25 mcg) by mouth once daily.       metoprolol tartrate  (Lopressor) 50 mg tablet Take 3 tablets by mouth twice a day.       olmesartan (BENIcar) 40 mg tablet Take 1 tablet (40 mg) by mouth once daily.       polyethylene glycol (Glycolax, Miralax) 17 gram packet Take 8.5 g by mouth once daily.

## 2025-06-16 NOTE — CARE PLAN
The clinical goals for the shift include pt will repeat back an  understanding of treatment plan

## 2025-06-16 NOTE — PROGRESS NOTES
Rosemary Motta is a 79 y.o. female on day 9 of admission presenting with Diverticulitis of large intestine with perforation without bleeding.      Assessment/Plan:     #Acute diverticulitis  #Bowel perforation s/p ostomy 6/7/2025  #Sepsis, POA, resolved  Patient started on ciprofloxacin and Flagyl due to penicillin allergy.  Patient was found to have short segment of mid sigmoid colon with inflammation and perforation with moderate amount of purulent fluid throughout the abdomen.  No cultures obtained.    Tmax 99.9. Hemodynamically stable. No Leukocytosis    #Pyomyositis  #R iliacus Intramuscular abscess? ( 3 x 2.8 cm )  As per IR, not well-formed abscess at this time.  As per Ortho, no intervention and to be followed up with Dr. Haywood     #Penicillin allergy  Reports history of syncope when she was a child.  Discussed with the patient that it is safe to get cefepime as it does not have cross-reactivity.  Tolerated cefepime well without any issues     #CKD  Estimated Creatinine Clearance: 42.2 mL/min (by C-G formula based on SCr of 0.81 mg/dL).     Serum creatinine: 0.81 mg/dL 06/16/25 0521  Estimated Glomerular Filtration Rate: 73.5 mL/min/1.73m2      HTN, HLD  Breast cancer s/p right mastectomy, on anastrozole  GERD  Hypothyroidism  Paroxysmal A-fib     Recommendations:  Repeat CT scan obtained and appears to have an iliac us fluid collection that may be an abscess surrounded by inflammatory tissue.  Will order interventional radiology guided drainage of the collection to determine etiology.  Discussed with Dr. Ojeda  -Cont cefepime 2 g every 12hr  -Cont Flagyl 500 twice daily  -Continue antibiotics. No tentative plan to stop for now  -Renally dose antibiotic  -Will continue to follow        Subjective   Interval History: No acute events overnight.    Post surgical pain is well controlled. Denies fever.chills. Continues to have numbness on her R thigh down to her knee. Tolerating Abx.    Review of Systems  Denies:  fever, chills, nausea, vomiting, dysuria    Objective   Range of Vitals (last 24 hours)  Heart Rate:  [57-90]   Temp:  [36.2 °C (97.1 °F)-37.5 °C (99.5 °F)]   Resp:  [14-16]   BP: (105-157)/(64-77)   SpO2:  [97 %-99 %]  Daily Weight  06/15/25 : 64.7 kg (142 lb 10.2 oz)   Body mass index is 29.81 kg/m².      General: alert, oriented, NAD  HEENT: No conjunctival pallor, no scleral icterus  Abdomen: soft, ostomy bag, midline surgical incision with staples, LOLA drain w/t serous drainage  - Ostomy with stool in appliance,  Neuro: AAO x 3, RLE motor 1/5  Extremities: no cyanosis or erythema. R thigh minimal swelling / non tender. 2+ pedal pulse  Skin: No rashes   MSK: No joint inflammation    Antibiotics  bacitracin - 500 unit/gram  cefepime - 2 gram/50 mL  metroNIDAZOLE - 500 mg/100 mL      Relevant Results  Labs  Results from last 72 hours   Lab Units 06/16/25  0521 06/15/25  0559 06/14/25  0432   WBC AUTO x10*3/uL 11.1 9.4 8.6   HEMOGLOBIN g/dL 10.4* 9.4* 9.3*   HEMATOCRIT % 31.6* 29.1* 28.8*   PLATELETS AUTO x10*3/uL 372 290 246     Results from last 72 hours   Lab Units 06/16/25  0521 06/15/25  0559 06/14/25  0432   SODIUM mmol/L 135* 140 137   POTASSIUM mmol/L 3.9 4.1 4.1   CHLORIDE mmol/L 107 105 106   CO2 mmol/L 22 27 26   BUN mg/dL 15 14 14   CREATININE mg/dL 0.81 0.83 0.83   GLUCOSE mg/dL 97 96 106*   CALCIUM mg/dL 8.7 8.4* 8.3*   ANION GAP mmol/L 10 12 9*   EGFR mL/min/1.73m*2 73 71 71           Estimated Creatinine Clearance: 42.2 mL/min (by C-G formula based on SCr of 0.81 mg/dL).  C-Reactive Protein   Date Value Ref Range Status   03/02/2024 <0.10 <1.00 mg/dL Final       Microbiology  No results found for the last 14 days.      Imaging    XR chest 1 view  Result Date: 6/8/2025  A nasogastric tube projects into the stomach beyond the field of imaging.   Mild cardiomegaly.   Minimal linear bibasilar atelectasis. Otherwise no sign of acute cardiopulmonary disease.     MACRO: None   Signed by: Mello Fried  6/8/2025 12:33 PM Dictation workstation:   [id number]    CT abdomen pelvis w IV contrast  Result Date: 6/7/2025  1. Diverticulosis of the sigmoid colon. There is inflammatory change noted about the proximal to mid sigmoid colon most concerning for acute diverticulitis. Multiple free air locules are noted adjacent to this inflamed segment of sigmoid colon as well as small-to-moderate amount of free air within the upper abdomen consistent with perforation. No overt abscess identified.   MACRO: Donovan Ocampo discussed the significance and urgency of this critical finding by telephone with  KONG HILL on 6/7/2025 at 3:39 am. (**-RCF-**) Findings:  See findings.   Signed by: Donovan Ocampo 6/7/2025 3:40 AM Dictation workstation:   [id number]      Ted Dutton MD  [phone]  Date of service: 6/16/2025  1250

## 2025-06-17 LAB
ANION GAP SERPL CALC-SCNC: 14 MMOL/L (ref 10–20)
BACTERIA BLD CULT: NORMAL
BACTERIA BLD CULT: NORMAL
BUN SERPL-MCNC: 15 MG/DL (ref 6–23)
CALCIUM SERPL-MCNC: 8.8 MG/DL (ref 8.6–10.3)
CHLORIDE SERPL-SCNC: 104 MMOL/L (ref 98–107)
CO2 SERPL-SCNC: 24 MMOL/L (ref 21–32)
CREAT SERPL-MCNC: 0.82 MG/DL (ref 0.5–1.05)
EGFRCR SERPLBLD CKD-EPI 2021: 72 ML/MIN/1.73M*2
ERYTHROCYTE [DISTWIDTH] IN BLOOD BY AUTOMATED COUNT: 13.4 % (ref 11.5–14.5)
GLUCOSE SERPL-MCNC: 104 MG/DL (ref 74–99)
HCT VFR BLD AUTO: 31.3 % (ref 36–46)
HGB BLD-MCNC: 10.5 G/DL (ref 12–16)
MAGNESIUM SERPL-MCNC: 1.89 MG/DL (ref 1.6–2.4)
MCH RBC QN AUTO: 31.8 PG (ref 26–34)
MCHC RBC AUTO-ENTMCNC: 33.5 G/DL (ref 32–36)
MCV RBC AUTO: 95 FL (ref 80–100)
NRBC BLD-RTO: 0 /100 WBCS (ref 0–0)
PLATELET # BLD AUTO: 393 X10*3/UL (ref 150–450)
POTASSIUM SERPL-SCNC: 4 MMOL/L (ref 3.5–5.3)
RBC # BLD AUTO: 3.3 X10*6/UL (ref 4–5.2)
SODIUM SERPL-SCNC: 138 MMOL/L (ref 136–145)
WBC # BLD AUTO: 9.5 X10*3/UL (ref 4.4–11.3)

## 2025-06-17 PROCEDURE — 97530 THERAPEUTIC ACTIVITIES: CPT | Mod: GO,CO

## 2025-06-17 PROCEDURE — 36415 COLL VENOUS BLD VENIPUNCTURE: CPT | Performed by: PHYSICIAN ASSISTANT

## 2025-06-17 PROCEDURE — 2500000005 HC RX 250 GENERAL PHARMACY W/O HCPCS: Performed by: PHYSICIAN ASSISTANT

## 2025-06-17 PROCEDURE — 2060000001 HC INTERMEDIATE ICU ROOM DAILY

## 2025-06-17 PROCEDURE — 80048 BASIC METABOLIC PNL TOTAL CA: CPT | Performed by: PHYSICIAN ASSISTANT

## 2025-06-17 PROCEDURE — 2500000004 HC RX 250 GENERAL PHARMACY W/ HCPCS (ALT 636 FOR OP/ED): Performed by: INTERNAL MEDICINE

## 2025-06-17 PROCEDURE — 83735 ASSAY OF MAGNESIUM: CPT | Performed by: PHYSICIAN ASSISTANT

## 2025-06-17 PROCEDURE — 97116 GAIT TRAINING THERAPY: CPT | Mod: GP,CQ | Performed by: PHYSICAL THERAPY ASSISTANT

## 2025-06-17 PROCEDURE — 2500000001 HC RX 250 WO HCPCS SELF ADMINISTERED DRUGS (ALT 637 FOR MEDICARE OP): Performed by: NURSE PRACTITIONER

## 2025-06-17 PROCEDURE — 99233 SBSQ HOSP IP/OBS HIGH 50: CPT | Performed by: INTERNAL MEDICINE

## 2025-06-17 PROCEDURE — 2500000001 HC RX 250 WO HCPCS SELF ADMINISTERED DRUGS (ALT 637 FOR MEDICARE OP): Performed by: INTERNAL MEDICINE

## 2025-06-17 PROCEDURE — 85027 COMPLETE CBC AUTOMATED: CPT | Performed by: PHYSICIAN ASSISTANT

## 2025-06-17 PROCEDURE — 97530 THERAPEUTIC ACTIVITIES: CPT | Mod: GP,CQ | Performed by: PHYSICAL THERAPY ASSISTANT

## 2025-06-17 RX ADMIN — PANTOPRAZOLE SODIUM 40 MG: 40 TABLET, DELAYED RELEASE ORAL at 05:15

## 2025-06-17 RX ADMIN — LEVOTHYROXINE SODIUM 25 MCG: 0.03 TABLET ORAL at 08:01

## 2025-06-17 RX ADMIN — METRONIDAZOLE 500 MG: 500 INJECTION, SOLUTION INTRAVENOUS at 03:37

## 2025-06-17 RX ADMIN — BACITRACIN ZINC: 500 OINTMENT TOPICAL at 08:26

## 2025-06-17 RX ADMIN — METRONIDAZOLE 500 MG: 500 INJECTION, SOLUTION INTRAVENOUS at 14:17

## 2025-06-17 RX ADMIN — ANASTROZOLE 1 MG: 1 TABLET, COATED ORAL at 08:01

## 2025-06-17 RX ADMIN — HYDROCODONE BITARTRATE AND ACETAMINOPHEN 1 TABLET: 7.5; 325 TABLET ORAL at 08:01

## 2025-06-17 RX ADMIN — CEFEPIME HYDROCHLORIDE 2 G: 2 INJECTION, SOLUTION INTRAVENOUS at 22:01

## 2025-06-17 RX ADMIN — ASPIRIN 81 MG: 81 TABLET, COATED ORAL at 08:01

## 2025-06-17 RX ADMIN — METOPROLOL TARTRATE 100 MG: 50 TABLET, FILM COATED ORAL at 22:00

## 2025-06-17 RX ADMIN — HYDROCODONE BITARTRATE AND ACETAMINOPHEN 1 TABLET: 7.5; 325 TABLET ORAL at 22:03

## 2025-06-17 RX ADMIN — METOPROLOL TARTRATE 100 MG: 50 TABLET, FILM COATED ORAL at 08:01

## 2025-06-17 RX ADMIN — CEFEPIME HYDROCHLORIDE 2 G: 2 INJECTION, SOLUTION INTRAVENOUS at 09:55

## 2025-06-17 RX ADMIN — LIDOCAINE 4% 1 PATCH: 40 PATCH TOPICAL at 08:02

## 2025-06-17 RX ADMIN — DILTIAZEM HYDROCHLORIDE 120 MG: 120 CAPSULE, COATED, EXTENDED RELEASE ORAL at 08:01

## 2025-06-17 RX ADMIN — HYDROCODONE BITARTRATE AND ACETAMINOPHEN 1 TABLET: 7.5; 325 TABLET ORAL at 15:57

## 2025-06-17 ASSESSMENT — ENCOUNTER SYMPTOMS
CHEST TIGHTNESS: 0
NUMBNESS: 1
CONSTIPATION: 0
DIAPHORESIS: 0
FATIGUE: 0
FACIAL SWELLING: 0
HALLUCINATIONS: 0
ABDOMINAL PAIN: 1
WEAKNESS: 1
WOUND: 0
NAUSEA: 0
DIARRHEA: 0
SORE THROAT: 0
DIZZINESS: 0
WHEEZING: 0
DYSURIA: 0
FEVER: 0
BLOOD IN STOOL: 0
BACK PAIN: 1
PALPITATIONS: 0
CHILLS: 0
FLANK PAIN: 0
HEADACHES: 0
LIGHT-HEADEDNESS: 0
VOMITING: 0
COUGH: 0
BRUISES/BLEEDS EASILY: 0
FREQUENCY: 0
HEMATURIA: 0
SHORTNESS OF BREATH: 0
TROUBLE SWALLOWING: 0
EYE PAIN: 0
APPETITE CHANGE: 0
JOINT SWELLING: 0

## 2025-06-17 ASSESSMENT — COGNITIVE AND FUNCTIONAL STATUS - GENERAL
DAILY ACTIVITIY SCORE: 19
CLIMB 3 TO 5 STEPS WITH RAILING: A LOT
DRESSING REGULAR LOWER BODY CLOTHING: A LITTLE
DRESSING REGULAR UPPER BODY CLOTHING: A LOT
MOVING FROM LYING ON BACK TO SITTING ON SIDE OF FLAT BED WITH BEDRAILS: A LITTLE
WALKING IN HOSPITAL ROOM: A LOT
DRESSING REGULAR UPPER BODY CLOTHING: A LITTLE
MOBILITY SCORE: 15
CLIMB 3 TO 5 STEPS WITH RAILING: A LOT
MOVING TO AND FROM BED TO CHAIR: A LOT
TURNING FROM BACK TO SIDE WHILE IN FLAT BAD: A LITTLE
PERSONAL GROOMING: A LITTLE
CLIMB 3 TO 5 STEPS WITH RAILING: A LOT
HELP NEEDED FOR BATHING: A LOT
TOILETING: A LOT
EATING MEALS: A LITTLE
PERSONAL GROOMING: A LITTLE
MOVING FROM LYING ON BACK TO SITTING ON SIDE OF FLAT BED WITH BEDRAILS: A LITTLE
MOVING TO AND FROM BED TO CHAIR: A LITTLE
TOILETING: A LITTLE
MOVING FROM LYING ON BACK TO SITTING ON SIDE OF FLAT BED WITH BEDRAILS: A LITTLE
MOBILITY SCORE: 14
WALKING IN HOSPITAL ROOM: A LOT
HELP NEEDED FOR BATHING: A LITTLE
MOBILITY SCORE: 15
WALKING IN HOSPITAL ROOM: A LOT
HELP NEEDED FOR BATHING: A LITTLE
TOILETING: A LITTLE
TURNING FROM BACK TO SIDE WHILE IN FLAT BAD: A LITTLE
STANDING UP FROM CHAIR USING ARMS: A LOT
MOVING TO AND FROM BED TO CHAIR: A LITTLE
DRESSING REGULAR LOWER BODY CLOTHING: A LOT
STANDING UP FROM CHAIR USING ARMS: A LOT
DRESSING REGULAR UPPER BODY CLOTHING: A LITTLE
DAILY ACTIVITIY SCORE: 19
DAILY ACTIVITIY SCORE: 14
DRESSING REGULAR LOWER BODY CLOTHING: A LITTLE
TURNING FROM BACK TO SIDE WHILE IN FLAT BAD: A LITTLE
STANDING UP FROM CHAIR USING ARMS: A LOT
PERSONAL GROOMING: A LITTLE

## 2025-06-17 ASSESSMENT — PAIN - FUNCTIONAL ASSESSMENT
PAIN_FUNCTIONAL_ASSESSMENT: 0-10

## 2025-06-17 ASSESSMENT — PAIN DESCRIPTION - LOCATION
LOCATION: LEG
LOCATION: LEG

## 2025-06-17 ASSESSMENT — PAIN SCALES - GENERAL
PAINLEVEL_OUTOF10: 5 - MODERATE PAIN
PAINLEVEL_OUTOF10: 3
PAINLEVEL_OUTOF10: 0 - NO PAIN
PAINLEVEL_OUTOF10: 3
PAINLEVEL_OUTOF10: 6
PAINLEVEL_OUTOF10: 5 - MODERATE PAIN
PAINLEVEL_OUTOF10: 5 - MODERATE PAIN

## 2025-06-17 ASSESSMENT — PAIN SCALES - WONG BAKER: WONGBAKER_NUMERICALRESPONSE: NO HURT

## 2025-06-17 NOTE — PROGRESS NOTES
Patient not ready for DC at this time due to pending culture data for ID recommendations. ADOD 24-48 hours pending when cultures result in Epic. Patient/family updated along with facility. Auth is good through 6/23.

## 2025-06-17 NOTE — CARE PLAN
The patient's goals for the shift include      The clinical goals for the shift include pt will be free of falls throughout shift      Problem: Pain  Goal: Takes deep breaths with improved pain control throughout the shift  Outcome: Progressing  Goal: Turns in bed with improved pain control throughout the shift  Outcome: Progressing  Goal: Walks with improved pain control throughout the shift  Outcome: Progressing  Goal: Performs ADL's with improved pain control throughout shift  Outcome: Progressing  Goal: Participates in PT with improved pain control throughout the shift  Outcome: Progressing  Goal: Free from opioid side effects throughout the shift  Outcome: Progressing  Goal: Free from acute confusion related to pain meds throughout the shift  Outcome: Progressing     Problem: Pain - Adult  Goal: Verbalizes/displays adequate comfort level or baseline comfort level  Outcome: Progressing     Problem: Safety - Adult  Goal: Free from fall injury  Outcome: Progressing     Problem: Discharge Planning  Goal: Discharge to home or other facility with appropriate resources  Outcome: Progressing     Problem: Chronic Conditions and Co-morbidities  Goal: Patient's chronic conditions and co-morbidity symptoms are monitored and maintained or improved  Outcome: Progressing     Problem: Nutrition  Goal: Nutrient intake appropriate for maintaining nutritional needs  Outcome: Progressing     Problem: Fall/Injury  Goal: Not fall by end of shift  Outcome: Progressing  Goal: Be free from injury by end of the shift  Outcome: Progressing  Goal: Verbalize understanding of personal risk factors for fall in the hospital  Outcome: Progressing  Goal: Verbalize understanding of risk factor reduction measures to prevent injury from fall in the home  Outcome: Progressing  Goal: Use assistive devices by end of the shift  Outcome: Progressing  Goal: Pace activities to prevent fatigue by end of the shift  Outcome: Progressing     Problem:  Skin  Goal: Decreased wound size/increased tissue granulation at next dressing change  Outcome: Progressing  Flowsheets (Taken 6/17/2025 1605)  Decreased wound size/increased tissue granulation at next dressing change: Protective dressings over bony prominences  Goal: Participates in plan/prevention/treatment measures  Outcome: Progressing  Flowsheets (Taken 6/17/2025 1605)  Participates in plan/prevention/treatment measures: Elevate heels  Goal: Prevent/manage excess moisture  Outcome: Progressing  Flowsheets (Taken 6/17/2025 1605)  Prevent/manage excess moisture: Follow provider orders for dressing changes  Goal: Prevent/minimize sheer/friction injuries  Outcome: Progressing  Flowsheets (Taken 6/17/2025 1605)  Prevent/minimize sheer/friction injuries: Increase activity/out of bed for meals  Goal: Promote/optimize nutrition  Outcome: Progressing  Flowsheets (Taken 6/17/2025 1605)  Promote/optimize nutrition: Consume > 50% meals/supplements  Goal: Promote skin healing  Outcome: Progressing  Flowsheets (Taken 6/17/2025 1605)  Promote skin healing: Protective dressings over bony prominences

## 2025-06-17 NOTE — PROGRESS NOTES
"GENERAL SURGERY PROGRESS NOTE    Rosemary Motta   1945   [id number]     Rosemary Motta is a 79 y.o. female on day 10 of admission presenting with Diverticulitis of large intestine with perforation without bleeding.      Subjective  No acute events overnight. Unchanged lower extremity neurologic symptoms. Cultures pending from hematoma aspirate.    Review of Systems   Constitutional:  Negative for chills and fever.   Respiratory:  Negative for cough and shortness of breath.    Cardiovascular:  Negative for chest pain and palpitations.   Gastrointestinal:  Positive for abdominal pain. Negative for blood in stool, constipation, diarrhea, nausea and vomiting.   Neurological:  Positive for numbness. Negative for dizziness and headaches.   All other systems reviewed and are negative.      Objective    Last Recorded Vitals  Blood pressure 117/73, pulse 66, temperature 35.9 °C (96.7 °F), temperature source Temporal, resp. rate 12, height 1.473 m (4' 10\"), weight 62.6 kg (138 lb), SpO2 97%.    Intake/Output last 3 Shifts:  I/O last 3 completed shifts:  In: 941 (15 mL/kg) [P.O.:791; IV Piggyback:150]  Out: 2650 (42.3 mL/kg) [Urine:2650 (1.2 mL/kg/hr)]  Weight: 62.6 kg     Physical Exam  Constitutional:       General: She is not in acute distress.     Appearance: Normal appearance. She is not ill-appearing.   HENT:      Head: Normocephalic and atraumatic.   Cardiovascular:      Rate and Rhythm: Normal rate and regular rhythm.   Pulmonary:      Effort: Pulmonary effort is normal. No respiratory distress.      Breath sounds: Normal breath sounds.   Abdominal:      General: There is no distension.      Palpations: Abdomen is soft.      Tenderness: There is no abdominal tenderness. There is no guarding.      Comments: Incision clean/dry/intact with staples in place. Colostomy viable with soft brown stool.    Musculoskeletal:         General: No swelling.   Skin:     General: Skin is warm and dry.   Neurological:      Mental " Status: She is alert and oriented to person, place, and time. Mental status is at baseline.   Psychiatric:         Mood and Affect: Mood normal.         Behavior: Behavior normal.       Labs  Results for orders placed or performed during the hospital encounter of 06/07/25 (from the past 24 hours)   Sterile Fluid Culture/Smear    Specimen: Aspirate; Fluid   Result Value Ref Range    Sterile Fluid Culture/Smear No growth to date     Gram Stain No polymorphonuclear leukocytes seen     Gram Stain No organisms seen    CBC   Result Value Ref Range    WBC 9.5 4.4 - 11.3 x10*3/uL    nRBC 0.0 0.0 - 0.0 /100 WBCs    RBC 3.30 (L) 4.00 - 5.20 x10*6/uL    Hemoglobin 10.5 (L) 12.0 - 16.0 g/dL    Hematocrit 31.3 (L) 36.0 - 46.0 %    MCV 95 80 - 100 fL    MCH 31.8 26.0 - 34.0 pg    MCHC 33.5 32.0 - 36.0 g/dL    RDW 13.4 11.5 - 14.5 %    Platelets 393 150 - 450 x10*3/uL   Basic Metabolic Panel   Result Value Ref Range    Glucose 104 (H) 74 - 99 mg/dL    Sodium 138 136 - 145 mmol/L    Potassium 4.0 3.5 - 5.3 mmol/L    Chloride 104 98 - 107 mmol/L    Bicarbonate 24 21 - 32 mmol/L    Anion Gap 14 10 - 20 mmol/L    Urea Nitrogen 15 6 - 23 mg/dL    Creatinine 0.82 0.50 - 1.05 mg/dL    eGFR 72 >60 mL/min/1.73m*2    Calcium 8.8 8.6 - 10.3 mg/dL   Magnesium   Result Value Ref Range    Magnesium 1.89 1.60 - 2.40 mg/dL       Radiology  Imaging  US guided aspiration of abscess, hematoma, cyst  Result Date: 6/17/2025  Ultrasound-guided aspiration of a potential fluid collection along the right iliopsoas complex yielded a few mL of heme stained fluid. Findings correlate with evolving hematoma.   Performed and dictated at [facility].   Signed by: Ryan Parker 6/17/2025 6:31 AM Dictation workstation:   [id number]    CT abdomen pelvis w IV contrast  Result Date: 6/16/2025  1.  The anterior mid to superior aspect of the right iliacus muscle has developed a 1.8 x 3.4 cm fluid collection with edema and/or inflammation  in the adjacent posterior pararenal space fat. 2. The central muscle has a hypodense collections similar in size to the prior CT; layering hyperdense material within this collection has decreased in overall density. Previously noted foci of hypodensity from active bleeding and/or pseudo aneurysm are not identified. While this may be related to hemorrhage cysts a shin/occlusion of the pseudoaneurysms, there is a possibility that this appearance is related to imaging during an earlier arterial phase of the exam on the current study prior to when slow contrast extravasation could be detected (the prior exam was imaged later following contrast administration and had more contrast within the venous system/IVC than the current exam). 3. No active hemorrhage and or pseudo aneurysm in this region is noted. 4. Small pleural effusions and lung atelectasis noted previously have cleared.     MACRO: None   Signed by: Jeff Freitas 6/16/2025 7:45 AM Dictation workstation:   [id number]    MR lumbar spine wo IV contrast  Result Date: 6/13/2025  1. Motion degraded study. Combination of congenital spinal stenosis, epidural lipomatosis and diffuse degenerative changes seen throughout the lumbar spine. 2. Moderate spinal canal stenosis at L3-L4 and mild spinal canal stenosis at L2-L3 and L4-L5. 3. Severe narrowing of the left lateral recess at L4-L5 with probable compromise of the descending left L5 nerve root. Severe left foraminal stenosis at this level. 4. Levocurvature of the lumbar spine. Right lateral listhesis of L1-L2. Slight left lateral listhesis of L3-L4 and L4-L5.   I personally reviewed the images/study and I agree with the findings as stated.   MACRO: None   Signed by: Jennifer Blake 6/13/2025 8:43 AM Dictation workstation:   [id number]    MR MSK pelvis w and wo IV contrast  Result Date: 6/12/2025  1.  Severe muscle edema in the iliopsoas muscle predominantly in the iliacus with several intramuscular fluid collections.  Findings most compatible with underlying infectious pyomyositis with intramuscular abscess formation. Underlying hematoma some may be present as well as per recent CT. Evaluation for extravasation reported on previous CT is limited in this study which is tailored for evaluation of the lumbar plexus. There is moderate muscle edema in the piriformis and the abductor musculature as well. This is also concerning for underlying myositis. 2. No evidence of abnormal marrow signal about the total hip arthroplasties. No large effusion seen. 3. No abnormality seen along the lumbosacral plexus. The sciatic nerves are symmetric.     I personally reviewed the images/study and I agree with the findings as stated. This study was interpreted at [place], [place].   MACRO: Critical Finding:  See findings. Notification was initiated on 6/12/2025 at 8:08 pm by  Duke Pierre.  (**-OCF-**)   Signed by: Duke Pierre 6/12/2025 8:08 PM Dictation workstation:   [id number]    CT abdomen pelvis w IV contrast  Result Date: 6/11/2025  1. Ill-defined right iliacus hematoma with central foci of contrast raising suspicion for pseudoaneurysms. Contrast extravasation is not entirely excluded on single phase imaging. Correlate with labs and symptoms and if there is concern for acute blood loss, multiphase examination can be considered. 2. Postsurgical changes of partial sigmoid colectomy with end colostomy and Nory's pouch creation. No acute postsurgical complications identified. 3. Pelvic drain is in place with the tip in the left lower quadrant. No residual drainable collections identified. 4. Diffuse hepatic steatosis. Other chronic findings as described above.   MACRO: Aaron Mccall discussed the significance and urgency of this critical finding by EPIC secure chat with  PARTH TEIXEIRA on 6/11/2025 at 10:40 pm.  (**-RCF-**) Findings:  See findings.   Signed by: Aaron Mccall 6/11/2025 10:41 PM  Dictation workstation:   [id number]      Assessment and Plan  Assessment & Plan  Diverticulitis of large intestine with perforation without bleeding    79 y.o. female with perforated diverticulitis status post Nory procedure 6/7/25. Her postoperative course was complicated by AFRVR and a significant right psoas hematoma from anticoagulation. She has motor function of her RLE but her right thigh is numb.     Plan:  - Pending cultures of hematoma aspirate.  Noted ID plan for continue of antibiotics till cultures are negative for 48 hours.  - Continue routine ostomy care  - Overall patient progressing well, tolerating regular diet  - Patient may be discharged from a surgical perspective once cultures are negative for 48 hours, follow-up with Dr. Haywood in a week for potential staple removal.    Discussed with Dr. Taqueria Day,  PGY3  General Surgery

## 2025-06-17 NOTE — PROGRESS NOTES
Occupational Therapy    Occupational Therapy Treatment    Name: Rosemary Motta  MRN: [id number]  Department: [facility]  Room: 2025/2025-A  Date: 06/17/25  Time Calculation  Start Time: 1034  Stop Time: 1100  Time Calculation (min): 26 min    Assessment:  OT Assessment: Pt progressed activity tolerance to participating in functional transfers with min A x2. Maintain x2 person assist d/t pt overtly buckling with mod A x2 to correct.  Barriers to Discharge Home: Cognition needs, Physical needs, Caregiver assistance  End of Session Communication: Bedside nurse  End of Session Patient Position: Up in chair, Alarm on  Plan:  Treatment Interventions: ADL retraining, Functional transfer training, Endurance training, Cognitive reorientation, Patient/family training, Equipment evaluation/education, Compensatory technique education  OT Frequency: 4 times per week  OT Discharge Recommendations: Moderate intensity level of continued care  OT Recommended Transfer Status: Assist of 2, Minimal assist  OT - OK to Discharge: Yes    Subjective     OT Visit Info:  OT Received On: 06/17/25  General:  General  Co-Treatment: PT  Co-Treatment Reason: optimize pt safety while focus on discipline specific goals  Prior to Session Communication: Bedside nurse  Patient Position Received: Bed, 3 rail up, Alarm on  General Comment: Pt agreeable and cooperative to therapy.  Precautions:  Medical Precautions: Fall precautions  Post-Surgical Precautions: Abdominal surgery precautions      Pain Assessment:  Pain Assessment  Pain Assessment: 0-10  0-10 (Numeric) Pain Score: 5 - Moderate pain  Pain Type: Acute pain  Pain Location: Leg  Pain Orientation: Right  Pain Interventions: Repositioned, Distraction    Objective   Cognition:  Overall Cognitive Status: Within Functional Limits  Orientation Level: Disoriented X4         Functional Standing Tolerance:  Functional Standing Tolerance  Activity: Pt completed x3 stands tolerated 1-2 minutes with min A x2  using FWW for support.  Bed Mobility/Transfers: Bed Mobility 1  Bed Mobility 1: Supine to sitting  Level of Assistance 1: Minimum assistance, +2, Moderate verbal cues    Transfer 1  Technique 1: Sit to stand, Stand to sit  Transfer Device 1: Walker  Transfer Level of Assistance 1: Minimum assistance, +2, Moderate verbal cues  Transfers 2  Transfer From 2: Bed to  Transfer to 2: Chair with arms  Technique 2: Stand to sit  Transfer Device 2: Walker  Transfer Level of Assistance 2: Minimum assistance, +2, Moderate verbal cues      Functional Mobility:  Functional Mobility 1  Comments 1: Pt completed functional mobility x2 trials min household distance using FWW with min A x2. Pt overtly buckled x2 trials with mod A x2 to correct.      Outcome Measures:  [facility] Daily Activity  Putting on and taking off regular lower body clothing: A lot  Bathing (including washing, rinsing, drying): A lot  Putting on and taking off regular upper body clothing: A lot  Toileting, which includes using toilet, bedpan or urinal: A lot  Taking care of personal grooming such as brushing teeth: A little  Eating Meals: A little  Daily Activity - Total Score: 14        Education Documentation  Body Mechanics, taught by MELANIE Cruz at 6/17/2025 11:42 AM.  Learner: Patient  Readiness: Acceptance  Method: Explanation  Response: Needs Reinforcement  Comment: functional transfer training    Education Comments  No comments found.      Goals:  Encounter Problems       Encounter Problems (Active)       ADLs       Patient will complete toileting including hygiene clothing management/hygiene with stand by assist level of assistance. (Progressing)       Start:  06/09/25    Expected End:  06/23/25               COGNITION/SAFETY       Patient will demonstrated orientation x 4 with verbal cues. (Progressing)       Start:  06/09/25    Expected End:  06/23/25       ORIENTATION            MOBILITY       Patient will perform Functional mobility  Household distances/Community Distances with stand by assist level of assistance and least restrictive device in order to improve safety and functional mobility. (Progressing)       Start:  06/09/25    Expected End:  06/23/25               TRANSFERS       Patient will perform bed mobility stand by assist level of assistance and log roll technique in order to improve safety and independence with mobility (Progressing)       Start:  06/09/25    Expected End:  06/23/25            Patient will complete functional transfers with least restrictive device with stand by assist level of assistance. (Progressing)       Start:  06/09/25    Expected End:  06/23/25

## 2025-06-17 NOTE — PROGRESS NOTES
Rosemary Motta is a 79 y.o. female on day 10 of admission presenting with Diverticulitis of large intestine with perforation without bleeding.      Assessment/Plan:     #Acute diverticulitis  #Bowel perforation s/p ostomy 6/7/2025  #Sepsis, POA, resolved  Patient was started on ciprofloxacin and Flagyl due to penicillin allergy.  Patient was found to have short segment of mid sigmoid colon with inflammation and perforation with moderate amount of purulent fluid throughout the abdomen.  No cultures obtained.    #Pyomyositis  #R iliacus Intramuscular abscess s/p IR aspiraton 6/16  Aspiration done on June 16.  As per IR, few mL of heme stain fluid was aspirated and sent for cultures     #Penicillin allergy  Reports history of syncope when she was a child.  Discussed with the patient that it is safe to get cefepime as it does not have cross-reactivity.  Tolerated cefepime well without any issues     #CKD  Estimated Creatinine Clearance: 40.9 mL/min (by C-G formula based on SCr of 0.82 mg/dL).     HTN, HLD  Breast cancer s/p right mastectomy, on anastrozole  GERD  Hypothyroidism  Paroxysmal A-fib     Recommendations:     -Cont cefepime 2 g every 12hr  -Cont Flagyl 500 twice daily  -Continue antibiotics  -If the aspiration fluid culture is negative for 48 hours, DC antibiotics  -Will continue to follow        Mikal Ceballos MD  Date of service: 6/17/2025  Time of service: 12:13 PM      Subjective   Interval History: No acute events overnight.  Patient reports RLE pain, numbness and weakness       Review of Systems  Denies: fever, chills, nausea, vomiting, dysuria    Objective   Range of Vitals (last 24 hours)  Heart Rate:  [63-82]   Temp:  [35.9 °C (96.7 °F)-37 °C (98.6 °F)]   Resp:  [12-18]   BP: (106-165)/(68-76)   Weight:  [61.8 kg (136 lb 3.9 oz)-62.6 kg (138 lb)]   SpO2:  [95 %-98 %]  Daily Weight  06/17/25 : 62.6 kg (138 lb)   Body mass index is 28.84 kg/m².      General: alert, oriented, NAD  HEENT: No conjunctival  pallor, no scleral icterus  Abdomen: soft, ostomy bag, midline surgical incision with staples,  Neuro: AAO x 3  Extremities: no cyanosis  Skin: No rashes   MSK: No joint inflammation    Antibiotics  bacitracin - 500 unit/gram  cefepime - 2 gram/50 mL  metroNIDAZOLE - 500 mg/100 mL      Relevant Results  Labs  Results from last 72 hours   Lab Units 06/17/25  0336 06/16/25  0521 06/15/25  0559   WBC AUTO x10*3/uL 9.5 11.1 9.4   HEMOGLOBIN g/dL 10.5* 10.4* 9.4*   HEMATOCRIT % 31.3* 31.6* 29.1*   PLATELETS AUTO x10*3/uL 393 372 290     Results from last 72 hours   Lab Units 06/17/25  0336 06/16/25  0521 06/15/25  0559   SODIUM mmol/L 138 135* 140   POTASSIUM mmol/L 4.0 3.9 4.1   CHLORIDE mmol/L 104 107 105   CO2 mmol/L 24 22 27   BUN mg/dL 15 15 14   CREATININE mg/dL 0.82 0.81 0.83   GLUCOSE mg/dL 104* 97 96   CALCIUM mg/dL 8.8 8.7 8.4*   ANION GAP mmol/L 14 10 12   EGFR mL/min/1.73m*2 72 73 71           Estimated Creatinine Clearance: 40.9 mL/min (by C-G formula based on SCr of 0.82 mg/dL).  C-Reactive Protein   Date Value Ref Range Status   03/02/2024 <0.10 <1.00 mg/dL Final       Microbiology  No results found for the last 14 days.      Imaging    XR chest 1 view  Result Date: 6/8/2025  A nasogastric tube projects into the stomach beyond the field of imaging.   Mild cardiomegaly.   Minimal linear bibasilar atelectasis. Otherwise no sign of acute cardiopulmonary disease.     MACRO: None   Signed by: Mello Fried 6/8/2025 12:33 PM Dictation workstation:   [id number]    CT abdomen pelvis w IV contrast  Result Date: 6/7/2025  1. Diverticulosis of the sigmoid colon. There is inflammatory change noted about the proximal to mid sigmoid colon most concerning for acute diverticulitis. Multiple free air locules are noted adjacent to this inflamed segment of sigmoid colon as well as small-to-moderate amount of free air within the upper abdomen consistent with perforation. No overt abscess identified.   MACRO: Donovan  Terrence discussed the significance and urgency of this critical finding by telephone with  KONG HILL on 6/7/2025 at 3:39 am. (**-RCF-**) Findings:  See findings.   Signed by: Donovan Ocampo 6/7/2025 3:40 AM Dictation workstation:   [id number]

## 2025-06-17 NOTE — PROGRESS NOTES
Rosemary Motta is a 79 y.o. female on day 10 of admission presenting with Diverticulitis of large intestine with perforation without bleeding.      Subjective   Rosemary Motta is a 79F hx HTN, HLE, breast ca, diverticulosis, GERD, hypothyroidism, neuropathy, migraines, OA, anxiety/depression presented 6/7/25 for abdominal pain, left lower quadrant abd pain with radiation to back. now improved. up to 10/10, now 5/10. nausea with small vomiting. no diarrhea, constipation, blood in stool or melena. subacute dry cough. no acute abdomen on exam. labs/imaging w/ cr 1.29 (baseline 1.02), cbc, lactate wnl. ua wnl. ekg wnl. ct a/p w/ perforated diverticulitis. Seen by surgery and is now s/p partial colectomy and colostomy placement 6/7/25. Had hypotension and was monitored closely in ICU. UA without UTI. Patient noted to have tachycardia 6/9/25, Nocturnist evaluated ECG and diagnosed as new onset a fib with RVR, given Cardizem x1, resumed home metoprolol, cardiology consulted. Patient back in normal sinus rhythm.  I discussed with Dr. Quintanilla, cardiologist who at this time does not recommend any oral anticoagulation given this was a isolated incident.  Will need to monitor further if this recurs then will need to reconsider this. 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  Outpatient follow-up with cardiology after patient recovers for CHAPIS. NG-tube was removed 6/9/25 as was Gabriel.  Called to patient's room as patient heart rate noted to be in the upper 170s to 180s 6/11/25, EC confirmed AFRVR, given IV lopressor x1, started on hep gtt (cleared with surgeon if no initial bolus given) and cardizem gtt. Shortly after Cardizem drip was given patient converted back to normal sinus rhythm.  Patient felt a little lightheaded during this time but that completely resolved once heart rate was improved.  She was feeling pressure in her abdomen at this time but  then was able to urinate and this had completely resolved. Patient again had recurrence of AFRVR 6/11/25 with pain in lower back radiating down her buttocks into posterior leg to level of the knee  Given pain control which did help initially however once patient moved pain then returned.  Trial lidocaine patch, IV Dilaudid as needed, as needed muscle relaxers.  Can trial heat to the area as well.  Patient converted prior to IV diltiazem being given, heart rate better controlled. Please see significant event note by our night doc. Worsening RLE abdominal and groin pain, CT showing R iliacus hematoma with central foci of contrast raising suspicion for pseudoaneurysms. Repeat hg 10.2, baseline roughly around 11-13. [facility] vascular recommends holding hep drip, monitor H/H and repeat CT in 48 hours. IR consulted here Evaluated in the presence of Dr. Brunner as patient is having new numbness on her right anterior thigh and weakness of her right hip flexion, she is sitting upright in bedside chair but discussed with PT that she was unable to put any weight on this leg as the knee continued to buckle and she only was pivoted over to the chair.  While sitting up in the chair pain is only approximately a 2 out of 10 in the right groin.        6/13/2025: No acute events overnight. Vitals remain stable this morning, afebrile, HR 91 with /74. Hemoglobin is stable 10.3 over the last 24 hours. MRI pelvis concerning for infectious pyomyositis with intramuscular abscess formation. I discussed the case with IR, ID and general surgery, will monitor clinically for now as not a good option to drain given initial concern for extra luminal contrast on CT. Repeat CT scan tomorrow vs Sunday pending clinical course  Patient and PT note less sensation loss RLE and better movement when getting up to bedside chair.    Re-evaluated patient with her daughter and  at bedside, all of the above relayed to her family per patients  request  Patient asking that her daughter Vishnu be called with daily updates if she is not present at time of evaluation by hospitalist service.     Rounded on patient on this date with Pharmacist, RN, and Pharmacy student(s)     6/14: Patient seen.  Concerned that numbness appears to be progressing down her leg.  Reviewed previous findings including MRI of her spine and back.  Remains on antibiotic therapy for diverticulitis as well as possible psoas abscess.  Will continue to monitor, consider reimaging if symptoms persist.  MRI suggest patient has some degenerative disc disease as well.    6/15: Patient seen.  Still has numbness in the lateral aspect of her right leg, thinks it is a little bit more than normal.  Will get follow-up CT scan with contrast to reassess the fluid collection in her right iliac us.  Appreciate input from general surgery.  Drain was pulled today.  If CT scan appears to be benign or resolving can consider discharge.  Reassess in AM.     6/16: Patient seen this AM and was sitting upright in chair. CT scan showed no acute changes. FNA was done this afternoon. F/U on FNA results. General surgery notes no concern for active bleeding at this time. May be discharged from surgical perspective, F/U with Dr. Haywood in one week for potential staple removal. ID consult ordered the FNA to determine underlying etiology of iliac hematoma. Continue Cefepime and Flagyl antibiotic therapy for now.  Awaiting results on aspirated fluid.    6/17: Patient seen this AM and sitting upright in bed. Fluid aspirate done yesterday and is suspected evolving hematoma. Cultures from aspirate are pending. ID consulted and is continuing cefepime 2 g every 12 hr. Continue Flagyl 500 mg twice daily. Okay to discharge per general surgery and ID if fluid culture is negative for 48 hours. US guided aspiration findings suggest evolving hematoma.     Review of Systems   Constitutional:  Negative for appetite change, chills,  diaphoresis, fatigue and fever.   HENT:  Negative for congestion, ear pain, facial swelling, hearing loss, nosebleeds, sore throat, tinnitus and trouble swallowing.    Eyes:  Negative for pain.   Respiratory:  Negative for cough, chest tightness, shortness of breath and wheezing.    Cardiovascular:  Negative for chest pain, palpitations (Resolved) and leg swelling.   Gastrointestinal:  Negative for blood in stool, constipation, diarrhea, nausea and vomiting.   Genitourinary:  Negative for dysuria, flank pain, frequency, hematuria and urgency.   Musculoskeletal:  Positive for back pain. Negative for joint swelling.        With radiation down the right buttock into right posterior leg to the level of the knee   Skin:  Negative for rash and wound.   Neurological:  Positive for weakness (Right hip flexion) and numbness (Right anterior thigh). Negative for dizziness, syncope, light-headedness and headaches.   Hematological:  Does not bruise/bleed easily.   Psychiatric/Behavioral:  Negative for behavioral problems, hallucinations and suicidal ideas.           Objective     Last Recorded Vitals  /73   Pulse 67   Temp 37 °C (98.6 °F)   Resp 18   Wt 62.6 kg (138 lb)   SpO2 95%     Image Results  Imaging  US guided aspiration of abscess, hematoma, cyst  Result Date: 6/17/2025  Ultrasound-guided aspiration of a potential fluid collection along the right iliopsoas complex yielded a few mL of heme stained fluid. Findings correlate with evolving hematoma.   Performed and dictated at [facility].   Signed by: Ryan Parker 6/17/2025 6:31 AM Dictation workstation:   [id number]    CT abdomen pelvis w IV contrast  Result Date: 6/16/2025  1.  The anterior mid to superior aspect of the right iliacus muscle has developed a 1.8 x 3.4 cm fluid collection with edema and/or inflammation in the adjacent posterior pararenal space fat. 2. The central muscle has a hypodense collections similar in size  to the prior CT; layering hyperdense material within this collection has decreased in overall density. Previously noted foci of hypodensity from active bleeding and/or pseudo aneurysm are not identified. While this may be related to hemorrhage cysts a shin/occlusion of the pseudoaneurysms, there is a possibility that this appearance is related to imaging during an earlier arterial phase of the exam on the current study prior to when slow contrast extravasation could be detected (the prior exam was imaged later following contrast administration and had more contrast within the venous system/IVC than the current exam). 3. No active hemorrhage and or pseudo aneurysm in this region is noted. 4. Small pleural effusions and lung atelectasis noted previously have cleared.     MACRO: None   Signed by: Jeff Freitas 6/16/2025 7:45 AM Dictation workstation:   [id number]    MR lumbar spine wo IV contrast  Result Date: 6/13/2025  1. Motion degraded study. Combination of congenital spinal stenosis, epidural lipomatosis and diffuse degenerative changes seen throughout the lumbar spine. 2. Moderate spinal canal stenosis at L3-L4 and mild spinal canal stenosis at L2-L3 and L4-L5. 3. Severe narrowing of the left lateral recess at L4-L5 with probable compromise of the descending left L5 nerve root. Severe left foraminal stenosis at this level. 4. Levocurvature of the lumbar spine. Right lateral listhesis of L1-L2. Slight left lateral listhesis of L3-L4 and L4-L5.   I personally reviewed the images/study and I agree with the findings as stated.   MACRO: None   Signed by: Jennifer Blake 6/13/2025 8:43 AM Dictation workstation:   [id number]    MR MSK pelvis w and wo IV contrast  Result Date: 6/12/2025  1.  Severe muscle edema in the iliopsoas muscle predominantly in the iliacus with several intramuscular fluid collections. Findings most compatible with underlying infectious pyomyositis with intramuscular abscess formation. Underlying  hematoma some may be present as well as per recent CT. Evaluation for extravasation reported on previous CT is limited in this study which is tailored for evaluation of the lumbar plexus. There is moderate muscle edema in the piriformis and the abductor musculature as well. This is also concerning for underlying myositis. 2. No evidence of abnormal marrow signal about the total hip arthroplasties. No large effusion seen. 3. No abnormality seen along the lumbosacral plexus. The sciatic nerves are symmetric.     I personally reviewed the images/study and I agree with the findings as stated. This study was interpreted at [place], [place].   MACRO: Critical Finding:  See findings. Notification was initiated on 6/12/2025 at 8:08 pm by  Duke Pierre.  (**-OCF-**)   Signed by: Duke Pierre 6/12/2025 8:08 PM Dictation workstation:   [id number]    CT abdomen pelvis w IV contrast  Result Date: 6/11/2025  1. Ill-defined right iliacus hematoma with central foci of contrast raising suspicion for pseudoaneurysms. Contrast extravasation is not entirely excluded on single phase imaging. Correlate with labs and symptoms and if there is concern for acute blood loss, multiphase examination can be considered. 2. Postsurgical changes of partial sigmoid colectomy with end colostomy and Nory's pouch creation. No acute postsurgical complications identified. 3. Pelvic drain is in place with the tip in the left lower quadrant. No residual drainable collections identified. 4. Diffuse hepatic steatosis. Other chronic findings as described above.   MACRO: Aaron Mccall discussed the significance and urgency of this critical finding by EPIC secure chat with  PARTH TEIXEIRA on 6/11/2025 at 10:40 pm.  (**-RCF-**) Findings:  See findings.   Signed by: Aaron Mccall 6/11/2025 10:41 PM Dictation workstation:   [id number]      Cardiology, Vascular, and Other Imaging  Electrocardiogram, 12-lead  PRN ACS symptoms  Result Date: 6/12/2025  Atrial flutter with variable AV block Nonspecific ST and T wave abnormality Abnormal ECG When compared with ECG of 11-JUN-2025 09:11, (unconfirmed) Nonspecific T wave abnormality, improved in Lateral leads Confirmed by Bob Hu ([id number]) on 6/12/2025 11:34:04 AM    Electrocardiogram, 12-lead PRN ACS symptoms  Result Date: 6/10/2025  Atrial fibrillation with rapid ventricular response Nonspecific ST and T wave abnormality Abnormal ECG When compared with ECG of 10-CHAVA-2025 10:11, (unconfirmed) No significant change was found Confirmed by Bob Hu ([id number]) on 6/10/2025 1:23:23 PM    Electrocardiogram, 12-lead PRN ACS symptoms  Result Date: 6/10/2025  Normal sinus rhythm Normal ECG When compared with ECG of 10-CHAVA-2025 10:11, (unconfirmed) Sinus rhythm has replaced Atrial fibrillation Vent. rate has decreased BY  89 BPM ST no longer depressed in Anterolateral leads T wave inversion now evident in Inferior leads Nonspecific T wave abnormality no longer evident in Lateral leads Confirmed by Bob Hu ([id number]) on 6/10/2025 1:23:11 PM         Lab Results  Results for orders placed or performed during the hospital encounter of 06/07/25 (from the past 24 hours)   Sterile Fluid Culture/Smear    Specimen: Aspirate; Fluid   Result Value Ref Range    Gram Stain No polymorphonuclear leukocytes seen     Gram Stain No organisms seen    CBC   Result Value Ref Range    WBC 9.5 4.4 - 11.3 x10*3/uL    nRBC 0.0 0.0 - 0.0 /100 WBCs    RBC 3.30 (L) 4.00 - 5.20 x10*6/uL    Hemoglobin 10.5 (L) 12.0 - 16.0 g/dL    Hematocrit 31.3 (L) 36.0 - 46.0 %    MCV 95 80 - 100 fL    MCH 31.8 26.0 - 34.0 pg    MCHC 33.5 32.0 - 36.0 g/dL    RDW 13.4 11.5 - 14.5 %    Platelets 393 150 - 450 x10*3/uL   Basic Metabolic Panel   Result Value Ref Range    Glucose 104 (H) 74 - 99 mg/dL    Sodium 138 136 - 145 mmol/L    Potassium 4.0 3.5 - 5.3 mmol/L    Chloride 104 98 - 107 mmol/L    Bicarbonate 24 21 - 32 mmol/L     Anion Gap 14 10 - 20 mmol/L    Urea Nitrogen 15 6 - 23 mg/dL    Creatinine 0.82 0.50 - 1.05 mg/dL    eGFR 72 >60 mL/min/1.73m*2    Calcium 8.8 8.6 - 10.3 mg/dL   Magnesium   Result Value Ref Range    Magnesium 1.89 1.60 - 2.40 mg/dL        Medications  Scheduled medications:  Scheduled Medications[1]  Continuous medications:  Continuous Medications[2]  PRN medications:  PRN Medications[3]     Physical Exam  Constitutional:       General: She is not in acute distress.     Appearance: Normal appearance.   HENT:      Head: Normocephalic and atraumatic.      Right Ear: External ear normal.      Left Ear: External ear normal.      Nose: Nose normal.      Mouth/Throat:      Mouth: Mucous membranes are moist.      Pharynx: Oropharynx is clear.   Eyes:      Extraocular Movements: Extraocular movements intact.      Conjunctiva/sclera: Conjunctivae normal.      Pupils: Pupils are equal, round, and reactive to light.   Cardiovascular:      Rate and Rhythm: Tachycardia present. Rhythm irregular.      Pulses: Normal pulses.      Heart sounds: Murmur heard.   Pulmonary:      Effort: Pulmonary effort is normal. No respiratory distress.      Breath sounds: Normal breath sounds. No wheezing, rhonchi or rales.   Abdominal:      General: Bowel sounds are normal.      Palpations: Abdomen is soft.      Tenderness: There is abdominal tenderness. There is no right CVA tenderness, left CVA tenderness, guarding or rebound.      Comments: LOLA removed. Incision clean, dry, with staples. Colostomy viable with soft, brown stool.   Musculoskeletal:         General: No swelling. Normal range of motion.      Cervical back: Normal range of motion and neck supple.   Skin:     General: Skin is warm and dry.      Capillary Refill: Capillary refill takes less than 2 seconds.      Findings: No lesion or rash.   Neurological:      Mental Status: She is alert and oriented to person, place, and time.      Gait: Gait abnormal.      Comments: RLE motor  2/5  Loss of sensation R thigh  Diminished sensation majority of RLE  Pulses intact      Psychiatric:         Mood and Affect: Mood normal.         Behavior: Behavior normal.                  Assessment/Plan      Recurrent acute Diverticulitis c/b perforation s/p partial colectomy and colostomy placement 6/7/25  PCN allergy of anaphylaxis   Cefepime/flagyl  NGT removed 6/9/25  Routine colostomy care  Follow blood cultures x2  Appreciate General surgery management  ID consulted- recommends to Continue antibiotics through 6/12/25 to finish 5 day course   6/14: Remains on antibiotic therapy, see below.  6/15: Continue antibiotics per ID. Drain removed.   6/16: Continue antibiotics per ID. Okay to discharge per general surgery. Minimal concern for active bleeding at this time. F/U with Dr. Haywood in one week for potential staple removal. Oral nutritional supplements ordered.    6/17: Continue cefepime 2 g every 12 hrs. Continue Flagyl 500 mg BID. Okay to discharge if aspiration fluid culture is negative for 48 hours and DC antibiotics. ID continuing to follow. F/U with Dr. Haywood in a week for potential staple removal.     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  Outpatient follow-up with cardiology after patient recovers for CHAPIS.  Monitor on tele  [id number] of 4-started on heparin drip 6/10/2025 with clearance from general surgery team. Heparin drip stopped 6/12/25 AM  Cardizem 120 mg daily     R iliacus hematoma with central foci of contrast raising suspicion for pseudoaneurysms vs intramuscular abscess formation  Stop heparin drip  Monitor H/H closely  [facility] vascular recommends holding hep drip, monitor H/H and repeat CT in 48 hours  IR consulted- no acute intervention planned  Appreciate general surgery recommendations  MRI lumbar and  pelvis as above  IV antibiotics per Infectious Diease  Repeat bl cx x2 6/13/25  Repeat CT scan Saturday vs Sunday 6/14: Continue current antibiotics.  Consider repeat CT scan tomorrow.  6/15: Still has numbness in the lateral right thigh.  Thinks it may be progressing slightly.  Otherwise few symptoms.  Will repeat CT scan with contrast to evaluate iliacas fluid collection.  6/16: CT scan showed no acute changes. Follow FNA results. Continue antibiotic therapy. May need prolonged antibiotics if abscess.  6/17: FNA findings suggest evolving hematoma. Fluid cultures pending to rule out abscess. Monitor for 48 hours. Continue IV abx.    Acute hypoxic respiratory failure  Resolved    MENG on CKD  Resolved    Hypokalemia, mild  Resolved    HTN  HLE  Monitor blood pressure, resume hypertensives as able    Breast CA on anastrozole  Status post mastectomy    GERD    Hypothyroidism  Continue Synthroid  Recent TSH reviewed    Neuropathy  Migraines  OA  Anxiety/Depression    Code Status: Full Code               DVT ppx: SCDs only given hematoma     Please see orders for more complete plan    NOREEN OCONNELL    Pt seen and examined  with my PA student . I have modified the note to reflect my documentation of HPI and assessment and plan.    Rei Ojeda MD          [1] anastrozole, 1 mg, oral, Daily  aspirin, 81 mg, oral, Daily  bacitracin, , Topical, Daily  cefepime, 2 g, intravenous, q12h  dilTIAZem CD, 120 mg, oral, Daily  pantoprazole, 40 mg, oral, Daily before breakfast   Or  esomeprazole, 40 mg, nasoduodenal tube, Daily before breakfast   Or  pantoprazole, 40 mg, intravenous, Daily before breakfast  levothyroxine, 25 mcg, oral, Daily  lidocaine, 1 patch, transdermal, Daily  magnesium hydroxide, 30 mL, oral, Daily  metoprolol tartrate, 100 mg, oral, BID  metroNIDAZOLE, 500 mg, intravenous, q12h     [2] dilTIAZem, 5-15 mg/hr, Last Rate: Stopped (06/13/25 2111)  [Held by provider] heparin, 0-4,500 Units/hr, Last Rate: Stopped  (06/12/25 0121)     [3] PRN medications: acetaminophen, dilTIAZem, [Held by provider] heparin, hydrALAZINE, HYDROcodone-acetaminophen, HYDROmorphone, labetaloL, melatonin, [DISCONTINUED] ondansetron **OR** ondansetron, tiZANidine

## 2025-06-17 NOTE — NURSING NOTE
End of Shift Report  6/17/25     Admission  Rosemary Motta was admitted on 6/7/2025 for the following diagnoses:   Diverticulitis [K57.92]  Free intraperitoneal air [K66.8]    Significant Events  There were no significant events    Interventions      Response to Interventions       Recent Vital Signs  Patient Vitals for the past 12 hrs:   BP Temp Temp src Pulse Resp SpO2   06/17/25 0820 158/76 36.4 °C (97.5 °F) Temporal 82 18 97 %   06/17/25 1109 117/73 35.9 °C (96.7 °F) Temporal 66 12 97 %   06/17/25 1530 128/73 36.2 °C (97.2 °F) -- 75 16 97 %      0-10 (Numeric) Pain Score: 3     Latest labs  Lab Results   Component Value Date    WBC 9.5 06/17/2025    HGB 10.5 (L) 06/17/2025    HCT 31.3 (L) 06/17/2025     06/17/2025    CHOL 280 (H) 02/28/2020    TRIG 145 02/28/2020    HDL 41.1 02/28/2020    ALT 10 06/11/2025    AST 17 06/11/2025     06/17/2025    K 4.0 06/17/2025     06/17/2025    CREATININE 0.82 06/17/2025    BUN 15 06/17/2025    CO2 24 06/17/2025    TSH 2.89 06/07/2025    INR 1.0 05/01/2025       Other Results      Scheduled Medications:  Scheduled Medications[1]   Continuous Medications[2]          [1] anastrozole, 1 mg, oral, Daily  aspirin, 81 mg, oral, Daily  bacitracin, , Topical, Daily  cefepime, 2 g, intravenous, q12h  dilTIAZem CD, 120 mg, oral, Daily  pantoprazole, 40 mg, oral, Daily before breakfast   Or  esomeprazole, 40 mg, nasoduodenal tube, Daily before breakfast   Or  pantoprazole, 40 mg, intravenous, Daily before breakfast  levothyroxine, 25 mcg, oral, Daily  lidocaine, 1 patch, transdermal, Daily  magnesium hydroxide, 30 mL, oral, Daily  metoprolol tartrate, 100 mg, oral, BID  metroNIDAZOLE, 500 mg, intravenous, q12h  [2] dilTIAZem, 5-15 mg/hr, Last Rate: Stopped (06/13/25 2111)  [Held by provider] heparin, 0-4,500 Units/hr, Last Rate: Stopped (06/12/25 0121)

## 2025-06-17 NOTE — CARE PLAN
Problem: Skin  Goal: Decreased wound size/increased tissue granulation at next dressing change  Outcome: Progressing  Flowsheets (Taken 6/16/2025 2122)  Decreased wound size/increased tissue granulation at next dressing change: Promote sleep for wound healing

## 2025-06-18 PROCEDURE — 2500000001 HC RX 250 WO HCPCS SELF ADMINISTERED DRUGS (ALT 637 FOR MEDICARE OP): Performed by: NURSE PRACTITIONER

## 2025-06-18 PROCEDURE — 2500000001 HC RX 250 WO HCPCS SELF ADMINISTERED DRUGS (ALT 637 FOR MEDICARE OP): Performed by: INTERNAL MEDICINE

## 2025-06-18 PROCEDURE — 2500000004 HC RX 250 GENERAL PHARMACY W/ HCPCS (ALT 636 FOR OP/ED): Performed by: INTERNAL MEDICINE

## 2025-06-18 PROCEDURE — 97530 THERAPEUTIC ACTIVITIES: CPT | Mod: GO,CO

## 2025-06-18 PROCEDURE — 2500000002 HC RX 250 W HCPCS SELF ADMINISTERED DRUGS (ALT 637 FOR MEDICARE OP, ALT 636 FOR OP/ED): Performed by: PHYSICIAN ASSISTANT

## 2025-06-18 PROCEDURE — 97116 GAIT TRAINING THERAPY: CPT | Mod: GP,CQ

## 2025-06-18 PROCEDURE — 2060000001 HC INTERMEDIATE ICU ROOM DAILY

## 2025-06-18 PROCEDURE — 97110 THERAPEUTIC EXERCISES: CPT | Mod: GP,CQ

## 2025-06-18 PROCEDURE — 97110 THERAPEUTIC EXERCISES: CPT | Mod: GO,CO

## 2025-06-18 PROCEDURE — 99233 SBSQ HOSP IP/OBS HIGH 50: CPT | Performed by: INTERNAL MEDICINE

## 2025-06-18 PROCEDURE — 99024 POSTOP FOLLOW-UP VISIT: CPT | Performed by: SURGERY

## 2025-06-18 PROCEDURE — 2500000005 HC RX 250 GENERAL PHARMACY W/O HCPCS: Performed by: PHYSICIAN ASSISTANT

## 2025-06-18 RX ADMIN — DILTIAZEM HYDROCHLORIDE 120 MG: 120 CAPSULE, COATED, EXTENDED RELEASE ORAL at 10:12

## 2025-06-18 RX ADMIN — PANTOPRAZOLE SODIUM 40 MG: 40 TABLET, DELAYED RELEASE ORAL at 06:13

## 2025-06-18 RX ADMIN — HYDROCODONE BITARTRATE AND ACETAMINOPHEN 1 TABLET: 7.5; 325 TABLET ORAL at 19:57

## 2025-06-18 RX ADMIN — CEFEPIME HYDROCHLORIDE 2 G: 2 INJECTION, SOLUTION INTRAVENOUS at 21:34

## 2025-06-18 RX ADMIN — ASPIRIN 81 MG: 81 TABLET, COATED ORAL at 10:12

## 2025-06-18 RX ADMIN — TIZANIDINE 2 MG: 2 TABLET ORAL at 19:57

## 2025-06-18 RX ADMIN — HYDROCODONE BITARTRATE AND ACETAMINOPHEN 1 TABLET: 7.5; 325 TABLET ORAL at 13:32

## 2025-06-18 RX ADMIN — CEFEPIME HYDROCHLORIDE 2 G: 2 INJECTION, SOLUTION INTRAVENOUS at 10:17

## 2025-06-18 RX ADMIN — METOPROLOL TARTRATE 100 MG: 50 TABLET, FILM COATED ORAL at 19:57

## 2025-06-18 RX ADMIN — LIDOCAINE 4% 1 PATCH: 40 PATCH TOPICAL at 10:11

## 2025-06-18 RX ADMIN — HYDROCODONE BITARTRATE AND ACETAMINOPHEN 1 TABLET: 7.5; 325 TABLET ORAL at 06:13

## 2025-06-18 RX ADMIN — METOPROLOL TARTRATE 100 MG: 50 TABLET, FILM COATED ORAL at 10:12

## 2025-06-18 RX ADMIN — ANASTROZOLE 1 MG: 1 TABLET, COATED ORAL at 10:17

## 2025-06-18 RX ADMIN — LEVOTHYROXINE SODIUM 25 MCG: 0.03 TABLET ORAL at 10:12

## 2025-06-18 RX ADMIN — METRONIDAZOLE 500 MG: 500 INJECTION, SOLUTION INTRAVENOUS at 16:05

## 2025-06-18 RX ADMIN — METRONIDAZOLE 500 MG: 500 INJECTION, SOLUTION INTRAVENOUS at 03:44

## 2025-06-18 ASSESSMENT — PAIN DESCRIPTION - LOCATION
LOCATION: BACK
LOCATION: LEG

## 2025-06-18 ASSESSMENT — ENCOUNTER SYMPTOMS
VOMITING: 0
NUMBNESS: 1
CHILLS: 0
ABDOMINAL PAIN: 1
SHORTNESS OF BREATH: 0
COUGH: 0
DIZZINESS: 0
FEVER: 0
DIARRHEA: 0
BLOOD IN STOOL: 0
PALPITATIONS: 0
NAUSEA: 0
CONSTIPATION: 0
HEADACHES: 0

## 2025-06-18 ASSESSMENT — PAIN DESCRIPTION - ORIENTATION
ORIENTATION: LOWER
ORIENTATION: LEFT;RIGHT

## 2025-06-18 ASSESSMENT — COGNITIVE AND FUNCTIONAL STATUS - GENERAL
DAILY ACTIVITIY SCORE: 14
DRESSING REGULAR UPPER BODY CLOTHING: A LOT
CLIMB 3 TO 5 STEPS WITH RAILING: TOTAL
PERSONAL GROOMING: A LITTLE
MOVING TO AND FROM BED TO CHAIR: A LOT
HELP NEEDED FOR BATHING: A LOT
MOVING FROM LYING ON BACK TO SITTING ON SIDE OF FLAT BED WITH BEDRAILS: A LITTLE
STANDING UP FROM CHAIR USING ARMS: A LOT
MOBILITY SCORE: 13
DRESSING REGULAR LOWER BODY CLOTHING: A LOT
TOILETING: A LOT
EATING MEALS: A LITTLE
WALKING IN HOSPITAL ROOM: A LOT
TURNING FROM BACK TO SIDE WHILE IN FLAT BAD: A LITTLE

## 2025-06-18 ASSESSMENT — PAIN - FUNCTIONAL ASSESSMENT
PAIN_FUNCTIONAL_ASSESSMENT: 0-10

## 2025-06-18 ASSESSMENT — PAIN SCALES - GENERAL
PAINLEVEL_OUTOF10: 7
PAINLEVEL_OUTOF10: 2
PAINLEVEL_OUTOF10: 5 - MODERATE PAIN
PAINLEVEL_OUTOF10: 0 - NO PAIN

## 2025-06-18 ASSESSMENT — PAIN SCALES - WONG BAKER: WONGBAKER_NUMERICALRESPONSE: HURTS LITTLE BIT

## 2025-06-18 NOTE — CARE PLAN
The patient's goals for the shift include  staying informed     The clinical goals for the shift include patients HR will be <100 throughout this shift    Over the shift, the patient did not make progress toward the following goals. Barriers to progression include understanding. Recommendations to address these barriers include education.

## 2025-06-18 NOTE — NURSING NOTE
0700 Received bedside shift report from night shift nursing, introduced myself to this patient as oncoming nurse for the day. Patients expressed no needs at this time.

## 2025-06-18 NOTE — PROGRESS NOTES
Occupational Therapy    Occupational Therapy Treatment    Name: Rosemary Motta  MRN: [id number]  Department: [facility]  Room: 2025/2025-A  Date: 06/18/25  Time Calculation  Start Time: 1420  Stop Time: 1445  Time Calculation (min): 25 min    Assessment:  OT Assessment: Pt progressed to requiring decreased assist of min A x1 for functional transfers and mobility min household distance. Pt demo'd near LOB and knees threatened to buckle, no overt knee buckling this session.  Barriers to Discharge Home: Cognition needs, Physical needs, Caregiver assistance  End of Session Communication: Bedside nurse  End of Session Patient Position: Up in chair, Alarm on  Plan:  Treatment Interventions: ADL retraining, Functional transfer training, Endurance training, Cognitive reorientation, Patient/family training, Equipment evaluation/education, Compensatory technique education  OT Frequency: 4 times per week  OT Discharge Recommendations: Moderate intensity level of continued care  OT Recommended Transfer Status: Minimal assist, Assist of 1  OT - OK to Discharge: Yes    Subjective     OT Visit Info:  OT Received On: 06/18/25  General:  General  Prior to Session Communication: Bedside nurse  Patient Position Received: Up in chair, Alarm on  General Comment: Pt agreeable and motivated for therapy.  Precautions:  Medical Precautions: Fall precautions  Post-Surgical Precautions: Abdominal surgery precautions           Pain Assessment:  Pain Assessment  Pain Assessment: 0-10  0-10 (Numeric) Pain Score: 5 - Moderate pain  Pain Type: Acute pain  Pain Location: Leg  Pain Orientation: Right  Pain Interventions: Repositioned, Distraction    Objective   Cognition:  Overall Cognitive Status: Within Functional Limits  Orientation Level: Oriented X4       Bed Mobility/Transfers:      Transfer 1  Technique 1: Sit to stand, Stand to sit  Transfer Device 1: Walker  Transfer Level of Assistance 1: Minimum assistance, Minimal verbal cues            Functional Mobility:  Functional Mobility 1  Comments 1: Pt participated in functional mobility min household distance using FWW with min A, unsteady at times and knees threatened to buckle with no overt LOB noted.       Therapy/Activity: Therapeutic Exercise  Therapeutic Exercise Performed: Yes  Therapeutic Exercise Activity 1: Pt instructed on BUE ther ex using red tband completing x12 reps of bicep curl, tricep extension, scapular retraction and shoulder flexion.        Outcome Measures:  [facility] Daily Activity  Putting on and taking off regular lower body clothing: A lot  Bathing (including washing, rinsing, drying): A lot  Putting on and taking off regular upper body clothing: A lot  Toileting, which includes using toilet, bedpan or urinal: A lot  Taking care of personal grooming such as brushing teeth: A little  Eating Meals: A little  Daily Activity - Total Score: 14        Education Documentation  Body Mechanics, taught by MELANIE Cruz at 6/18/2025  3:15 PM.  Learner: Patient  Readiness: Acceptance  Method: Explanation  Response: Needs Reinforcement  Comment: safety, fall prevention.    Education Comments  No comments found.      Goals:  Encounter Problems       Encounter Problems (Active)       ADLs       Patient will complete toileting including hygiene clothing management/hygiene with stand by assist level of assistance. (Progressing)       Start:  06/09/25    Expected End:  06/23/25               COGNITION/SAFETY       Patient will demonstrated orientation x 4 with verbal cues. (Progressing)       Start:  06/09/25    Expected End:  06/23/25       ORIENTATION            MOBILITY       Patient will perform Functional mobility Household distances/Community Distances with stand by assist level of assistance and least restrictive device in order to improve safety and functional mobility. (Progressing)       Start:  06/09/25    Expected End:  06/23/25               TRANSFERS       Patient will  perform bed mobility stand by assist level of assistance and log roll technique in order to improve safety and independence with mobility (Progressing)       Start:  06/09/25    Expected End:  06/23/25            Patient will complete functional transfers with least restrictive device with stand by assist level of assistance. (Progressing)       Start:  06/09/25    Expected End:  06/23/25

## 2025-06-18 NOTE — PROGRESS NOTES
Physical Therapy    Physical Therapy Treatment    Patient Name: Rosemary Motta  MRN: [id number]  Department: [address]  Room: 2025/2025-A  Today's Date: 6/18/2025  Time Calculation  Start Time: 1135  Stop Time: 1159  Time Calculation (min): 24 min         Assessment/Plan   PT Assessment  Barriers to Discharge Home: Cognition needs, Physical needs  End of Session Communication: Bedside nurse  Assessment Comment: patient able to progress to gait training  , knees did not buckle . cotninue to try to increase  strenrgth right Le  End of Session Patient Position: Up in chair, Alarm on  PT Plan  Inpatient/Swing Bed or Outpatient: Inpatient  PT Plan  Treatment/Interventions: Bed mobility, Transfer training, Gait training, Neuromuscular re-education, Therapeutic exercise, Therapeutic activity  PT Plan: Ongoing PT  PT Frequency: 6 times per week  PT Discharge Recommendations: Moderate intensity level of continued care  PT - OK to Discharge: Yes    PT Visit Info:  PT Received On: 06/18/25  Response to Previous Treatment: Patient reporting fatigue but able to participate.     General Visit Information:   General  Prior to Session Communication: Bedside nurse  Patient Position Received: Up in chair, Alarm on  General Comment: patient up in chair agreeable  to therapy..  Subjective   Precautions:  Precautions  Precautions Comment: new L colectomy pouch, bulb drain RLQ abd            Objective   Pain:  Pain Assessment  Pain Assessment: 0-10  0-10 (Numeric) Pain Score: 0 - No pain (no complaints of pain, just numbness right LE hip to knee)  Cognition:  Cognition  Overall Cognitive Status: Within Functional Limits    Activity Tolerance:  Activity Tolerance  Endurance: Tolerates 30 min exercise with multiple rests  Treatments:   patient performed seated marches, LAQ, glute sets, ankle pumps 15 reps each. with self assist for  right LE  as unable to move independently. sit to stand x 3 reps . with CGa and verbal cues. gait training with  FWW 10 feet x2 reps with seated active recovery between. stand to sit with CGA.    Outcome Measures:  [facility] Basic Mobility  Turning from your back to your side while in a flat bed without using bedrails: A little  Moving from lying on your back to sitting on the side of a flat bed without using bedrails: A little  Moving to and from bed to chair (including a wheelchair): A lot  Standing up from a chair using your arms (e.g. wheelchair or bedside chair): A lot  To walk in hospital room: A lot (ensure consistent)  Climbing 3-5 steps with railing: Total  Basic Mobility - Total Score: 13    Education Documentation  Body Mechanics, taught by Mini Sloan PTA at 6/18/2025  1:11 PM.  Learner: Patient  Readiness: Acceptance  Method: Explanation  Response: Verbalizes Understanding, Needs Reinforcement  Comment: safety with mobility    Mobility Training, taught by Mini Sloan PTA at 6/18/2025  1:11 PM.  Learner: Patient  Readiness: Acceptance  Method: Explanation  Response: Verbalizes Understanding, Needs Reinforcement  Comment: safety with mobility    Education Comments  No comments found.        OP EDUCATION:       Encounter Problems       Encounter Problems (Active)       Mobility       STG - Patient will ambulate household distance using FWW initially, no more than SBA x1 (Progressing)       Start:  06/09/25    Expected End:  06/23/25               PT Transfers       STG - Transfer from bed to chair using FWW initially, no more than SBA x1 (Progressing)       Start:  06/09/25    Expected End:  06/23/25            STG - Patient to transfer to and from sit to supine DEMO LOG ROLL TECH (for ABD precautions) with no more than min Ax1 (Progressing)       Start:  06/09/25    Expected End:  06/23/25               Pain - Adult

## 2025-06-18 NOTE — PROGRESS NOTES
Rosemary Motta is a 79 y.o. female on day 11 of admission presenting with Diverticulitis of large intestine with perforation without bleeding.      Assessment/Plan:     #Acute diverticulitis  #Bowel perforation s/p ostomy 6/7/2025  #Sepsis, POA, resolved  Patient was started on ciprofloxacin and Flagyl due to penicillin allergy.  Patient was found to have short segment of mid sigmoid colon with inflammation and perforation with moderate amount of purulent fluid throughout the abdomen.  No cultures obtained.    #Pyomyositis  #R iliacus Intramuscular abscess s/p IR aspiraton 6/16  Aspiration done on June 16.  As per IR, few mL of heme stain fluid was aspirated and sent for cultures     #Penicillin allergy  Reports history of syncope when she was a child.  Discussed with the patient that it is safe to get cefepime as it does not have cross-reactivity.  Tolerated cefepime well without any issues     #CKD  Estimated Creatinine Clearance: 41.1 mL/min (by C-G formula based on SCr of 0.82 mg/dL).     HTN, HLD  Breast cancer s/p right mastectomy, on anastrozole  GERD  Hypothyroidism  Paroxysmal A-fib     Recommendations:     -Cont cefepime 2 g every 12hr  -Cont Flagyl 500 twice daily  -If the aspiration fluid culture is negative for 48 hours I.e today after 2:30pm, DC antibiotics  -If cultures stay negative, patient can be discharged from ID standpoint-Will continue to follow        Mikal Ceballos MD  Date of service: 6/18/2025  Time of service: 11:53 AM      Subjective   Interval History: No acute events overnight       Review of Systems  Denies: fever, chills, nausea, vomiting, dysuria    Objective   Range of Vitals (last 24 hours)  Heart Rate:  []   Temp:  [35.9 °C (96.7 °F)-36.8 °C (98.2 °F)]   Resp:  [16-18]   BP: (119-154)/(71-82)   Weight:  [63 kg (138 lb 14.2 oz)]   SpO2:  [97 %-99 %]  Daily Weight  06/18/25 : 63 kg (138 lb 14.2 oz)   Body mass index is 29.03 kg/m².      General: alert, oriented, NAD  HEENT: No  conjunctival pallor, no scleral icterus  Abdomen: soft, ostomy bag, midline surgical incision with staples,  Neuro: AAO x 3  Extremities: no cyanosis  Skin: No rashes   MSK: No joint inflammation    Antibiotics  bacitracin - 500 unit/gram  cefepime - 2 gram/50 mL  metroNIDAZOLE - 500 mg/100 mL      Relevant Results  Labs  Results from last 72 hours   Lab Units 06/17/25  0336 06/16/25  0521   WBC AUTO x10*3/uL 9.5 11.1   HEMOGLOBIN g/dL 10.5* 10.4*   HEMATOCRIT % 31.3* 31.6*   PLATELETS AUTO x10*3/uL 393 372     Results from last 72 hours   Lab Units 06/17/25  0336 06/16/25  0521   SODIUM mmol/L 138 135*   POTASSIUM mmol/L 4.0 3.9   CHLORIDE mmol/L 104 107   CO2 mmol/L 24 22   BUN mg/dL 15 15   CREATININE mg/dL 0.82 0.81   GLUCOSE mg/dL 104* 97   CALCIUM mg/dL 8.8 8.7   ANION GAP mmol/L 14 10   EGFR mL/min/1.73m*2 72 73           Estimated Creatinine Clearance: 41.1 mL/min (by C-G formula based on SCr of 0.82 mg/dL).  C-Reactive Protein   Date Value Ref Range Status   03/02/2024 <0.10 <1.00 mg/dL Final       Microbiology  No results found for the last 14 days.      Imaging    XR chest 1 view  Result Date: 6/8/2025  A nasogastric tube projects into the stomach beyond the field of imaging.   Mild cardiomegaly.   Minimal linear bibasilar atelectasis. Otherwise no sign of acute cardiopulmonary disease.     MACRO: None   Signed by: Mello Fried 6/8/2025 12:33 PM Dictation workstation:   [id number]    CT abdomen pelvis w IV contrast  Result Date: 6/7/2025  1. Diverticulosis of the sigmoid colon. There is inflammatory change noted about the proximal to mid sigmoid colon most concerning for acute diverticulitis. Multiple free air locules are noted adjacent to this inflamed segment of sigmoid colon as well as small-to-moderate amount of free air within the upper abdomen consistent with perforation. No overt abscess identified.   MACRO: Donovan Ocampo discussed the significance and urgency of this critical finding by  telephone with  KONG HILL on 6/7/2025 at 3:39 am. (**-RCF-**) Findings:  See findings.   Signed by: Donovan Ocampo 6/7/2025 3:40 AM Dictation workstation:   [id number]

## 2025-06-18 NOTE — CARE PLAN
The patient's goals for the shift include      The clinical goals for the shift include .    Over the shift, the patient did not make progress toward the following goals. Barriers to progression include . Recommendations to address these barriers include

## 2025-06-18 NOTE — PROGRESS NOTES
"GENERAL SURGERY PROGRESS NOTE    Rosemary Motta   1945   [id number]     Rosemary Motta is a 79 y.o. female on day 11 of admission presenting with Diverticulitis of large intestine with perforation without bleeding.      Subjective  No acute events overnight. Unchanged lower extremity neurologic symptoms. Cultures from hematoma aspirate negative to date.    Review of Systems   Constitutional:  Negative for chills and fever.   Respiratory:  Negative for cough and shortness of breath.    Cardiovascular:  Negative for chest pain and palpitations.   Gastrointestinal:  Positive for abdominal pain. Negative for blood in stool, constipation, diarrhea, nausea and vomiting.   Neurological:  Positive for numbness. Negative for dizziness and headaches.   All other systems reviewed and are negative.      Objective    Last Recorded Vitals  Blood pressure 147/79, pulse 71, temperature 36.6 °C (97.9 °F), temperature source Temporal, resp. rate 16, height 1.473 m (4' 10\"), weight 63 kg (138 lb 14.2 oz), SpO2 98%.    Intake/Output last 3 Shifts:  I/O last 3 completed shifts:  In: 941 (14.9 mL/kg) [P.O.:941]  Out: 2550 (40.5 mL/kg) [Urine:2550 (1.1 mL/kg/hr)]  Weight: 63 kg     Physical Exam  Constitutional:       General: She is not in acute distress.     Appearance: Normal appearance. She is not ill-appearing.   HENT:      Head: Normocephalic and atraumatic.   Cardiovascular:      Rate and Rhythm: Normal rate and regular rhythm.   Pulmonary:      Effort: Pulmonary effort is normal. No respiratory distress.      Breath sounds: Normal breath sounds.   Abdominal:      General: There is no distension.      Palpations: Abdomen is soft.      Tenderness: There is no abdominal tenderness. There is no guarding.      Comments: Incision clean/dry/intact with staples in place. Colostomy viable with soft brown stool.    Musculoskeletal:         General: No swelling.   Skin:     General: Skin is warm and dry.   Neurological:      Mental " Status: She is alert and oriented to person, place, and time. Mental status is at baseline.   Psychiatric:         Mood and Affect: Mood normal.         Behavior: Behavior normal.       Labs  No results found for this or any previous visit (from the past 24 hours).      Radiology  Imaging  US guided aspiration of abscess, hematoma, cyst  Result Date: 6/17/2025  Ultrasound-guided aspiration of a potential fluid collection along the right iliopsoas complex yielded a few mL of heme stained fluid. Findings correlate with evolving hematoma.   Performed and dictated at [facility].   Signed by: Ryan Parker 6/17/2025 6:31 AM Dictation workstation:   [id number]    CT abdomen pelvis w IV contrast  Result Date: 6/16/2025  1.  The anterior mid to superior aspect of the right iliacus muscle has developed a 1.8 x 3.4 cm fluid collection with edema and/or inflammation in the adjacent posterior pararenal space fat. 2. The central muscle has a hypodense collections similar in size to the prior CT; layering hyperdense material within this collection has decreased in overall density. Previously noted foci of hypodensity from active bleeding and/or pseudo aneurysm are not identified. While this may be related to hemorrhage cysts a shin/occlusion of the pseudoaneurysms, there is a possibility that this appearance is related to imaging during an earlier arterial phase of the exam on the current study prior to when slow contrast extravasation could be detected (the prior exam was imaged later following contrast administration and had more contrast within the venous system/IVC than the current exam). 3. No active hemorrhage and or pseudo aneurysm in this region is noted. 4. Small pleural effusions and lung atelectasis noted previously have cleared.     MACRO: None   Signed by: Jeff Freitas 6/16/2025 7:45 AM Dictation workstation:   [id number]    MR lumbar spine wo IV contrast  Result Date: 6/13/2025  1.  Motion degraded study. Combination of congenital spinal stenosis, epidural lipomatosis and diffuse degenerative changes seen throughout the lumbar spine. 2. Moderate spinal canal stenosis at L3-L4 and mild spinal canal stenosis at L2-L3 and L4-L5. 3. Severe narrowing of the left lateral recess at L4-L5 with probable compromise of the descending left L5 nerve root. Severe left foraminal stenosis at this level. 4. Levocurvature of the lumbar spine. Right lateral listhesis of L1-L2. Slight left lateral listhesis of L3-L4 and L4-L5.   I personally reviewed the images/study and I agree with the findings as stated.   MACRO: None   Signed by: Jennifer Blake 6/13/2025 8:43 AM Dictation workstation:   [id number]    MR MSK pelvis w and wo IV contrast  Result Date: 6/12/2025  1.  Severe muscle edema in the iliopsoas muscle predominantly in the iliacus with several intramuscular fluid collections. Findings most compatible with underlying infectious pyomyositis with intramuscular abscess formation. Underlying hematoma some may be present as well as per recent CT. Evaluation for extravasation reported on previous CT is limited in this study which is tailored for evaluation of the lumbar plexus. There is moderate muscle edema in the piriformis and the abductor musculature as well. This is also concerning for underlying myositis. 2. No evidence of abnormal marrow signal about the total hip arthroplasties. No large effusion seen. 3. No abnormality seen along the lumbosacral plexus. The sciatic nerves are symmetric.     I personally reviewed the images/study and I agree with the findings as stated. This study was interpreted at [facility], [place], [place].   MACRO: Critical Finding:  See findings. Notification was initiated on 6/12/2025 at 8:08 pm by  Duke Pierre.  (**-OCF-**)   Signed by: Duke Pierre 6/12/2025 8:08 PM Dictation workstation:   [id number]    CT abdomen pelvis w IV  contrast  Result Date: 6/11/2025  1. Ill-defined right iliacus hematoma with central foci of contrast raising suspicion for pseudoaneurysms. Contrast extravasation is not entirely excluded on single phase imaging. Correlate with labs and symptoms and if there is concern for acute blood loss, multiphase examination can be considered. 2. Postsurgical changes of partial sigmoid colectomy with end colostomy and Nory's pouch creation. No acute postsurgical complications identified. 3. Pelvic drain is in place with the tip in the left lower quadrant. No residual drainable collections identified. 4. Diffuse hepatic steatosis. Other chronic findings as described above.   MACRO: Aaron Mccall discussed the significance and urgency of this critical finding by EPIC secure chat with  PARTH TEIXEIRA on 6/11/2025 at 10:40 pm.  (**-RCF-**) Findings:  See findings.   Signed by: Aaron Mccall 6/11/2025 10:41 PM Dictation workstation:   [id number]      Assessment and Plan  Assessment & Plan  Diverticulitis of large intestine with perforation without bleeding    79 y.o. female with perforated diverticulitis status post Nory procedure 6/7/25. Her postoperative course was complicated by AFRVR and a significant right psoas hematoma from anticoagulation. She has motor function of her RLE but her right thigh is numb. Doing well from a surgical standpoint.    Plan:  - Pending cultures of hematoma aspirate.  Noted ID plan for continue of antibiotics untill cultures are negative for 48 hours.  - Continue routine ostomy care  - Overall patient progressing well, tolerating regular diet  - Patient may be discharged from a surgical perspective once cultures are negative for 48 hours, follow-up with Dr. Haywood in a week for potential staple removal.    Discussed with Dr. Taqueria Day, DO PGY3  General Surgery

## 2025-06-18 NOTE — PROGRESS NOTES
Rosemary Motta is a 79 y.o. female on day 11 of admission presenting with Diverticulitis of large intestine with perforation without bleeding.    Medical History[1]  Surgical History[2]  Social History     Socioeconomic History    Marital status:      Spouse name: Not on file    Number of children: Not on file    Years of education: Not on file    Highest education level: Not on file   Occupational History    Not on file   Tobacco Use    Smoking status: Never    Smokeless tobacco: Never   Vaping Use    Vaping status: Never Used   Substance and Sexual Activity    Alcohol use: Not Currently    Drug use: Never    Sexual activity: Not on file   Other Topics Concern    Not on file   Social History Narrative    Not on file     Social Drivers of Health     Financial Resource Strain: Low Risk  (6/10/2025)    Overall Financial Resource Strain (CARDIA)     Difficulty of Paying Living Expenses: Not hard at all   Food Insecurity: No Food Insecurity (6/7/2025)    Hunger Vital Sign     Worried About Running Out of Food in the Last Year: Never true     Ran Out of Food in the Last Year: Never true   Transportation Needs: No Transportation Needs (6/10/2025)    PRAPARE - Transportation     Lack of Transportation (Medical): No     Lack of Transportation (Non-Medical): No   Physical Activity: Not on file   Stress: Not on file   Social Connections: Not on file   Intimate Partner Violence: Not At Risk (6/7/2025)    Humiliation, Afraid, Rape, and Kick questionnaire     Fear of Current or Ex-Partner: No     Emotionally Abused: No     Physically Abused: No     Sexually Abused: No   Housing Stability: Low Risk  (6/10/2025)    Housing Stability Vital Sign     Unable to Pay for Housing in the Last Year: No     Number of Times Moved in the Last Year: 0     Homeless in the Last Year: No     Allergies[3]    Dietary Orders (From admission, onward)               Oral nutritional supplements  Until discontinued        Question Answer Comment    Deliver with Lunch    Select supplement: Magic Cup            Adult diet Regular  Diet effective now        Question:  Diet type  Answer:  Regular        May Participate in Room Service  Once        Question:  .  Answer:  Yes                      Objective     Vitals  Temp:  [35.9 °C (96.7 °F)-36.7 °C (98 °F)] 36.6 °C (97.9 °F)  Heart Rate:  [62-96] 71  Resp:  [12-18] 16  BP: (117-154)/(71-79) 147/79       0-10 (Numeric) Pain Score: 0 - No pain  Hartley-Baker FACES Pain Rating: No hurt      Malnutrition Diagnosis Status: New  Malnutrition Diagnosis: Mild malnutrition related to acute disease or injury  Related to: altered GI function, recent sugery  As Evidenced by: <50% of estimated needs > 5 days  I agree with the dietitian's malnutrition diagnosis.    Peripheral IV 06/17/25 22 G Anterior;Left (Active)   Number of days: 1       Colostomy LUQ (Active)   Number of days: 11       External Urinary Catheter Female (Active)   Number of days: 6       Relevant Results  Scheduled medications  Scheduled Medications[4]  Continuous medications  Continuous Medications[5]  PRN medications  PRN Medications[6]      S/  Seen and examined  No new complaints no new event over the night   ROS: Negative    O/   General Appearance: Alert, Oriented X3, Cooperative, Not in Acute Distress  HEENT: no eye redness, not pale, no jaundice, Throat: not congested, no ulcers    Chest: Good AE bilateral, No crackles, no ronchies, no wheezes.   Heart: RHR, Normal heart sounds S1, S2, no murmur or gallop,   Abdomen: Soft, lax, no hepatosplenomegaly, intact hernia orifices, negative lopez sign, no tenderness,   Genitalia: no abnormal discharge, no ulcers, no swelling.  Lymphatics: no lymphadenopathy, supraclavicular, inguinal trochlear, or axilla.   Neurology: Intact cranial nerves 2 to 12, intact sensation, intact motor function (Power, tone and reflexes). Normal DTR reflexes.   Extremities: no signs of PAD, no swelling no ulcers   Back: no  deformity, no SI tenderness, no ulcers.   Skin: no signs of dehydration, no Rash, Bruises, or purpura.      AS:  Ms. Rosemary Motta is a 79 y.o. female on day 11 of admission presenting with Diverticulitis of large intestine with perforation s/p Nory procedure 6/7/25 complicated with AFib.RVR.    #. Acute Diverticulitis, complicated w perforation.  - s/p hematoma and abd wall abscess s/p aspiration pending cx  - c/w ABX  - surgery on board  - ID on board   - c/w Cefepime   #. Comorbidity:  - Afib, HTN, CKD, breast CA, hypothyroid, GERD.  - c/w monitoring     Code Status: Full  DVT ppx:  on hold due to hematoma  Disp: PT/ot     I reviewed the resident/fellow's documentation and discussed the patient with the resident/fellow. I agree with the resident/fellow's medical decision making as documented in the note.  I saw and evaluated the patient.  I personally obtained the key and critical portions of the history and physical exam or was physically present for key and critical portions performed by the resident. I reviewed the resident's documentation and discussed the patient with the resident.  I agree with the resident's medical decision making as documented in the note.   spoke to the pt, explained the medical and social conditions and the reason for this admission explained our plan and recommendations.  Time spent on the assessment of patient, gathering and interpreting data, review of medical record/patient history, personally reviewing radiographic imaging. With greater than 50% spent in personal discussion with patient.  Disclaimer:   Portions of this note may have been generated using Dragon voice recognition software. Reasonable efforts were made to correct any dictation errors that resulted due to the programming of this software but some may still be present.   Portions of this note including HPI, ROS, impression/plan, and examination may have been copied forward from previous notes as to provide  important historical information essential in contributing to medical decision making. Documentation has been reviewed and edited as necessary to support clinical decision making for today's visit and to reflect my own independent evaluation of this patient.    The time of this note does not reflect the time I saw the patient but the time that this note was written   Time > 45 min     Eric Khan MD         [1]   Past Medical History:  Diagnosis Date    Anxiety and depression     Breast cancer     CKD (chronic kidney disease)     HLD (hyperlipidemia)     HTN (hypertension)     Hypothyroidism     Pulmonary embolism     postop   [2]   Past Surgical History:  Procedure Laterality Date    BREAST BIOPSY      CARPAL TUNNEL RELEASE Bilateral      SECTION, CLASSIC      CHOLECYSTECTOMY      COLOSTOMY  2025    Hanna's procedure    FOOT SURGERY Bilateral     bunions and corns    MASTECTOMY Right 2018    TOTAL HIP ARTHROPLASTY Left    [3]   Allergies  Allergen Reactions    Penicillins Anaphylaxis and Syncope     Tolerated cefepime on 2025    Morphine Itching    Clarithromycin Nausea/vomiting    Hydrochlorothiazide Diarrhea    Tetracycline Nausea/vomiting    Cortisone Rash and Headache     Facial pain    Methylprednisolone Swelling and Rash     facial pain    Nifedipine Rash, Dizziness and Blurred vision    Prednisone Rash and Headache     Pain    [4] anastrozole, 1 mg, oral, Daily  aspirin, 81 mg, oral, Daily  bacitracin, , Topical, Daily  cefepime, 2 g, intravenous, q12h  dilTIAZem CD, 120 mg, oral, Daily  pantoprazole, 40 mg, oral, Daily before breakfast   Or  esomeprazole, 40 mg, nasoduodenal tube, Daily before breakfast   Or  pantoprazole, 40 mg, intravenous, Daily before breakfast  levothyroxine, 25 mcg, oral, Daily  lidocaine, 1 patch, transdermal, Daily  magnesium hydroxide, 30 mL, oral, Daily  metoprolol tartrate, 100 mg, oral, BID  metroNIDAZOLE, 500 mg, intravenous, q12h  [5] dilTIAZem,  5-15 mg/hr, Last Rate: Stopped (06/13/25 2111)  [Held by provider] heparin, 0-4,500 Units/hr, Last Rate: Stopped (06/12/25 0121)  [6] PRN medications: acetaminophen, dilTIAZem, [Held by provider] heparin, hydrALAZINE, HYDROcodone-acetaminophen, HYDROmorphone, labetaloL, melatonin, [DISCONTINUED] ondansetron **OR** ondansetron, tiZANidine

## 2025-06-18 NOTE — CONSULTS
Wound Care Consult     Visit Date: 6/18/2025      Patient Name: Rosemary Motta         MRN: [id number]             Colostomy LUQ (Active)   Placement Date: 06/07/25   Location: LUQ   Number of days: 11      Colostomy LUQ (Active)   Present on Admission to Healthcare Facility N 06/09/25 1133   Stomal Appliance 1 piece;Changed 06/18/25 1900   Site/Stoma Assessment Clean;Intact 06/18/25 1900   Stoma Size (cm) 38 cm 06/18/25 1900   Peristomal Assessment Clean;Intact 06/18/25 1900   Treatment Pouch change;Site care 06/18/25 1900   Drainage Characteristics Brown 06/18/25 1900   Output (mL) 150 mL 06/18/25 1900        A 79 year old female patient who is POD # 11 seen for ostomy education related to new colostomy. Patient reports no pain at this time. Patient was previously provided education by Maru while this RN was on PTO. Patient apprehensive about ostomy care and is feeling overwhelmed. She reports she mary lbe goingto SNF for rehab upon discharge but then has concerns about ostomy care when going home. Pateint states her  is unable to assist her and that her daughter is leaving to go out of town but will be the one helping her when she is discharged home fron SNF. Additional educational materials provided to patient for daughter. Patient participated in practice with releasing gas and emptying contents of pouch, practice with opening and re-closing pouch. Patient also assisted with measuring, cutting, and fitting barrier over stoma during appliance change. Patient practiced using a mirror to visualize stoma and peristomal skin during appliance change. Patient had difficulties cutting pouch and would benefit from moldable pouches, samples given to patient. All questions answered to patient satisfaction with encouragement provided appropriately during interventions. Johnniet previously enrolled in [employer] start and convatec plus program by Maru on 6/9. Care package at bedside to accompany patient on  discharge.    Assessment    Ostomy Type: colostomy   Size: 38mm  Color: red budded  Protruding: yes  Functioning: soft brown stool 150ml  Mucocutaneous junction: sutures visible, small area of separation, stoma powder placed.   Peristomal Skin:  clean/intact   Pouching supplies: adhesive remover spray, mild soap and water, gauze, no sting barrier wipe, barrier ring, stoma powder, one piece convex pouch, lubricating deodorizer.     Education:. Review of [employer] secure start program; literature including: Routine Care of Your Ostomy, One-Piece Pouching System, [employer] Barrier Rings, Peristomal Skin Care and Lock 'n Roll Microseal Closure. QR code sheet Of [employer] Ostomy Educational Videos provided for patients family to review [employer] educational videos. Additional literature including: Ostomy 101. When to call Your Doctor, Medication Considerations with an Ostomy, Food Reference Guide, Obtaining Ostomy Supplies List, Bowel Management Instructions and Output Chart, Resources for Ostomy Supplies, Ostomy Product Manufacturers List, Resources/ Organizations and Ostomy Support Groups provide within folder.     Plan: Staff to continue education and include patient in teaching/ostomy care.   Assess stoma and assist patient with appliance changes every 3 days/as needed. Bedside nursing to continue reinforcement of ostomy teaching and involving patient in ostomy care, continue encouragement of questions and participation. Patient to begin review of educational videos, allowing opportunities for patient to discuss questions/concerns.    Notify provider/Wound Care RN if barriers to discharge are identified.    Please contact me with questions or changes in patient condition.  Demetria Salamanca RN  Wound/Ostomy Care   [phone]

## 2025-06-19 VITALS
TEMPERATURE: 97.8 F | BODY MASS INDEX: 29.06 KG/M2 | DIASTOLIC BLOOD PRESSURE: 82 MMHG | OXYGEN SATURATION: 98 % | RESPIRATION RATE: 16 BRPM | WEIGHT: 138.45 LBS | SYSTOLIC BLOOD PRESSURE: 146 MMHG | HEIGHT: 58 IN | HEART RATE: 110 BPM

## 2025-06-19 LAB
ALBUMIN SERPL BCP-MCNC: 3.6 G/DL (ref 3.4–5)
ANION GAP SERPL CALC-SCNC: 12 MMOL/L (ref 10–20)
BUN SERPL-MCNC: 20 MG/DL (ref 6–23)
CALCIUM SERPL-MCNC: 9 MG/DL (ref 8.6–10.3)
CHLORIDE SERPL-SCNC: 105 MMOL/L (ref 98–107)
CO2 SERPL-SCNC: 24 MMOL/L (ref 21–32)
CREAT SERPL-MCNC: 0.81 MG/DL (ref 0.5–1.05)
CRP SERPL-MCNC: 2.07 MG/DL
EGFRCR SERPLBLD CKD-EPI 2021: 73 ML/MIN/1.73M*2
ERYTHROCYTE [DISTWIDTH] IN BLOOD BY AUTOMATED COUNT: 13.5 % (ref 11.5–14.5)
GLUCOSE SERPL-MCNC: 99 MG/DL (ref 74–99)
HCT VFR BLD AUTO: 31.9 % (ref 36–46)
HGB BLD-MCNC: 10.6 G/DL (ref 12–16)
MCH RBC QN AUTO: 31.8 PG (ref 26–34)
MCHC RBC AUTO-ENTMCNC: 33.2 G/DL (ref 32–36)
MCV RBC AUTO: 96 FL (ref 80–100)
NRBC BLD-RTO: 0 /100 WBCS (ref 0–0)
PHOSPHATE SERPL-MCNC: 3 MG/DL (ref 2.5–4.9)
PLATELET # BLD AUTO: 420 X10*3/UL (ref 150–450)
POTASSIUM SERPL-SCNC: 4.2 MMOL/L (ref 3.5–5.3)
RBC # BLD AUTO: 3.33 X10*6/UL (ref 4–5.2)
SODIUM SERPL-SCNC: 137 MMOL/L (ref 136–145)
WBC # BLD AUTO: 8.6 X10*3/UL (ref 4.4–11.3)

## 2025-06-19 PROCEDURE — 36415 COLL VENOUS BLD VENIPUNCTURE: CPT | Performed by: INTERNAL MEDICINE

## 2025-06-19 PROCEDURE — 2500000005 HC RX 250 GENERAL PHARMACY W/O HCPCS: Performed by: PHYSICIAN ASSISTANT

## 2025-06-19 PROCEDURE — 2500000004 HC RX 250 GENERAL PHARMACY W/ HCPCS (ALT 636 FOR OP/ED): Performed by: INTERNAL MEDICINE

## 2025-06-19 PROCEDURE — 2500000001 HC RX 250 WO HCPCS SELF ADMINISTERED DRUGS (ALT 637 FOR MEDICARE OP): Performed by: INTERNAL MEDICINE

## 2025-06-19 PROCEDURE — 85027 COMPLETE CBC AUTOMATED: CPT | Performed by: INTERNAL MEDICINE

## 2025-06-19 PROCEDURE — 99024 POSTOP FOLLOW-UP VISIT: CPT | Performed by: SURGERY

## 2025-06-19 PROCEDURE — 86140 C-REACTIVE PROTEIN: CPT | Performed by: INTERNAL MEDICINE

## 2025-06-19 PROCEDURE — 80069 RENAL FUNCTION PANEL: CPT | Performed by: INTERNAL MEDICINE

## 2025-06-19 PROCEDURE — 2500000001 HC RX 250 WO HCPCS SELF ADMINISTERED DRUGS (ALT 637 FOR MEDICARE OP): Performed by: NURSE PRACTITIONER

## 2025-06-19 RX ORDER — BACITRACIN ZINC 500 UNIT/G
OINTMENT (GRAM) TOPICAL DAILY
Start: 2025-06-19

## 2025-06-19 RX ORDER — LIDOCAINE 560 MG/1
1 PATCH PERCUTANEOUS; TOPICAL; TRANSDERMAL DAILY
Start: 2025-06-19

## 2025-06-19 RX ORDER — METOPROLOL TARTRATE 100 MG/1
100 TABLET ORAL 2 TIMES DAILY
Start: 2025-06-19

## 2025-06-19 RX ORDER — ACETAMINOPHEN 325 MG/1
650 TABLET ORAL EVERY 4 HOURS PRN
Start: 2025-06-19

## 2025-06-19 RX ORDER — TALC
3 POWDER (GRAM) TOPICAL NIGHTLY PRN
Start: 2025-06-19

## 2025-06-19 RX ORDER — DILTIAZEM HYDROCHLORIDE 120 MG/1
120 CAPSULE, COATED, EXTENDED RELEASE ORAL DAILY
Start: 2025-06-19

## 2025-06-19 RX ORDER — TIZANIDINE 2 MG/1
2 TABLET ORAL EVERY 12 HOURS PRN
Qty: 30 TABLET | Refills: 0 | Status: SHIPPED | OUTPATIENT
Start: 2025-06-19

## 2025-06-19 RX ORDER — PANTOPRAZOLE SODIUM 40 MG/1
40 TABLET, DELAYED RELEASE ORAL
Start: 2025-06-20

## 2025-06-19 RX ADMIN — METOPROLOL TARTRATE 100 MG: 50 TABLET, FILM COATED ORAL at 08:56

## 2025-06-19 RX ADMIN — DILTIAZEM HYDROCHLORIDE 120 MG: 120 CAPSULE, COATED, EXTENDED RELEASE ORAL at 08:56

## 2025-06-19 RX ADMIN — LEVOTHYROXINE SODIUM 25 MCG: 0.03 TABLET ORAL at 08:55

## 2025-06-19 RX ADMIN — METRONIDAZOLE 500 MG: 500 INJECTION, SOLUTION INTRAVENOUS at 03:18

## 2025-06-19 RX ADMIN — LIDOCAINE 4% 1 PATCH: 40 PATCH TOPICAL at 08:54

## 2025-06-19 RX ADMIN — ASPIRIN 81 MG: 81 TABLET, COATED ORAL at 08:55

## 2025-06-19 RX ADMIN — APIXABAN 2.5 MG: 2.5 TABLET, FILM COATED ORAL at 08:58

## 2025-06-19 RX ADMIN — PANTOPRAZOLE SODIUM 40 MG: 40 TABLET, DELAYED RELEASE ORAL at 05:59

## 2025-06-19 RX ADMIN — ANASTROZOLE 1 MG: 1 TABLET, COATED ORAL at 08:54

## 2025-06-19 ASSESSMENT — COGNITIVE AND FUNCTIONAL STATUS - GENERAL
TURNING FROM BACK TO SIDE WHILE IN FLAT BAD: A LITTLE
MOVING TO AND FROM BED TO CHAIR: A LITTLE
MOVING FROM LYING ON BACK TO SITTING ON SIDE OF FLAT BED WITH BEDRAILS: A LITTLE
MOBILITY SCORE: 18
STANDING UP FROM CHAIR USING ARMS: A LITTLE
WALKING IN HOSPITAL ROOM: A LITTLE
HELP NEEDED FOR BATHING: A LITTLE
DRESSING REGULAR UPPER BODY CLOTHING: A LITTLE
TOILETING: A LITTLE
PERSONAL GROOMING: A LITTLE
DAILY ACTIVITIY SCORE: 19
CLIMB 3 TO 5 STEPS WITH RAILING: A LITTLE
DRESSING REGULAR LOWER BODY CLOTHING: A LITTLE

## 2025-06-19 ASSESSMENT — ENCOUNTER SYMPTOMS
PALPITATIONS: 0
CONSTIPATION: 0
NUMBNESS: 1
DIZZINESS: 0
ABDOMINAL PAIN: 1
COUGH: 0
DIARRHEA: 0
HEADACHES: 0
NAUSEA: 0
VOMITING: 0
FEVER: 0
BLOOD IN STOOL: 0
CHILLS: 0
SHORTNESS OF BREATH: 0

## 2025-06-19 ASSESSMENT — PAIN - FUNCTIONAL ASSESSMENT
PAIN_FUNCTIONAL_ASSESSMENT: 0-10
PAIN_FUNCTIONAL_ASSESSMENT: 0-10

## 2025-06-19 ASSESSMENT — PAIN SCALES - GENERAL
PAINLEVEL_OUTOF10: 0 - NO PAIN

## 2025-06-19 ASSESSMENT — PAIN SCALES - WONG BAKER: WONGBAKER_NUMERICALRESPONSE: NO HURT

## 2025-06-19 NOTE — CARE PLAN
The patient's goals for the shift include      The clinical goals for the shift include no c/o pain

## 2025-06-19 NOTE — CARE PLAN
The patient's goals for the shift include  staying informed    The clinical goals for the shift include patient will remain in NSR    Over the shift, the patient did not make progress toward the following goals. Barriers to progression include understanding. Recommendations to address these barriers include education.

## 2025-06-19 NOTE — PROGRESS NOTES
Nutrition Follow Up Assessment:   Nutrition Assessment       Medical history per chart: perforated diverticulitis status post Nory procedure 6/7/25. Her postoperative course was complicated by AFRVR and a significant right psoas hematoma from anticoagulation. She has motor function of her RLE but her right thigh is numb.     6/19: RD working remotely, discussed with RN via Virtual Chat. RN reported pt doing well, no GI issues, no edema, possible discharge to facility this morning so let pt rest. Per Wound Care 6/18 note, pt provided with literature including: Food Reference Guide, Bowel Management Instructions and Output Chart, Resources/ Organizations and Ostomy Support Groups within folder. Pt admission Dx, PMH, labs, medications, allergies, diet orders, skin integrity reviewed. RD to follow per POC, protocol. Will continue Magic Cup once daily while pt here.    Nutrition History:  Energy Intake: Poor < 50 %  Food and Nutrient History: Patient reports not eating well since her surgery, however denies issues with appetite PTA.  She complains of no appetite and is forcing herselt fo eat. She also complains of early satiety. Patient reports having coleman yogurt for lunch and is not going to eat dinner. She is only agreeable to Magic Cup. Written information was provided on colostomy however patient not appropriate as she is not feeling well, complains of spasms after medication consumption. Nursing made aware.       Average meal Intake during admission: %   Dietary Orders (From admission, onward)       Start     Ordered    06/16/25 1523  Oral nutritional supplements  Until discontinued        Question Answer Comment   Deliver with Lunch    Select supplement: Magic Cup        06/16/25 1522    06/10/25 0819  Adult diet Regular  Diet effective now        Question:  Diet type  Answer:  Regular    06/10/25 0818    06/07/25 0857  May Participate in Room Service  ( ROOM SERVICE MAY PARTICIPATE)  Once       "  Question:  .  Answer:  Yes    06/07/25 0856                    Anthropometrics:  Height: 147.3 cm (4' 10\")   Weight: 62.8 kg (138 lb 7.2 oz)   BMI (Calculated): 28.94           Weight History:   Wt Readings from Last 10 Encounters:   06/19/25 62.8 kg (138 lb 7.2 oz)   05/09/25 64.4 kg (142 lb)   05/01/25 64.4 kg (142 lb)   10/16/24 62.2 kg (137 lb 3.8 oz)   09/27/24 62.7 kg (138 lb 3.2 oz)   09/23/24 62.1 kg (137 lb)   04/06/24 63.5 kg (140 lb)   03/27/24 63.9 kg (140 lb 14.4 oz)   03/02/24 65.8 kg (145 lb)   03/01/24 62.6 kg (138 lb)       Weight Change %:  Weight History / % Weight Change: 6/16: CBW: 142 lbs. 6/19: Weight fluctuations noted, I/O negative balance >9L since admission noted. Will monitor.    Nutrition Focused Physical Exam Findings:  Subcutaneous Fat Loss:   Orbital Fat Pads: Well nourished (slightly bulging fat pads)  Buccal Fat Pads: Well nourished (full, rounded cheeks)  Triceps: Well nourished (ample fat tissue)  Muscle Wasting:  Temporalis: Well nourished (well-defined muscle)  Pectoralis (Clavicular Region): Well nourished (clavicle not visible)  Deltoid/Trapezius: Well nourished (rounded appearance at arm, shoulder, neck)  Interosseous: Well nourished (muscle bulges)  Edema:  Edema: none (per RN)  Physical Findings:  Skin: Positive  Positive Skin Findings: Impaired wound healing (surgical wound noted)  Digestive System Findings: Anorexia, Early satiety (6/16)    Nutrition Significant Labs:    Results from last 7 days   Lab Units 06/19/25  0426 06/17/25  0336 06/16/25  0521 06/15/25  0559   GLUCOSE mg/dL 99 104* 97 96   SODIUM mmol/L 137 138 135* 140   POTASSIUM mmol/L 4.2 4.0 3.9 4.1   CHLORIDE mmol/L 105 104 107 105   CO2 mmol/L 24 24 22 27   BUN mg/dL 20 15 15 14   CREATININE mg/dL 0.81 0.82 0.81 0.83   EGFR mL/min/1.73m*2 73 72 73 71   CALCIUM mg/dL 9.0 8.8 8.7 8.4*   PHOSPHORUS mg/dL 3.0  --   --   --    MAGNESIUM mg/dL  --  1.89 1.73 1.84     No results found for: \"HGBA1C\"    " "    Nutrition Specific Medications:  Meds reviewed/noted.   Scheduled Medications[1]   Continuous Medications[2]     I/O:   Last BM Date: 06/17/25; Stool Appearance: Soft (06/18/25 0800)    Estimated Needs:   Total Energy Estimated Needs in 24 hours (kCal):  (8076-1907)  Method for Estimating Needs: 22-25 kcal/kg CBW  Total Protein Estimated Needs in 24 Hours (g): 64 g  Method for Estimating 24 Hour Protein Needs: 1.0 g/kg CBW  Total Fluid Estimated Needs in 24 Hours (mL):   Method for Estimating 24 Hour Fluid Needs: 1 mL/kcal + ostomy losses        Nutrition Diagnosis   Malnutrition Diagnosis  Patient has Malnutrition Diagnosis: Yes  Diagnosis Status: Active  Malnutrition Diagnosis: Mild malnutrition related to acute disease or injury  Related to: altered GI function, recent sugery  As Evidenced by: <50% of estimated needs > 5 days    Nutrition Diagnosis  Patient has Nutrition Diagnosis: Yes  Diagnosis Status (1): Discontinued  Nutrition Diagnosis 1: Inadequate oral intake  Related to (1): decreased ability to consume sufficient intakes  As Evidenced by (1): pt reports poor oral intakes       Nutrition Interventions/Recommendations   Nutrition prescription for oral nutrition    Nutrition Recommendations:  Individualized Nutrition Prescription Provided for : Continue current diet order.    Nutrition Interventions/Goals:   Interventions: Meals and snacks, Medical food supplement  Meals and Snacks: General healthful diet  Goal: Consume as tolerated, >75% of meals  Medical Food Supplement: Commercial food medical food supplement therapy  Goal: Consume Magic Cup once daily (290kcal and 9g protein per 4 oz cup)  Coordination of Care with Providers: Nursing (DENNIS Workman)      Education Documentation  Nutrition Care Manual, taught by Deanna M McGuire Lombardo, RDN, LD at 6/16/2025  3:22 PM.  Learner: Patient  Readiness: Refuses  Method: Handout  Response: Needs Reinforcement  Comment: Provided \"Colostomy Nutrition " "Therapy\"            Nutrition Monitoring and Evaluation   Food/Nutrient Related History Monitoring  Monitoring and Evaluation Plan: Estimated Energy Intake  Estimated Energy Intake: Energy intake greater or equal to 75% of estimated energy needs    Anthropometric Measurements  Monitoring and Evaluation Plan: Body weight  Body Weight: Body weight - Maintain stable weight    Biochemical Data, Medical Tests and Procedures  Monitoring and Evaluation Plan: Electrolyte/renal panel, Glucose/endocrine profile  Electrolyte and Renal Panel: Electrolytes within normal limits  Glucose/Endocrine Profile: Glucose within normal limits ( mg/dL)    Physical Exam Findings  Monitoring and Evaluation Plan: Skin  Skin Finding: Impaired wound healing - Improved wound healing    Goal Status: Some progress toward goal(s)    Time Spent (min): 30 minutes             [1] anastrozole, 1 mg, oral, Daily  apixaban, 2.5 mg, oral, q12h  aspirin, 81 mg, oral, Daily  bacitracin, , Topical, Daily  cefepime, 2 g, intravenous, q12h  dilTIAZem CD, 120 mg, oral, Daily  pantoprazole, 40 mg, oral, Daily before breakfast   Or  esomeprazole, 40 mg, nasoduodenal tube, Daily before breakfast   Or  pantoprazole, 40 mg, intravenous, Daily before breakfast  levothyroxine, 25 mcg, oral, Daily  lidocaine, 1 patch, transdermal, Daily  magnesium hydroxide, 30 mL, oral, Daily  metoprolol tartrate, 100 mg, oral, BID  metroNIDAZOLE, 500 mg, intravenous, q12h  [2] dilTIAZem, 5-15 mg/hr, Last Rate: Stopped (06/13/25 2111)    "

## 2025-06-19 NOTE — PROGRESS NOTES
April 25, 2019     Patient: Kenny Person   YOB: 2003   Date of Visit: 4/25/2019       To Whom it May Concern:    Kenny Person was seen in my clinic on 4/25/2019 at 11:30 am.     Please excuse Kenny for his absence from school on the date listed above to be able to make his appointment.  Also, please excuse Kenny from gym class tomorrow, Friday, April 26th.  Let me know if you have any questions.        Sincerely,         Zabrina Carreon CNP    Medical information is confidential and cannot be disclosed without the written consent of the patient or his representative.       Patient is discharging to [facility]. [facility] is arranging transportation and sending all needed documentation and orders to facility. Patient/daughter are aware. Bedside RN will be given N2N report number once transport is arranged.

## 2025-06-19 NOTE — DISCHARGE SUMMARY
Discharge Diagnosis  Acute Diverticulitis of large intestine with perforation without bleeding     Issues Requiring Follow-Up  Close f/p with General surgery     Discharge Meds     Medication List      ASK your doctor about these medications     anastrozole 1 mg tablet; Commonly known as: Arimidex   artificial tears (dextran-hypomel-glycerin) 0.1-0.3-0.2 % ophthalmic   solution   aspirin 81 mg EC tablet   calcium carbonate-vitamin D3 500 mg-5 mcg (200 unit) tablet   levothyroxine 25 mcg tablet; Commonly known as: Synthroid, Levoxyl   metoprolol tartrate 50 mg tablet; Commonly known as: Lopressor   olmesartan 40 mg tablet; Commonly known as: BENIcar   polyethylene glycol 17 gram packet; Commonly known as: Glycolax, Miralax       Test Results Pending At Discharge  Pending Labs       Order Current Status    Surgical Pathology Exam In process    Sterile Fluid Culture/Smear Preliminary result            Hospital Course   O/   General Appearance: Alert, Oriented X3, Cooperative, Not in Acute Distress  HEENT: no eye redness, not pale, no jaundice, Throat: not congested, no ulcers    Chest: Good AE bilateral, No crackles, no ronchies, no wheezes.   Heart: RHR, Normal heart sounds S1, S2, no murmur or gallop,   Abdomen: Soft, lax, no hepatosplenomegaly, intact hernia orifices, negative lopez sign, no tenderness,   Genitalia: no abnormal discharge, no ulcers, no swelling.  Lymphatics: no lymphadenopathy, supraclavicular, inguinal trochlear, or axilla.   Neurology: Intact cranial nerves 2 to 12, intact sensation, intact motor function (Power, tone and reflexes). Normal DTR reflexes.   Extremities: no signs of PAD, no swelling no ulcers   Back: no deformity, no SI tenderness, no ulcers.   Skin: no signs of dehydration, no Rash, Bruises, or purpura.        AS:  Ms. Rosemary Motta is a 79 y.o. female on day 11 of admission presenting with Diverticulitis of large intestine with perforation s/p Nory procedure 6/7/25  complicated with AFib.RVR.  Per G surgery clear to DC.  Per ID as culture negative no growth > 48hs stop aBX and clear to dc  Medically stable to dc to SNF      #. Acute Diverticulitis, complicated w perforation.  - s/p hematoma and abd wall abscess s/p aspiration pending cx  - c/w ABX  - surgery on board  - ID on board   - c/w Cefepime  - 6/19.  #. Comorbidity:  - Afib, HTN, CKD, breast CA, hypothyroid, GERD.  - c/w monitoring      Code Status: Full  DVT ppx:  on hold due to hematoma  Disp: PT/ot      I reviewed the resident/fellow's documentation and discussed the patient with the resident/fellow. I agree with the resident/fellow's medical decision making as documented in the note.  I saw and evaluated the patient.  I personally obtained the key and critical portions of the history and physical exam or was physically present for key and critical portions performed by the resident. I reviewed the resident's documentation and discussed the patient with the resident.  I agree with the resident's medical decision making as documented in the note.   spoke to the pt, explained the medical and social conditions and the reason for this admission explained our plan and recommendations.  Time spent on the assessment of patient, gathering and interpreting data, review of medical record/patient history, personally reviewing radiographic imaging. With greater than 50% spent in personal discussion with patient.  Disclaimer:   Portions of this note may have been generated using Dragon voice recognition software. Reasonable efforts were made to correct any dictation errors that resulted due to the programming of this software but some may still be present.   Portions of this note including HPI, ROS, impression/plan, and examination may have been copied forward from previous notes as to provide important historical information essential in contributing to medical decision making. Documentation has been reviewed and edited as  necessary to support clinical decision making for today's visit and to reflect my own independent evaluation of this patient.    The time of this note does not reflect the time I saw the patient but the time that this note was written   Time > 45 min     Pertinent Physical Exam At Time of Discharge  Physical Exam    Outpatient Follow-Up  Future Appointments   Date Time Provider Department Center   7/21/2025  9:00 AM POR PROCEDURE ROOM [facility]   9/29/2025 10:30 AM POR MAMMO 1 [facility]   10/1/2025 10:30 AM Cynthia Haywood MD [id number] [facility]   10/15/2025 10:30 AM Denisha Stanton MD [id number] [facility]         Eric Khan MD

## 2025-06-19 NOTE — PROGRESS NOTES
"GENERAL SURGERY PROGRESS NOTE    Rosemary Motta   1945   [id number]     Rosemary Motta is a 79 y.o. female on day 12 of admission presenting with Diverticulitis of large intestine with perforation without bleeding.      Subjective  No acute events overnight. Negative cultures. Continues to have ostomy function. No complaints at this time.    Review of Systems   Constitutional:  Negative for chills and fever.   Respiratory:  Negative for cough and shortness of breath.    Cardiovascular:  Negative for chest pain and palpitations.   Gastrointestinal:  Positive for abdominal pain. Negative for blood in stool, constipation, diarrhea, nausea and vomiting.   Neurological:  Positive for numbness. Negative for dizziness and headaches.   All other systems reviewed and are negative.      Objective    Last Recorded Vitals  Blood pressure 149/68, pulse 71, temperature 36.6 °C (97.8 °F), temperature source Temporal, resp. rate 18, height 1.473 m (4' 10\"), weight 62.8 kg (138 lb 7.2 oz), SpO2 99%.    Intake/Output last 3 Shifts:  I/O last 3 completed shifts:  In: 150 (2.4 mL/kg) [P.O.:150]  Out: 1650 (26.2 mL/kg) [Urine:1650 (0.7 mL/kg/hr)]  Weight: 63 kg     Physical Exam  Constitutional:       General: She is not in acute distress.     Appearance: Normal appearance. She is not ill-appearing.   HENT:      Head: Normocephalic and atraumatic.   Cardiovascular:      Rate and Rhythm: Normal rate and regular rhythm.   Pulmonary:      Effort: Pulmonary effort is normal. No respiratory distress.      Breath sounds: Normal breath sounds.   Abdominal:      General: There is no distension.      Palpations: Abdomen is soft.      Tenderness: There is no abdominal tenderness. There is no guarding.      Comments: Incision clean/dry/intact with staples in place. Colostomy viable with soft brown stool.    Musculoskeletal:         General: No swelling.   Skin:     General: Skin is warm and dry.   Neurological:      Mental Status: She is alert " and oriented to person, place, and time. Mental status is at baseline.   Psychiatric:         Mood and Affect: Mood normal.         Behavior: Behavior normal.       Labs  Results for orders placed or performed during the hospital encounter of 06/07/25 (from the past 24 hours)   CBC   Result Value Ref Range    WBC 8.6 4.4 - 11.3 x10*3/uL    nRBC 0.0 0.0 - 0.0 /100 WBCs    RBC 3.33 (L) 4.00 - 5.20 x10*6/uL    Hemoglobin 10.6 (L) 12.0 - 16.0 g/dL    Hematocrit 31.9 (L) 36.0 - 46.0 %    MCV 96 80 - 100 fL    MCH 31.8 26.0 - 34.0 pg    MCHC 33.2 32.0 - 36.0 g/dL    RDW 13.5 11.5 - 14.5 %    Platelets 420 150 - 450 x10*3/uL   Renal Function Panel   Result Value Ref Range    Glucose 99 74 - 99 mg/dL    Sodium 137 136 - 145 mmol/L    Potassium 4.2 3.5 - 5.3 mmol/L    Chloride 105 98 - 107 mmol/L    Bicarbonate 24 21 - 32 mmol/L    Anion Gap 12 10 - 20 mmol/L    Urea Nitrogen 20 6 - 23 mg/dL    Creatinine 0.81 0.50 - 1.05 mg/dL    eGFR 73 >60 mL/min/1.73m*2    Calcium 9.0 8.6 - 10.3 mg/dL    Phosphorus 3.0 2.5 - 4.9 mg/dL    Albumin 3.6 3.4 - 5.0 g/dL   C-Reactive Protein   Result Value Ref Range    C-Reactive Protein 2.07 (H) <1.00 mg/dL         Radiology  Imaging  US guided aspiration of abscess, hematoma, cyst  Result Date: 6/17/2025  Ultrasound-guided aspiration of a potential fluid collection along the right iliopsoas complex yielded a few mL of heme stained fluid. Findings correlate with evolving hematoma.   Performed and dictated at [facility].   Signed by: Ryan Parker 6/17/2025 6:31 AM Dictation workstation:   [id number]    CT abdomen pelvis w IV contrast  Result Date: 6/16/2025  1.  The anterior mid to superior aspect of the right iliacus muscle has developed a 1.8 x 3.4 cm fluid collection with edema and/or inflammation in the adjacent posterior pararenal space fat. 2. The central muscle has a hypodense collections similar in size to the prior CT; layering hyperdense material  within this collection has decreased in overall density. Previously noted foci of hypodensity from active bleeding and/or pseudo aneurysm are not identified. While this may be related to hemorrhage cysts a shin/occlusion of the pseudoaneurysms, there is a possibility that this appearance is related to imaging during an earlier arterial phase of the exam on the current study prior to when slow contrast extravasation could be detected (the prior exam was imaged later following contrast administration and had more contrast within the venous system/IVC than the current exam). 3. No active hemorrhage and or pseudo aneurysm in this region is noted. 4. Small pleural effusions and lung atelectasis noted previously have cleared.     MACRO: None   Signed by: Jeff Freitas 6/16/2025 7:45 AM Dictation workstation:   [id number]    MR lumbar spine wo IV contrast  Result Date: 6/13/2025  1. Motion degraded study. Combination of congenital spinal stenosis, epidural lipomatosis and diffuse degenerative changes seen throughout the lumbar spine. 2. Moderate spinal canal stenosis at L3-L4 and mild spinal canal stenosis at L2-L3 and L4-L5. 3. Severe narrowing of the left lateral recess at L4-L5 with probable compromise of the descending left L5 nerve root. Severe left foraminal stenosis at this level. 4. Levocurvature of the lumbar spine. Right lateral listhesis of L1-L2. Slight left lateral listhesis of L3-L4 and L4-L5.   I personally reviewed the images/study and I agree with the findings as stated.   MACRO: None   Signed by: Jennifer Blake 6/13/2025 8:43 AM Dictation workstation:   [id number]    MR MSK pelvis w and wo IV contrast  Result Date: 6/12/2025  1.  Severe muscle edema in the iliopsoas muscle predominantly in the iliacus with several intramuscular fluid collections. Findings most compatible with underlying infectious pyomyositis with intramuscular abscess formation. Underlying hematoma some may be present as well as per recent  CT. Evaluation for extravasation reported on previous CT is limited in this study which is tailored for evaluation of the lumbar plexus. There is moderate muscle edema in the piriformis and the abductor musculature as well. This is also concerning for underlying myositis. 2. No evidence of abnormal marrow signal about the total hip arthroplasties. No large effusion seen. 3. No abnormality seen along the lumbosacral plexus. The sciatic nerves are symmetric.     I personally reviewed the images/study and I agree with the findings as stated. This study was interpreted at [facility], [place], [place].   MACRO: Critical Finding:  See findings. Notification was initiated on 6/12/2025 at 8:08 pm by  Duke Pierre.  (**-OCF-**)   Signed by: Duke Pierre 6/12/2025 8:08 PM Dictation workstation:   [id number]      Assessment and Plan  Assessment & Plan  Diverticulitis of large intestine with perforation without bleeding    79 y.o. female with perforated diverticulitis status post Nory procedure 6/7/25. Her postoperative course was complicated by AFRVR and a significant right psoas hematoma from anticoagulation. She has motor function of her RLE but her right thigh is numb. Doing well from a surgical standpoint.    Plan:  - Per ID recommendations antibiotics to be stopped given negative cultures  - Continue routine ostomy care, seen by wound nurse and given supplies yesterday  - Overall patient progressing well, tolerating regular diet  - Patient may be discharged from a surgical perspective, follow-up with Dr. Haywood in a week for potential staple removal.    Discussed with Dr. Taqueria Day,  PGY3  General Surgery

## 2025-06-19 NOTE — CONSULTS
Ostomy Care Consult     Visit Date: 6/19/2025      Patient Name: Rosemary Motta         MRN: [id number]               Colostomy LUQ (Active)   Placement Date: 06/07/25   Location: LUQ   Number of days: 12      Colostomy LUQ (Active)   Present on Admission to Healthcare Facility N 06/09/25 1133   Stomal Appliance 1 piece;Clean;Dry;Intact 06/19/25 1000   Site/Stoma Assessment Clean;Intact 06/19/25 0019   Stoma Size (cm) 38 cm 06/18/25 1900   Peristomal Assessment Clean;Intact 06/19/25 0019   Treatment Other (Comment) 06/19/25 1000   Drainage Characteristics Brown 06/19/25 1000   Output (mL) 100 mL 06/19/25 1000     Patient emptied pouch independently with verbal cueing. All questions answered to patients satisfaction. Patient being discharged to SNF today. Home going supplies at bedside to go with patient. See full education note yesterday.       Please contact me with questions or changes in patient condition.   Demteria Salamanca. RN  Wound/Ostomy Care  [phone]

## 2025-06-19 NOTE — NURSING NOTE
End of Shift Report  6/19/25     Admission  Rosemary Motta was admitted on 6/7/2025 for the following diagnoses:   Diverticulitis [K57.92]  Free intraperitoneal air [K66.8]    Significant Events  There were no significant events    Interventions      Response to Interventions       Recent Vital Signs  Patient Vitals for the past 12 hrs:   BP Temp Temp src Pulse Resp SpO2 Weight   06/18/25 1939 127/70 37.7 °C (99.8 °F) Temporal 83 15 99 % --   06/18/25 2348 108/55 36.1 °C (96.9 °F) Temporal 65 16 100 % --   06/19/25 0513 149/68 36.6 °C (97.8 °F) Temporal 71 18 99 % 62.8 kg (138 lb 7.2 oz)      0-10 (Numeric) Pain Score: 0 - No pain  Hartley-Baker FACES Pain Rating: Hurts little bit  N-PASS Pain/Agitation Score:  [0]      Latest labs  Lab Results   Component Value Date    WBC 8.6 06/19/2025    HGB 10.6 (L) 06/19/2025    HCT 31.9 (L) 06/19/2025     06/19/2025    CHOL 280 (H) 02/28/2020    TRIG 145 02/28/2020    HDL 41.1 02/28/2020    ALT 10 06/11/2025    AST 17 06/11/2025     06/19/2025    K 4.2 06/19/2025     06/19/2025    CREATININE 0.81 06/19/2025    BUN 20 06/19/2025    CO2 24 06/19/2025    TSH 2.89 06/07/2025    INR 1.0 05/01/2025       Other Results      Scheduled Medications:  Scheduled Medications[1]   Continuous Medications[2]         [1] anastrozole, 1 mg, oral, Daily  aspirin, 81 mg, oral, Daily  bacitracin, , Topical, Daily  cefepime, 2 g, intravenous, q12h  dilTIAZem CD, 120 mg, oral, Daily  pantoprazole, 40 mg, oral, Daily before breakfast   Or  esomeprazole, 40 mg, nasoduodenal tube, Daily before breakfast   Or  pantoprazole, 40 mg, intravenous, Daily before breakfast  levothyroxine, 25 mcg, oral, Daily  lidocaine, 1 patch, transdermal, Daily  magnesium hydroxide, 30 mL, oral, Daily  metoprolol tartrate, 100 mg, oral, BID  metroNIDAZOLE, 500 mg, intravenous, q12h  [2] dilTIAZem, 5-15 mg/hr, Last Rate: Stopped (06/13/25 2111)  [Held by provider] heparin, 0-4,500 Units/hr, Last Rate:  Stopped (06/12/25 0121)

## 2025-06-20 LAB
BACTERIA FLD CULT: NORMAL
GRAM STN SPEC: NORMAL
GRAM STN SPEC: NORMAL

## 2025-06-25 ENCOUNTER — TELEPHONE (OUTPATIENT)
Dept: SURGERY | Facility: CLINIC | Age: 80
End: 2025-06-25
Payer: MEDICARE

## 2025-06-25 NOTE — TELEPHONE ENCOUNTER
----- Message from Arya FINNEGAN sent at 6/25/2025  1:56 PM EDT -----  Patient is scheduled for a staple removal at facility, [facility] in [place], on 6/26/25. Patient is scheduled to see us in the office for a post-op appointment on 7/2/25.  ----- Message -----  From: Mae Barragan CMA  Sent: 6/25/2025  10:47 AM EDT  To: Arya Esteban MA      ----- Message -----  From: Cynthia Haywood MD  Sent: 6/24/2025   1:19 PM EDT  To: Mae Barragan CMA    She was supposed to call and make a postop appointment in the office.  Please call the facility back and make an appointment within the next week for her to be seen in the office and I will take out the staples.  ----- Message -----  From: Mae Barragan CMA  Sent: 6/24/2025  11:10 AM EDT  To: MD Duyen Pratt from [facility] is calling about this patient staples.  They state that they just need an order for those to come out.  The house physician feels they are more than ready to be removed since her surgery was on 6/8/25.  Please call duyen with a response at [phone]

## 2025-06-26 DIAGNOSIS — B02.29 POST HERPETIC NEURALGIA: ICD-10-CM

## 2025-06-26 DIAGNOSIS — R06.00 DYSPNEA, UNSPECIFIED: ICD-10-CM

## 2025-06-26 DIAGNOSIS — I10 PRIMARY HYPERTENSION: ICD-10-CM

## 2025-06-26 DIAGNOSIS — K57.92 DIVERTICULITIS: ICD-10-CM

## 2025-06-26 DIAGNOSIS — K57.20 DIVERTICULITIS OF LARGE INTESTINE WITH PERFORATION WITHOUT BLEEDING: ICD-10-CM

## 2025-06-26 DIAGNOSIS — I48.91 ATRIAL FIBRILLATION, UNSPECIFIED TYPE (MULTI): ICD-10-CM

## 2025-06-26 DIAGNOSIS — I48.0 PAROXYSMAL ATRIAL FIBRILLATION (MULTI): ICD-10-CM

## 2025-06-26 LAB
LABORATORY COMMENT REPORT: NORMAL
PATH REPORT.FINAL DX SPEC: NORMAL
PATH REPORT.GROSS SPEC: NORMAL
PATH REPORT.RELEVANT HX SPEC: NORMAL
PATH REPORT.TOTAL CANCER: NORMAL

## 2025-06-28 ENCOUNTER — APPOINTMENT (OUTPATIENT)
Dept: RADIOLOGY | Facility: HOSPITAL | Age: 80
End: 2025-06-28
Payer: MEDICARE

## 2025-06-28 ENCOUNTER — HOSPITAL ENCOUNTER (EMERGENCY)
Facility: HOSPITAL | Age: 80
Discharge: NURSING, REHAB OR LONG-TERM CARE | End: 2025-06-28
Attending: EMERGENCY MEDICINE
Payer: MEDICARE

## 2025-06-28 VITALS
RESPIRATION RATE: 14 BRPM | SYSTOLIC BLOOD PRESSURE: 150 MMHG | DIASTOLIC BLOOD PRESSURE: 72 MMHG | HEART RATE: 86 BPM | WEIGHT: 131 LBS | OXYGEN SATURATION: 100 % | HEIGHT: 58 IN | TEMPERATURE: 98.4 F | BODY MASS INDEX: 27.5 KG/M2

## 2025-06-28 DIAGNOSIS — M25.551 RIGHT HIP PAIN: ICD-10-CM
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
## 2025-06-28 LAB
ALBUMIN SERPL BCP-MCNC: 3.7 G/DL (ref 3.4–5)
ALP SERPL-CCNC: 65 U/L (ref 33–136)
ALT SERPL W P-5'-P-CCNC: 14 U/L (ref 7–45)
ANION GAP SERPL CALC-SCNC: 11 MMOL/L (ref 10–20)
AST SERPL W P-5'-P-CCNC: 21 U/L (ref 9–39)
BASOPHILS # BLD AUTO: 0.04 X10*3/UL (ref 0–0.1)
BASOPHILS NFR BLD AUTO: 0.9 %
BILIRUB SERPL-MCNC: 0.7 MG/DL (ref 0–1.2)
BUN SERPL-MCNC: 26 MG/DL (ref 6–23)
CALCIUM SERPL-MCNC: 9.5 MG/DL (ref 8.6–10.3)
CHLORIDE SERPL-SCNC: 104 MMOL/L (ref 98–107)
CO2 SERPL-SCNC: 27 MMOL/L (ref 21–32)
CREAT SERPL-MCNC: 1.07 MG/DL (ref 0.5–1.05)
EGFRCR SERPLBLD CKD-EPI 2021: 53 ML/MIN/1.73M*2
EOSINOPHIL # BLD AUTO: 0.1 X10*3/UL (ref 0–0.4)
EOSINOPHIL NFR BLD AUTO: 2.2 %
ERYTHROCYTE [DISTWIDTH] IN BLOOD BY AUTOMATED COUNT: 13.1 % (ref 11.5–14.5)
GLUCOSE SERPL-MCNC: 115 MG/DL (ref 74–99)
HCT VFR BLD AUTO: 31.4 % (ref 36–46)
HGB BLD-MCNC: 10.7 G/DL (ref 12–16)
IMM GRANULOCYTES # BLD AUTO: 0.07 X10*3/UL (ref 0–0.5)
IMM GRANULOCYTES NFR BLD AUTO: 1.5 % (ref 0–0.9)
LYMPHOCYTES # BLD AUTO: 0.88 X10*3/UL (ref 0.8–3)
LYMPHOCYTES NFR BLD AUTO: 19.4 %
MCH RBC QN AUTO: 32.2 PG (ref 26–34)
MCHC RBC AUTO-ENTMCNC: 34.1 G/DL (ref 32–36)
MCV RBC AUTO: 95 FL (ref 80–100)
MONOCYTES # BLD AUTO: 0.28 X10*3/UL (ref 0.05–0.8)
MONOCYTES NFR BLD AUTO: 6.2 %
NEUTROPHILS # BLD AUTO: 3.17 X10*3/UL (ref 1.6–5.5)
NEUTROPHILS NFR BLD AUTO: 69.8 %
NRBC BLD-RTO: 0 /100 WBCS (ref 0–0)
PLATELET # BLD AUTO: 279 X10*3/UL (ref 150–450)
POTASSIUM SERPL-SCNC: 3.7 MMOL/L (ref 3.5–5.3)
PROT SERPL-MCNC: 6.8 G/DL (ref 6.4–8.2)
RBC # BLD AUTO: 3.32 X10*6/UL (ref 4–5.2)
SODIUM SERPL-SCNC: 138 MMOL/L (ref 136–145)
WBC # BLD AUTO: 4.5 X10*3/UL (ref 4.4–11.3)

## 2025-06-28 PROCEDURE — 72170 X-RAY EXAM OF PELVIS: CPT | Performed by: STUDENT IN AN ORGANIZED HEALTH CARE EDUCATION/TRAINING PROGRAM

## 2025-06-28 PROCEDURE — 85025 COMPLETE CBC W/AUTO DIFF WBC: CPT | Performed by: EMERGENCY MEDICINE

## 2025-06-28 PROCEDURE — 36415 COLL VENOUS BLD VENIPUNCTURE: CPT | Performed by: EMERGENCY MEDICINE

## 2025-06-28 PROCEDURE — 73700 CT LOWER EXTREMITY W/O DYE: CPT | Mod: RIGHT SIDE | Performed by: STUDENT IN AN ORGANIZED HEALTH CARE EDUCATION/TRAINING PROGRAM

## 2025-06-28 PROCEDURE — 73552 X-RAY EXAM OF FEMUR 2/>: CPT | Mod: RT

## 2025-06-28 PROCEDURE — 72170 X-RAY EXAM OF PELVIS: CPT

## 2025-06-28 PROCEDURE — 73564 X-RAY EXAM KNEE 4 OR MORE: CPT | Mod: RT

## 2025-06-28 PROCEDURE — 73700 CT LOWER EXTREMITY W/O DYE: CPT | Mod: RT

## 2025-06-28 PROCEDURE — 73564 X-RAY EXAM KNEE 4 OR MORE: CPT | Performed by: STUDENT IN AN ORGANIZED HEALTH CARE EDUCATION/TRAINING PROGRAM

## 2025-06-28 PROCEDURE — 80053 COMPREHEN METABOLIC PANEL: CPT | Performed by: EMERGENCY MEDICINE

## 2025-06-28 PROCEDURE — 2500000001 HC RX 250 WO HCPCS SELF ADMINISTERED DRUGS (ALT 637 FOR MEDICARE OP): Performed by: EMERGENCY MEDICINE

## 2025-06-28 PROCEDURE — 73552 X-RAY EXAM OF FEMUR 2/>: CPT | Performed by: STUDENT IN AN ORGANIZED HEALTH CARE EDUCATION/TRAINING PROGRAM

## 2025-06-28 PROCEDURE — 99285 EMERGENCY DEPT VISIT HI MDM: CPT | Mod: 25 | Performed by: EMERGENCY MEDICINE

## 2025-06-28 RX ORDER — OXYCODONE AND ACETAMINOPHEN 5; 325 MG/1; MG/1
1 TABLET ORAL ONCE
Refills: 0 | Status: COMPLETED | OUTPATIENT
Start: 2025-06-28 | End: 2025-06-28

## 2025-06-28 RX ADMIN — OXYCODONE HYDROCHLORIDE AND ACETAMINOPHEN 1 TABLET: 5; 325 TABLET ORAL at 11:50

## 2025-06-28 ASSESSMENT — PAIN SCALES - GENERAL
PAINLEVEL_OUTOF10: 8
PAINLEVEL_OUTOF10: 3

## 2025-06-28 ASSESSMENT — LIFESTYLE VARIABLES
HAVE YOU EVER FELT YOU SHOULD CUT DOWN ON YOUR DRINKING: NO
EVER HAD A DRINK FIRST THING IN THE MORNING TO STEADY YOUR NERVES TO GET RID OF A HANGOVER: NO
TOTAL SCORE: 0
HAVE PEOPLE ANNOYED YOU BY CRITICIZING YOUR DRINKING: NO
EVER FELT BAD OR GUILTY ABOUT YOUR DRINKING: NO

## 2025-06-28 ASSESSMENT — PAIN DESCRIPTION - LOCATION: LOCATION: KNEE

## 2025-06-28 ASSESSMENT — PAIN - FUNCTIONAL ASSESSMENT
PAIN_FUNCTIONAL_ASSESSMENT: 0-10
PAIN_FUNCTIONAL_ASSESSMENT: 0-10

## 2025-06-28 NOTE — ED PROVIDER NOTES
HPI   No chief complaint on file.      Chief complaint: Fall, right hip pain    History of present illness: Patient is an 79-year-old female with history of GERD, hypertension, diverticulitis status post colectomy and diverting ostomy presenting to the emergency department with complaints of right lower extremity pain after a fall.  According to the patient, she was getting into the shower when her foot slipped.  The patient states that she fell to the ground.  The patient states that she is now experiencing pain of her right hip and leg.  The patient states that she has pain from the right hip to the right knee.  Any movement of the right lower extremity causes pain.  She denies hitting her head or any loss of consciousness.  The patient does take Eliquis.  She denies pain anywhere else.  EMS was called and the patient was brought to the emergency department for further evaluation.      History provided by:  Patient   used: No            Patient History   Medical History[1]  Surgical History[2]  Family History[3]  Social History[4]    Physical Exam   ED Triage Vitals   Temp Pulse Resp BP   -- -- -- --      SpO2 Temp src Heart Rate Source Patient Position   -- -- -- --      BP Location FiO2 (%)     -- --       Physical Exam  Constitutional:       Appearance: Normal appearance.   HENT:      Head: Normocephalic and atraumatic.      Right Ear: External ear normal.      Left Ear: External ear normal.      Nose: Nose normal.      Mouth/Throat:      Mouth: Mucous membranes are moist.   Eyes:      General: Lids are normal.      Extraocular Movements: Extraocular movements intact.      Pupils: Pupils are equal, round, and reactive to light.   Cardiovascular:      Rate and Rhythm: Normal rate and regular rhythm.      Heart sounds: Normal heart sounds.   Pulmonary:      Effort: Pulmonary effort is normal.      Breath sounds: Normal breath sounds.   Abdominal:      General: Abdomen is flat.      Palpations:  Abdomen is soft.      Tenderness: There is no abdominal tenderness. There is no guarding or rebound.   Musculoskeletal:         General: No deformity.      Cervical back: Normal range of motion and neck supple.      Right hip: Tenderness present. Decreased range of motion.      Right upper leg: Tenderness present.      Right knee: Decreased range of motion. Tenderness present.      Comments: Patient has no tenderness to palpation of the pelvis when pressure is applied to her ASIS.   Skin:     General: Skin is warm.      Capillary Refill: Capillary refill takes less than 2 seconds.      Coloration: Skin is not jaundiced.   Neurological:      General: No focal deficit present.      Mental Status: She is alert and oriented to person, place, and time.   Psychiatric:         Mood and Affect: Mood normal.         Behavior: Behavior normal.           ED Course & MDM                  No data recorded                                 Medical Decision Making      Procedure  Procedures         [1]   Past Medical History:  Diagnosis Date    Anxiety and depression     Breast cancer     CKD (chronic kidney disease)     HLD (hyperlipidemia)     HTN (hypertension)     Hypothyroidism     Pulmonary embolism     postop   [2]   Past Surgical History:  Procedure Laterality Date    BREAST BIOPSY      CARPAL TUNNEL RELEASE Bilateral      SECTION, CLASSIC      CHOLECYSTECTOMY      COLOSTOMY  2025    Hanna's procedure    FOOT SURGERY Bilateral     bunions and corns    MASTECTOMY Right 2018    TOTAL HIP ARTHROPLASTY Left    [3]   Family History  Problem Relation Name Age of Onset    Breast cancer Mother  65   [4]   Social History  Tobacco Use    Smoking status: Never    Smokeless tobacco: Never   Vaping Use    Vaping status: Never Used   Substance Use Topics    Alcohol use: Not Currently    Drug use: Never      and/or Complexity of Data Reviewed  Radiology: ordered. Decision-making details documented in ED Course.        Procedure  Procedures         [1]   Past Medical History:  Diagnosis Date    Anxiety and depression     Breast cancer     CKD (chronic kidney disease)     HLD (hyperlipidemia)     HTN (hypertension)     Hypothyroidism     Pulmonary embolism     postop   [2]   Past Surgical History:  Procedure Laterality Date    BREAST BIOPSY      CARPAL TUNNEL RELEASE Bilateral      SECTION, CLASSIC      CHOLECYSTECTOMY      COLOSTOMY  2025    Hanna's procedure    FOOT SURGERY Bilateral     bunions and corns    MASTECTOMY Right 2018    TOTAL HIP ARTHROPLASTY Left    [3]   Family History  Problem Relation Name Age of Onset    Breast cancer Mother  65   [4]   Social History  Tobacco Use    Smoking status: Never    Smokeless tobacco: Never   Vaping Use    Vaping status: Never Used   Substance Use Topics    Alcohol use: Not Currently    Drug use: Never        Martín Warner MD  25 [phone]

## 2025-07-02 ENCOUNTER — APPOINTMENT (OUTPATIENT)
Dept: SURGERY | Facility: CLINIC | Age: 80
End: 2025-07-02
Payer: MEDICARE

## 2025-07-02 VITALS
SYSTOLIC BLOOD PRESSURE: 150 MMHG | OXYGEN SATURATION: 98 % | DIASTOLIC BLOOD PRESSURE: 85 MMHG | BODY MASS INDEX: 27.5 KG/M2 | WEIGHT: 131 LBS | HEART RATE: 68 BPM | HEIGHT: 58 IN

## 2025-07-02 DIAGNOSIS — Z93.3 COLOSTOMY IN PLACE (MULTI): ICD-10-CM

## 2025-07-02 DIAGNOSIS — K57.20 DIVERTICULITIS OF LARGE INTESTINE WITH PERFORATION WITHOUT BLEEDING: Primary | ICD-10-CM

## 2025-07-02 PROCEDURE — 3079F DIAST BP 80-89 MM HG: CPT | Performed by: SURGERY

## 2025-07-02 PROCEDURE — 1157F ADVNC CARE PLAN IN RCRD: CPT | Performed by: SURGERY

## 2025-07-02 PROCEDURE — 1159F MED LIST DOCD IN RCRD: CPT | Performed by: SURGERY

## 2025-07-02 PROCEDURE — 1160F RVW MEDS BY RX/DR IN RCRD: CPT | Performed by: SURGERY

## 2025-07-02 PROCEDURE — 1036F TOBACCO NON-USER: CPT | Performed by: SURGERY

## 2025-07-02 PROCEDURE — 99024 POSTOP FOLLOW-UP VISIT: CPT | Performed by: SURGERY

## 2025-07-02 PROCEDURE — 3077F SYST BP >= 140 MM HG: CPT | Performed by: SURGERY

## 2025-07-02 PROCEDURE — 1111F DSCHRG MED/CURRENT MED MERGE: CPT | Performed by: SURGERY

## 2025-07-02 RX ORDER — LOSARTAN POTASSIUM 100 MG/1
100 TABLET ORAL DAILY
COMMUNITY

## 2025-07-02 RX ORDER — ROPINIROLE 2 MG/1
2 TABLET, FILM COATED ORAL NIGHTLY
COMMUNITY

## 2025-07-02 RX ORDER — ONDANSETRON 4 MG/1
4 TABLET, ORALLY DISINTEGRATING ORAL EVERY 8 HOURS PRN
COMMUNITY

## 2025-07-02 RX ORDER — ROPINIROLE 0.5 MG/1
0.5 TABLET, FILM COATED ORAL 3 TIMES DAILY
COMMUNITY

## 2025-07-02 NOTE — PATIENT INSTRUCTIONS
Continue with routine colostomy care.  You should start doing the colostomy care yourself.  Continue with the daily MiraLAX to help with your chronic constipation.  You should also be eating a high-fiber diet.  Your goal is 25 g of fiber per day with 1.5 to 2 L of water a day.  This will help with your constipation.  You may shower with the Steri-Strips in place.  Let the Steri-Strips fall off on their own.  You are encouraged to drink 3 protein drinks per day to optimize your nutrition.  Keep your appointment for your left breast screening mammogram on 9/29/2025.  Follow-up in Dr. Haywood's office on 10/1/2025 for routine follow-up of your right breast cancer.  If you have other questions or concerns regarding your recent abdominal surgery, please call Dr. Haywood's office.  [phone]

## 2025-07-02 NOTE — PROGRESS NOTES
GENERAL SURGERY OFFICE NOTE    Patient: Rosemary Motta    Age: 79 y.o.   Gender: female    MRN: [id number]    PCP: Prema Romero, DO        SUBJECTIVE     Chief Complaint  Follow-up (Patient is here for a post op Exploratory laparotomy with partial colectomy and colostomy placement done 6/7/25. Patient states that since surgery she has had a constant pressure in her groin area. Patient states that she has been able to urinate normally. )       HPI  Rosemary returns to the office for a first postop check after undergoing an emergency Hanna's procedure for perforated diverticular disease.  After her surgery, she was sent to a skilled nursing facility where she still currently resides.  The facility elected to remove her staples.  She is having good colostomy output, and reports being on MiraLAX or Metamucil on a daily basis.  She is not having any drainage from her midline incision.  She states that her appetite is very poor, but tries to drink liquids throughout the day.  She continues to work with physical therapy, but uses a combination of a wheelchair and walker for mobilization.  She is not running any fevers.    PREVIOUS APPT (BREAST CANCER): Rosemary returns to the office for a 6.5-year follow-up after undergoing a right Goldilocks mastectomy with sentinel lymph node biopsy for a T3N0 poorly differentiated right breast cancer.  She has completed her 5 years of anastrozole (as of March 2024), but continues to take this medication.  She does have some associated tiredness and arthritic pain, but is unsure whether this medication is contributing to these symptoms, or not.  She does have a follow-up appointment with medical oncology (Dr. Stanton) on 10/16/2024.  She noted that recently she has been having panic attacks, and her primary care physician started her on some medication for this.  She has not noticed any new masses, skin changes or nipple discharge of the left breast, nor any other abnormalities of the  right chest wall.  She did undergo her screening mammogram of her left breast which showed no concerns for malignancy.    ROS  Review of Systems   Constitutional: no fever, no chills, no recent weight gain and no recent weight loss.   Eyes: no loss of vision, no discharge from the eyes, no itching of the eyes and no eye pain.   ENT: no hearing loss, no neck pain and no hoarseness.   Cardiovascular: no chest pain, no palpitations and no lower extremity edema.   Respiratory: no dyspnea, no dyspnea during exertion and no cough.   Breast: no nipple discharge, no breast mass, no pain in breast, no erythema, no change in breast skin and no axillary adenopathy.   Gastrointestinal: no abdominal pain, no vomiting, no nausea.   Genitourinary: no dysuria and no hematuria.   Musculoskeletal: arthralgias, muscle weakness and joint stiffness/swelling, but no myalgias.   Integumentary: no rashes and no skin lesions.   Neurological: no headache, no dizziness and no numbness.   Psychiatric: anxiety, but no depression and no emotional problems.   Endocrine: no heat or cold intolerance and no increased thirst.   Hematologic/Lymphatic: no tendency for easy bleeding and no tendency for easy bruising.   All other systems have been reviewed and are negative for complaint.     HISTORY     Past Medical History:   Diagnosis Date    Anxiety and depression     Breast cancer     CKD (chronic kidney disease)     HLD (hyperlipidemia)     HTN (hypertension)     Hypothyroidism     Pulmonary embolism     postop        Past Surgical History:   Procedure Laterality Date    BREAST BIOPSY      CARPAL TUNNEL RELEASE Bilateral      SECTION, CLASSIC      CHOLECYSTECTOMY      COLOSTOMY  2025    Hanna's procedure    COLOSTOMY  2025    EXPLORATORY LAPAROTOMY  2025    FOOT SURGERY Bilateral     bunions and corns    MASTECTOMY Right 2018    TOTAL HIP ARTHROPLASTY Left         Family History   Problem Relation Name Age of Onset     Breast cancer Mother  65        Allergies   Allergen Reactions    Penicillins Anaphylaxis and Syncope     Tolerated cefepime on 06/08/2025    Morphine Itching    Clarithromycin Nausea/vomiting    Hydrochlorothiazide Diarrhea    Tetracycline Nausea/vomiting    Cortisone Rash and Headache     Facial pain    Methylprednisolone Swelling and Rash     facial pain    Nifedipine Rash, Dizziness and Blurred vision    Prednisone Rash and Headache     Pain         Social History     Tobacco Use   Smoking Status Never   Smokeless Tobacco Never        Social History     Substance and Sexual Activity   Alcohol Use Not Currently        HOME MEDICATIONS  Current Outpatient Medications   Medication Instructions    acetaminophen (TYLENOL) 650 mg, oral, Every 4 hours PRN    anastrozole (Arimidex) 1 mg tablet Take 1 tablet (1 mg total) by mouth once daily.    apixaban (ELIQUIS) 2.5 mg, oral, Every 12 hours    artificial tears, dextran-hypomel-glycerin, 0.1-0.3-0.2 % ophthalmic solution 1 drop, ophthalmic (eye), 4 times daily PRN,      bacitracin 500 unit/gram ointment Topical, Daily    calcium carbonate-vitamin D3 500 mg-5 mcg (200 unit) tablet 2 tablets, oral, Daily    dilTIAZem CD (CARDIZEM CD) 120 mg, oral, Daily    levothyroxine (Synthroid, Levoxyl) 25 mcg tablet 1 tablet, Daily    lidocaine 4 % patch 1 patch, transdermal, Daily, Remove & discard patch within 12 hours or as directed by MD.    losartan (COZAAR) 100 mg, oral, Daily    melatonin 3 mg, oral, Nightly PRN    metoprolol tartrate (LOPRESSOR) 100 mg, oral, 2 times daily    olmesartan (BENIcar) 40 mg tablet 1 tablet, Daily    ondansetron ODT (ZOFRAN-ODT) 4 mg, oral, Every 8 hours PRN    oxycodone HCl (OXYCODONE ORAL) 5 mg, oral, 2 times daily PRN    pantoprazole (PROTONIX) 40 mg, oral, Daily before breakfast, Do not crush, chew, or split.    polyethylene glycol (GLYCOLAX, MIRALAX) 8.5 g, oral, Daily    rOPINIRole (REQUIP) 0.5 mg, oral, 3 times daily    rOPINIRole (REQUIP) 2  "mg, oral, Nightly    tiZANidine (ZANAFLEX) 2 mg, oral, Every 12 hours PRN, Avoid if sleepy or RASS less 00.  Avoid if RR less 12 bpm.  Avoid if HR less 60 bpm.  Avoid if SBP less 90 mmhg.          OBJECTIVE   Last Recorded Vitals.  Blood pressure 150/85, pulse 68, height (!) 1.473 m (4' 10\"), weight 59.4 kg (131 lb), SpO2 98%.     PHYSICAL EXAM  Physical Exam   General: Well-developed, well-nourished and in no acute distress.  Head: Normocephalic. Atraumatic.  Neck/thyroid: Neck is supple. Normal thyroid without mass. No jugular venous distention.  Eyes: Pupils equal round and reactive to light. Conjunctiva normal.  ENMT: No masses or deformity of external nose. External ears without masses.  Respiratory/Chest: Normal respiratory effort.  Breast: No abnormalities of the left breast. On the right, status post mastectomy with inverted T shaped scar. At the central portion of the mastectomy wound, j there is a mound of tissue, but the previous \"nodules\" are no longer palpable. No induration. No obvious mass.  Lymphatics: No palpable lymphadenopathy of the cervical, supraclavicular or axillary regions.  Cardiovascular: Regular rate and rhythm.   Abdomen: Soft, nontender, nondistended.  Midline incision is healing well without any evidence of infection or hernia.  Colostomy in the left lower quadrant is pink and viable with soft, brown stool output.  Musculoskeletal: Joints and limbs are grossly normal.   Neuro: Oriented to person, place and time. No obvious neurological deficit.   Psych: Normal mood and affect.     RESULTS   Labs  No results found for this or any previous visit (from the past 24 hours).    Radiology Resutls  mammo left screening tomosynthesis  Status: Final result     PACS Images     Show images for BI mammo left screening tomosynthesis  Signed by    Signed Time Phone Pager   Chris Escobar MD 9/23/2024 14:24 [phone]     Exam Information    Status Exam Begun Exam Ended   Final 9/23/2024 " "09:55 9/23/2024 10:10     Study Result    Narrative & Impression   Interpreted By:  Chris Escobar,   STUDY:  BI MAMMO LEFT SCREENING TOMOSYNTHESIS;  9/23/2024 10:10 am      ACCESSION NUMBER(S):  [id number]      ORDERING CLINICIAN:  AISHA MEADOWS      INDICATION:  Screening.      ,Z12.31 Encounter for screening mammogram for malignant neoplasm of  breast      COMPARISON:  Multiple prior examinations dating back to 06/10/2019      FINDINGS:  2D and tomosynthesis images were reviewed at 1 mm slice thickness.  Left unilateral screening mammogram      Density:  There are scattered areas of fibroglandular density.      There are punctate calcifications left breast, stable. No suspicious  masses or calcifications are identified.      IMPRESSION:  No mammographic evidence of malignancy.      BI-RADS CATEGORY:  BI-RADS Category:  2 Benign.  Recommendation:  Annual Screening.  Recommended Date:  1 Year.     SURGICAL PATHOLOGY  Surgical Pathology Exam: [id number]  Order: [id number]   Collected 6/7/2025 22:36       Status: Final result       Dx: Diverticulitis    Test Result Released: No (inaccessible in [facility])    0 Result Notes       Component  Resulting Agency   FINAL DIAGNOSIS   A. 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 The \"mound\" of tissue in the central portion of her mastectomy wound is her subcutaneous tissue from the Goldilocks mastectomy. The previous nodules are no longer palpable and previous biopsy was " benign. Those nodules most likely were consistent with fatty necrosis. No clinical evidence for recurrent cancer.   2. Recent yearly screening left mammogram was without radiographic evidence of malignancy.  Continue with yearly left screening mammograms. She will follow-up in the office after this mammogram.   3.  She has completed her 5 years of anastrozole as of March 2024.  She will follow-up with medical oncology (Dr. Stanton) on 10/16/2024 to discuss whether, or not, to continue this medication.  She is having some tiredness/exhaustion and some arthritic pain, but it is unclear whether is associated with this medication or not.  She does still take a calcium and vitamin D supplement while on this medication.  4.  Prescription provided for postmastectomy bra and prosthesis.  5.  She recently underwent an emergency Hanna's procedure for perforated diverticular disease.  Pathology showed benign diverticular disease.  Although she is healing well from the surgery, from a physical standpoint, she is still quite weak and malnourished.  Encouraged her to try to drink 3 protein drinks per day to optimize her nutrition.  She will continue working with physical therapy for strength and endurance.  She is still residing in a skilled nursing facility.  Given her physical status, and advanced age, she is unlikely to be a candidate for colostomy takedown surgery in the future.  6.  She will return to the office 10/1/2025 after her left screening mammogram 9/29/2025 for routine follow-up of her right breast cancer.      Cynthia Haywood MD, FACS   [facility]  [address] [place] [address];   [employer] [address]  [place]  [address] [phone]

## 2025-07-03 ENCOUNTER — APPOINTMENT (OUTPATIENT)
Dept: RADIOLOGY | Facility: HOSPITAL | Age: 80
End: 2025-07-03
Payer: MEDICARE

## 2025-07-03 ENCOUNTER — HOSPITAL ENCOUNTER (EMERGENCY)
Facility: HOSPITAL | Age: 80
Discharge: HOME | End: 2025-07-03
Attending: EMERGENCY MEDICINE
Payer: MEDICARE

## 2025-07-03 VITALS
WEIGHT: 131 LBS | OXYGEN SATURATION: 100 % | TEMPERATURE: 97.9 F | DIASTOLIC BLOOD PRESSURE: 58 MMHG | SYSTOLIC BLOOD PRESSURE: 138 MMHG | HEIGHT: 58 IN | HEART RATE: 52 BPM | BODY MASS INDEX: 27.5 KG/M2 | RESPIRATION RATE: 16 BRPM

## 2025-07-03 DIAGNOSIS — M25.551 RIGHT HIP PAIN: ICD-10-CM

## 2025-07-03 DIAGNOSIS — S70.11XS: ICD-10-CM
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
## 2025-07-03 PROCEDURE — 73560 X-RAY EXAM OF KNEE 1 OR 2: CPT | Mod: RT

## 2025-07-03 PROCEDURE — 73560 X-RAY EXAM OF KNEE 1 OR 2: CPT | Mod: RIGHT SIDE | Performed by: RADIOLOGY

## 2025-07-03 PROCEDURE — 72131 CT LUMBAR SPINE W/O DYE: CPT | Performed by: STUDENT IN AN ORGANIZED HEALTH CARE EDUCATION/TRAINING PROGRAM

## 2025-07-03 PROCEDURE — 2500000001 HC RX 250 WO HCPCS SELF ADMINISTERED DRUGS (ALT 637 FOR MEDICARE OP): Performed by: EMERGENCY MEDICINE

## 2025-07-03 PROCEDURE — 73700 CT LOWER EXTREMITY W/O DYE: CPT | Mod: RIGHT SIDE | Performed by: RADIOLOGY

## 2025-07-03 PROCEDURE — 73552 X-RAY EXAM OF FEMUR 2/>: CPT | Mod: RT

## 2025-07-03 PROCEDURE — 99284 EMERGENCY DEPT VISIT MOD MDM: CPT | Performed by: EMERGENCY MEDICINE

## 2025-07-03 PROCEDURE — 72131 CT LUMBAR SPINE W/O DYE: CPT

## 2025-07-03 PROCEDURE — 73502 X-RAY EXAM HIP UNI 2-3 VIEWS: CPT | Mod: RIGHT SIDE | Performed by: STUDENT IN AN ORGANIZED HEALTH CARE EDUCATION/TRAINING PROGRAM

## 2025-07-03 PROCEDURE — 73700 CT LOWER EXTREMITY W/O DYE: CPT | Mod: RT

## 2025-07-03 PROCEDURE — 73552 X-RAY EXAM OF FEMUR 2/>: CPT | Mod: RIGHT SIDE | Performed by: RADIOLOGY

## 2025-07-03 PROCEDURE — 73502 X-RAY EXAM HIP UNI 2-3 VIEWS: CPT | Mod: RT

## 2025-07-03 RX ORDER — OXYCODONE AND ACETAMINOPHEN 5; 325 MG/1; MG/1
1 TABLET ORAL ONCE
Refills: 0 | Status: COMPLETED | OUTPATIENT
Start: 2025-07-03 | End: 2025-07-03

## 2025-07-03 RX ADMIN — OXYCODONE HYDROCHLORIDE AND ACETAMINOPHEN 1 TABLET: 5; 325 TABLET ORAL at 07:24

## 2025-07-03 ASSESSMENT — PAIN DESCRIPTION - PAIN TYPE
TYPE: ACUTE PAIN
TYPE: ACUTE PAIN

## 2025-07-03 ASSESSMENT — LIFESTYLE VARIABLES
EVER HAD A DRINK FIRST THING IN THE MORNING TO STEADY YOUR NERVES TO GET RID OF A HANGOVER: NO
HAVE YOU EVER FELT YOU SHOULD CUT DOWN ON YOUR DRINKING: NO
TOTAL SCORE: 0
EVER FELT BAD OR GUILTY ABOUT YOUR DRINKING: NO
HAVE PEOPLE ANNOYED YOU BY CRITICIZING YOUR DRINKING: NO

## 2025-07-03 ASSESSMENT — PAIN DESCRIPTION - DESCRIPTORS: DESCRIPTORS: ACHING

## 2025-07-03 ASSESSMENT — PAIN DESCRIPTION - ORIENTATION
ORIENTATION: RIGHT
ORIENTATION: RIGHT

## 2025-07-03 ASSESSMENT — PAIN SCALES - GENERAL
PAINLEVEL_OUTOF10: 6
PAINLEVEL_OUTOF10: 6

## 2025-07-03 ASSESSMENT — PAIN DESCRIPTION - PROGRESSION: CLINICAL_PROGRESSION: GRADUALLY WORSENING

## 2025-07-03 ASSESSMENT — PAIN DESCRIPTION - DIRECTION: RADIATING_TOWARDS: RIGHT KNEE

## 2025-07-03 ASSESSMENT — PAIN - FUNCTIONAL ASSESSMENT
PAIN_FUNCTIONAL_ASSESSMENT: 0-10
PAIN_FUNCTIONAL_ASSESSMENT: 0-10

## 2025-07-03 ASSESSMENT — PAIN DESCRIPTION - LOCATION
LOCATION: HIP
LOCATION: HIP

## 2025-07-03 NOTE — ED TRIAGE NOTES
"Pt arrived to ED from SNF via squad for c/o witnessed fall, EMS states that pt got up to use restroom when her right leg \"buckled\" underneath her, EMS states that there was an aide assisting pt to restroom when this happened and aide had to lower pt to the floor. Pt is on eliquis, but did not hit head or have LOC. EMS states that pt does have slight shortening of the right leg, pt is having pain in right hip and knee. Pt was given 50 mcgs of fentanyl prior to arrival and states that it helped with the pain a little. Pt denies any CP or SOB.   "

## 2025-07-03 NOTE — PROGRESS NOTES
Patient care signed out to me by Dr. Rouse.  Patient with persistent pain and paresthesias so was ordered imaging.  CT of the L-spine and hip were read as negative.  Patient continues to have iliopsoas what I believe is a hematoma.  Patient to follow-up with surgery regarding this hematoma in approximately 1 month.  Patient feels better here upon reassessment and will discharge home back to her nursing facility for continued physical therapy.    No indication for admission.  Discussed findings and diagnosis with the patient, follow-up and return to ED precautions given, patient voiced understanding, agrees with plan, questions answered, patient was discharged in stable condition.    MDM/ED Course  Diagnoses as of 07/03/25 1047   Fall, initial encounter   Right hip pain   Iliopsoas muscle hematoma, right, sequela

## 2025-07-03 NOTE — ED PROVIDER NOTES
Rosemary Motta is a 79 y.o. patient presenting to the ED for     Additional History Obtained from:   Limitations to History:  ------------------------------------------------------------------------------------------------------------------------------------------  Physical Exam:  Appearance: Alert, cooperative,   Skin: Warm, dry, appropriate color for ethnicity.  Eyes: Cornea clear. No scleral icterus or injection.   ENT: Mucous membranes moist.  Pulmonary: No accessory muscle use or stridor. Clear lung sounds bilaterally without rhonchi or wheezing.   Cardiac: Heart sounds regular without murmur. B/L radial pulses full and symmetric.   Abdomen: Soft, not tender.  No rebound or guarding.   Musculoskeletal: No gross deformities.   Neurological: Face symmetrical. Voice clear. Appropriately conversant.   Psychiatric: Appropriate mood and affect.    Medical Decision Making:  Chronic Medical Conditions Significantly Affecting Care:  has a past medical history of Anxiety and depression, Breast cancer, CKD (chronic kidney disease), HLD (hyperlipidemia), HTN (hypertension), Hypothyroidism, and Pulmonary embolism.    Social Determinants of Health Significantly Affecting Care:    Differential Diagnosis Considered but not limited to:       External Records Reviewed:   I reviewed recent and relevant outside records including:     Independent Interpretation of Studies: The following studies were ordered as part of the emergency department work up and independently interpreted by me. See ED Course for details.           Escalation of Care:        Discussion of Management with Other Providers:   I discussed the patient/results with:     high on the differential.  She was witnessed to not hit her head or lose consciousness.  She does not have headache.  Therefore imaging of her head was not obtained.      External Records Reviewed:   I reviewed recent and relevant outside records including:     Independent Interpretation of Studies: The following studies were ordered as part of the emergency department work up and independently interpreted by me. See ED Course for details.    X-rays of the right knee, femur, hip, pelvis were reviewed by me and do not show evidence of displaced fracture.  Confirmed by radiology to be without acute fracture identified.  On reevaluation the patient continues to have significant pain despite negative imaging.  CT was therefore ordered to further assess the hip and lumbar spine.    Diagnoses as of 07/15/25 1445   Fall, initial encounter   Right hip pain   Iliopsoas muscle hematoma, right, sequela            Micaela Rouse, DO  07/15/25 144

## 2025-07-10 DIAGNOSIS — R93.1 ABNORMAL ECHOCARDIOGRAM: ICD-10-CM

## 2025-07-21 ENCOUNTER — RESULTS FOLLOW-UP (OUTPATIENT)
Dept: CARDIOLOGY | Facility: HOSPITAL | Age: 80
End: 2025-07-21

## 2025-07-21 ENCOUNTER — HOSPITAL ENCOUNTER (OUTPATIENT)
Dept: CARDIOLOGY | Facility: HOSPITAL | Age: 80
Setting detail: OBSERVATION
Discharge: HOME | End: 2025-07-21
Payer: MEDICARE

## 2025-07-21 ENCOUNTER — HOSPITAL ENCOUNTER (OUTPATIENT)
Dept: CARDIOLOGY | Facility: HOSPITAL | Age: 80
Discharge: HOME | End: 2025-07-21
Payer: MEDICARE

## 2025-07-21 VITALS
HEART RATE: 56 BPM | BODY MASS INDEX: 27.17 KG/M2 | SYSTOLIC BLOOD PRESSURE: 122 MMHG | WEIGHT: 130 LBS | RESPIRATION RATE: 22 BRPM | DIASTOLIC BLOOD PRESSURE: 58 MMHG | OXYGEN SATURATION: 98 %

## 2025-07-21 DIAGNOSIS — I35.8 AORTIC VALVE MASS: ICD-10-CM

## 2025-07-21 DIAGNOSIS — R93.1 ABNORMAL ECHOCARDIOGRAM: ICD-10-CM

## 2025-07-21 PROBLEM — H26.9 NONSENILE CATARACT: Status: ACTIVE | Noted: 2025-07-21

## 2025-07-21 PROBLEM — H53.10 SUBJECTIVE VISUAL DISTURBANCE: Status: ACTIVE | Noted: 2025-07-21

## 2025-07-21 PROBLEM — N63.0 MASS OF BREAST: Status: ACTIVE | Noted: 2025-07-21

## 2025-07-21 PROBLEM — F32.A ANXIETY AND DEPRESSION: Status: ACTIVE | Noted: 2024-03-18

## 2025-07-21 PROBLEM — K44.9 HIATAL HERNIA: Status: ACTIVE | Noted: 2025-05-27

## 2025-07-21 PROBLEM — I26.99 POSTOPERATIVE PULMONARY EMBOLISM (MULTI): Status: ACTIVE | Noted: 2025-07-21

## 2025-07-21 PROBLEM — R53.83 OTHER FATIGUE: Status: ACTIVE | Noted: 2024-12-01

## 2025-07-21 PROBLEM — E55.9 VITAMIN D DEFICIENCY: Status: ACTIVE | Noted: 2024-03-18

## 2025-07-21 PROBLEM — F41.9 ANXIETY AND DEPRESSION: Status: ACTIVE | Noted: 2024-03-18

## 2025-07-21 PROBLEM — H04.129 TEAR FILM INSUFFICIENCY: Status: ACTIVE | Noted: 2025-07-21

## 2025-07-21 PROBLEM — L25.9 CONTACT DERMATITIS: Status: ACTIVE | Noted: 2025-07-21

## 2025-07-21 PROBLEM — H01.009 BLEPHARITIS: Status: ACTIVE | Noted: 2025-07-21

## 2025-07-21 PROBLEM — M79.603 PAIN OF UPPER EXTREMITY: Status: ACTIVE | Noted: 2025-07-21

## 2025-07-21 PROBLEM — H43.399 VITREOUS OPACITIES: Status: ACTIVE | Noted: 2025-07-21

## 2025-07-21 PROBLEM — N39.42 URINARY INCONTINENCE WITHOUT SENSORY AWARENESS: Status: ACTIVE | Noted: 2023-09-13

## 2025-07-21 PROBLEM — M16.11 PRIMARY OSTEOARTHRITIS OF RIGHT HIP: Status: ACTIVE | Noted: 2017-07-17

## 2025-07-21 PROBLEM — H35.30 AGE-RELATED MACULAR DEGENERATION: Status: ACTIVE | Noted: 2025-07-21

## 2025-07-21 PROBLEM — R10.9 ABDOMINAL PAIN WITH RADIATION TO BACK: Status: ACTIVE | Noted: 2025-07-21

## 2025-07-21 PROBLEM — Z86.711 HISTORY OF PULMONARY EMBOLISM: Status: ACTIVE | Noted: 2025-07-21

## 2025-07-21 PROBLEM — F51.01 PRIMARY INSOMNIA: Status: ACTIVE | Noted: 2024-09-26

## 2025-07-21 PROBLEM — H52.00 HYPERMETROPIA: Status: ACTIVE | Noted: 2025-07-21

## 2025-07-21 PROBLEM — H52.4 PRESBYOPIA: Status: ACTIVE | Noted: 2025-07-21

## 2025-07-21 PROBLEM — G62.9 NEUROPATHY: Status: ACTIVE | Noted: 2025-07-21

## 2025-07-21 PROBLEM — K57.92 ACUTE DIVERTICULITIS: Status: ACTIVE | Noted: 2025-01-05

## 2025-07-21 PROBLEM — E03.9 ACQUIRED HYPOTHYROIDISM: Status: ACTIVE | Noted: 2024-03-18

## 2025-07-21 PROBLEM — E78.5 HYPERLIPIDEMIA: Status: ACTIVE | Noted: 2025-01-05

## 2025-07-21 PROBLEM — R15.9 FULL INCONTINENCE OF FECES: Status: ACTIVE | Noted: 2024-12-01

## 2025-07-21 PROBLEM — T81.718A POSTOPERATIVE PULMONARY EMBOLISM (MULTI): Status: ACTIVE | Noted: 2025-07-21

## 2025-07-21 PROBLEM — K57.20 DIVERTICULITIS OF LARGE INTESTINE WITH ABSCESS: Status: ACTIVE | Noted: 2025-01-05

## 2025-07-21 PROBLEM — N39.0 URINARY TRACT INFECTION: Status: ACTIVE | Noted: 2025-07-21

## 2025-07-21 PROBLEM — Z85.3 HISTORY OF MALIGNANT NEOPLASM OF BREAST: Status: ACTIVE | Noted: 2025-07-21

## 2025-07-21 PROBLEM — I89.0 LYMPHEDEMA: Status: ACTIVE | Noted: 2025-07-21

## 2025-07-21 LAB
ATRIAL RATE: 66 BPM
EJECTION FRACTION: 63 %
P AXIS: 55 DEGREES
P OFFSET: 189 MS
P ONSET: 136 MS
PR INTERVAL: 174 MS
Q ONSET: 223 MS
QRS COUNT: 10 BEATS
QRS DURATION: 78 MS
QT INTERVAL: 384 MS
QTC CALCULATION(BAZETT): 402 MS
QTC FREDERICIA: 396 MS
R AXIS: 3 DEGREES
T AXIS: 10 DEGREES
T OFFSET: 415 MS
VENTRICULAR RATE: 66 BPM

## 2025-07-21 PROCEDURE — 99152 MOD SED SAME PHYS/QHP 5/>YRS: CPT | Performed by: INTERNAL MEDICINE

## 2025-07-21 PROCEDURE — 96375 TX/PRO/DX INJ NEW DRUG ADDON: CPT | Mod: 59

## 2025-07-21 PROCEDURE — 93005 ELECTROCARDIOGRAM TRACING: CPT | Mod: 59

## 2025-07-21 PROCEDURE — 93320 DOPPLER ECHO COMPLETE: CPT | Performed by: INTERNAL MEDICINE

## 2025-07-21 PROCEDURE — 93320 DOPPLER ECHO COMPLETE: CPT

## 2025-07-21 PROCEDURE — 2500000004 HC RX 250 GENERAL PHARMACY W/ HCPCS (ALT 636 FOR OP/ED): Performed by: INTERNAL MEDICINE

## 2025-07-21 PROCEDURE — 96374 THER/PROPH/DIAG INJ IV PUSH: CPT | Mod: 59

## 2025-07-21 PROCEDURE — 99152 MOD SED SAME PHYS/QHP 5/>YRS: CPT

## 2025-07-21 PROCEDURE — 7100000010 HC PHASE TWO TIME - EACH INCREMENTAL 1 MINUTE

## 2025-07-21 PROCEDURE — 93312 ECHO TRANSESOPHAGEAL: CPT | Performed by: INTERNAL MEDICINE

## 2025-07-21 PROCEDURE — 7100000009 HC PHASE TWO TIME - INITIAL BASE CHARGE

## 2025-07-21 PROCEDURE — 93319 3D ECHO IMG CGEN CAR ANOMAL: CPT | Performed by: INTERNAL MEDICINE

## 2025-07-21 PROCEDURE — 2500000005 HC RX 250 GENERAL PHARMACY W/O HCPCS: Performed by: INTERNAL MEDICINE

## 2025-07-21 PROCEDURE — 99221 1ST HOSP IP/OBS SF/LOW 40: CPT | Performed by: NURSE PRACTITIONER

## 2025-07-21 RX ORDER — MIDAZOLAM HYDROCHLORIDE 1 MG/ML
INJECTION, SOLUTION INTRAMUSCULAR; INTRAVENOUS AS NEEDED
Status: DISCONTINUED | OUTPATIENT
Start: 2025-07-21 | End: 2025-07-21 | Stop reason: HOSPADM

## 2025-07-21 RX ORDER — FENTANYL CITRATE 50 UG/ML
INJECTION, SOLUTION INTRAMUSCULAR; INTRAVENOUS AS NEEDED
Status: DISCONTINUED | OUTPATIENT
Start: 2025-07-21 | End: 2025-07-21 | Stop reason: HOSPADM

## 2025-07-21 RX ORDER — DILTIAZEM HYDROCHLORIDE 120 MG/1
1 CAPSULE, EXTENDED RELEASE ORAL
COMMUNITY
Start: 2025-07-12

## 2025-07-21 RX ORDER — LIDOCAINE HYDROCHLORIDE 20 MG/ML
SOLUTION OROPHARYNGEAL AS NEEDED
Status: DISCONTINUED | OUTPATIENT
Start: 2025-07-21 | End: 2025-07-21 | Stop reason: HOSPADM

## 2025-07-21 RX ORDER — ROSUVASTATIN CALCIUM 5 MG/1
1 TABLET, COATED ORAL
COMMUNITY
Start: 2025-07-11

## 2025-07-21 RX ADMIN — FENTANYL CITRATE 12.5 MCG: 50 INJECTION INTRAMUSCULAR; INTRAVENOUS at 09:34

## 2025-07-21 RX ADMIN — BENZOCAINE 1 SPRAY: 200 SPRAY DENTAL; ORAL; PERIODONTAL at 09:27

## 2025-07-21 RX ADMIN — FENTANYL CITRATE 12.5 MCG: 50 INJECTION INTRAMUSCULAR; INTRAVENOUS at 09:29

## 2025-07-21 RX ADMIN — MIDAZOLAM 0.5 MG: 1 INJECTION INTRAMUSCULAR; INTRAVENOUS at 09:36

## 2025-07-21 RX ADMIN — MIDAZOLAM 0.5 MG: 1 INJECTION INTRAMUSCULAR; INTRAVENOUS at 09:29

## 2025-07-21 RX ADMIN — LIDOCAINE HYDROCHLORIDE 1.25 ML: 20 SOLUTION ORAL; TOPICAL at 09:25

## 2025-07-21 RX ADMIN — MIDAZOLAM 0.5 MG: 1 INJECTION INTRAMUSCULAR; INTRAVENOUS at 09:32

## 2025-07-21 ASSESSMENT — ENCOUNTER SYMPTOMS
CONSTITUTIONAL NEGATIVE: 1
CARDIOVASCULAR NEGATIVE: 1
RESPIRATORY NEGATIVE: 1
NEUROLOGICAL NEGATIVE: 1

## 2025-07-21 ASSESSMENT — PAIN SCALES - GENERAL: PAINLEVEL_OUTOF10: 1

## 2025-07-21 ASSESSMENT — PAIN - FUNCTIONAL ASSESSMENT: PAIN_FUNCTIONAL_ASSESSMENT: 0-10

## 2025-07-21 NOTE — SIGNIFICANT EVENT
Patient returned from procedure, Patient drowsy but responds to commands, family at bedside, will continue to monitor.

## 2025-07-21 NOTE — H&P
History Of Present Illness  Rosemary Motta is a 79 y.o. female presenting with echodensity of the aortic valve, found on the TTE in June 2025. The patient has a past medical history of HTN, HLE, breast ca, diverticulosis, GERD, hypothyroidism, neuropathy, migraines, OA, anxiety/depression. In July she visited the ER due to fall and knee pain. Today we will perform TTE to evaluate echodensity.      Past Medical History  Medical History[1]    Surgical History  Surgical History[2]     Social History  She reports that she has never smoked. She has never used smokeless tobacco. She reports that she does not currently use alcohol. She reports that she does not use drugs.    Family History  Family History[3]     Allergies  Penicillins, Morphine, Clarithromycin, Hydrochlorothiazide, Tetracycline, Cortisone, Methylprednisolone, Nifedipine, and Prednisone    Review of Systems   Constitutional: Negative.    Respiratory: Negative.     Cardiovascular: Negative.    Neurological: Negative.    All other systems reviewed and are negative.       Physical Exam  Vitals reviewed.   Constitutional:       Appearance: Normal appearance.     Cardiovascular:      Rate and Rhythm: Normal rate and regular rhythm.      Pulses: Normal pulses.      Heart sounds: Normal heart sounds.   Pulmonary:      Effort: Pulmonary effort is normal.      Breath sounds: Normal breath sounds.     Musculoskeletal:      Right lower leg: No edema.      Left lower leg: No edema.     Skin:     General: Skin is warm and dry.      Capillary Refill: Capillary refill takes less than 2 seconds.     Neurological:      General: No focal deficit present.      Mental Status: She is alert and oriented to person, place, and time.          Last Recorded Vitals  Blood pressure 157/73, pulse 59, resp. rate 16, weight 59 kg (130 lb), SpO2 100%.    Relevant Results    Assessment & Plan  Abnormal echocardiogram    Aortic valve mass      Plan: CHAPIS    I spent 35 minutes in the  professional and overall care of this patient.      Eli Garcia, APRN-CNP         [1]   Past Medical History:  Diagnosis Date    Anxiety and depression     Breast cancer     CKD (chronic kidney disease)     HLD (hyperlipidemia)     HTN (hypertension)     Hypothyroidism     Pulmonary embolism     postop   [2]   Past Surgical History:  Procedure Laterality Date    BREAST BIOPSY      CARPAL TUNNEL RELEASE Bilateral      SECTION, CLASSIC      CHOLECYSTECTOMY      COLOSTOMY  2025    Hanna's procedure    COLOSTOMY  2025    EXPLORATORY LAPAROTOMY  2025    FOOT SURGERY Bilateral     bunions and corns    MASTECTOMY Right 2018    TOTAL HIP ARTHROPLASTY Left    [3]   Family History  Problem Relation Name Age of Onset    Breast cancer Mother  65

## 2025-07-21 NOTE — PRE-SEDATION DOCUMENTATION
Sedation Plan    ASA 4     Mallampati class: III.    Risks, benefits, and alternatives discussed with patient.    Moderate sedation

## 2025-07-21 NOTE — RESULT ENCOUNTER NOTE
Results need further discussion, nothing urgent. Follow up as usual to discuss.  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  We can discuss further at the follow-up visit.

## 2025-07-21 NOTE — DISCHARGE INSTRUCTIONS
If you have any questions, please call the Cath Lab Nurse Practitioner Anjelica Garcia at [phone] and leave a message. She will return your call the same day if calling before 3 PM M-F. If you call on the weekend you can expect a call back on Monday morning. You may also call the Cath Lab at [phone] M-F, 7-3:30 with any questions. Weekends and after hours please call your cardiologist office number [phone] to reach a physician on call.

## 2025-07-23 ENCOUNTER — OFFICE VISIT (OUTPATIENT)
Dept: PRIMARY CARE CLINIC | Age: 80
End: 2025-07-23
Payer: MEDICARE

## 2025-07-23 VITALS
DIASTOLIC BLOOD PRESSURE: 80 MMHG | OXYGEN SATURATION: 98 % | BODY MASS INDEX: 26.57 KG/M2 | SYSTOLIC BLOOD PRESSURE: 135 MMHG | HEIGHT: 58 IN | WEIGHT: 126.6 LBS | HEART RATE: 67 BPM | TEMPERATURE: 98.5 F

## 2025-07-23 DIAGNOSIS — I48.0 PAROXYSMAL ATRIAL FIBRILLATION (HCC): ICD-10-CM

## 2025-07-23 DIAGNOSIS — Z09 HOSPITAL DISCHARGE FOLLOW-UP: Primary | ICD-10-CM

## 2025-07-23 DIAGNOSIS — K57.92 ACUTE DIVERTICULITIS: ICD-10-CM

## 2025-07-23 DIAGNOSIS — Z93.3 COLOSTOMY PRESENT (HCC): ICD-10-CM

## 2025-07-23 DIAGNOSIS — Z13.820 ENCOUNTER FOR SCREENING FOR OSTEOPOROSIS: ICD-10-CM

## 2025-07-23 PROCEDURE — 1159F MED LIST DOCD IN RCRD: CPT | Performed by: FAMILY MEDICINE

## 2025-07-23 PROCEDURE — 99214 OFFICE O/P EST MOD 30 MIN: CPT | Performed by: FAMILY MEDICINE

## 2025-07-23 PROCEDURE — 1111F DSCHRG MED/CURRENT MED MERGE: CPT | Performed by: FAMILY MEDICINE

## 2025-07-23 PROCEDURE — 3078F DIAST BP <80 MM HG: CPT | Performed by: FAMILY MEDICINE

## 2025-07-23 PROCEDURE — 1160F RVW MEDS BY RX/DR IN RCRD: CPT | Performed by: FAMILY MEDICINE

## 2025-07-23 PROCEDURE — 1124F ACP DISCUSS-NO DSCNMKR DOCD: CPT | Performed by: FAMILY MEDICINE

## 2025-07-23 PROCEDURE — 3075F SYST BP GE 130 - 139MM HG: CPT | Performed by: FAMILY MEDICINE

## 2025-07-23 RX ORDER — TIZANIDINE 2 MG/1
2 TABLET ORAL 2 TIMES DAILY
COMMUNITY

## 2025-07-23 RX ORDER — LOSARTAN POTASSIUM 100 MG/1
100 TABLET ORAL DAILY
COMMUNITY

## 2025-07-23 RX ORDER — DILTIAZEM HYDROCHLORIDE 120 MG/1
120 CAPSULE, EXTENDED RELEASE ORAL DAILY
COMMUNITY
Start: 2025-07-12

## 2025-07-23 RX ORDER — PANTOPRAZOLE SODIUM 40 MG/1
40 TABLET, DELAYED RELEASE ORAL DAILY
COMMUNITY
Start: 2025-07-12

## 2025-07-23 RX ORDER — GABAPENTIN 100 MG/1
100 CAPSULE ORAL NIGHTLY
COMMUNITY

## 2025-07-23 RX ORDER — ACETAMINOPHEN 325 MG/1
650 TABLET ORAL EVERY 4 HOURS PRN
COMMUNITY

## 2025-07-23 RX ORDER — MIRTAZAPINE 7.5 MG/1
7.5 TABLET, FILM COATED ORAL NIGHTLY
COMMUNITY

## 2025-07-23 RX ORDER — METOPROLOL TARTRATE 100 MG/1
100 TABLET ORAL 2 TIMES DAILY
COMMUNITY

## 2025-07-23 RX ORDER — APIXABAN 2.5 MG/1
2.5 TABLET, FILM COATED ORAL 2 TIMES DAILY
COMMUNITY
Start: 2025-07-12

## 2025-07-23 NOTE — PROGRESS NOTES
Post-Discharge Transitional Care Follow Up      Brie Harris   YOB: 1945    Date of Office Visit:  7/23/2025  Date of Hospital Admission: 5/7/25  Date of Hospital Discharge: 5/7/25  Readmission Risk Score (high >=14%. Medium >=10%):Readmission Risk Score: 5.6      Care management risk score Rising risk (score 2-5) and Complex Care (Scores >=6): No Risk Score On File     Non face to face  following discharge, date last encounter closed (first attempt may have been earlier): *No documented post hospital discharge outreach found in the last 14 days     Call initiated 2 business days of discharge: *No response recorded in the last 14 days     Hospital discharge follow-up  -     SC DISCHARGE MEDS RECONCILED W/ CURRENT OUTPATIENT MED LIST  Paroxysmal atrial fibrillation (HCC)  Acute diverticulitis  Colostomy present (HCC)  Encounter for screening for osteoporosis      Medical Decision Making: moderate complexity  Return in about 3 months (around 10/23/2025) for Due for AWV.    On this date 7/23/2025 I have spent not billed by time minutes reviewing previous notes, test results and face to face with the patient discussing the diagnosis and importance of compliance with the treatment plan as well as documenting on the day of the visit.       Subjective:   HPI    Inpatient course: Discharge summary reviewed- see chart.  Chief Complaint   Patient presents with    Follow-Up from Hospital     Pt was in-pt [facility] 06/07-06/19 for diverticulitis and had surgery on 6/08 and removed part of her colon. Pt has colostomy bag. Pt was discharged from rehab a week ago.      Interval history/Current status:     Diverticulitis Hospital follow-up  Patient was inpatient at [facility]"" from 6/7/2025 until 6/19/2025 with diverticulitis which had perforated  Patient did have partial colectomy on 6/8/2025 and was given a colostomy bag  Patient was then sent to subacute rehab and was discharged about a week ago

## 2025-07-23 NOTE — PATIENT INSTRUCTIONS
[facility] Prescription Assistance Program  Phone: [phone]  Address: [address] [place], [place] [address]

## 2025-07-29 NOTE — TELEPHONE ENCOUNTER
----- Message from Mona Quintanilla sent at 7/21/2025  1:15 PM EDT -----  Results need further discussion, nothing urgent. Follow up as usual to discuss.  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  We can discuss further at the follow-up visit.  ----- Message -----  From: Geno, Syngo - Cardiology Results In  Sent: 7/21/2025   1:12 PM EDT  To: Mona Quintanilla MD

## 2025-07-29 NOTE — TELEPHONE ENCOUNTER
Reached her daughter.  Dr. Quintanilla did discuss with her after the CHAPIS.  She is bringing her to the visit with Dr. Quintanilla on 8/21/25 to be there for the discussion.

## 2025-07-30 DIAGNOSIS — I48.0 PAROXYSMAL ATRIAL FIBRILLATION (HCC): ICD-10-CM

## 2025-07-30 RX ORDER — MIRTAZAPINE 7.5 MG/1
7.5 TABLET, FILM COATED ORAL NIGHTLY
Qty: 30 TABLET | Refills: 3 | Status: SHIPPED | OUTPATIENT
Start: 2025-07-30

## 2025-07-30 RX ORDER — LOSARTAN POTASSIUM 100 MG/1
100 TABLET ORAL DAILY
Qty: 30 TABLET | Refills: 3 | Status: SHIPPED | OUTPATIENT
Start: 2025-07-30

## 2025-07-30 RX ORDER — DILTIAZEM HYDROCHLORIDE 120 MG/1
120 CAPSULE, EXTENDED RELEASE ORAL DAILY
Qty: 30 CAPSULE | Refills: 3 | Status: SHIPPED | OUTPATIENT
Start: 2025-07-30

## 2025-07-30 RX ORDER — APIXABAN 2.5 MG/1
2.5 TABLET, FILM COATED ORAL 2 TIMES DAILY
Qty: 60 TABLET | Refills: 3 | Status: SHIPPED | OUTPATIENT
Start: 2025-07-30

## 2025-07-30 RX ORDER — GABAPENTIN 100 MG/1
100 CAPSULE ORAL NIGHTLY
Qty: 30 CAPSULE | Refills: 3 | Status: SHIPPED | OUTPATIENT
Start: 2025-07-30 | End: 2025-11-27

## 2025-07-30 NOTE — TELEPHONE ENCOUNTER
Pt called requesting refills for gabapentin, mirtazapine, eliquis, diltiazem and losartan sent to [facility].     Pt states she is taking diltiazem 1 tab twice a day and losartan 1 tab twice a day per her aids notes. Order is for 1 time a day on these as her 7/23 AVS said continue taking and directions were for once a day. please advise. Please call patient to let her know how she is to be taking. Name of Medication(s) Requested:  Requested Prescriptions     Pending Prescriptions Disp Refills    gabapentin (NEURONTIN) 100 MG capsule 30 capsule 3     Sig: Take 1 capsule by mouth at bedtime.    mirtazapine (REMERON) 7.5 MG tablet 30 tablet 3     Sig: Take 1 tablet by mouth nightly    ELIQUIS 2.5 MG TABS tablet 60 tablet 3     Sig: Take 1 tablet by mouth 2 times daily    dilTIAZem (TIAZAC) 120 MG extended release capsule 30 capsule 3     Sig: Take 1 capsule by mouth daily    losartan (COZAAR) 100 MG tablet 30 tablet 3     Sig: Take 1 tablet by mouth daily       Medication is on current medication list Yes    Dosage and directions were verified? Yes    Quantity verified: 30 day supply     Pharmacy Verified?  Yes    Last Appointment:  7/23/2025    Future appts:  Future Appointments   Date Time Provider Department Center   10/24/2025 10:00 AM Afsaneh Mello DO [facility] DEP        (If no appt send self scheduling link. .REFILLAPPT)  Scheduling request sent?     [] Yes  [x] No    Does patient need updated?  [] Yes  [x] No

## 2025-07-31 NOTE — TELEPHONE ENCOUNTER
Patient notified and verbalizes understanding about taking Diltiazem and Losartan 1 tab once daily

## 2025-08-05 ENCOUNTER — TELEPHONE (OUTPATIENT)
Dept: PRIMARY CARE CLINIC | Age: 80
End: 2025-08-05

## 2025-08-11 RX ORDER — PANTOPRAZOLE SODIUM 40 MG/1
40 TABLET, DELAYED RELEASE ORAL DAILY
Qty: 30 TABLET | Refills: 3 | Status: SHIPPED | OUTPATIENT
Start: 2025-08-11

## 2025-08-12 ENCOUNTER — TELEPHONE (OUTPATIENT)
Dept: PRIMARY CARE CLINIC | Age: 80
End: 2025-08-12

## 2025-08-12 RX ORDER — SERTRALINE HYDROCHLORIDE 25 MG/1
25 TABLET, FILM COATED ORAL EVERY EVENING
Qty: 30 TABLET | Refills: 2 | Status: SHIPPED | OUTPATIENT
Start: 2025-08-12

## 2025-08-20 RX ORDER — ANASTROZOLE 1 MG/1
1 TABLET ORAL DAILY
Qty: 90 TABLET | Refills: 1 | Status: SHIPPED | OUTPATIENT
Start: 2025-08-20

## 2025-08-21 ENCOUNTER — ANCILLARY PROCEDURE (OUTPATIENT)
Dept: CARDIOLOGY | Facility: HOSPITAL | Age: 80
End: 2025-08-21
Payer: MEDICARE

## 2025-08-21 ENCOUNTER — OFFICE VISIT (OUTPATIENT)
Dept: CARDIOLOGY | Facility: HOSPITAL | Age: 80
End: 2025-08-21
Payer: MEDICARE

## 2025-08-21 VITALS
SYSTOLIC BLOOD PRESSURE: 164 MMHG | HEIGHT: 59 IN | OXYGEN SATURATION: 98 % | BODY MASS INDEX: 24.8 KG/M2 | WEIGHT: 123 LBS | HEART RATE: 58 BPM | DIASTOLIC BLOOD PRESSURE: 68 MMHG

## 2025-08-21 DIAGNOSIS — R29.898 WEAKNESS OF RIGHT LOWER EXTREMITY: ICD-10-CM

## 2025-08-21 DIAGNOSIS — E78.5 HYPERLIPIDEMIA, UNSPECIFIED HYPERLIPIDEMIA TYPE: ICD-10-CM

## 2025-08-21 DIAGNOSIS — D15.1 PAPILLARY FIBROELASTOMA OF HEART: ICD-10-CM

## 2025-08-21 DIAGNOSIS — I48.0 AF (PAROXYSMAL ATRIAL FIBRILLATION) (MULTI): ICD-10-CM

## 2025-08-21 DIAGNOSIS — I10 PRIMARY HYPERTENSION: ICD-10-CM

## 2025-08-21 DIAGNOSIS — I10 PRIMARY HYPERTENSION: Primary | ICD-10-CM

## 2025-08-21 LAB — BODY SURFACE AREA: 1.52 M2

## 2025-08-21 PROCEDURE — 1036F TOBACCO NON-USER: CPT | Performed by: INTERNAL MEDICINE

## 2025-08-21 PROCEDURE — 93247 EXT ECG>7D<15D SCAN A/R: CPT

## 2025-08-21 PROCEDURE — 1159F MED LIST DOCD IN RCRD: CPT | Performed by: INTERNAL MEDICINE

## 2025-08-21 PROCEDURE — 99215 OFFICE O/P EST HI 40 MIN: CPT | Performed by: INTERNAL MEDICINE

## 2025-08-21 PROCEDURE — 99212 OFFICE O/P EST SF 10 MIN: CPT

## 2025-08-21 PROCEDURE — 1160F RVW MEDS BY RX/DR IN RCRD: CPT | Performed by: INTERNAL MEDICINE

## 2025-08-21 PROCEDURE — 3077F SYST BP >= 140 MM HG: CPT | Performed by: INTERNAL MEDICINE

## 2025-08-21 PROCEDURE — 3078F DIAST BP <80 MM HG: CPT | Performed by: INTERNAL MEDICINE

## 2025-08-21 RX ORDER — GABAPENTIN 100 MG/1
CAPSULE ORAL
COMMUNITY
Start: 2025-07-30

## 2025-08-21 RX ORDER — MIRTAZAPINE 7.5 MG/1
7.5 TABLET, FILM COATED ORAL NIGHTLY
COMMUNITY
Start: 2025-07-30

## 2025-08-21 RX ORDER — SERTRALINE HYDROCHLORIDE 25 MG/1
25 TABLET, FILM COATED ORAL EVERY EVENING
COMMUNITY
Start: 2025-08-12

## 2025-08-21 RX ORDER — METOPROLOL TARTRATE 50 MG/1
3 TABLET ORAL
COMMUNITY
Start: 2025-07-25 | End: 2025-08-21 | Stop reason: WASHOUT

## 2025-10-01 ENCOUNTER — APPOINTMENT (OUTPATIENT)
Dept: SURGERY | Facility: CLINIC | Age: 80
End: 2025-10-01
Payer: MEDICARE

## (undated) DEVICE — SYRINGE, 50 CC, IRRIGATION, CATHETER TIP, DISPOSABLE, STERILE, LF

## (undated) DEVICE — DRAPE, SHEET, LAPAROTOMY, INCISE, FENESTRATED, 105 X 115 IN, STERILE

## (undated) DEVICE — CUTTER, PROX LINEAR, 75MM, REG TISSUE, W/ SAFETY LOCK OUT

## (undated) DEVICE — SUTURE, PDS II, 0 36 IN, CT-1, VIOLET

## (undated) DEVICE — SUTURE, VICRYL, 0, 18 IN, UNDYED

## (undated) DEVICE — STAPLER, SKIN PROXIMATE, 35 WIDE

## (undated) DEVICE — SWABSTICK, PREP, IODOPHOR, PVP, 8 IN

## (undated) DEVICE — SYRINGE, 30 CC, LUER LOCK

## (undated) DEVICE — SUTURE, SILK, 3-0, 18 IN SH/CR, BLACK

## (undated) DEVICE — SUTURE, VICRYL, 2-0, 27 IN, SH, UNDYED

## (undated) DEVICE — SOLUTION, PREP, PVP IODINE, FLIP TOP, 4OZ

## (undated) DEVICE — SYRINGE, HYPODERMIC, CONTROL, LUER LOCK, 10 CC, PLASTIC, STERILE

## (undated) DEVICE — SUTURE, ETHILON, 2-0, 18 IN, FS, BLACK, BX/12

## (undated) DEVICE — DRESSING, GAUZE, SPONGE, VERSALON, ALL PURPOSE, 4 X 4 IN, SOFT

## (undated) DEVICE — SUTURE, PDSII, 1, TP-1, VIL, MONO, 48LP

## (undated) DEVICE — LIGASURE IMPACT, 18CM

## (undated) DEVICE — Device

## (undated) DEVICE — COVER HANDLE LIGHT, STERIS, BLUE, STERILE

## (undated) DEVICE — CUTTER, PROXIMATE LINEAR RELOAD, 75MM, BLUE

## (undated) DEVICE — APPLICATOR, CHLORAPREP, W/ORANGE TINT, 26ML

## (undated) DEVICE — NEEDLE, SAFETY, 22 G X 1.5 IN

## (undated) DEVICE — SUTURE, VICRYL, 3-0, 27 IN, SH

## (undated) DEVICE — SUTURE, SILK, 2-0, 30 IN, SH, BLACK

## (undated) DEVICE — GLOVE, PROTEXIS PI CLASSIC, SZ-7.0, PF, LF